# Patient Record
Sex: FEMALE | Race: WHITE | NOT HISPANIC OR LATINO
[De-identification: names, ages, dates, MRNs, and addresses within clinical notes are randomized per-mention and may not be internally consistent; named-entity substitution may affect disease eponyms.]

---

## 2017-01-10 ENCOUNTER — RX RENEWAL (OUTPATIENT)
Age: 82
End: 2017-01-10

## 2017-01-17 ENCOUNTER — APPOINTMENT (OUTPATIENT)
Dept: INTERNAL MEDICINE | Facility: CLINIC | Age: 82
End: 2017-01-17

## 2017-01-17 ENCOUNTER — LABORATORY RESULT (OUTPATIENT)
Age: 82
End: 2017-01-17

## 2017-01-17 VITALS
HEIGHT: 62 IN | HEART RATE: 78 BPM | BODY MASS INDEX: 22.08 KG/M2 | SYSTOLIC BLOOD PRESSURE: 138 MMHG | DIASTOLIC BLOOD PRESSURE: 80 MMHG | RESPIRATION RATE: 14 BRPM | WEIGHT: 120 LBS

## 2017-01-17 DIAGNOSIS — R07.9 CHEST PAIN, UNSPECIFIED: ICD-10-CM

## 2017-01-17 RX ORDER — DILTIAZEM HYDROCHLORIDE 60 MG/1
60 TABLET ORAL 3 TIMES DAILY
Refills: 0 | Status: DISCONTINUED | COMMUNITY
End: 2017-01-17

## 2017-01-26 ENCOUNTER — APPOINTMENT (OUTPATIENT)
Dept: PULMONOLOGY | Facility: CLINIC | Age: 82
End: 2017-01-26

## 2017-01-26 VITALS
HEART RATE: 69 BPM | HEIGHT: 61 IN | DIASTOLIC BLOOD PRESSURE: 80 MMHG | SYSTOLIC BLOOD PRESSURE: 122 MMHG | OXYGEN SATURATION: 98 % | BODY MASS INDEX: 22.66 KG/M2 | WEIGHT: 120 LBS

## 2017-02-07 ENCOUNTER — APPOINTMENT (OUTPATIENT)
Dept: INTERNAL MEDICINE | Facility: CLINIC | Age: 82
End: 2017-02-07

## 2017-02-07 VITALS
WEIGHT: 122 LBS | HEIGHT: 61 IN | SYSTOLIC BLOOD PRESSURE: 124 MMHG | DIASTOLIC BLOOD PRESSURE: 78 MMHG | TEMPERATURE: 98 F | BODY MASS INDEX: 23.03 KG/M2 | HEART RATE: 72 BPM | RESPIRATION RATE: 14 BRPM

## 2017-02-23 ENCOUNTER — APPOINTMENT (OUTPATIENT)
Dept: PULMONOLOGY | Facility: CLINIC | Age: 82
End: 2017-02-23

## 2017-02-23 ENCOUNTER — LABORATORY RESULT (OUTPATIENT)
Age: 82
End: 2017-02-23

## 2017-02-23 VITALS
BODY MASS INDEX: 23.03 KG/M2 | HEART RATE: 70 BPM | HEIGHT: 61 IN | SYSTOLIC BLOOD PRESSURE: 128 MMHG | OXYGEN SATURATION: 98 % | DIASTOLIC BLOOD PRESSURE: 62 MMHG | WEIGHT: 122 LBS

## 2017-02-27 ENCOUNTER — APPOINTMENT (OUTPATIENT)
Dept: CARDIOLOGY | Facility: CLINIC | Age: 82
End: 2017-02-27

## 2017-02-27 ENCOUNTER — NON-APPOINTMENT (OUTPATIENT)
Age: 82
End: 2017-02-27

## 2017-02-27 VITALS
WEIGHT: 116 LBS | DIASTOLIC BLOOD PRESSURE: 62 MMHG | SYSTOLIC BLOOD PRESSURE: 144 MMHG | RESPIRATION RATE: 16 BRPM | HEART RATE: 75 BPM | OXYGEN SATURATION: 98 % | BODY MASS INDEX: 21.92 KG/M2

## 2017-03-13 ENCOUNTER — APPOINTMENT (OUTPATIENT)
Dept: CARDIOLOGY | Facility: CLINIC | Age: 82
End: 2017-03-13

## 2017-03-29 ENCOUNTER — LABORATORY RESULT (OUTPATIENT)
Age: 82
End: 2017-03-29

## 2017-04-05 ENCOUNTER — RX RENEWAL (OUTPATIENT)
Age: 82
End: 2017-04-05

## 2017-04-10 ENCOUNTER — APPOINTMENT (OUTPATIENT)
Dept: INTERNAL MEDICINE | Facility: CLINIC | Age: 82
End: 2017-04-10

## 2017-04-10 VITALS
HEART RATE: 76 BPM | RESPIRATION RATE: 14 BRPM | DIASTOLIC BLOOD PRESSURE: 78 MMHG | SYSTOLIC BLOOD PRESSURE: 132 MMHG | HEIGHT: 61 IN | BODY MASS INDEX: 21.9 KG/M2 | WEIGHT: 116 LBS

## 2017-04-10 RX ORDER — FUROSEMIDE 20 MG/1
20 TABLET ORAL TWICE DAILY
Refills: 0 | Status: DISCONTINUED | COMMUNITY
End: 2017-04-10

## 2017-05-02 ENCOUNTER — LABORATORY RESULT (OUTPATIENT)
Age: 82
End: 2017-05-02

## 2017-05-22 ENCOUNTER — APPOINTMENT (OUTPATIENT)
Dept: PULMONOLOGY | Facility: CLINIC | Age: 82
End: 2017-05-22

## 2017-05-22 VITALS
SYSTOLIC BLOOD PRESSURE: 118 MMHG | WEIGHT: 116 LBS | BODY MASS INDEX: 21.9 KG/M2 | DIASTOLIC BLOOD PRESSURE: 72 MMHG | HEIGHT: 61 IN

## 2017-06-13 ENCOUNTER — LABORATORY RESULT (OUTPATIENT)
Age: 82
End: 2017-06-13

## 2017-06-14 ENCOUNTER — RX RENEWAL (OUTPATIENT)
Age: 82
End: 2017-06-14

## 2017-06-19 ENCOUNTER — APPOINTMENT (OUTPATIENT)
Dept: CARDIOLOGY | Facility: CLINIC | Age: 82
End: 2017-06-19

## 2017-06-25 ENCOUNTER — EMERGENCY (EMERGENCY)
Facility: HOSPITAL | Age: 82
LOS: 1 days | Discharge: HOME | End: 2017-06-25
Admitting: INTERNAL MEDICINE
Payer: MEDICARE

## 2017-06-25 DIAGNOSIS — Z79.01 LONG TERM (CURRENT) USE OF ANTICOAGULANTS: ICD-10-CM

## 2017-06-25 DIAGNOSIS — Z23 ENCOUNTER FOR IMMUNIZATION: ICD-10-CM

## 2017-06-25 DIAGNOSIS — K21.9 GASTRO-ESOPHAGEAL REFLUX DISEASE WITHOUT ESOPHAGITIS: ICD-10-CM

## 2017-06-25 DIAGNOSIS — S51.811A LACERATION WITHOUT FOREIGN BODY OF RIGHT FOREARM, INITIAL ENCOUNTER: ICD-10-CM

## 2017-06-25 DIAGNOSIS — Z79.82 LONG TERM (CURRENT) USE OF ASPIRIN: ICD-10-CM

## 2017-06-25 DIAGNOSIS — S09.90XA UNSPECIFIED INJURY OF HEAD, INITIAL ENCOUNTER: ICD-10-CM
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
## 2017-06-25 DIAGNOSIS — Z98.49 CATARACT EXTRACTION STATUS, UNSPECIFIED EYE: Chronic | ICD-10-CM
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
## 2017-06-25 DIAGNOSIS — I10 ESSENTIAL (PRIMARY) HYPERTENSION: ICD-10-CM

## 2017-06-25 DIAGNOSIS — S61.212A LACERATION WITHOUT FOREIGN BODY OF RIGHT MIDDLE FINGER WITHOUT DAMAGE TO NAIL, INITIAL ENCOUNTER: ICD-10-CM

## 2017-06-25 DIAGNOSIS — S01.81XA LACERATION WITHOUT FOREIGN BODY OF OTHER PART OF HEAD, INITIAL ENCOUNTER: ICD-10-CM
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
## 2017-06-25 PROCEDURE — 73130 X-RAY EXAM OF HAND: CPT

## 2017-06-25 PROCEDURE — 90471 IMMUNIZATION ADMIN: CPT

## 2017-06-25 PROCEDURE — 99284 EMERGENCY DEPT VISIT MOD MDM: CPT | Mod: 25

## 2017-06-25 PROCEDURE — 70450 CT HEAD/BRAIN W/O DYE: CPT | Mod: 26

## 2017-06-25 PROCEDURE — 80048 BASIC METABOLIC PNL TOTAL CA: CPT

## 2017-06-25 PROCEDURE — 85027 COMPLETE CBC AUTOMATED: CPT

## 2017-06-25 PROCEDURE — 70450 CT HEAD/BRAIN W/O DYE: CPT

## 2017-06-25 PROCEDURE — 12014 RPR F/E/E/N/L/M 5.1-7.5 CM: CPT | Mod: XU

## 2017-06-25 PROCEDURE — 12002 RPR S/N/AX/GEN/TRNK2.6-7.5CM: CPT

## 2017-06-25 PROCEDURE — 12014 RPR F/E/E/N/L/M 5.1-7.5 CM: CPT | Mod: 59,RT

## 2017-06-25 PROCEDURE — 85610 PROTHROMBIN TIME: CPT

## 2017-06-25 PROCEDURE — 73130 X-RAY EXAM OF HAND: CPT | Mod: 26,RT

## 2017-06-25 PROCEDURE — 36415 COLL VENOUS BLD VENIPUNCTURE: CPT

## 2017-06-25 PROCEDURE — 90700 DTAP VACCINE < 7 YRS IM: CPT

## 2017-06-25 PROCEDURE — 85730 THROMBOPLASTIN TIME PARTIAL: CPT

## 2017-07-03 ENCOUNTER — RX RENEWAL (OUTPATIENT)
Age: 82
End: 2017-07-03

## 2017-07-05 ENCOUNTER — RX RENEWAL (OUTPATIENT)
Age: 82
End: 2017-07-05

## 2017-07-10 ENCOUNTER — APPOINTMENT (OUTPATIENT)
Dept: INTERNAL MEDICINE | Facility: CLINIC | Age: 82
End: 2017-07-10

## 2017-07-10 VITALS
HEIGHT: 61 IN | RESPIRATION RATE: 14 BRPM | BODY MASS INDEX: 21.71 KG/M2 | HEART RATE: 72 BPM | SYSTOLIC BLOOD PRESSURE: 116 MMHG | DIASTOLIC BLOOD PRESSURE: 72 MMHG | WEIGHT: 115 LBS

## 2017-07-18 ENCOUNTER — LABORATORY RESULT (OUTPATIENT)
Age: 82
End: 2017-07-18

## 2017-07-21 ENCOUNTER — LABORATORY RESULT (OUTPATIENT)
Age: 82
End: 2017-07-21

## 2017-08-03 ENCOUNTER — LABORATORY RESULT (OUTPATIENT)
Age: 82
End: 2017-08-03

## 2017-08-12 ENCOUNTER — RX RENEWAL (OUTPATIENT)
Age: 82
End: 2017-08-12

## 2017-08-17 ENCOUNTER — LABORATORY RESULT (OUTPATIENT)
Age: 82
End: 2017-08-17

## 2017-08-28 ENCOUNTER — APPOINTMENT (OUTPATIENT)
Dept: CARDIOLOGY | Facility: CLINIC | Age: 82
End: 2017-08-28
Payer: MEDICARE

## 2017-08-28 ENCOUNTER — NON-APPOINTMENT (OUTPATIENT)
Age: 82
End: 2017-08-28

## 2017-08-28 VITALS
OXYGEN SATURATION: 99 % | BODY MASS INDEX: 22.58 KG/M2 | WEIGHT: 115 LBS | HEIGHT: 60 IN | SYSTOLIC BLOOD PRESSURE: 148 MMHG | HEART RATE: 60 BPM | DIASTOLIC BLOOD PRESSURE: 68 MMHG | RESPIRATION RATE: 17 BRPM

## 2017-08-28 PROCEDURE — 93000 ELECTROCARDIOGRAM COMPLETE: CPT

## 2017-08-28 PROCEDURE — 99214 OFFICE O/P EST MOD 30 MIN: CPT

## 2017-08-28 PROCEDURE — 93306 TTE W/DOPPLER COMPLETE: CPT

## 2017-09-05 ENCOUNTER — RX RENEWAL (OUTPATIENT)
Age: 82
End: 2017-09-05

## 2017-09-08 ENCOUNTER — RX RENEWAL (OUTPATIENT)
Age: 82
End: 2017-09-08

## 2017-09-20 ENCOUNTER — APPOINTMENT (OUTPATIENT)
Dept: CARDIOLOGY | Facility: CLINIC | Age: 82
End: 2017-09-20
Payer: MEDICARE

## 2017-09-20 PROCEDURE — 93288 INTERROG EVL PM/LDLS PM IP: CPT

## 2017-10-02 ENCOUNTER — RX RENEWAL (OUTPATIENT)
Age: 82
End: 2017-10-02

## 2017-10-04 ENCOUNTER — RX RENEWAL (OUTPATIENT)
Age: 82
End: 2017-10-04

## 2017-10-09 ENCOUNTER — RX RENEWAL (OUTPATIENT)
Age: 82
End: 2017-10-09

## 2017-10-12 ENCOUNTER — LABORATORY RESULT (OUTPATIENT)
Age: 82
End: 2017-10-12

## 2017-10-13 ENCOUNTER — LABORATORY RESULT (OUTPATIENT)
Age: 82
End: 2017-10-13

## 2017-10-16 ENCOUNTER — APPOINTMENT (OUTPATIENT)
Dept: INTERNAL MEDICINE | Facility: CLINIC | Age: 82
End: 2017-10-16
Payer: MEDICARE

## 2017-10-16 VITALS
RESPIRATION RATE: 14 BRPM | TEMPERATURE: 98.4 F | SYSTOLIC BLOOD PRESSURE: 138 MMHG | DIASTOLIC BLOOD PRESSURE: 78 MMHG | HEART RATE: 68 BPM

## 2017-10-16 VITALS
SYSTOLIC BLOOD PRESSURE: 142 MMHG | DIASTOLIC BLOOD PRESSURE: 74 MMHG | RESPIRATION RATE: 15 BRPM | HEART RATE: 64 BPM | HEIGHT: 60 IN | BODY MASS INDEX: 22.78 KG/M2 | WEIGHT: 116 LBS

## 2017-10-16 DIAGNOSIS — B02.9 ZOSTER W/OUT COMPLICATIONS: ICD-10-CM

## 2017-10-16 PROCEDURE — 99215 OFFICE O/P EST HI 40 MIN: CPT | Mod: 25

## 2017-10-16 PROCEDURE — 90662 IIV NO PRSV INCREASED AG IM: CPT

## 2017-10-16 PROCEDURE — G0008: CPT

## 2017-10-30 ENCOUNTER — APPOINTMENT (OUTPATIENT)
Dept: INTERNAL MEDICINE | Facility: CLINIC | Age: 82
End: 2017-10-30
Payer: MEDICARE

## 2017-10-30 ENCOUNTER — LABORATORY RESULT (OUTPATIENT)
Age: 82
End: 2017-10-30

## 2017-10-30 DIAGNOSIS — Z23 ENCOUNTER FOR IMMUNIZATION: ICD-10-CM

## 2017-10-30 PROCEDURE — G0009: CPT

## 2017-10-30 PROCEDURE — 90670 PCV13 VACCINE IM: CPT

## 2017-11-06 ENCOUNTER — RX RENEWAL (OUTPATIENT)
Age: 82
End: 2017-11-06

## 2017-11-13 ENCOUNTER — RX RENEWAL (OUTPATIENT)
Age: 82
End: 2017-11-13

## 2017-11-15 ENCOUNTER — NON-APPOINTMENT (OUTPATIENT)
Age: 82
End: 2017-11-15

## 2017-11-15 ENCOUNTER — APPOINTMENT (OUTPATIENT)
Dept: CARDIOLOGY | Facility: CLINIC | Age: 82
End: 2017-11-15
Payer: MEDICARE

## 2017-11-15 VITALS
BODY MASS INDEX: 23.16 KG/M2 | DIASTOLIC BLOOD PRESSURE: 61 MMHG | WEIGHT: 118 LBS | OXYGEN SATURATION: 98 % | HEIGHT: 60 IN | RESPIRATION RATE: 17 BRPM | SYSTOLIC BLOOD PRESSURE: 144 MMHG | HEART RATE: 60 BPM

## 2017-11-15 PROCEDURE — 99214 OFFICE O/P EST MOD 30 MIN: CPT

## 2017-11-15 PROCEDURE — 93000 ELECTROCARDIOGRAM COMPLETE: CPT

## 2017-11-22 ENCOUNTER — APPOINTMENT (OUTPATIENT)
Dept: PULMONOLOGY | Facility: CLINIC | Age: 82
End: 2017-11-22
Payer: MEDICARE

## 2017-11-22 ENCOUNTER — LABORATORY RESULT (OUTPATIENT)
Age: 82
End: 2017-11-22

## 2017-11-22 VITALS
DIASTOLIC BLOOD PRESSURE: 70 MMHG | HEIGHT: 60 IN | OXYGEN SATURATION: 98 % | SYSTOLIC BLOOD PRESSURE: 122 MMHG | WEIGHT: 118 LBS | BODY MASS INDEX: 23.16 KG/M2

## 2017-11-22 PROCEDURE — 99213 OFFICE O/P EST LOW 20 MIN: CPT

## 2017-11-30 ENCOUNTER — RX RENEWAL (OUTPATIENT)
Age: 82
End: 2017-11-30

## 2017-11-30 ENCOUNTER — MEDICATION RENEWAL (OUTPATIENT)
Age: 82
End: 2017-11-30

## 2017-12-10 ENCOUNTER — FORM ENCOUNTER (OUTPATIENT)
Age: 82
End: 2017-12-10

## 2017-12-11 ENCOUNTER — APPOINTMENT (OUTPATIENT)
Dept: CT IMAGING | Facility: HOSPITAL | Age: 82
End: 2017-12-11
Payer: MEDICARE

## 2017-12-11 ENCOUNTER — OUTPATIENT (OUTPATIENT)
Dept: OUTPATIENT SERVICES | Facility: HOSPITAL | Age: 82
LOS: 1 days | End: 2017-12-11
Payer: MEDICARE

## 2017-12-11 ENCOUNTER — RX RENEWAL (OUTPATIENT)
Age: 82
End: 2017-12-11

## 2017-12-11 DIAGNOSIS — J47.9 BRONCHIECTASIS, UNCOMPLICATED: ICD-10-CM

## 2017-12-11 DIAGNOSIS — Z98.49 CATARACT EXTRACTION STATUS, UNSPECIFIED EYE: Chronic | ICD-10-CM

## 2017-12-11 DIAGNOSIS — J98.4 OTHER DISORDERS OF LUNG: ICD-10-CM

## 2017-12-11 PROCEDURE — 71250 CT THORAX DX C-: CPT | Mod: 26

## 2017-12-11 PROCEDURE — 71250 CT THORAX DX C-: CPT

## 2017-12-13 ENCOUNTER — RX RENEWAL (OUTPATIENT)
Age: 82
End: 2017-12-13

## 2017-12-27 ENCOUNTER — RX RENEWAL (OUTPATIENT)
Age: 82
End: 2017-12-27

## 2017-12-28 ENCOUNTER — MEDICATION RENEWAL (OUTPATIENT)
Age: 82
End: 2017-12-28

## 2017-12-28 ENCOUNTER — RX RENEWAL (OUTPATIENT)
Age: 82
End: 2017-12-28

## 2018-01-08 ENCOUNTER — NON-APPOINTMENT (OUTPATIENT)
Age: 83
End: 2018-01-08

## 2018-01-08 ENCOUNTER — APPOINTMENT (OUTPATIENT)
Dept: CARDIOLOGY | Facility: CLINIC | Age: 83
End: 2018-01-08
Payer: MEDICARE

## 2018-01-08 VITALS
DIASTOLIC BLOOD PRESSURE: 63 MMHG | HEART RATE: 64 BPM | WEIGHT: 120 LBS | OXYGEN SATURATION: 98 % | HEIGHT: 60 IN | RESPIRATION RATE: 17 BRPM | SYSTOLIC BLOOD PRESSURE: 113 MMHG | BODY MASS INDEX: 23.56 KG/M2

## 2018-01-08 VITALS — DIASTOLIC BLOOD PRESSURE: 70 MMHG | SYSTOLIC BLOOD PRESSURE: 138 MMHG

## 2018-01-08 PROCEDURE — 93000 ELECTROCARDIOGRAM COMPLETE: CPT

## 2018-01-08 PROCEDURE — 99214 OFFICE O/P EST MOD 30 MIN: CPT

## 2018-01-09 ENCOUNTER — APPOINTMENT (OUTPATIENT)
Dept: INTERNAL MEDICINE | Facility: CLINIC | Age: 83
End: 2018-01-09
Payer: MEDICARE

## 2018-01-09 ENCOUNTER — LABORATORY RESULT (OUTPATIENT)
Age: 83
End: 2018-01-09

## 2018-01-09 VITALS
SYSTOLIC BLOOD PRESSURE: 128 MMHG | HEART RATE: 68 BPM | BODY MASS INDEX: 23.95 KG/M2 | DIASTOLIC BLOOD PRESSURE: 80 MMHG | WEIGHT: 122 LBS | RESPIRATION RATE: 14 BRPM | HEIGHT: 60 IN

## 2018-01-09 DIAGNOSIS — Z78.9 OTHER SPECIFIED HEALTH STATUS: ICD-10-CM

## 2018-01-09 PROCEDURE — 99215 OFFICE O/P EST HI 40 MIN: CPT

## 2018-01-15 ENCOUNTER — APPOINTMENT (OUTPATIENT)
Dept: CARDIOLOGY | Facility: CLINIC | Age: 83
End: 2018-01-15
Payer: MEDICARE

## 2018-01-15 PROCEDURE — 93293 PM PHONE R-STRIP DEVICE EVAL: CPT

## 2018-01-27 ENCOUNTER — RX RENEWAL (OUTPATIENT)
Age: 83
End: 2018-01-27

## 2018-02-06 ENCOUNTER — RX RENEWAL (OUTPATIENT)
Age: 83
End: 2018-02-06

## 2018-02-08 ENCOUNTER — MEDICATION RENEWAL (OUTPATIENT)
Age: 83
End: 2018-02-08

## 2018-02-25 ENCOUNTER — RX RENEWAL (OUTPATIENT)
Age: 83
End: 2018-02-25

## 2018-02-26 ENCOUNTER — LABORATORY RESULT (OUTPATIENT)
Age: 83
End: 2018-02-26

## 2018-03-21 ENCOUNTER — LABORATORY RESULT (OUTPATIENT)
Age: 83
End: 2018-03-21

## 2018-03-21 LAB
APPEARANCE: CLEAR
BASOPHILS # BLD AUTO: 0.03 K/UL
BASOPHILS NFR BLD AUTO: 0.4 %
BILIRUBIN URINE: NEGATIVE
BLOOD URINE: NEGATIVE
COLOR: YELLOW
EOSINOPHIL # BLD AUTO: 0.17 K/UL
EOSINOPHIL NFR BLD AUTO: 2.5 %
GLUCOSE QUALITATIVE U: NEGATIVE MG/DL
HCT VFR BLD CALC: 39 %
HGB BLD-MCNC: 12.9 G/DL
IMM GRANULOCYTES NFR BLD AUTO: 0 %
KETONES URINE: NEGATIVE
LEUKOCYTE ESTERASE URINE: ABNORMAL
LYMPHOCYTES # BLD AUTO: 1.23 K/UL
LYMPHOCYTES NFR BLD AUTO: 18.3 %
MAN DIFF?: NORMAL
MCHC RBC-ENTMCNC: 28.7 PG
MCHC RBC-ENTMCNC: 33.1 GM/DL
MCV RBC AUTO: 86.9 FL
MONOCYTES # BLD AUTO: 0.56 K/UL
MONOCYTES NFR BLD AUTO: 8.3 %
NEUTROPHILS # BLD AUTO: 4.73 K/UL
NEUTROPHILS NFR BLD AUTO: 70.5 %
NITRITE URINE: NEGATIVE
PH URINE: 5.5
PLATELET # BLD AUTO: 174 K/UL
PROTEIN URINE: 30 MG/DL
RBC # BLD: 4.49 M/UL
RBC # FLD: 16.3 %
SPECIFIC GRAVITY URINE: 1.02
UROBILINOGEN URINE: NEGATIVE MG/DL
WBC # FLD AUTO: 6.72 K/UL

## 2018-03-22 LAB
25(OH)D3 SERPL-MCNC: 11 NG/ML
ALBUMIN SERPL ELPH-MCNC: 4.4 G/DL
ALP BLD-CCNC: 155 U/L
ALT SERPL-CCNC: 19 U/L
ANION GAP SERPL CALC-SCNC: 17 MMOL/L
AST SERPL-CCNC: 24 U/L
BILIRUB SERPL-MCNC: 0.6 MG/DL
BUN SERPL-MCNC: 22 MG/DL
CALCIUM SERPL-MCNC: 11 MG/DL
CHLORIDE SERPL-SCNC: 102 MMOL/L
CHOLEST SERPL-MCNC: 190 MG/DL
CHOLEST/HDLC SERPL: 3.2 RATIO
CO2 SERPL-SCNC: 23 MMOL/L
CREAT SERPL-MCNC: 1.33 MG/DL
CRP SERPL HS-MCNC: 7.3 MG/L
GLUCOSE BS SERPL-MCNC: 105 MG/DL
GLUCOSE SERPL-MCNC: 107 MG/DL
HBA1C MFR BLD HPLC: 6.1 %
HDLC SERPL-MCNC: 60 MG/DL
LDLC SERPL CALC-MCNC: 109 MG/DL
POTASSIUM SERPL-SCNC: 3.9 MMOL/L
PROT SERPL-MCNC: 6.8 G/DL
SODIUM SERPL-SCNC: 142 MMOL/L
T4 FREE SERPL-MCNC: 1.5 NG/DL
TRIGL SERPL-MCNC: 103 MG/DL
TSH SERPL-ACNC: 2.65 UIU/ML
VIT B12 SERPL-MCNC: 1309 PG/ML

## 2018-03-26 ENCOUNTER — RX RENEWAL (OUTPATIENT)
Age: 83
End: 2018-03-26

## 2018-03-30 ENCOUNTER — RX RENEWAL (OUTPATIENT)
Age: 83
End: 2018-03-30

## 2018-04-02 ENCOUNTER — RX RENEWAL (OUTPATIENT)
Age: 83
End: 2018-04-02

## 2018-04-09 ENCOUNTER — LABORATORY RESULT (OUTPATIENT)
Age: 83
End: 2018-04-09

## 2018-04-10 ENCOUNTER — APPOINTMENT (OUTPATIENT)
Dept: INTERNAL MEDICINE | Facility: CLINIC | Age: 83
End: 2018-04-10
Payer: MEDICARE

## 2018-04-10 VITALS
HEIGHT: 60 IN | DIASTOLIC BLOOD PRESSURE: 75 MMHG | BODY MASS INDEX: 23.16 KG/M2 | RESPIRATION RATE: 15 BRPM | HEART RATE: 72 BPM | SYSTOLIC BLOOD PRESSURE: 124 MMHG | WEIGHT: 118 LBS

## 2018-04-10 PROCEDURE — 99215 OFFICE O/P EST HI 40 MIN: CPT

## 2018-04-16 ENCOUNTER — APPOINTMENT (OUTPATIENT)
Dept: CARDIOLOGY | Facility: CLINIC | Age: 83
End: 2018-04-16
Payer: MEDICARE

## 2018-04-16 PROCEDURE — 93293 PM PHONE R-STRIP DEVICE EVAL: CPT

## 2018-04-23 ENCOUNTER — LABORATORY RESULT (OUTPATIENT)
Age: 83
End: 2018-04-23

## 2018-04-29 ENCOUNTER — RX RENEWAL (OUTPATIENT)
Age: 83
End: 2018-04-29

## 2018-04-30 ENCOUNTER — RX RENEWAL (OUTPATIENT)
Age: 83
End: 2018-04-30

## 2018-04-30 ENCOUNTER — LABORATORY RESULT (OUTPATIENT)
Age: 83
End: 2018-04-30

## 2018-05-09 ENCOUNTER — RX RENEWAL (OUTPATIENT)
Age: 83
End: 2018-05-09

## 2018-05-10 ENCOUNTER — APPOINTMENT (OUTPATIENT)
Dept: CARDIOLOGY | Facility: CLINIC | Age: 83
End: 2018-05-10
Payer: MEDICARE

## 2018-05-10 VITALS
WEIGHT: 118 LBS | HEIGHT: 60 IN | DIASTOLIC BLOOD PRESSURE: 66 MMHG | HEART RATE: 60 BPM | RESPIRATION RATE: 17 BRPM | SYSTOLIC BLOOD PRESSURE: 138 MMHG | BODY MASS INDEX: 23.16 KG/M2 | OXYGEN SATURATION: 95 %

## 2018-05-10 VITALS — DIASTOLIC BLOOD PRESSURE: 60 MMHG | SYSTOLIC BLOOD PRESSURE: 120 MMHG

## 2018-05-10 PROCEDURE — 99214 OFFICE O/P EST MOD 30 MIN: CPT

## 2018-05-10 PROCEDURE — 93000 ELECTROCARDIOGRAM COMPLETE: CPT

## 2018-05-29 ENCOUNTER — RX RENEWAL (OUTPATIENT)
Age: 83
End: 2018-05-29

## 2018-06-04 ENCOUNTER — LABORATORY RESULT (OUTPATIENT)
Age: 83
End: 2018-06-04

## 2018-06-21 ENCOUNTER — RX RENEWAL (OUTPATIENT)
Age: 83
End: 2018-06-21

## 2018-06-28 ENCOUNTER — RX RENEWAL (OUTPATIENT)
Age: 83
End: 2018-06-28

## 2018-07-10 ENCOUNTER — APPOINTMENT (OUTPATIENT)
Dept: INTERNAL MEDICINE | Facility: CLINIC | Age: 83
End: 2018-07-10
Payer: MEDICARE

## 2018-07-10 ENCOUNTER — RX RENEWAL (OUTPATIENT)
Age: 83
End: 2018-07-10

## 2018-07-10 ENCOUNTER — LABORATORY RESULT (OUTPATIENT)
Age: 83
End: 2018-07-10

## 2018-07-10 VITALS — BODY MASS INDEX: 20.43 KG/M2 | HEIGHT: 62 IN | WEIGHT: 111 LBS

## 2018-07-10 VITALS — DIASTOLIC BLOOD PRESSURE: 64 MMHG | SYSTOLIC BLOOD PRESSURE: 122 MMHG | HEART RATE: 62 BPM | RESPIRATION RATE: 15 BRPM

## 2018-07-10 PROCEDURE — 99215 OFFICE O/P EST HI 40 MIN: CPT

## 2018-07-10 NOTE — REVIEW OF SYSTEMS
[Nasal Discharge] : nasal discharge [Nocturia] : nocturia [Fever] : no fever [Chills] : no chills [Fatigue] : no fatigue [Hot Flashes] : no hot flashes [Night Sweats] : no night sweats [Recent Change In Weight] : ~T no recent weight change [Discharge] : no discharge [Pain] : no pain [Redness] : no redness [Dryness] : no dryness  [Vision Problems] : no vision problems [Itching] : no itching [Earache] : no earache [Hearing Loss] : no hearing loss [Nosebleed] : no nosebleeds [Hoarseness] : no hoarseness [Sore Throat] : no sore throat [Postnasal Drip] : no postnasal drip [Chest Pain] : no chest pain [Palpitations] : no palpitations [Leg Claudication] : no leg claudication [Lower Ext Edema] : no lower extremity edema [Orthopnea] : no orthopnea [Paroysmal Nocturnal Dyspnea] : no paroysmal nocturnal dyspnea [Shortness Of Breath] : no shortness of breath [Wheezing] : no wheezing [Cough] : no cough [Dyspnea on Exertion] : no dyspnea on exertion [Abdominal Pain] : no abdominal pain [Nausea] : no nausea [Constipation] : no constipation [Diarrhea] : diarrhea [Vomiting] : no vomiting [Heartburn] : no heartburn [Melena] : no melena [Dysuria] : no dysuria [Incontinence] : no incontinence [Poor Libido] : libido not poor [Hematuria] : no hematuria [Frequency] : no frequency [Vaginal Discharge] : no vaginal discharge [Dysmenorrhea] : no dysmenorrhea [Joint Pain] : no joint pain [Joint Stiffness] : no joint stiffness [Joint Swelling] : no joint swelling [Muscle Weakness] : no muscle weakness [Muscle Pain] : no muscle pain [Back Pain] : no back pain [Itching] : no itching [Mole Changes] : no mole changes [Nail Changes] : no nail changes [Hair Changes] : no hair changes [Skin Rash] : no skin rash [Headache] : no headache [Dizziness] : no dizziness [Fainting] : no fainting [Confusion] : no confusion [Memory Loss] : no memory loss [Unsteady Walking] : no ataxia [Suicidal] : not suicidal [Insomnia] : no insomnia [Anxiety] : no anxiety [Depression] : no depression [Easy Bleeding] : no easy bleeding [Easy Bruising] : no easy bruising [Swollen Glands] : no swollen glands

## 2018-07-10 NOTE — HISTORY OF PRESENT ILLNESS
[FreeTextEntry1] : Comes to the office for followup to review her medications discuss her overall health [de-identified] : 89-year-old woman comes for followup of her history of bronchiectasis A. fib ulcerative colitis hypertension macular degeneration chronic venous insufficiency and leg edema and sick sinus syndrome with pacemaker placement. She denies chest pain shortness of breath exertional dyspnea palpitations lightheadedness dizziness vertigo or syncope she has chronic discomfort in her lower back which is mild she has seen no blood in her stool and denied abdominal pain nausea vomiting or constipation her appetite has been good her weight is stable denies temperature chills sweats or myalgias she still has some irritation in the area of her shingles which occurred in January of 2018

## 2018-07-10 NOTE — ASSESSMENT
[FreeTextEntry1] : Physical exam shows a well-developed female in no acute distress blood pressure 122/64 pulse is 60-70 respirations of 15 5 foot 2 inches 111 pounds BMI 20.3 HEENT was unremarkable chest clear cardiovascular was irregular with a 1/6 systolic murmur abdomen was soft and nontender extremities showed trace bilateral edema unchanged from prior exam neurologic exam was nonfocal patient recently saw her cardiologist and gets monthly pacemaker checks she is heading down to get her INR which he does faithfully every month. She is up-to-date with her ophthalmologist and dermatologist she will receive the new shingles vaccine in the appropriate interval time since her last attack of shingles

## 2018-07-10 NOTE — HEALTH RISK ASSESSMENT
[No falls in past year] : Patient reported no falls in the past year [0] : 2) Feeling down, depressed, or hopeless: Not at all (0) [[id number]] : 0

## 2018-07-23 ENCOUNTER — LABORATORY RESULT (OUTPATIENT)
Age: 83
End: 2018-07-23

## 2018-08-02 ENCOUNTER — RX RENEWAL (OUTPATIENT)
Age: 83
End: 2018-08-02

## 2018-08-06 ENCOUNTER — RX RENEWAL (OUTPATIENT)
Age: 83
End: 2018-08-06

## 2018-08-06 ENCOUNTER — LABORATORY RESULT (OUTPATIENT)
Age: 83
End: 2018-08-06

## 2018-08-20 ENCOUNTER — LABORATORY RESULT (OUTPATIENT)
Age: 83
End: 2018-08-20

## 2018-09-10 ENCOUNTER — LABORATORY RESULT (OUTPATIENT)
Age: 83
End: 2018-09-10

## 2018-09-13 ENCOUNTER — APPOINTMENT (OUTPATIENT)
Dept: CARDIOLOGY | Facility: CLINIC | Age: 83
End: 2018-09-13
Payer: MEDICARE

## 2018-09-13 ENCOUNTER — NON-APPOINTMENT (OUTPATIENT)
Age: 83
End: 2018-09-13

## 2018-09-13 VITALS
HEART RATE: 60 BPM | BODY MASS INDEX: 20.61 KG/M2 | HEIGHT: 62 IN | WEIGHT: 112 LBS | DIASTOLIC BLOOD PRESSURE: 58 MMHG | OXYGEN SATURATION: 98 % | RESPIRATION RATE: 17 BRPM | SYSTOLIC BLOOD PRESSURE: 107 MMHG

## 2018-09-13 VITALS — DIASTOLIC BLOOD PRESSURE: 60 MMHG | SYSTOLIC BLOOD PRESSURE: 120 MMHG

## 2018-09-13 PROCEDURE — 93000 ELECTROCARDIOGRAM COMPLETE: CPT

## 2018-09-13 PROCEDURE — 99214 OFFICE O/P EST MOD 30 MIN: CPT

## 2018-09-13 PROCEDURE — 93306 TTE W/DOPPLER COMPLETE: CPT

## 2018-09-17 ENCOUNTER — RX RENEWAL (OUTPATIENT)
Age: 83
End: 2018-09-17

## 2018-09-25 ENCOUNTER — APPOINTMENT (OUTPATIENT)
Dept: CARDIOLOGY | Facility: CLINIC | Age: 83
End: 2018-09-25
Payer: MEDICARE

## 2018-09-25 PROCEDURE — 93288 INTERROG EVL PM/LDLS PM IP: CPT

## 2018-10-04 ENCOUNTER — OUTPATIENT (OUTPATIENT)
Dept: OUTPATIENT SERVICES | Facility: HOSPITAL | Age: 83
LOS: 1 days | End: 2018-10-04
Payer: MEDICARE

## 2018-10-04 ENCOUNTER — APPOINTMENT (OUTPATIENT)
Dept: RADIOLOGY | Facility: HOSPITAL | Age: 83
End: 2018-10-04

## 2018-10-04 DIAGNOSIS — Z00.8 ENCOUNTER FOR OTHER GENERAL EXAMINATION: ICD-10-CM
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
## 2018-10-04 DIAGNOSIS — R26.2 DIFFICULTY IN WALKING, NOT ELSEWHERE CLASSIFIED: ICD-10-CM

## 2018-10-04 DIAGNOSIS — M25.551 PAIN IN RIGHT HIP: ICD-10-CM

## 2018-10-04 DIAGNOSIS — M79.651 PAIN IN RIGHT THIGH: ICD-10-CM
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
## 2018-10-04 DIAGNOSIS — Z98.49 CATARACT EXTRACTION STATUS, UNSPECIFIED EYE: Chronic | ICD-10-CM

## 2018-10-04 PROCEDURE — 73552 X-RAY EXAM OF FEMUR 2/>: CPT | Mod: 26,RT

## 2018-10-04 PROCEDURE — 73502 X-RAY EXAM HIP UNI 2-3 VIEWS: CPT

## 2018-10-04 PROCEDURE — 73502 X-RAY EXAM HIP UNI 2-3 VIEWS: CPT | Mod: 26,RT

## 2018-10-04 PROCEDURE — 73552 X-RAY EXAM OF FEMUR 2/>: CPT

## 2018-10-08 ENCOUNTER — LABORATORY RESULT (OUTPATIENT)
Age: 83
End: 2018-10-08

## 2018-10-08 ENCOUNTER — APPOINTMENT (OUTPATIENT)
Dept: ORTHOPEDIC SURGERY | Facility: CLINIC | Age: 83
End: 2018-10-08
Payer: MEDICARE

## 2018-10-08 VITALS
SYSTOLIC BLOOD PRESSURE: 152 MMHG | HEART RATE: 61 BPM | BODY MASS INDEX: 20.2 KG/M2 | WEIGHT: 107 LBS | DIASTOLIC BLOOD PRESSURE: 77 MMHG | HEIGHT: 61 IN

## 2018-10-08 PROCEDURE — 99214 OFFICE O/P EST MOD 30 MIN: CPT

## 2018-10-09 ENCOUNTER — APPOINTMENT (OUTPATIENT)
Dept: INTERNAL MEDICINE | Facility: CLINIC | Age: 83
End: 2018-10-09

## 2018-10-23 ENCOUNTER — LABORATORY RESULT (OUTPATIENT)
Age: 83
End: 2018-10-23

## 2018-10-23 ENCOUNTER — APPOINTMENT (OUTPATIENT)
Dept: ORTHOPEDIC SURGERY | Facility: CLINIC | Age: 83
End: 2018-10-23
Payer: MEDICARE

## 2018-10-23 VITALS — HEIGHT: 61 IN | WEIGHT: 107 LBS | BODY MASS INDEX: 20.2 KG/M2

## 2018-10-23 DIAGNOSIS — S72.001A FRACTURE OF UNSPECIFIED PART OF NECK OF RIGHT FEMUR, INITIAL ENCOUNTER FOR CLOSED FRACTURE: ICD-10-CM

## 2018-10-23 PROCEDURE — 99213 OFFICE O/P EST LOW 20 MIN: CPT

## 2018-10-23 PROCEDURE — 73502 X-RAY EXAM HIP UNI 2-3 VIEWS: CPT | Mod: RT

## 2018-11-01 ENCOUNTER — RX RENEWAL (OUTPATIENT)
Age: 83
End: 2018-11-01

## 2018-11-13 ENCOUNTER — APPOINTMENT (OUTPATIENT)
Dept: ORTHOPEDIC SURGERY | Facility: CLINIC | Age: 83
End: 2018-11-13
Payer: MEDICARE

## 2018-11-13 ENCOUNTER — LABORATORY RESULT (OUTPATIENT)
Age: 83
End: 2018-11-13

## 2018-11-13 VITALS — HEIGHT: 61 IN | WEIGHT: 107 LBS | BODY MASS INDEX: 20.2 KG/M2

## 2018-11-13 DIAGNOSIS — S72.001G FRACTURE OF UNSPECIFIED PART OF NECK OF RIGHT FEMUR, SUBSEQUENT ENCOUNTER FOR CLOSED FRACTURE WITH DELAYED HEALING: ICD-10-CM

## 2018-11-13 PROCEDURE — 99213 OFFICE O/P EST LOW 20 MIN: CPT

## 2018-11-13 PROCEDURE — 73502 X-RAY EXAM HIP UNI 2-3 VIEWS: CPT

## 2018-11-23 ENCOUNTER — TRANSCRIPTION ENCOUNTER (OUTPATIENT)
Age: 83
End: 2018-11-23

## 2018-11-23 ENCOUNTER — INPATIENT (INPATIENT)
Facility: HOSPITAL | Age: 83
LOS: 3 days | Discharge: NURSING, REHAB OR LONG-TERM CARE | DRG: 470 | End: 2018-11-27
Attending: INTERNAL MEDICINE | Admitting: INTERNAL MEDICINE
Payer: MEDICARE

## 2018-11-23 VITALS
OXYGEN SATURATION: 96 % | RESPIRATION RATE: 17 BRPM | TEMPERATURE: 98 F | HEART RATE: 72 BPM | DIASTOLIC BLOOD PRESSURE: 65 MMHG | SYSTOLIC BLOOD PRESSURE: 158 MMHG | WEIGHT: 111.99 LBS

## 2018-11-23 DIAGNOSIS — I10 ESSENTIAL (PRIMARY) HYPERTENSION: ICD-10-CM

## 2018-11-23 DIAGNOSIS — E87.6 HYPOKALEMIA: ICD-10-CM

## 2018-11-23 DIAGNOSIS — S72.001A FRACTURE OF UNSPECIFIED PART OF NECK OF RIGHT FEMUR, INITIAL ENCOUNTER FOR CLOSED FRACTURE: ICD-10-CM

## 2018-11-23 DIAGNOSIS — Z29.9 ENCOUNTER FOR PROPHYLACTIC MEASURES, UNSPECIFIED: ICD-10-CM

## 2018-11-23 DIAGNOSIS — Z98.49 CATARACT EXTRACTION STATUS, UNSPECIFIED EYE: Chronic | ICD-10-CM

## 2018-11-23 DIAGNOSIS — I48.2 CHRONIC ATRIAL FIBRILLATION: ICD-10-CM

## 2018-11-23 DIAGNOSIS — S72.90XA UNSPECIFIED FRACTURE OF UNSPECIFIED FEMUR, INITIAL ENCOUNTER FOR CLOSED FRACTURE: ICD-10-CM

## 2018-11-23 DIAGNOSIS — Z95.0 PRESENCE OF CARDIAC PACEMAKER: ICD-10-CM

## 2018-11-23 LAB
ALBUMIN SERPL ELPH-MCNC: 3.1 G/DL — LOW (ref 3.3–5)
ALP SERPL-CCNC: 101 U/L — SIGNIFICANT CHANGE UP (ref 40–120)
ALT FLD-CCNC: 21 U/L DA — SIGNIFICANT CHANGE UP (ref 10–45)
ANION GAP SERPL CALC-SCNC: 7 MMOL/L — SIGNIFICANT CHANGE UP (ref 5–17)
APTT BLD: 29.3 SEC — SIGNIFICANT CHANGE UP (ref 27.5–36.3)
AST SERPL-CCNC: 16 U/L — SIGNIFICANT CHANGE UP (ref 10–40)
BASOPHILS # BLD AUTO: 0 K/UL — SIGNIFICANT CHANGE UP (ref 0–0.2)
BASOPHILS NFR BLD AUTO: 0.6 % — SIGNIFICANT CHANGE UP (ref 0–2)
BILIRUB SERPL-MCNC: 0.6 MG/DL — SIGNIFICANT CHANGE UP (ref 0.2–1.2)
BUN SERPL-MCNC: 24 MG/DL — HIGH (ref 7–23)
CALCIUM SERPL-MCNC: 10.5 MG/DL — SIGNIFICANT CHANGE UP (ref 8.4–10.5)
CHLORIDE SERPL-SCNC: 100 MMOL/L — SIGNIFICANT CHANGE UP (ref 96–108)
CO2 SERPL-SCNC: 29 MMOL/L — SIGNIFICANT CHANGE UP (ref 22–31)
CREAT SERPL-MCNC: 1.24 MG/DL — SIGNIFICANT CHANGE UP (ref 0.5–1.3)
EOSINOPHIL # BLD AUTO: 0.1 K/UL — SIGNIFICANT CHANGE UP (ref 0–0.5)
EOSINOPHIL NFR BLD AUTO: 1 % — SIGNIFICANT CHANGE UP (ref 0–6)
GLUCOSE SERPL-MCNC: 106 MG/DL — HIGH (ref 70–99)
HCT VFR BLD CALC: 40.8 % — SIGNIFICANT CHANGE UP (ref 34.5–45)
HGB BLD-MCNC: 13.1 G/DL — SIGNIFICANT CHANGE UP (ref 11.5–15.5)
INR BLD: 1.46 RATIO — HIGH (ref 0.88–1.16)
LYMPHOCYTES # BLD AUTO: 0.8 K/UL — LOW (ref 1–3.3)
LYMPHOCYTES # BLD AUTO: 9.3 % — LOW (ref 13–44)
MCHC RBC-ENTMCNC: 29.6 PG — SIGNIFICANT CHANGE UP (ref 27–34)
MCHC RBC-ENTMCNC: 32.2 GM/DL — SIGNIFICANT CHANGE UP (ref 32–36)
MCV RBC AUTO: 91.8 FL — SIGNIFICANT CHANGE UP (ref 80–100)
MONOCYTES # BLD AUTO: 1 K/UL — HIGH (ref 0–0.9)
MONOCYTES NFR BLD AUTO: 12.2 % — SIGNIFICANT CHANGE UP (ref 2–14)
NEUTROPHILS # BLD AUTO: 6.5 K/UL — SIGNIFICANT CHANGE UP (ref 1.8–7.4)
NEUTROPHILS NFR BLD AUTO: 76.9 % — SIGNIFICANT CHANGE UP (ref 43–77)
PLATELET # BLD AUTO: 178 K/UL — SIGNIFICANT CHANGE UP (ref 150–400)
POTASSIUM SERPL-MCNC: 2.8 MMOL/L — CRITICAL LOW (ref 3.5–5.3)
POTASSIUM SERPL-SCNC: 2.8 MMOL/L — CRITICAL LOW (ref 3.5–5.3)
PROT SERPL-MCNC: 6.5 G/DL — SIGNIFICANT CHANGE UP (ref 6–8.3)
PROTHROM AB SERPL-ACNC: 16.5 SEC — HIGH (ref 10–12.9)
RBC # BLD: 4.44 M/UL — SIGNIFICANT CHANGE UP (ref 3.8–5.2)
RBC # FLD: 14.6 % — HIGH (ref 10.3–14.5)
SODIUM SERPL-SCNC: 136 MMOL/L — SIGNIFICANT CHANGE UP (ref 135–145)
WBC # BLD: 8.4 K/UL — SIGNIFICANT CHANGE UP (ref 3.8–10.5)
WBC # FLD AUTO: 8.4 K/UL — SIGNIFICANT CHANGE UP (ref 3.8–10.5)

## 2018-11-23 PROCEDURE — 99285 EMERGENCY DEPT VISIT HI MDM: CPT

## 2018-11-23 PROCEDURE — 73552 X-RAY EXAM OF FEMUR 2/>: CPT | Mod: 26,RT

## 2018-11-23 PROCEDURE — 99223 1ST HOSP IP/OBS HIGH 75: CPT

## 2018-11-23 PROCEDURE — 71045 X-RAY EXAM CHEST 1 VIEW: CPT | Mod: 26

## 2018-11-23 PROCEDURE — 93010 ELECTROCARDIOGRAM REPORT: CPT

## 2018-11-23 PROCEDURE — 73502 X-RAY EXAM HIP UNI 2-3 VIEWS: CPT | Mod: 26,RT

## 2018-11-23 PROCEDURE — 72192 CT PELVIS W/O DYE: CPT | Mod: 26

## 2018-11-23 RX ORDER — SODIUM CHLORIDE 9 MG/ML
3 INJECTION INTRAMUSCULAR; INTRAVENOUS; SUBCUTANEOUS ONCE
Qty: 0 | Refills: 0 | Status: COMPLETED | OUTPATIENT
Start: 2018-11-23 | End: 2018-11-23

## 2018-11-23 RX ORDER — CHLORHEXIDINE GLUCONATE 213 G/1000ML
1 SOLUTION TOPICAL ONCE
Qty: 0 | Refills: 0 | Status: COMPLETED | OUTPATIENT
Start: 2018-11-23 | End: 2018-11-24

## 2018-11-23 RX ORDER — MESALAMINE 400 MG
2400 TABLET, DELAYED RELEASE (ENTERIC COATED) ORAL DAILY
Qty: 0 | Refills: 0 | Status: DISCONTINUED | OUTPATIENT
Start: 2018-11-23 | End: 2018-11-23

## 2018-11-23 RX ORDER — FUROSEMIDE 40 MG
20 TABLET ORAL DAILY
Qty: 0 | Refills: 0 | Status: DISCONTINUED | OUTPATIENT
Start: 2018-11-23 | End: 2018-11-24

## 2018-11-23 RX ORDER — POTASSIUM CHLORIDE 20 MEQ
10 PACKET (EA) ORAL DAILY
Qty: 0 | Refills: 0 | Status: DISCONTINUED | OUTPATIENT
Start: 2018-11-23 | End: 2018-11-24

## 2018-11-23 RX ORDER — ACETAMINOPHEN 500 MG
650 TABLET ORAL ONCE
Qty: 0 | Refills: 0 | Status: COMPLETED | OUTPATIENT
Start: 2018-11-23 | End: 2018-11-23

## 2018-11-23 RX ORDER — LABETALOL HCL 100 MG
200 TABLET ORAL THREE TIMES A DAY
Qty: 0 | Refills: 0 | Status: DISCONTINUED | OUTPATIENT
Start: 2018-11-23 | End: 2018-11-24

## 2018-11-23 RX ORDER — OXYCODONE AND ACETAMINOPHEN 5; 325 MG/1; MG/1
1 TABLET ORAL EVERY 6 HOURS
Qty: 0 | Refills: 0 | Status: DISCONTINUED | OUTPATIENT
Start: 2018-11-23 | End: 2018-11-24

## 2018-11-23 RX ORDER — MONTELUKAST 4 MG/1
10 TABLET, CHEWABLE ORAL AT BEDTIME
Qty: 0 | Refills: 0 | Status: DISCONTINUED | OUTPATIENT
Start: 2018-11-23 | End: 2018-11-24

## 2018-11-23 RX ORDER — MESALAMINE 400 MG
2.4 TABLET, DELAYED RELEASE (ENTERIC COATED) ORAL DAILY
Qty: 0 | Refills: 0 | Status: DISCONTINUED | OUTPATIENT
Start: 2018-11-23 | End: 2018-11-24

## 2018-11-23 RX ORDER — FUROSEMIDE 40 MG
40 TABLET ORAL
Qty: 0 | Refills: 0 | Status: DISCONTINUED | OUTPATIENT
Start: 2018-11-23 | End: 2018-11-24

## 2018-11-23 RX ORDER — POTASSIUM CHLORIDE 20 MEQ
40 PACKET (EA) ORAL ONCE
Qty: 0 | Refills: 0 | Status: COMPLETED | OUTPATIENT
Start: 2018-11-23 | End: 2018-11-23

## 2018-11-23 RX ORDER — POTASSIUM CHLORIDE 20 MEQ
10 PACKET (EA) ORAL
Qty: 0 | Refills: 0 | Status: COMPLETED | OUTPATIENT
Start: 2018-11-23 | End: 2018-11-23

## 2018-11-23 RX ADMIN — Medication 10 MILLIEQUIVALENT(S): at 14:41

## 2018-11-23 RX ADMIN — OXYCODONE AND ACETAMINOPHEN 1 TABLET(S): 5; 325 TABLET ORAL at 15:50

## 2018-11-23 RX ADMIN — MONTELUKAST 10 MILLIGRAM(S): 4 TABLET, CHEWABLE ORAL at 22:26

## 2018-11-23 RX ADMIN — Medication 100 MILLIEQUIVALENT(S): at 13:32

## 2018-11-23 RX ADMIN — Medication 40 MILLIEQUIVALENT(S): at 13:31

## 2018-11-23 RX ADMIN — OXYCODONE AND ACETAMINOPHEN 1 TABLET(S): 5; 325 TABLET ORAL at 16:30

## 2018-11-23 RX ADMIN — SODIUM CHLORIDE 3 MILLILITER(S): 9 INJECTION INTRAMUSCULAR; INTRAVENOUS; SUBCUTANEOUS at 12:52

## 2018-11-23 RX ADMIN — Medication 100 MILLIEQUIVALENT(S): at 14:41

## 2018-11-23 RX ADMIN — Medication 200 MILLIGRAM(S): at 22:26

## 2018-11-23 NOTE — H&P ADULT - HISTORY OF PRESENT ILLNESS
89 y/o F with PMH HTN, Afib on coumadin , +PPM presents after falling after her physical therapy session, and having right hip pain. Patient states a few weeks ago she feel and fractured her right hip but was able to walk around and do PT, declined surgery at that time. Now patient understands that her second fall has made her fracture "worse" and is agreeable to surgery now . Pt denies any chest pain, SOB, dizziness, palpitations etc

## 2018-11-23 NOTE — ED PROVIDER NOTE - MEDICAL DECISION MAKING DETAILS
fall fx femur old unable to ambulate fall fx femur old unable to ambulate xray hip ortho consult admit

## 2018-11-23 NOTE — ED ADULT TRIAGE NOTE - CHIEF COMPLAINT QUOTE
she fell the other day, she recently fractured her hip and is in PT,but now is complaining of leg pain

## 2018-11-23 NOTE — ED PROVIDER NOTE - PMH
Acid reflux disease    Atrial fibrillation    HTN (hypertension), benign    Pacemaker    Transient cerebral ischemia, unspecified transient cerebral ischemia type

## 2018-11-23 NOTE — ED PROVIDER NOTE - PHYSICAL EXAMINATION
General:     NAD, well-nourished, well-appearing  Head:     NC/AT, EOMI, oral mucosa moist  Neck:     trachea midline  Lungs:     CTA b/l, no w/r/r  CVS:     S1S2, RRR,   Abd:     +BS, s/nt/nd, no organomegaly  Ext:    2+ radial and pedal pulses, no c/c/e,   MSK R hip tenderness painful movement, unable to ambulate  Neuro: AAOx3, no sensory/motor deficits

## 2018-11-23 NOTE — ED ADULT NURSE NOTE - OBJECTIVE STATEMENT
Patient presents today with right hip pain. She suffered a right hip fx approx. 8 weeks ago, no surgery, in PT. On Monday of this week, she suffered another fall. Patient comes in today due to constant pain at right hip, worsened with movement. Patient took Tylenol just PTA.

## 2018-11-23 NOTE — H&P ADULT - NSHPREVIEWOFSYSTEMS_GEN_ALL_CORE
REVIEW OF SYSTEMS:  CONSTITUTIONAL: No fever, weight loss, or fatigue  RESPIRATORY: No cough, wheezing, chills or hemoptysis; No shortness of breath  CARDIOVASCULAR: No chest pain, palpitations, dizziness, or leg swelling  GASTROINTESTINAL: No abdominal pain, No nausea, vomiting, or hematemesis; No diarrhea or constipation  GENITOURINARY: No dysuria, frequency, hematuria, or incontinence  NEUROLOGICAL: No headaches, memory loss, loss of strength, numbness, or tremors  SKIN: No itching, burning, rashes, or lesions   MUSCULOSKELETAL: +right hip pain           All other ROS reviewed and negative except as otherwise stated

## 2018-11-23 NOTE — H&P ADULT - NSHPLABSRESULTS_GEN_ALL_CORE
13.1   8.4   )-----------( 178      ( 23 Nov 2018 12:48 )             40.8       11-23    136  |  100  |  24<H>  ----------------------------<  106<H>  2.8<LL>   |  29  |  1.24    Ca    10.5      23 Nov 2018 12:48    TPro  6.5  /  Alb  3.1<L>  /  TBili  0.6  /  DBili  x   /  AST  16  /  ALT  21  /  AlkPhos  101  11-23      PT/INR - ( 23 Nov 2018 12:48 )   PT: 16.5 sec;   INR: 1.46 ratio         PTT - ( 23 Nov 2018 12:48 )  PTT:29.3 sec          Vital Signs Last 24 Hrs  T(C): 36.4 (23 Nov 2018 10:45), Max: 36.4 (23 Nov 2018 10:45)  T(F): 97.6 (23 Nov 2018 10:45), Max: 97.6 (23 Nov 2018 10:45)  HR: 62 (23 Nov 2018 14:31) (60 - 72)  BP: 118/74 (23 Nov 2018 14:31) (118/74 - 158/65)  BP(mean): --  RR: 16 (23 Nov 2018 14:31) (16 - 17)  SpO2: 98% (23 Nov 2018 14:31) (96% - 99%)        < from: CT Pelvis Bony Only No Cont (11.23.18 @ 12:50) >      IMPRESSION:    Redemonstration of comminuted impacted right femoral neck fracture   without evidence of bridging. Now with mild superior displacement of the   distal femur with respect to the proximal femoral head/neck. No new   fracture    < end of copied text >

## 2018-11-23 NOTE — H&P ADULT - PROBLEM SELECTOR PLAN 1
ortho consult , hold asa/coumadin, likely surgery in am  npo after midnight  pain control ortho consult , hold asa/coumadin, likely surgery in am  npo after midnight  pain control  check 2D echo for medical clearance

## 2018-11-23 NOTE — H&P ADULT - PROBLEM SELECTOR PLAN 6
IMPROVE VTE Individual Risk Assessment          RISK                                                          Points  [  ] Previous VTE                                                3  [  ] Thrombophilia                                             2  [  ] Lower limb paralysis                                   2        (unable to hold up >15 seconds)    [  ] Current Cancer                                             2         (within 6 months)  [  ] Immobilization > 24 hrs                              1  [  ] ICU/CCU stay > 24 hours                             1  [ x ] Age > 60                                                         1    IMPROVE VTE Score: 1

## 2018-11-23 NOTE — ED PROVIDER NOTE - CARE PLAN
Principal Discharge DX:	Femur fracture Principal Discharge DX:	Closed fracture of neck of right femur, initial encounter

## 2018-11-23 NOTE — ED ADULT NURSE NOTE - PMH
Acid reflux disease    Atrial fibrillation    HTN (hypertension), benign    Transient cerebral ischemia, unspecified transient cerebral ischemia type Acid reflux disease    Atrial fibrillation    HTN (hypertension), benign    Pacemaker    Transient cerebral ischemia, unspecified transient cerebral ischemia type

## 2018-11-23 NOTE — ED ADULT NURSE REASSESSMENT NOTE - REASSESS COMMUNICATION
Patient c/o pain with KCL infusion. Infusion rate slowed down for patient comfort. Will continue to monitor.

## 2018-11-23 NOTE — ED ADULT NURSE NOTE - NSIMPLEMENTINTERV_GEN_ALL_ED
Implemented All Fall with Harm Risk Interventions:  [place] to call system. Call bell, personal items and telephone within reach. Instruct patient to call for assistance. Room bathroom lighting operational. Non-slip footwear when patient is off stretcher. Physically safe environment: no spills, clutter or unnecessary equipment. Stretcher in lowest position, wheels locked, appropriate side rails in place. Provide visual cue, wrist band, yellow gown, etc. Monitor gait and stability. Monitor for mental status changes and reorient to person, place, and time. Review medications for side effects contributing to fall risk. Reinforce activity limits and safety measures with patient and family. Provide visual clues: red socks.

## 2018-11-24 ENCOUNTER — RESULT REVIEW (OUTPATIENT)
Age: 83
End: 2018-11-24

## 2018-11-24 DIAGNOSIS — S72.90XA UNSPECIFIED FRACTURE OF UNSPECIFIED FEMUR, INITIAL ENCOUNTER FOR CLOSED FRACTURE: ICD-10-CM

## 2018-11-24 LAB
ABO RH CONFIRMATION: SIGNIFICANT CHANGE UP
ANION GAP SERPL CALC-SCNC: 8 MMOL/L — SIGNIFICANT CHANGE UP (ref 5–17)
BLD GP AB SCN SERPL QL: SIGNIFICANT CHANGE UP
BUN SERPL-MCNC: 20 MG/DL — SIGNIFICANT CHANGE UP (ref 7–23)
CALCIUM SERPL-MCNC: 10.6 MG/DL — HIGH (ref 8.4–10.5)
CHLORIDE SERPL-SCNC: 102 MMOL/L — SIGNIFICANT CHANGE UP (ref 96–108)
CO2 SERPL-SCNC: 27 MMOL/L — SIGNIFICANT CHANGE UP (ref 22–31)
CREAT SERPL-MCNC: 0.98 MG/DL — SIGNIFICANT CHANGE UP (ref 0.5–1.3)
GLUCOSE SERPL-MCNC: 106 MG/DL — HIGH (ref 70–99)
HCT VFR BLD CALC: 34.8 % — SIGNIFICANT CHANGE UP (ref 34.5–45)
HCT VFR BLD CALC: 35.7 % — SIGNIFICANT CHANGE UP (ref 34.5–45)
HCT VFR BLD CALC: 39 % — SIGNIFICANT CHANGE UP (ref 34.5–45)
HGB BLD-MCNC: 11.1 G/DL — LOW (ref 11.5–15.5)
HGB BLD-MCNC: 11.5 G/DL — SIGNIFICANT CHANGE UP (ref 11.5–15.5)
HGB BLD-MCNC: 12.2 G/DL — SIGNIFICANT CHANGE UP (ref 11.5–15.5)
INR BLD: 1.4 RATIO — HIGH (ref 0.88–1.16)
MCHC RBC-ENTMCNC: 29 PG — SIGNIFICANT CHANGE UP (ref 27–34)
MCHC RBC-ENTMCNC: 29.4 PG — SIGNIFICANT CHANGE UP (ref 27–34)
MCHC RBC-ENTMCNC: 29.5 PG — SIGNIFICANT CHANGE UP (ref 27–34)
MCHC RBC-ENTMCNC: 31.4 GM/DL — LOW (ref 32–36)
MCHC RBC-ENTMCNC: 32 GM/DL — SIGNIFICANT CHANGE UP (ref 32–36)
MCHC RBC-ENTMCNC: 32.2 GM/DL — SIGNIFICANT CHANGE UP (ref 32–36)
MCV RBC AUTO: 91.6 FL — SIGNIFICANT CHANGE UP (ref 80–100)
MCV RBC AUTO: 91.9 FL — SIGNIFICANT CHANGE UP (ref 80–100)
MCV RBC AUTO: 92.3 FL — SIGNIFICANT CHANGE UP (ref 80–100)
PLATELET # BLD AUTO: 165 K/UL — SIGNIFICANT CHANGE UP (ref 150–400)
PLATELET # BLD AUTO: 167 K/UL — SIGNIFICANT CHANGE UP (ref 150–400)
PLATELET # BLD AUTO: 170 K/UL — SIGNIFICANT CHANGE UP (ref 150–400)
POTASSIUM SERPL-MCNC: 3.5 MMOL/L — SIGNIFICANT CHANGE UP (ref 3.5–5.3)
POTASSIUM SERPL-SCNC: 3.5 MMOL/L — SIGNIFICANT CHANGE UP (ref 3.5–5.3)
PROTHROM AB SERPL-ACNC: 15.8 SEC — HIGH (ref 10–12.9)
RBC # BLD: 3.79 M/UL — LOW (ref 3.8–5.2)
RBC # BLD: 3.9 M/UL — SIGNIFICANT CHANGE UP (ref 3.8–5.2)
RBC # BLD: 4.22 M/UL — SIGNIFICANT CHANGE UP (ref 3.8–5.2)
RBC # FLD: 14.4 % — SIGNIFICANT CHANGE UP (ref 10.3–14.5)
RBC # FLD: 14.6 % — HIGH (ref 10.3–14.5)
RBC # FLD: 14.6 % — HIGH (ref 10.3–14.5)
SODIUM SERPL-SCNC: 137 MMOL/L — SIGNIFICANT CHANGE UP (ref 135–145)
WBC # BLD: 10 K/UL — SIGNIFICANT CHANGE UP (ref 3.8–10.5)
WBC # BLD: 7 K/UL — SIGNIFICANT CHANGE UP (ref 3.8–10.5)
WBC # BLD: 9.2 K/UL — SIGNIFICANT CHANGE UP (ref 3.8–10.5)
WBC # FLD AUTO: 10 K/UL — SIGNIFICANT CHANGE UP (ref 3.8–10.5)
WBC # FLD AUTO: 7 K/UL — SIGNIFICANT CHANGE UP (ref 3.8–10.5)
WBC # FLD AUTO: 9.2 K/UL — SIGNIFICANT CHANGE UP (ref 3.8–10.5)

## 2018-11-24 PROCEDURE — 27236 TREAT THIGH FRACTURE: CPT | Mod: AS,RT

## 2018-11-24 PROCEDURE — 27236 TREAT THIGH FRACTURE: CPT | Mod: RT

## 2018-11-24 PROCEDURE — 99222 1ST HOSP IP/OBS MODERATE 55: CPT

## 2018-11-24 PROCEDURE — 93306 TTE W/DOPPLER COMPLETE: CPT | Mod: 26

## 2018-11-24 PROCEDURE — 99235 HOSP IP/OBS SAME DATE MOD 70: CPT

## 2018-11-24 PROCEDURE — 73501 X-RAY EXAM HIP UNI 1 VIEW: CPT | Mod: 26,RT

## 2018-11-24 PROCEDURE — 99223 1ST HOSP IP/OBS HIGH 75: CPT | Mod: 57

## 2018-11-24 RX ORDER — POLYETHYLENE GLYCOL 3350 17 G/17G
17 POWDER, FOR SOLUTION ORAL DAILY
Qty: 0 | Refills: 0 | Status: DISCONTINUED | OUTPATIENT
Start: 2018-11-25 | End: 2018-11-27

## 2018-11-24 RX ORDER — ENOXAPARIN SODIUM 100 MG/ML
30 INJECTION SUBCUTANEOUS DAILY
Qty: 0 | Refills: 0 | Status: DISCONTINUED | OUTPATIENT
Start: 2018-11-25 | End: 2018-11-27

## 2018-11-24 RX ORDER — LABETALOL HCL 100 MG
200 TABLET ORAL THREE TIMES A DAY
Qty: 0 | Refills: 0 | Status: DISCONTINUED | OUTPATIENT
Start: 2018-11-24 | End: 2018-11-27

## 2018-11-24 RX ORDER — ACETAMINOPHEN 500 MG
1000 TABLET ORAL ONCE
Qty: 0 | Refills: 0 | Status: COMPLETED | OUTPATIENT
Start: 2018-11-24 | End: 2018-11-24

## 2018-11-24 RX ORDER — HYDROMORPHONE HYDROCHLORIDE 2 MG/ML
0.25 INJECTION INTRAMUSCULAR; INTRAVENOUS; SUBCUTANEOUS
Qty: 0 | Refills: 0 | Status: DISCONTINUED | OUTPATIENT
Start: 2018-11-24 | End: 2018-11-27

## 2018-11-24 RX ORDER — SODIUM CHLORIDE 9 MG/ML
1000 INJECTION, SOLUTION INTRAVENOUS
Qty: 0 | Refills: 0 | Status: DISCONTINUED | OUTPATIENT
Start: 2018-11-24 | End: 2018-11-24

## 2018-11-24 RX ORDER — SENNA PLUS 8.6 MG/1
1 TABLET ORAL AT BEDTIME
Qty: 0 | Refills: 0 | Status: DISCONTINUED | OUTPATIENT
Start: 2018-11-25 | End: 2018-11-27

## 2018-11-24 RX ORDER — FUROSEMIDE 40 MG
40 TABLET ORAL
Qty: 0 | Refills: 0 | Status: DISCONTINUED | OUTPATIENT
Start: 2018-11-24 | End: 2018-11-27

## 2018-11-24 RX ORDER — MONTELUKAST 4 MG/1
10 TABLET, CHEWABLE ORAL AT BEDTIME
Qty: 0 | Refills: 0 | Status: DISCONTINUED | OUTPATIENT
Start: 2018-11-24 | End: 2018-11-27

## 2018-11-24 RX ORDER — ONDANSETRON 8 MG/1
4 TABLET, FILM COATED ORAL ONCE
Qty: 0 | Refills: 0 | Status: DISCONTINUED | OUTPATIENT
Start: 2018-11-24 | End: 2018-11-27

## 2018-11-24 RX ORDER — ONDANSETRON 8 MG/1
4 TABLET, FILM COATED ORAL EVERY 6 HOURS
Qty: 0 | Refills: 0 | Status: DISCONTINUED | OUTPATIENT
Start: 2018-11-24 | End: 2018-11-27

## 2018-11-24 RX ORDER — SODIUM CHLORIDE 9 MG/ML
1000 INJECTION, SOLUTION INTRAVENOUS
Qty: 0 | Refills: 0 | Status: DISCONTINUED | OUTPATIENT
Start: 2018-11-24 | End: 2018-11-26

## 2018-11-24 RX ORDER — HYDROMORPHONE HYDROCHLORIDE 2 MG/ML
0.5 INJECTION INTRAMUSCULAR; INTRAVENOUS; SUBCUTANEOUS EVERY 4 HOURS
Qty: 0 | Refills: 0 | Status: DISCONTINUED | OUTPATIENT
Start: 2018-11-24 | End: 2018-11-27

## 2018-11-24 RX ORDER — POTASSIUM CHLORIDE 20 MEQ
10 PACKET (EA) ORAL DAILY
Qty: 0 | Refills: 0 | Status: DISCONTINUED | OUTPATIENT
Start: 2018-11-24 | End: 2018-11-27

## 2018-11-24 RX ORDER — DOCUSATE SODIUM 100 MG
100 CAPSULE ORAL THREE TIMES A DAY
Qty: 0 | Refills: 0 | Status: DISCONTINUED | OUTPATIENT
Start: 2018-11-24 | End: 2018-11-27

## 2018-11-24 RX ORDER — OXYCODONE HYDROCHLORIDE 5 MG/1
5 TABLET ORAL
Qty: 0 | Refills: 0 | Status: DISCONTINUED | OUTPATIENT
Start: 2018-11-24 | End: 2018-11-25

## 2018-11-24 RX ORDER — FUROSEMIDE 40 MG
20 TABLET ORAL DAILY
Qty: 0 | Refills: 0 | Status: DISCONTINUED | OUTPATIENT
Start: 2018-11-24 | End: 2018-11-27

## 2018-11-24 RX ADMIN — Medication 400 MILLIGRAM(S): at 21:45

## 2018-11-24 RX ADMIN — Medication 100 MILLIGRAM(S): at 21:45

## 2018-11-24 RX ADMIN — Medication 200 MILLIGRAM(S): at 06:14

## 2018-11-24 RX ADMIN — SODIUM CHLORIDE 50 MILLILITER(S): 9 INJECTION, SOLUTION INTRAVENOUS at 10:07

## 2018-11-24 RX ADMIN — Medication 20 MILLIGRAM(S): at 06:13

## 2018-11-24 RX ADMIN — Medication 100 MILLIGRAM(S): at 21:40

## 2018-11-24 RX ADMIN — MONTELUKAST 10 MILLIGRAM(S): 4 TABLET, CHEWABLE ORAL at 21:40

## 2018-11-24 RX ADMIN — Medication 200 MILLIGRAM(S): at 21:40

## 2018-11-24 RX ADMIN — CHLORHEXIDINE GLUCONATE 1 APPLICATION(S): 213 SOLUTION TOPICAL at 13:36

## 2018-11-24 NOTE — CONSULT NOTE ADULT - ASSESSMENT
In summary, the patient is an elderly woman with chronic af s/p vvi pm lasted interrogated 9/25/2018 with normal function and 90 months of battery life remaining  Echo today reveals normal lvef with mod tr mild ai mild mr    There is no cardiac contraindication due the proposed surgery and the patient is thereby "cardiac cleared"    Would restart a/c as soon as deemed appropriate by the surgical team

## 2018-11-24 NOTE — BRIEF OPERATIVE NOTE - PROCEDURE
<<-----Click on this checkbox to enter Procedure Hemiarthroplasty of hip  11/24/2018  right hip  Active  [email or web address]

## 2018-11-24 NOTE — CONSULT NOTE ADULT - SUBJECTIVE AND OBJECTIVE BOX
Chief Complaint:  BROKEN HIP    HPI: this is a 89 yr old woman with a hx of PAF AND SICK AV NODE MANY YRS S/P PPM VVI ADDMITTED AFTER TRIPPING,FALLING AND BREAKING RIGHT HIP.  Patient has had no recent cardiac events. no cp sob palps dizziness or syncope    PMH:   Pacemaker  Transient cerebral ischemia, unspecified transient cerebral ischemia type  HTN (hypertension), benign  Atrial fibrillation  Acid reflux disease    PSH:   History of cataract surgery    Family History:  FAMILY HISTORY:  No pertinent family history in first degree relatives      Social History:  Smoking:NO  Alcohol:NO  Drugs:NO    Allergies:  Keflex (Diarrhea)  Norvasc (Unknown)      Medications:  chlorhexidine 4% Liquid 1 Application(s) Topical once  diltiazem    Tablet 60 milliGRAM(s) Oral every 8 hours  furosemide    Tablet 20 milliGRAM(s) Oral daily  furosemide    Tablet 40 milliGRAM(s) Oral <User Schedule>  labetalol 200 milliGRAM(s) Oral three times a day  lactated ringers. 1000 milliLiter(s) IV Continuous <Continuous>  mesalamine DR (24-Hour) Tablet 2.4 Gram(s) Oral daily  montelukast 10 milliGRAM(s) Oral at bedtime  oxyCODONE    5 mG/acetaminophen 325 mG 1 Tablet(s) Oral every 6 hours PRN  potassium chloride    Tablet ER 10 milliEquivalent(s) Oral daily      REVIEW OF SYSTEMS:  CONSTITUTIONAL: No fever, weight loss, or fatigue  EYES: No eye pain, visual disturbances, or discharge  ENMT:  No difficulty hearing, tinnitus, vertigo; No sinus or throat pain  NECK: No pain or stiffness  BREASTS: No pain, masses, or nipple discharge  RESPIRATORY: No cough, wheezing, chills or hemoptysis; No shortness of breath  CARDIOVASCULAR: No chest pain, palpitations, dizziness, or leg swelling  GASTROINTESTINAL: No abdominal or epigastric pain. No nausea, vomiting, or hematemesis; No diarrhea or constipation. No melena or hematochezia.  GENITOURINARY: No dysuria, frequency, hematuria, or incontinence  NEUROLOGICAL: No headaches, memory loss, loss of strength, numbness, or tremors  SKIN: No itching, burning, rashes, or lesions   LYMPH NODES: No enlarged glands  ENDOCRINE: No heat or cold intolerance; No hair loss  MUSCULOSKELETAL: No joint pain or swelling; No muscle, back, or extremity pain  PSYCHIATRIC: No depression, anxiety, mood swings, or difficulty sleeping  HEME/LYMPH: No easy bruising, or bleeding gums  ALLERY AND IMMUNOLOGIC: No hives or eczema    Physical Exam:  T(C): 36.6 (11-24-18 @ 05:16), Max: 37.3 (11-23-18 @ 21:44)  HR: 80 (11-24-18 @ 05:16) (55 - 80)  BP: 150/53 (11-24-18 @ 05:16) (118/74 - 170/60)  RR: 15 (11-24-18 @ 05:16) (15 - 16)  SpO2: 94% (11-24-18 @ 05:16) (94% - 99%)  Wt(kg): --    GENERAL: NAD, well-groomed, well-developed  HEAD:  Atraumatic, Normocephalic  EYES: EOMI, conjunctiva and sclera clear  ENT: Moist mucous membranes,  NECK: Supple, No JVD, no bruits  CHEST/LUNG: Clear to percussion bilaterally; No rales, rhonchi, wheezing, or rubs  HEART: Regular rate and rhythm; No murmurs, rubs, or gallops PMI non displaced.  ABDOMEN: Soft, Nontender, Nondistended; Bowel sounds present  EXTREMITIES:  2+ Peripheral Pulses, No clubbing, cyanosis, or edema  SKIN: No rashes or lesions  NERVOUS SYSTEM:  unable to cooperate    Cardiovascular Diagnostic Testing:  ECG:VVI PACING    Labs:                        11.5   7.0   )-----------( 170      ( 24 Nov 2018 06:35 )             35.7     11-24    137  |  102  |  20  ----------------------------<  106<H>  3.5   |  27  |  0.98    Ca    10.6<H>      24 Nov 2018 06:35    TPro  6.5  /  Alb  3.1<L>  /  TBili  0.6  /  DBili  x   /  AST  16  /  ALT  21  /  AlkPhos  101  11-23    PT/INR - ( 24 Nov 2018 06:35 )   PT: 15.8 sec;   INR: 1.40 ratio         PTT - ( 23 Nov 2018 12:48 )  PTT:29.3 sec                Imaging:

## 2018-11-24 NOTE — CONSULT NOTE ADULT - SUBJECTIVE AND OBJECTIVE BOX
History and Physical:   Source of Information	Patient, Child	  Outpatient Providers	Dr. Turner	     Language:  · Patient/Family of Limited English Proficiency	No	       History of Present Illness:  Reason for Admission: fall, hip fx	  History of Present Illness: 	  89 y/o F with PMH HTN, Afib on coumadin , +PPM presents after falling after leaving her physical therapy session yesterday , and having right hip pain. Patient states a few weeks ago she feel and fractured her right hip but was able to walk around and do PT, declined surgery at that time. Now patient understands that her second fall has made her fracture "worse" and is agreeable to surgery now . Pt denies any chest pain, SOB, dizziness, palpitations etc. Hx of having sustained a nondisplaced right hip femoral neck fracture on/around October 4, 2018. Saw Dr Davidson. Patient offered surgical stabilization via hip pinning procedure but she declined opting for nonop tx instead. Once she fell yesterday, acute worsening of right hip pain, inability to ambulate or bear weight on the right hip. Presented to [facility] yesterday for further evaluation and treatment of same. Prior to original injury, was able to ambulate without assistive devices.      Review of Systems:  Review of Systems: REVIEW OF SYSTEMS:  CONSTITUTIONAL: No fever, weight loss, or fatigue  RESPIRATORY: No cough, wheezing, chills or hemoptysis; No shortness of breath  CARDIOVASCULAR: No chest pain, palpitations, dizziness, or leg swelling  GASTROINTESTINAL: No abdominal pain, No nausea, vomiting, or hematemesis; No diarrhea or constipation  GENITOURINARY: No dysuria, frequency, hematuria, or incontinence  NEUROLOGICAL: No headaches, memory loss, loss of strength, numbness, or tremors  SKIN: No itching, burning, rashes, or lesions   MUSCULOSKELETAL: +right hip pain       All other ROS reviewed and negative except as otherwise stated	      Allergies and Intolerances:        Allergies:  	Keflex: Drug, Diarrhea, bloody  	Norvasc: Drug, Unknown    Home Medications:   * Patient Currently Takes Medications as of 23-Nov-2018 11:17 documented in Structured Notes  · 	furosemide 20 mg oral tablet: 1 tab(s) orally once a day in the am, Last Dose Taken:    · 	furosemide 40 mg oral tablet: 1 tab(s) orally once a day in the PM, Last Dose Taken:    · 	mesalamine 1.2 g oral delayed release tablet: 2 tab(s) orally once a day at dinner, Last Dose Taken:    · 	montelukast 10 mg oral tablet: orally once a day (at bedtime), Last Dose Taken:    · 	fluticasone propionate 55 mcg/inh inhalation powder: 1 puff(s) inhaled every 12 hours, Last Dose Taken:    · 	Azopt 1% ophthalmic suspension: 1 drop left eye 2 x a day, Last Dose Taken:    · 	vit D: to each affected eye once a day, Last Dose Taken:    · 	Breo Ellipta 100 mcg-25 mcg/inh inhalation powder: 1 puff(s) inhaled once a day, Last Dose Taken:    · 	potassium chloride 20 mEq oral granule, extended release: orally once a day, Last Dose Taken:    · 	labetalol 200 mg oral tablet: orally 3 times a day, Last Dose Taken:    · 	diltiazem 60 mg oral tablet: 1 tab(s) orally every 8 hours, Last Dose Taken:    · 	warfarin 2 mg oral tablet: 1 tab(s) orally once a day, Last Dose Taken:    · 	Aspirin Enteric Coated 81 mg oral delayed release tablet: 1 tab(s) orally once a day, Last Dose Taken:      .    Patient History:    Past Medical History:  Acid reflux disease    Atrial fibrillation    HTN (hypertension), benign    Pacemaker    Transient cerebral ischemia, unspecified transient cerebral ischemia type.     Past Surgical History:  History of cataract surgery.     Family History:  No pertinent family history in first degree relatives.     Social History:  Social History (marital status, living situation, occupation, tobacco use, alcohol and drug use, and sexual history): denies tobacco, etoh use, drug use	     Tobacco Screening:  · Core Measure Site	No	  · Has the patient used tobacco in the past 30 days?	No	    Risk Assessment:    Present on Admission:  Deep Venous Thrombosis	no	  Pulmonary Embolus	no	  Urinary Catheter	no	  Central Venous Catheter/PICC Line	no	  Surgical Site Incision	no	  Pressure Ulcer(s)	no	     Heart Failure:  Does this patient have a history of or has been diagnosed with heart failure? no.       Physical Exam:  Physical Exam: PHYSICAL EXAM:  GENERAL: NAD, well-groomed, well-developed  HEAD:  Atraumatic, Normocephalic  EYES: EOMI, PERRLA, conjunctiva and sclera clear  ENMT: No tonsillar erythema, exudates, or enlargement; Moist mucous membranes, Good dentition  NECK: Supple, No JVD  CHEST/LUNG: Clear to auscultation bilaterally; No rales, rhonchi, wheezing, or rubs  HEART: Regular rate and rhythm; S1/S2, No murmurs, rubs, or gallops  ABDOMEN: Soft, Nontender, Nondistended; Bowel sounds present  VASCULAR: Normal pulses, Normal capillary refill  EXTREMITIES:  2+ Peripheral Pulses, No clubbing, cyanosis, or edema  SKIN: Warm, Intact  PSYCH: Normal mood and affect NERVOUS SYSTEM:  A/O x3, Good concentration; CN 2-12 intact, No focal deficits  Right hip exam +groin ttp mild edema neg significant ecchymosis or erythema +deformity (shortened, rotated) ROM and stability testing deferred due to known displaced right hip femoral neck fx nvid BLE unable to straight leg raise RLE	       Labs and Results:  Labs, Radiology, Cardiology, and Other Results: 13.1   8.4   )-----------( 178      ( 23 Nov 2018 12:48 )             40.8     11-23   136  |  100  |  24<H>  ----------------------------<  106<H>  2.8<LL>   |  29  |  1.24   Ca    10.5      23 Nov 2018 12:48   TPro  6.5  /  Alb  3.1<L>  /  TBili  0.6  /  DBili  x   /  AST  16  /  ALT  21  /  AlkPhos  101  11-23    PT/INR - ( 23 Nov 2018 12:48 )   PT: 16.5 sec;   INR: 1.46 ratio        PTT - ( 23 Nov 2018 12:48 )  PTT:29.3 sec      Vital Signs Last 24 Hrs  T(C): 36.4 (23 Nov 2018 10:45), Max: 36.4 (23 Nov 2018 10:45)  T(F): 97.6 (23 Nov 2018 10:45), Max: 97.6 (23 Nov 2018 10:45)  HR: 62 (23 Nov 2018 14:31) (60 - 72)  BP: 118/74 (23 Nov 2018 14:31) (118/74 - 158/65)  BP(mean): --  RR: 16 (23 Nov 2018 14:31) (16 - 17)  SpO2: 98% (23 Nov 2018 14:31) (96% - 99%)     < from: CT Pelvis Bony Only No Cont (11.23.18 @ 12:50) >    IMPRESSION:   Redemonstration of comminuted impacted right femoral neck fracture   without evidence of bridging. Now with mild superior displacement of the   distal femur with respect to the proximal femoral head/neck. No new   fracture   < end of copied text >	    Assessment and Plan:    Assessment:  · Assessment		  89 y/o F with Afib on coumadin, HTN, +PPM present s/p fall with right hip pain      Problem/Plan - 1:  ·  Problem: Closed fracture of right hip, initial encounter.   Patient is medically and cardiac cleared for right hip hemiarthroplasty to be done today  Continue NPO  Continue NWB RLE  Continue pain management/pain control in the meantime  risks and benefits of op vs nonop tx discussed with patient in detail including and up to risk of mortality, iatrogenic fracture, postop hip instability and/or leg length discrepancy  patient aware of risks and wishes to proceed accordingly        Problem/Plan - 2:  Problem: Prophylactic measure. Plan: IMPROVE VTE Individual Risk Assessment          RISK                                                          Points  [  ] Previous VTE                                                3  [  ] Thrombophilia                                             2  [  ] Lower limb paralysis                                   2        (unable to hold up >15 seconds)    [  ] Current Cancer                                             2         (within 6 months)  [  ] Immobilization > 24 hrs                              1  [  ] ICU/CCU stay > 24 hours                             1  [ x ] Age > 60                                                         1    IMPROVE VTE Score: 1.

## 2018-11-24 NOTE — BRIEF OPERATIVE NOTE - POST-OP DX
Closed hip fracture requiring operative repair  11/24/2018  femoral neck fracture  Active  Cecilia Rivers

## 2018-11-25 DIAGNOSIS — I48.91 UNSPECIFIED ATRIAL FIBRILLATION: ICD-10-CM

## 2018-11-25 LAB
ANION GAP SERPL CALC-SCNC: 6 MMOL/L — SIGNIFICANT CHANGE UP (ref 5–17)
BUN SERPL-MCNC: 34 MG/DL — HIGH (ref 7–23)
CALCIUM SERPL-MCNC: 9.9 MG/DL — SIGNIFICANT CHANGE UP (ref 8.4–10.5)
CHLORIDE SERPL-SCNC: 101 MMOL/L — SIGNIFICANT CHANGE UP (ref 96–108)
CO2 SERPL-SCNC: 27 MMOL/L — SIGNIFICANT CHANGE UP (ref 22–31)
CREAT SERPL-MCNC: 1.49 MG/DL — HIGH (ref 0.5–1.3)
GLUCOSE SERPL-MCNC: 219 MG/DL — HIGH (ref 70–99)
HCT VFR BLD CALC: 30.8 % — LOW (ref 34.5–45)
HGB BLD-MCNC: 9.8 G/DL — LOW (ref 11.5–15.5)
INR BLD: 1.45 RATIO — HIGH (ref 0.88–1.16)
MCHC RBC-ENTMCNC: 29.4 PG — SIGNIFICANT CHANGE UP (ref 27–34)
MCHC RBC-ENTMCNC: 31.7 GM/DL — LOW (ref 32–36)
MCV RBC AUTO: 92.7 FL — SIGNIFICANT CHANGE UP (ref 80–100)
PLATELET # BLD AUTO: 164 K/UL — SIGNIFICANT CHANGE UP (ref 150–400)
POTASSIUM SERPL-MCNC: 4.3 MMOL/L — SIGNIFICANT CHANGE UP (ref 3.5–5.3)
POTASSIUM SERPL-SCNC: 4.3 MMOL/L — SIGNIFICANT CHANGE UP (ref 3.5–5.3)
PROTHROM AB SERPL-ACNC: 16.4 SEC — HIGH (ref 10–12.9)
RBC # BLD: 3.33 M/UL — LOW (ref 3.8–5.2)
RBC # FLD: 14.3 % — SIGNIFICANT CHANGE UP (ref 10.3–14.5)
SODIUM SERPL-SCNC: 134 MMOL/L — LOW (ref 135–145)
WBC # BLD: 11 K/UL — HIGH (ref 3.8–10.5)
WBC # FLD AUTO: 11 K/UL — HIGH (ref 3.8–10.5)

## 2018-11-25 PROCEDURE — 99232 SBSQ HOSP IP/OBS MODERATE 35: CPT

## 2018-11-25 PROCEDURE — 99233 SBSQ HOSP IP/OBS HIGH 50: CPT

## 2018-11-25 RX ORDER — WARFARIN SODIUM 2.5 MG/1
2.5 TABLET ORAL ONCE
Qty: 0 | Refills: 0 | Status: COMPLETED | OUTPATIENT
Start: 2018-11-25 | End: 2018-11-25

## 2018-11-25 RX ORDER — ACETAMINOPHEN 500 MG
1000 TABLET ORAL ONCE
Qty: 0 | Refills: 0 | Status: DISCONTINUED | OUTPATIENT
Start: 2018-11-25 | End: 2018-11-26

## 2018-11-25 RX ORDER — TRAMADOL HYDROCHLORIDE 50 MG/1
50 TABLET ORAL EVERY 6 HOURS
Qty: 0 | Refills: 0 | Status: DISCONTINUED | OUTPATIENT
Start: 2018-11-25 | End: 2018-11-27

## 2018-11-25 RX ADMIN — MONTELUKAST 10 MILLIGRAM(S): 4 TABLET, CHEWABLE ORAL at 22:40

## 2018-11-25 RX ADMIN — WARFARIN SODIUM 2.5 MILLIGRAM(S): 2.5 TABLET ORAL at 22:42

## 2018-11-25 RX ADMIN — Medication 200 MILLIGRAM(S): at 22:40

## 2018-11-25 RX ADMIN — Medication 10 MILLIEQUIVALENT(S): at 14:11

## 2018-11-25 RX ADMIN — Medication 200 MILLIGRAM(S): at 14:32

## 2018-11-25 RX ADMIN — Medication 100 MILLIGRAM(S): at 06:56

## 2018-11-25 RX ADMIN — ENOXAPARIN SODIUM 30 MILLIGRAM(S): 100 INJECTION SUBCUTANEOUS at 14:11

## 2018-11-25 RX ADMIN — SODIUM CHLORIDE 75 MILLILITER(S): 9 INJECTION, SOLUTION INTRAVENOUS at 09:45

## 2018-11-25 RX ADMIN — Medication 40 MILLIGRAM(S): at 18:03

## 2018-11-25 RX ADMIN — Medication 20 MILLIGRAM(S): at 06:56

## 2018-11-25 RX ADMIN — Medication 1000 MILLIGRAM(S): at 00:37

## 2018-11-25 RX ADMIN — Medication 200 MILLIGRAM(S): at 06:56

## 2018-11-25 RX ADMIN — Medication 100 MILLIGRAM(S): at 22:40

## 2018-11-25 NOTE — PHYSICAL THERAPY INITIAL EVALUATION ADULT - ADDITIONAL COMMENTS
pt lives in apartment, dtr on 2nd floor, 5 LUIS w/handrail. pt has been using a rw since 1 fall approx 2 wks ago but ambulated independently prior to that

## 2018-11-26 LAB
ANION GAP SERPL CALC-SCNC: 8 MMOL/L — SIGNIFICANT CHANGE UP (ref 5–17)
BUN SERPL-MCNC: 41 MG/DL — HIGH (ref 7–23)
CALCIUM SERPL-MCNC: 10.1 MG/DL — SIGNIFICANT CHANGE UP (ref 8.4–10.5)
CHLORIDE SERPL-SCNC: 102 MMOL/L — SIGNIFICANT CHANGE UP (ref 96–108)
CO2 SERPL-SCNC: 26 MMOL/L — SIGNIFICANT CHANGE UP (ref 22–31)
CREAT SERPL-MCNC: 1.35 MG/DL — HIGH (ref 0.5–1.3)
GLUCOSE SERPL-MCNC: 136 MG/DL — HIGH (ref 70–99)
HCT VFR BLD CALC: 28.5 % — LOW (ref 34.5–45)
HGB BLD-MCNC: 9.3 G/DL — LOW (ref 11.5–15.5)
INR BLD: 1.53 RATIO — HIGH (ref 0.88–1.16)
MCHC RBC-ENTMCNC: 29.7 PG — SIGNIFICANT CHANGE UP (ref 27–34)
MCHC RBC-ENTMCNC: 32.5 GM/DL — SIGNIFICANT CHANGE UP (ref 32–36)
MCV RBC AUTO: 91.3 FL — SIGNIFICANT CHANGE UP (ref 80–100)
PLATELET # BLD AUTO: 150 K/UL — SIGNIFICANT CHANGE UP (ref 150–400)
POTASSIUM SERPL-MCNC: 4.2 MMOL/L — SIGNIFICANT CHANGE UP (ref 3.5–5.3)
POTASSIUM SERPL-SCNC: 4.2 MMOL/L — SIGNIFICANT CHANGE UP (ref 3.5–5.3)
PROTHROM AB SERPL-ACNC: 17.4 SEC — HIGH (ref 10–12.9)
RBC # BLD: 3.12 M/UL — LOW (ref 3.8–5.2)
RBC # FLD: 14.7 % — HIGH (ref 10.3–14.5)
SODIUM SERPL-SCNC: 136 MMOL/L — SIGNIFICANT CHANGE UP (ref 135–145)
WBC # BLD: 11.6 K/UL — HIGH (ref 3.8–10.5)
WBC # FLD AUTO: 11.6 K/UL — HIGH (ref 3.8–10.5)

## 2018-11-26 PROCEDURE — 99222 1ST HOSP IP/OBS MODERATE 55: CPT | Mod: GC

## 2018-11-26 PROCEDURE — 99232 SBSQ HOSP IP/OBS MODERATE 35: CPT

## 2018-11-26 PROCEDURE — 99231 SBSQ HOSP IP/OBS SF/LOW 25: CPT

## 2018-11-26 RX ORDER — ACETAMINOPHEN 500 MG
325 TABLET ORAL EVERY 4 HOURS
Qty: 0 | Refills: 0 | Status: DISCONTINUED | OUTPATIENT
Start: 2018-11-26 | End: 2018-11-27

## 2018-11-26 RX ORDER — DORZOLAMIDE HYDROCHLORIDE 20 MG/ML
1 SOLUTION/ DROPS OPHTHALMIC THREE TIMES A DAY
Qty: 0 | Refills: 0 | Status: DISCONTINUED | OUTPATIENT
Start: 2018-11-26 | End: 2018-11-27

## 2018-11-26 RX ORDER — MESALAMINE 400 MG
2.4 TABLET, DELAYED RELEASE (ENTERIC COATED) ORAL
Qty: 0 | Refills: 0 | Status: DISCONTINUED | OUTPATIENT
Start: 2018-11-26 | End: 2018-11-27

## 2018-11-26 RX ORDER — BUDESONIDE AND FORMOTEROL FUMARATE DIHYDRATE 160; 4.5 UG/1; UG/1
2 AEROSOL RESPIRATORY (INHALATION)
Qty: 0 | Refills: 0 | Status: DISCONTINUED | OUTPATIENT
Start: 2018-11-26 | End: 2018-11-27

## 2018-11-26 RX ORDER — WARFARIN SODIUM 2.5 MG/1
4 TABLET ORAL ONCE
Qty: 0 | Refills: 0 | Status: COMPLETED | OUTPATIENT
Start: 2018-11-26 | End: 2018-11-26

## 2018-11-26 RX ADMIN — Medication 325 MILLIGRAM(S): at 14:58

## 2018-11-26 RX ADMIN — Medication 100 MILLIGRAM(S): at 05:47

## 2018-11-26 RX ADMIN — Medication 200 MILLIGRAM(S): at 13:43

## 2018-11-26 RX ADMIN — WARFARIN SODIUM 4 MILLIGRAM(S): 2.5 TABLET ORAL at 21:54

## 2018-11-26 RX ADMIN — DORZOLAMIDE HYDROCHLORIDE 1 DROP(S): 20 SOLUTION/ DROPS OPHTHALMIC at 21:54

## 2018-11-26 RX ADMIN — BUDESONIDE AND FORMOTEROL FUMARATE DIHYDRATE 2 PUFF(S): 160; 4.5 AEROSOL RESPIRATORY (INHALATION) at 20:28

## 2018-11-26 RX ADMIN — Medication 10 MILLIEQUIVALENT(S): at 12:11

## 2018-11-26 RX ADMIN — Medication 100 MILLIGRAM(S): at 21:54

## 2018-11-26 RX ADMIN — Medication 200 MILLIGRAM(S): at 05:47

## 2018-11-26 RX ADMIN — SODIUM CHLORIDE 75 MILLILITER(S): 9 INJECTION, SOLUTION INTRAVENOUS at 05:48

## 2018-11-26 RX ADMIN — MONTELUKAST 10 MILLIGRAM(S): 4 TABLET, CHEWABLE ORAL at 21:54

## 2018-11-26 RX ADMIN — Medication 325 MILLIGRAM(S): at 14:28

## 2018-11-26 RX ADMIN — Medication 20 MILLIGRAM(S): at 05:47

## 2018-11-26 RX ADMIN — Medication 2.4 GRAM(S): at 17:35

## 2018-11-26 RX ADMIN — Medication 200 MILLIGRAM(S): at 21:54

## 2018-11-26 RX ADMIN — Medication 100 MILLIGRAM(S): at 13:43

## 2018-11-26 RX ADMIN — ENOXAPARIN SODIUM 30 MILLIGRAM(S): 100 INJECTION SUBCUTANEOUS at 12:11

## 2018-11-26 RX ADMIN — Medication 40 MILLIGRAM(S): at 17:34

## 2018-11-26 RX ADMIN — TRAMADOL HYDROCHLORIDE 50 MILLIGRAM(S): 50 TABLET ORAL at 10:18

## 2018-11-26 NOTE — CONSULT NOTE ADULT - ATTENDING COMMENTS
Chart reviewed. Patient seen at bedside.   89 year old female, with history as stated above, admitted after two falls and work up that showed right femoral neck impacted superiorly displaced fracture, needing hemiarthroplasty. Tolerated procedure. WBAT. On coumadin due to a fib.   Exam also shows limited AROM left shoulder ? after recent fall.  Consider ortho follow up and further work up as this will impact overall function.   Patient awaiting OT eval  will follow up in am     Recommend acute inpatient rehab : PT/OT 3 hrs /day 5 days /week, as patient needs daily physician oversight for management of medical comorbidities.   Thank you for this consult.

## 2018-11-26 NOTE — CONSULT NOTE ADULT - ASSESSMENT
This is a 89 y/o female s/p mechanical fall resulting in right femur fx (neck fx) s/p hemiarthroplasty now with functional deficits including gait and ADL dysfunction with decreased strength and endurance.     Recommend: Continue bedside PT, awaiting OT evaluation. Continue medical management per primary team. Leukocytosis noted, likely reactive, continue to monitor for S&S of infection.   Continue tramadol PRN for pain management. Rec d/c the IV Dilaudid and switch IV Tylenol to PO Tylenol Add lidocaine patch daily and ice packs to right hip prn.   Left shoulder weakness likely secondary to rotator cuff strain from improper RW use. Continue ROM exercises with PT/OT.  Patient will need rehab services including PT and OT to address gait and ADL dysfunction and to improve endurance and strength. Patient will be able to tolerate 3 hours a day, 5 days a week of therapies. ELOS 14-18 days with disposition to home with home services.   Will discuss with rehab attending.

## 2018-11-26 NOTE — CONSULT NOTE ADULT - SUBJECTIVE AND OBJECTIVE BOX
This is a 89 y/o female who reports that she had fallen at home after trying to get up from her dinning room table and tripped over a rug. She landed on her right hip. Her daughter took her for an xray that revealed a right hip fracture. Patient states that she was given the name of an orthopedic MD for evaluation and she did not go to that appointment. Pt states that she did not want to undergo surgery at that time, so she started outpatient PT and began using a RW. On the 23rd, the patient reports she was leaving the PT outpatient facilyt and while trying to make room on the sidewalk, her RW got stuck in some dirt and she lost her balance, again falling onto her right hip. She was brought to [facility]'s ER and found to have a high neck fracture of the right femur. Orthopedics were consulted and patient cleared for surgery. Patient underwent a right hemiarthroplasty by Dr Bhatia on 11/24.   Post operatively patient has acute blood loss anemia. WBC are slightly elevated. No signs or symptoms of infection.   Patient was seen by PT yesterday and able to stand from seated position with mod A x2. Patient was able to ambulate 5 feet with RW and min Ax 2.   Pending OT evaluation.   PM&R consulted to weigh in on disposition planning.     PAST MEDICAL & SURGICAL HISTORY:  Pacemaker  Transient cerebral ischemia, unspecified transient cerebral ischemia type  HTN (hypertension), benign  Atrial fibrillation on coumadin   Acid reflux disease  History of cataract surgery  Ulcerative colitis     Allergies    Keflex (Diarrhea)  Norvasc (Unknown)      Social History: Patient lives in a PH on the 1st floor with 5 LUIS, and a HR. Her daughter lives on the second floor.   Prior to fall 2 weeks ago, patient did not need an AD for ambulation. Patient was independent with all ADLs. She did the cooking for her daughter and herself.     TODAY'S SUBJECTIVE & REVIEW OF SYMPTOMS:    [  x ] Constitutional WNL  [  x ] Cardio WNL  [  x ] Resp WNL  [   ] GI WNL  [   ] Heme WNL  [ x  ] Endo WNL  [   ] Skin WNL  [   ] MSK WNL  [ x  ] Neuro WNL  [ x  ] Cognitive WNL  [ x  ] Psych WNL  Patient denies HA, dizziness, vision changes, CP, SOB, abdominal pain, dysuria. Last BM prior to admission but declining stool softeners at this time due to h/o ulcerative colitis and trying to avoid loose stools/diarrhea from occurring   Patient notes that she has had some difficulty with weakness in the left shoulder since using the RW after her first fall.   Having difficulty raising the arm above her head. Denies pain in the shoulder.   Reports that there is no pain to right hip while at rest and reports that pain is present with activity.     Vital Signs Last 24 Hrs  T(C): 36.7 (25 Nov 2018 20:50), Max: 36.8 (25 Nov 2018 15:33)  T(F): 98.1 (25 Nov 2018 20:50), Max: 98.2 (25 Nov 2018 15:33)  HR: 107 (25 Nov 2018 20:50) (91 - 112)  BP: 134/58 (25 Nov 2018 20:50) (106/53 - 134/58)  BP(mean): 75 (25 Nov 2018 20:50) (75 - 75)  RR: 18 (25 Nov 2018 20:50) (16 - 18)  SpO2: 98% (25 Nov 2018 20:50) (98% - 100%)    11-26    136  |  102  |  41<H>  ----------------------------<  136<H>  4.2   |  26  |  1.35<H>    Ca    10.1      26 Nov 2018 06:10    11-26    136  |  102  |  41<H>  ----------------------------<  136<H>  4.2   |  26  |  1.35<H>    Ca    10.1      26 Nov 2018 06:10      < from: Xray Femur 2 Views, Right (11.23.18 @ 12:07) >    EXAM:  XR FEMUR 2 VIEWS RT    EXAM:  XR HIP 2-3V RT      PROCEDURE DATE:  11/23/2018        INTERPRETATION:  Right hip and right femur. Patient fell with local   trauma.    Right hip with full pelvic view. 3 images obtained.    There is a mild to moderate left lower lumbar curve. Arterial   calcifications are noted.    There is a high neck fracture of the right hip with superior displacement   of the distal fracture fragment.    Hips are relatively free of degeneration.    Right femur.    4 extra views obtained.    There is mild knee degeneration.    Lower femur is intact.    IMPRESSION: High neck fracture of right femur as above. Other findings as   above.    < end of copied text >          Physical Exam:  Mental Status - Patient is alert, awake, oriented X3. Speech is fluent. Normal attention and concentration.  Follows commands well and able to answer questions appropriately. Mood and affect  normal.  Motor Exam -   Right upper full AROM, 5/5 strengths throughout   Left upper limited AROM, unable to raise the arm above 75-80 degrees (shoulder flexion), full PROM, pain noted with internal rotation and abduction with passive movement, 4-/5 elbow flexion and extension, 4+/5 hand strengths   Right lower 3/5 hip flexion, 4+/5 knee extension and 4/+ 5DF and PF  Left lower 4+/5 hip flexion, 5/5 knee extension and 5/5 DF and PF   Normal muscle bulk/tone  Sensory    Intact to light touch bilaterally.   DTS Reflexes:   2+ bilat biceps and patellar reflexes                      GENERAL Exam:     Nontoxic , No Acute Distress 	  HEENT:  normocephalic, atraumatic		  LUNGS Clear bilaterally    HEART:	 Irreg, S1S2      GI/ ABDOMEN:  Soft  Non tender +BS  EXTREMITIES:  =1 bilat lower extremity edema, edema noted to the left upper extremity   MUSCULOSKELETAL: as noted above   SKIN: aquacel dressing to right hip clean, dry and intact. ecchymosis to the left medial elbow (edema present)

## 2018-11-27 ENCOUNTER — INPATIENT (INPATIENT)
Facility: HOSPITAL | Age: 83
LOS: 14 days | Discharge: NURSING, REHAB OR LONG-TERM CARE | DRG: 949 | End: 2018-12-12
Attending: PHYSICAL MEDICINE & REHABILITATION | Admitting: PHYSICAL MEDICINE & REHABILITATION
Payer: MEDICARE

## 2018-11-27 VITALS
HEART RATE: 91 BPM | TEMPERATURE: 99 F | WEIGHT: 123.02 LBS | HEIGHT: 61 IN | DIASTOLIC BLOOD PRESSURE: 73 MMHG | RESPIRATION RATE: 14 BRPM | SYSTOLIC BLOOD PRESSURE: 135 MMHG | OXYGEN SATURATION: 92 %

## 2018-11-27 VITALS
OXYGEN SATURATION: 98 % | RESPIRATION RATE: 16 BRPM | HEART RATE: 78 BPM | SYSTOLIC BLOOD PRESSURE: 122 MMHG | DIASTOLIC BLOOD PRESSURE: 34 MMHG | TEMPERATURE: 98 F

## 2018-11-27 DIAGNOSIS — S72.146A NONDISPLACED INTERTROCHANTERIC FRACTURE OF UNSPECIFIED FEMUR, INITIAL ENCOUNTER FOR CLOSED FRACTURE: ICD-10-CM

## 2018-11-27 DIAGNOSIS — R29.898 OTHER SYMPTOMS AND SIGNS INVOLVING THE MUSCULOSKELETAL SYSTEM: ICD-10-CM

## 2018-11-27 DIAGNOSIS — S72.91XA UNSPECIFIED FRACTURE OF RIGHT FEMUR, INITIAL ENCOUNTER FOR CLOSED FRACTURE: ICD-10-CM

## 2018-11-27 DIAGNOSIS — I10 ESSENTIAL (PRIMARY) HYPERTENSION: ICD-10-CM

## 2018-11-27 DIAGNOSIS — K21.9 GASTRO-ESOPHAGEAL REFLUX DISEASE WITHOUT ESOPHAGITIS: ICD-10-CM

## 2018-11-27 DIAGNOSIS — I48.91 UNSPECIFIED ATRIAL FIBRILLATION: ICD-10-CM

## 2018-11-27 DIAGNOSIS — K51.90 ULCERATIVE COLITIS, UNSPECIFIED, WITHOUT COMPLICATIONS: ICD-10-CM

## 2018-11-27 DIAGNOSIS — Z98.49 CATARACT EXTRACTION STATUS, UNSPECIFIED EYE: Chronic | ICD-10-CM

## 2018-11-27 PROBLEM — Z95.0 PRESENCE OF CARDIAC PACEMAKER: Chronic | Status: ACTIVE | Noted: 2018-11-23

## 2018-11-27 LAB
INR BLD: 1.53 RATIO — HIGH (ref 0.88–1.16)
PROTHROM AB SERPL-ACNC: 17.4 SEC — HIGH (ref 10–12.9)

## 2018-11-27 PROCEDURE — 93306 TTE W/DOPPLER COMPLETE: CPT

## 2018-11-27 PROCEDURE — 73501 X-RAY EXAM HIP UNI 1 VIEW: CPT

## 2018-11-27 PROCEDURE — 85730 THROMBOPLASTIN TIME PARTIAL: CPT

## 2018-11-27 PROCEDURE — 36000 PLACE NEEDLE IN VEIN: CPT

## 2018-11-27 PROCEDURE — 85027 COMPLETE CBC AUTOMATED: CPT

## 2018-11-27 PROCEDURE — 86850 RBC ANTIBODY SCREEN: CPT

## 2018-11-27 PROCEDURE — 97116 GAIT TRAINING THERAPY: CPT

## 2018-11-27 PROCEDURE — 99285 EMERGENCY DEPT VISIT HI MDM: CPT | Mod: 25

## 2018-11-27 PROCEDURE — C1713: CPT

## 2018-11-27 PROCEDURE — 73552 X-RAY EXAM OF FEMUR 2/>: CPT

## 2018-11-27 PROCEDURE — 93005 ELECTROCARDIOGRAM TRACING: CPT

## 2018-11-27 PROCEDURE — 85610 PROTHROMBIN TIME: CPT

## 2018-11-27 PROCEDURE — 94760 N-INVAS EAR/PLS OXIMETRY 1: CPT

## 2018-11-27 PROCEDURE — C1776: CPT

## 2018-11-27 PROCEDURE — 73502 X-RAY EXAM HIP UNI 2-3 VIEWS: CPT

## 2018-11-27 PROCEDURE — 72192 CT PELVIS W/O DYE: CPT

## 2018-11-27 PROCEDURE — 71045 X-RAY EXAM CHEST 1 VIEW: CPT

## 2018-11-27 PROCEDURE — 86901 BLOOD TYPING SEROLOGIC RH(D): CPT

## 2018-11-27 PROCEDURE — C1889: CPT

## 2018-11-27 PROCEDURE — 99239 HOSP IP/OBS DSCHRG MGMT >30: CPT

## 2018-11-27 PROCEDURE — 86900 BLOOD TYPING SEROLOGIC ABO: CPT

## 2018-11-27 PROCEDURE — 94640 AIRWAY INHALATION TREATMENT: CPT

## 2018-11-27 PROCEDURE — 80053 COMPREHEN METABOLIC PANEL: CPT

## 2018-11-27 PROCEDURE — 80048 BASIC METABOLIC PNL TOTAL CA: CPT

## 2018-11-27 RX ORDER — POLYETHYLENE GLYCOL 3350 17 G/17G
17 POWDER, FOR SOLUTION ORAL
Qty: 0 | Refills: 0 | COMMUNITY
Start: 2018-11-27

## 2018-11-27 RX ORDER — SENNA PLUS 8.6 MG/1
1 TABLET ORAL
Qty: 0 | Refills: 0 | DISCHARGE
Start: 2018-11-27

## 2018-11-27 RX ORDER — LABETALOL HCL 100 MG
0 TABLET ORAL
Qty: 0 | Refills: 0 | COMMUNITY

## 2018-11-27 RX ORDER — ZINC SULFATE TAB 220 MG (50 MG ZINC EQUIVALENT) 220 (50 ZN) MG
220 TAB ORAL DAILY
Qty: 0 | Refills: 0 | Status: COMPLETED | OUTPATIENT
Start: 2018-11-28 | End: 2018-12-07

## 2018-11-27 RX ORDER — ENOXAPARIN SODIUM 100 MG/ML
30 INJECTION SUBCUTANEOUS
Qty: 0 | Refills: 0 | COMMUNITY
Start: 2018-11-27

## 2018-11-27 RX ORDER — TRAMADOL HYDROCHLORIDE 50 MG/1
50 TABLET ORAL EVERY 6 HOURS
Qty: 0 | Refills: 0 | Status: DISCONTINUED | OUTPATIENT
Start: 2018-11-27 | End: 2018-12-03

## 2018-11-27 RX ORDER — ONDANSETRON 8 MG/1
4 TABLET, FILM COATED ORAL
Qty: 0 | Refills: 0 | DISCHARGE
Start: 2018-11-27

## 2018-11-27 RX ORDER — MESALAMINE 400 MG
2 TABLET, DELAYED RELEASE (ENTERIC COATED) ORAL
Qty: 0 | Refills: 0 | COMMUNITY

## 2018-11-27 RX ORDER — FUROSEMIDE 40 MG
1 TABLET ORAL
Qty: 0 | Refills: 0 | COMMUNITY

## 2018-11-27 RX ORDER — LABETALOL HCL 100 MG
1 TABLET ORAL
Qty: 0 | Refills: 0 | COMMUNITY
Start: 2018-11-27

## 2018-11-27 RX ORDER — SENNA PLUS 8.6 MG/1
1 TABLET ORAL AT BEDTIME
Qty: 0 | Refills: 0 | Status: DISCONTINUED | OUTPATIENT
Start: 2018-11-27 | End: 2018-12-08

## 2018-11-27 RX ORDER — TRAMADOL HYDROCHLORIDE 50 MG/1
1 TABLET ORAL
Qty: 0 | Refills: 0 | COMMUNITY
Start: 2018-11-27

## 2018-11-27 RX ORDER — FLUTICASONE PROPIONATE 220 MCG
1 AEROSOL WITH ADAPTER (GRAM) INHALATION
Qty: 0 | Refills: 0 | COMMUNITY

## 2018-11-27 RX ORDER — ACETAMINOPHEN 500 MG
650 TABLET ORAL EVERY 6 HOURS
Qty: 0 | Refills: 0 | Status: DISCONTINUED | OUTPATIENT
Start: 2018-11-27 | End: 2018-12-12

## 2018-11-27 RX ORDER — DORZOLAMIDE HYDROCHLORIDE 20 MG/ML
1 SOLUTION/ DROPS OPHTHALMIC THREE TIMES A DAY
Qty: 0 | Refills: 0 | Status: DISCONTINUED | OUTPATIENT
Start: 2018-11-27 | End: 2018-12-12

## 2018-11-27 RX ORDER — POLYETHYLENE GLYCOL 3350 17 G/17G
17 POWDER, FOR SOLUTION ORAL DAILY
Qty: 0 | Refills: 0 | Status: DISCONTINUED | OUTPATIENT
Start: 2018-11-27 | End: 2018-12-12

## 2018-11-27 RX ORDER — WARFARIN SODIUM 2.5 MG/1
1 TABLET ORAL
Qty: 0 | Refills: 0 | COMMUNITY
Start: 2018-11-27

## 2018-11-27 RX ORDER — MONTELUKAST 4 MG/1
0 TABLET, CHEWABLE ORAL
Qty: 0 | Refills: 0 | COMMUNITY

## 2018-11-27 RX ORDER — BRINZOLAMIDE 10 MG/ML
0 SUSPENSION/ DROPS OPHTHALMIC
Qty: 0 | Refills: 0 | COMMUNITY

## 2018-11-27 RX ORDER — FUROSEMIDE 40 MG
40 TABLET ORAL
Qty: 0 | Refills: 0 | Status: DISCONTINUED | OUTPATIENT
Start: 2018-11-27 | End: 2018-11-30

## 2018-11-27 RX ORDER — MONTELUKAST 4 MG/1
10 TABLET, CHEWABLE ORAL AT BEDTIME
Qty: 0 | Refills: 0 | Status: DISCONTINUED | OUTPATIENT
Start: 2018-11-27 | End: 2018-12-12

## 2018-11-27 RX ORDER — FUROSEMIDE 40 MG
20 TABLET ORAL DAILY
Qty: 0 | Refills: 0 | Status: DISCONTINUED | OUTPATIENT
Start: 2018-11-27 | End: 2018-11-30

## 2018-11-27 RX ORDER — PANTOPRAZOLE SODIUM 20 MG/1
40 TABLET, DELAYED RELEASE ORAL
Qty: 0 | Refills: 0 | Status: DISCONTINUED | OUTPATIENT
Start: 2018-11-27 | End: 2018-12-12

## 2018-11-27 RX ORDER — POTASSIUM CHLORIDE 20 MEQ
0 PACKET (EA) ORAL
Qty: 0 | Refills: 0 | COMMUNITY

## 2018-11-27 RX ORDER — DORZOLAMIDE HYDROCHLORIDE 20 MG/ML
1 SOLUTION/ DROPS OPHTHALMIC
Qty: 0 | Refills: 0 | COMMUNITY
Start: 2018-11-27

## 2018-11-27 RX ORDER — FUROSEMIDE 40 MG
1 TABLET ORAL
Qty: 0 | Refills: 0 | COMMUNITY
Start: 2018-11-27

## 2018-11-27 RX ORDER — LIDOCAINE 4 G/100G
1 CREAM TOPICAL
Qty: 0 | Refills: 0 | Status: DISCONTINUED | OUTPATIENT
Start: 2018-11-28 | End: 2018-12-12

## 2018-11-27 RX ORDER — POTASSIUM CHLORIDE 20 MEQ
10 PACKET (EA) ORAL DAILY
Qty: 0 | Refills: 0 | Status: DISCONTINUED | OUTPATIENT
Start: 2018-11-28 | End: 2018-12-12

## 2018-11-27 RX ORDER — LABETALOL HCL 100 MG
200 TABLET ORAL THREE TIMES A DAY
Qty: 0 | Refills: 0 | Status: DISCONTINUED | OUTPATIENT
Start: 2018-11-27 | End: 2018-12-04

## 2018-11-27 RX ORDER — ASCORBIC ACID 60 MG
500 TABLET,CHEWABLE ORAL
Qty: 0 | Refills: 0 | Status: DISCONTINUED | OUTPATIENT
Start: 2018-11-27 | End: 2018-12-12

## 2018-11-27 RX ORDER — ACETAMINOPHEN 500 MG
1 TABLET ORAL
Qty: 0 | Refills: 0 | COMMUNITY
Start: 2018-11-27

## 2018-11-27 RX ORDER — MESALAMINE 400 MG
2 TABLET, DELAYED RELEASE (ENTERIC COATED) ORAL
Qty: 0 | Refills: 0 | COMMUNITY
Start: 2018-11-27

## 2018-11-27 RX ORDER — ENOXAPARIN SODIUM 100 MG/ML
30 INJECTION SUBCUTANEOUS DAILY
Qty: 0 | Refills: 0 | Status: DISCONTINUED | OUTPATIENT
Start: 2018-11-27 | End: 2018-11-30

## 2018-11-27 RX ORDER — WARFARIN SODIUM 2.5 MG/1
4 TABLET ORAL AT BEDTIME
Qty: 0 | Refills: 0 | Status: DISCONTINUED | OUTPATIENT
Start: 2018-11-27 | End: 2018-11-28

## 2018-11-27 RX ORDER — DOCUSATE SODIUM 100 MG
1 CAPSULE ORAL
Qty: 0 | Refills: 0 | DISCHARGE
Start: 2018-11-27

## 2018-11-27 RX ORDER — MONTELUKAST 4 MG/1
1 TABLET, CHEWABLE ORAL
Qty: 0 | Refills: 0 | COMMUNITY
Start: 2018-11-27

## 2018-11-27 RX ORDER — DOCUSATE SODIUM 100 MG
100 CAPSULE ORAL THREE TIMES A DAY
Qty: 0 | Refills: 0 | Status: DISCONTINUED | OUTPATIENT
Start: 2018-11-27 | End: 2018-11-28

## 2018-11-27 RX ORDER — DILTIAZEM HCL 120 MG
1 CAPSULE, EXT RELEASE 24 HR ORAL
Qty: 0 | Refills: 0 | COMMUNITY
Start: 2018-11-27

## 2018-11-27 RX ORDER — BUDESONIDE AND FORMOTEROL FUMARATE DIHYDRATE 160; 4.5 UG/1; UG/1
2 AEROSOL RESPIRATORY (INHALATION)
Qty: 0 | Refills: 0 | Status: DISCONTINUED | OUTPATIENT
Start: 2018-11-27 | End: 2018-12-12

## 2018-11-27 RX ORDER — LIDOCAINE 4 G/100G
1 CREAM TOPICAL
Qty: 0 | Refills: 0 | Status: DISCONTINUED | OUTPATIENT
Start: 2018-11-27 | End: 2018-12-12

## 2018-11-27 RX ORDER — POTASSIUM CHLORIDE 20 MEQ
1 PACKET (EA) ORAL
Qty: 0 | Refills: 0 | COMMUNITY
Start: 2018-11-27

## 2018-11-27 RX ORDER — MESALAMINE 400 MG
800 TABLET, DELAYED RELEASE (ENTERIC COATED) ORAL
Qty: 0 | Refills: 0 | Status: DISCONTINUED | OUTPATIENT
Start: 2018-11-27 | End: 2018-12-12

## 2018-11-27 RX ADMIN — Medication 200 MILLIGRAM(S): at 13:49

## 2018-11-27 RX ADMIN — Medication 100 MILLIGRAM(S): at 06:06

## 2018-11-27 RX ADMIN — Medication 10 MILLIEQUIVALENT(S): at 12:49

## 2018-11-27 RX ADMIN — Medication 2.4 GRAM(S): at 16:27

## 2018-11-27 RX ADMIN — Medication 500 MILLIGRAM(S): at 17:50

## 2018-11-27 RX ADMIN — DORZOLAMIDE HYDROCHLORIDE 1 DROP(S): 20 SOLUTION/ DROPS OPHTHALMIC at 06:07

## 2018-11-27 RX ADMIN — Medication 200 MILLIGRAM(S): at 06:06

## 2018-11-27 RX ADMIN — Medication 40 MILLIGRAM(S): at 17:50

## 2018-11-27 RX ADMIN — Medication 200 MILLIGRAM(S): at 22:37

## 2018-11-27 RX ADMIN — Medication 20 MILLIGRAM(S): at 06:07

## 2018-11-27 RX ADMIN — DORZOLAMIDE HYDROCHLORIDE 1 DROP(S): 20 SOLUTION/ DROPS OPHTHALMIC at 22:38

## 2018-11-27 RX ADMIN — Medication 325 MILLIGRAM(S): at 10:54

## 2018-11-27 RX ADMIN — Medication 100 MILLIGRAM(S): at 13:49

## 2018-11-27 RX ADMIN — ENOXAPARIN SODIUM 30 MILLIGRAM(S): 100 INJECTION SUBCUTANEOUS at 22:38

## 2018-11-27 RX ADMIN — BUDESONIDE AND FORMOTEROL FUMARATE DIHYDRATE 2 PUFF(S): 160; 4.5 AEROSOL RESPIRATORY (INHALATION) at 08:50

## 2018-11-27 RX ADMIN — BUDESONIDE AND FORMOTEROL FUMARATE DIHYDRATE 2 PUFF(S): 160; 4.5 AEROSOL RESPIRATORY (INHALATION) at 21:05

## 2018-11-27 RX ADMIN — Medication 325 MILLIGRAM(S): at 11:00

## 2018-11-27 RX ADMIN — MONTELUKAST 10 MILLIGRAM(S): 4 TABLET, CHEWABLE ORAL at 22:37

## 2018-11-27 RX ADMIN — WARFARIN SODIUM 4 MILLIGRAM(S): 2.5 TABLET ORAL at 22:38

## 2018-11-27 RX ADMIN — ENOXAPARIN SODIUM 30 MILLIGRAM(S): 100 INJECTION SUBCUTANEOUS at 12:50

## 2018-11-27 RX ADMIN — DORZOLAMIDE HYDROCHLORIDE 1 DROP(S): 20 SOLUTION/ DROPS OPHTHALMIC at 13:49

## 2018-11-27 NOTE — DISCHARGE NOTE ADULT - MEDICATION SUMMARY - MEDICATIONS TO TAKE
I will START or STAY ON the medications listed below when I get home from the hospital:    mesalamine 1.2 g oral delayed release tablet  -- 2 tab(s) by mouth   -- Indication: For Colitis    acetaminophen 325 mg oral tablet  -- 1 tab(s) by mouth every 4 hours, As needed, Temp greater or equal to 38C (100.4F), Mild Pain (1 - 3)  -- Indication: For Pain    traMADol 50 mg oral tablet  -- 1 tab(s) by mouth every 6 hours, As needed, Moderate Pain (4 - 6)  -- Indication: For Pain    dilTIAZem 60 mg oral tablet  -- 1 tab(s) by mouth every 8 hours  -- Indication: For Chronic atrial fibrillation    warfarin 2 mg oral tablet  -- 1 tab(s) by mouth once a day  -- Indication: For Chronic atrial fibrillation    ondansetron 2 mg/mL injectable solution  -- 4 milligram(s) injectable every 6 hours, As Needed for nausea  -- Indication: For nausea    labetalol 200 mg oral tablet  -- 1 tab(s) by mouth 3 times a day  -- Indication: For Chronic atrial fibrillation    Breo Ellipta 100 mcg-25 mcg/inh inhalation powder  -- 1 puff(s) inhaled once a day  -- Indication: For breathing treatment    furosemide 40 mg oral tablet  -- 1 tab(s) by mouth   -- Indication: For HTN (hypertension), benign    furosemide 20 mg oral tablet  -- 1 tab(s) by mouth once a day  -- Indication: For Diuretic    senna oral tablet  -- 1 tab(s) by mouth once a day (at bedtime), As needed, Constipation  -- Indication: For Constipation    docusate sodium 100 mg oral capsule  -- 1 cap(s) by mouth 3 times a day  -- Indication: For Constipation    polyethylene glycol 3350 oral powder for reconstitution  -- 17 gram(s) by mouth once a day, As needed, Constipation  -- Indication: For Constipation    montelukast 10 mg oral tablet  -- 1 tab(s) by mouth once a day (at bedtime)  -- Indication: For breathing    potassium chloride 10 mEq oral tablet, extended release  -- 1 tab(s) by mouth once a day  -- Indication: For supplement    dorzolamide 2% ophthalmic solution  -- 1 drop(s) to each affected eye 3 times a day  -- Indication: For eyes

## 2018-11-27 NOTE — H&P ADULT - PROBLEM SELECTOR PLAN 1
WBAT.  Start comprehensive PT and OT program.   Maintain posterior hip precautions. WBAT.  Start comprehensive PT and OT program.   Maintain posterior hip precautions.  For pain, continue prn tylenol and tramadol. Add ice packs prn and daily lidocaine patch.   Vit c, zinc x 10day, MVI for healing.

## 2018-11-27 NOTE — DISCHARGE NOTE ADULT - SECONDARY DIAGNOSIS.
Gastroesophageal reflux disease without esophagitis Chronic atrial fibrillation HTN (hypertension), benign Pacemaker

## 2018-11-27 NOTE — PROGRESS NOTE ADULT - PROVIDER SPECIALTY LIST ADULT
Cardiology
Cardiology
Hospitalist
Internal Medicine
Orthopedics
Surgery
Surgery
Internal Medicine

## 2018-11-27 NOTE — H&P ADULT - NSHPLABSRESULTS_GEN_ALL_CORE
11-26    136  |  102  |  41<H>  ----------------------------<  136<H>  4.2   |  26  |  1.35<H>    Ca    10.1      26 Nov 2018 06:10                          9.3    11.6  )-----------( 150      ( 26 Nov 2018 06:10 )             28.5   PT/INR - ( 27 Nov 2018 07:20 )   PT: 17.4 sec;   INR: 1.53 ratio         < from: CT Pelvis Bony Only No Cont (11.23.18 @ 12:50) >    EXAM:  CT PELVIS BONY ONLY      PROCEDURE DATE:  11/23/2018        INTERPRETATION:  CT of the pelvis     CLINICAL INFORMATION: Status post fall 5 days ago. History of femoral   neck fracture.  TECHNIQUE: Axial CT images were obtained of the pelvis with coronal and   sagittal reconstructions. No contrast was administered.     COMPARISON: Hip radiographs dated 10/4/2018.    FINDINGS:    Redemonstration of right comminuted impacted femoral neck fracture   without evidence of bony bridging. There is now superior displacement of   the distal femur with respect to the femoral head neck junction.  No new   fractures visualized. There is severe bilateral hip osteoarthrosis. There   is a right hip joint effusion. There is enthesopathy and mineralization   of the proximal hamstring tendons bilaterally. There is right gluteus   minimus and medius and adductor atrophy. There is mild diffuse atrophy of   the right proximal thigh musculature compared to the left. There are   vascular calcifications. Partially visualized fluid-filled structure in   the right hemiabdomen containing peripherally mineralized focus, which   may represent the gallbladder. Subcutaneous tissues are intact.    IMPRESSION:    Redemonstration of comminuted impacted right femoral neck fracture   without evidence of bridging. Now with mild superior displacement of the   distal femur with respect to the proximal femoral head/neck. No new   fracture                  ALFREDA WAKEFIELD M.D., ATTENDING RADIOLOGIST  This document has been electronically signed. Nov 23 2018  1:30PM                < end of copied text > 9.2    5.8   )-----------( 184      ( 28 Nov 2018 06:30 )             29.9   11-28    137  |  103  |  24<H>  ----------------------------<  98  3.8   |  29  |  0.91    Ca    10.2      28 Nov 2018 06:30    TPro  5.6<L>  /  Alb  2.2<L>  /  TBili  0.5  /  DBili  x   /  AST  28  /  ALT  30  /  AlkPhos  87  11-28  PT/INR - ( 28 Nov 2018 06:30 )   PT: 18.1 sec;   INR: 1.59 ratio         PTT - ( 28 Nov 2018 06:30 )  PTT:31.9 sec  PT/INR - ( 27 Nov 2018 07:20 )   PT: 17.4 sec;   INR: 1.53 ratio         < from: CT Pelvis Bony Only No Cont (11.23.18 @ 12:50) >    EXAM:  CT PELVIS BONY ONLY      PROCEDURE DATE:  11/23/2018        INTERPRETATION:  CT of the pelvis     CLINICAL INFORMATION: Status post fall 5 days ago. History of femoral   neck fracture.  TECHNIQUE: Axial CT images were obtained of the pelvis with coronal and   sagittal reconstructions. No contrast was administered.     COMPARISON: Hip radiographs dated 10/4/2018.    FINDINGS:    Redemonstration of right comminuted impacted femoral neck fracture   without evidence of bony bridging. There is now superior displacement of   the distal femur with respect to the femoral head neck junction.  No new   fractures visualized. There is severe bilateral hip osteoarthrosis. There   is a right hip joint effusion. There is enthesopathy and mineralization   of the proximal hamstring tendons bilaterally. There is right gluteus   minimus and medius and adductor atrophy. There is mild diffuse atrophy of   the right proximal thigh musculature compared to the left. There are   vascular calcifications. Partially visualized fluid-filled structure in   the right hemiabdomen containing peripherally mineralized focus, which   may represent the gallbladder. Subcutaneous tissues are intact.    IMPRESSION:    Redemonstration of comminuted impacted right femoral neck fracture   without evidence of bridging. Now with mild superior displacement of the   distal femur with respect to the proximal femoral head/neck. No new   fracture                  ALFREDA WAKEFIELD M.D., ATTENDING RADIOLOGIST  This document < from: Xray Shoulder 2 Views, Left (11.28.18 @ 10:41) >      IMPRESSION: No evidence for acute fracture or dislocation. No significant   degenerative or inflammatory arthropathy identified.    < end of copied text >    been electronically signed. Nov 23 2018  1:30PM                < end of copied text >

## 2018-11-27 NOTE — H&P ADULT - HISTORY OF PRESENT ILLNESS
This is a 89 y/o female who reports that she had fallen at home after trying to get up from her dinning room table and tripped over a rug. She landed on her right hip. Her daughter took her for an xray that revealed a right hip fracture. Patient states that she was given the name of an orthopedic MD for evaluation and she did not go to that appointment. Pt states that she did not want to undergo surgery at that time, so she started outpatient PT and began using a RW. On the 23rd, the patient reports she was leaving the PT outpatient facilyt and while trying to make room on the sidewalk, her RW got stuck in some dirt and she lost her balance, again falling onto her right hip. She was brought to [facility]'s ER and found to have a high neck fracture of the right femur. Orthopedics were consulted and patient cleared for surgery. Patient underwent a right hemiarthroplasty by Dr Bhatia on 11/24.   Post operatively patient has acute blood loss anemia. WBC are slightly elevated. No signs or symptoms of infection.   Patient was seen by PT yesterday and able to stand from seated position with mod A x2. Patient was able to ambulate 5 feet with RW and min Ax 2.   Pending OT evaluation.   PM&R consulted to weigh in on disposition planning and recommended patient be transferred for an acute rehab stay after cleared by primary team.   Patient deemed medically stable today, 11/27/18, for admission to acute rehab here at [facility].

## 2018-11-27 NOTE — H&P ADULT - PROBLEM SELECTOR PLAN 5
Continue mesalamine.   If patient prefers, may bring in home dose of 2.4gm taken every day at 1700 (with dinner).   Currently ordered for Delzicol (mesalamine) 800mg BID (the equivalent to her home dose per pharmacy).

## 2018-11-27 NOTE — H&P ADULT - ASSESSMENT
This is a 89 y/o female s/p mechanical fall resulting in right femur fx (neck fx) s/p hemiarthroplasty now with functional deficits including gait and ADL dysfunction with decreased strength and endurance.     Precautions:    falls, posterior hip precautions                                 Diet: DASH    DVT Prophylaxis:    lovenox and coumadin, will d/c lovenox once INR >2                                                                      Medical Prognosis: good    Prescreen Comparison: I have reviewed the prescreen information and I found no relevant changes between the preadmission screening and my post admission evaluation.     Expected Therapy:   P.T.    2    hrs/day           O. T.  1    hrs/day           S.L.P.      n/a  hrs/day                    P&O    n/a                                               Excpected Frequency: 5 days/7 day period    Rehab Potential:      good       Estimated Disposition: home with home services         ELOS:  14-18            days      Rationale For Inpatient Rehab Admission- Patient demonstrates the following:     [X] Medically appropriate for rehabilitation admission   [X] Has attainable rehab goals with an appropriate discharge plan  [X] Has rehabilitation potential (expected to make significant improvement within a reasonable period of time)  [X] Requires close medical management by a rehab physician, rehab nursing care and comprehensive interdisciplinary team (including         PT, OT, SLP and/or prosthetics and orthotics)            Re  commend: Continue bedside PT, awaiting OT evaluation. Continue medical management per primary team. Leukocytosis noted, likely reactive, continue to monitor for S&S of infection.   Continue tramadol PRN for pain management. Rec d/c the IV Dilaudid and switch IV Tylenol to PO Tylenol Add lidocaine patch daily and ice packs to right hip prn.   Left shoulder weakness likely secondary to rotator cuff strain from improper RW use. Continue ROM exercises with PT/OT.  Patient will need rehab services including PT and OT to address gait and ADL dysfunction and to improve endurance and strength. Patient will be able to tolerate 3 hours a day, 5 days a week of therapies. ELOS 14-18 days with disposition to home with home services.   Will discuss with rehab attending. This is a 89 y/o female s/p mechanical fall resulting in right femur fx (neck fx) s/p hemiarthroplasty now with functional deficits including gait and ADL dysfunction with decreased strength and endurance.       Comprehensive rehab WBAT RLE: PT/OT    Pain management: Tramadol or tylenol prn.  Lidoderm patch, ice packs prn  Left shoulder pain: Xrays 11/28 neg for fx or dislocation.  Likely RTC tear.  Consider cortisone injection    Chronic Afib: Coumadin  INR goal 2-3  INR 1.59  Continue coumadin 4mg qhs with Lovenox bridge    CAD/HTN/PPM: Cardizem, Lasix, Labetalol.  D/W Dr Turner-Resumwilver ASA 81mg daily  H/O TIA: ASA    Colitis: Mesalamine.  Caution with bowel meds    Asthma: Symbicort, Singulair    Acute blood loss anemia: H/H 9.2/29.9.  Add Fe daily    Nutrition/wound care: MVI, Vit C, Zn        Precautions:    falls, posterior hip precautions                                 Diet: DASH    DVT Prophylaxis:    lovenox and coumadin, will d/c lovenox once INR >2                                                                      Medical Prognosis: good    Prescreen Comparison: I have reviewed the prescreen information and I found no relevant changes between the preadmission screening and my post admission evaluation.     Expected Therapy:   P.T.    1.5   hrs/day           O. T.  1.5    hrs/day           S.L.P.      n/a  hrs/day                    P&O    n/a                                               Excpected Frequency: 5 days/7 day period    Rehab Potential:      good       Estimated Disposition: home with home services         ELOS:  14-18            days      Rationale For Inpatient Rehab Admission- Patient demonstrates the following:     [X] Medically appropriate for rehabilitation admission   [X] Has attainable rehab goals with an appropriate discharge plan  [X] Has rehabilitation potential (expected to make significant improvement within a reasonable period of time)  [X] Requires close medical management by a rehab physician, rehab nursing care and comprehensive interdisciplinary team (including         PT, OT, SLP and/or prosthetics and orthotics) This is a 89 y/o female s/p mechanical fall resulting in right femur fx (neck fx) s/p hemiarthroplasty now with functional deficits including gait and ADL dysfunction with decreased strength and endurance.       Comprehensive rehab WBAT RLE: PT/OT    Pain management: Tramadol or tylenol prn.  Lidoderm patch, ice packs prn  Left shoulder pain: Xrays 11/28 neg for fx or dislocation.  Likely RTC tear.  Consider cortisone injection    Chronic Afib: Coumadin  INR goal 2-3  INR 1.59  Continue coumadin 4mg qhs with Lovenox bridge    CAD/HTN/PPM: Cardizem, Lasix, Labetalol.  D/W Dr Turner-Resumwilver ASA 81mg daily  H/O TIA: ASA    Colitis: Mesalamine.  Caution with bowel meds    GERD: Protonix    Asthma: Symbicort, Singulair    Acute blood loss anemia: H/H 9.2/29.9.  Add Fe daily    Moderate protein calorie malutrition/Nutrition/wound care: Ensure bid, MVI, Vit C, Zn        Precautions:    falls, posterior hip precautions                                 Diet: DASH    DVT Prophylaxis:    lovenox and coumadin, will d/c lovenox once INR >2                                                                      Medical Prognosis: good    Prescreen Comparison: I have reviewed the prescreen information and I found no relevant changes between the preadmission screening and my post admission evaluation.     Expected Therapy:   P.T.    1.5   hrs/day           O. T.  1.5    hrs/day           S.L.P.      n/a  hrs/day                    P&O    n/a                                               Excpected Frequency: 5 days/7 day period    Rehab Potential:      good       Estimated Disposition: home with home services         ELOS:  14-18            days      Rationale For Inpatient Rehab Admission- Patient demonstrates the following:     [X] Medically appropriate for rehabilitation admission   [X] Has attainable rehab goals with an appropriate discharge plan  [X] Has rehabilitation potential (expected to make significant improvement within a reasonable period of time)  [X] Requires close medical management by a rehab physician, rehab nursing care and comprehensive interdisciplinary team (including         PT, OT, SLP and/or prosthetics and orthotics) This is a 89 y/o female s/p mechanical fall resulting in right femur fx (neck fx) s/p hemiarthroplasty now with functional deficits including gait and ADL dysfunction with decreased strength and endurance.       Comprehensive rehab WBAT RLE: PT/OT    Pain management: Tramadol or tylenol prn.  Lidoderm patch, ice packs prn  Left shoulder pain: Xrays 11/28 neg for fx or dislocation.  Likely RTC tear.  Consider cortisone injection    Chronic Afib: Coumadin  INR goal 2-3  INR 1.59  Continue coumadin 4mg qhs with Lovenox bridge    CAD/HTN/PPM: Cardizem, Lasix, Labetalol.  D/W Dr Turner-Resumwilver ASA 81mg daily  H/O TIA: ASA    Colitis: Mesalamine.  Caution with bowel meds    GERD: Protonix    Asthma: Symbicort, Singulair    Acute blood loss anemia: H/H 9.2/29.9.  Add Fe daily    Moderate protein calorie malutrition/Nutrition/wound care: Ensure bid, MVI, Vit C, Zn    Vitamin D deficiency: Vit D 25.  Add 1,000U daily        Precautions:    falls, posterior hip precautions                                 Diet: DASH    DVT Prophylaxis:    lovenox and coumadin, will d/c lovenox once INR >2                                                                      Medical Prognosis: good    Prescreen Comparison: I have reviewed the prescreen information and I found no relevant changes between the preadmission screening and my post admission evaluation.     Expected Therapy:   P.T.    1.5   hrs/day           O. T.  1.5    hrs/day           S.L.P.      n/a  hrs/day                    P&O    n/a                                               Excpected Frequency: 5 days/7 day period    Rehab Potential:      good       Estimated Disposition: home with home services         ELOS:  14-18            days      Rationale For Inpatient Rehab Admission- Patient demonstrates the following:     [X] Medically appropriate for rehabilitation admission   [X] Has attainable rehab goals with an appropriate discharge plan  [X] Has rehabilitation potential (expected to make significant improvement within a reasonable period of time)  [X] Requires close medical management by a rehab physician, rehab nursing care and comprehensive interdisciplinary team (including         PT, OT, SLP and/or prosthetics and orthotics) This is a 89 y/o female s/p mechanical fall resulting in right femur fx (neck fx) s/p hemiarthroplasty now with functional deficits including gait and ADL dysfunction with decreased strength and endurance.       Comprehensive rehab WBAT RLE: PT/OT with post hip precautions    Pain management: Tramadol or tylenol prn.  Lidoderm patch, ice packs prn  Left shoulder pain: Xrays 11/28 neg for fx or dislocation.  Likely RTC tear.  Consider cortisone injection    Chronic Afib: Coumadin  INR goal 2-3  INR 1.59  Continue coumadin 4mg qhs with Lovenox bridge    CAD/HTN/PPM: Cardizem, Lasix, Labetalol.  D/W Dr Turner-Resume ASA 81mg daily  H/O TIA: ASA    Colitis: Mesalamine.  Caution with bowel meds    GERD: Protonix    Asthma: Symbicort, Singulair    Acute blood loss anemia: H/H 9.2/29.9.  Add Fe daily    Moderate protein calorie malutrition/Nutrition/wound care: Ensure bid, MVI, Vit C, Zn    Vitamin D deficiency: Vit D 25.  Add 1,000U daily        Precautions:    falls, posterior hip precautions                                 Diet: DASH    DVT Prophylaxis:    lovenox and coumadin, will d/c lovenox once INR >2                                                                      Medical Prognosis: good    Prescreen Comparison: I have reviewed the prescreen information and I found no relevant changes between the preadmission screening and my post admission evaluation.     Expected Therapy:   P.T.    1.5   hrs/day           O. T.  1.5    hrs/day           S.L.P.      n/a  hrs/day                    P&O    n/a                                               Excpected Frequency: 5 days/7 day period    Rehab Potential:      good       Estimated Disposition: home with home services         ELOS:  14-18            days      Rationale For Inpatient Rehab Admission- Patient demonstrates the following:     [X] Medically appropriate for rehabilitation admission   [X] Has attainable rehab goals with an appropriate discharge plan  [X] Has rehabilitation potential (expected to make significant improvement within a reasonable period of time)  [X] Requires close medical management by a rehab physician, rehab nursing care and comprehensive interdisciplinary team (including         PT, OT, SLP and/or prosthetics and orthotics)

## 2018-11-27 NOTE — DISCHARGE NOTE ADULT - CARE PROVIDER_API CALL
Kiko Turner), Gastroenterology; Internal Medicine  [address]  [address]  [place], [place] [id number]  Phone: [phone]  Fax: [phone]

## 2018-11-27 NOTE — DISCHARGE NOTE ADULT - CARE PLAN
Principal Discharge DX:	Closed fracture of neck of femur, unspecified laterality, initial encounter  Goal:	pain control, regain use of right leg  Assessment and plan of treatment:	rehab program  Secondary Diagnosis:	Gastroesophageal reflux disease without esophagitis  Secondary Diagnosis:	Chronic atrial fibrillation  Secondary Diagnosis:	HTN (hypertension), benign  Secondary Diagnosis:	Pacemaker

## 2018-11-27 NOTE — H&P ADULT - ATTENDING COMMENTS
Agree with above.  Pt is a 89 y/o female s/p mechanical fall resulting in right femur fx (neck fx) s/p hemiarthroplasty now with functional deficits including gait and ADL dysfunction with decreased strength and endurance.  Pt requires comprehensive rehab with physician management of comorbidities.

## 2018-11-27 NOTE — PROGRESS NOTE ADULT - PROBLEM SELECTOR PROBLEM 1
Closed fracture of right hip, initial encounter
Femur fracture

## 2018-11-27 NOTE — PROGRESS NOTE ADULT - PROBLEM SELECTOR PROBLEM 2
Atrial fibrillation
Chronic atrial fibrillation
Atrial fibrillation
Chronic atrial fibrillation

## 2018-11-27 NOTE — PROGRESS NOTE ADULT - ASSESSMENT
Medically cleared for transfer to acute rehabilitation
89 y/o F with Afib on coumadin, HTN, +PPM present s/p fall with right hip pain, found to have right femoral neck fracture, displaced.
IMP  Patient s/p hip surgery doing well    May d/c monitor
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
Resting comfortably after surgery on her hip will reinstate warfarin if okay with orthopedics check daily INR acute. rehabilitation consult
Stable post repair of hip fracture believe she would be an excellent candidate for acute rehabilitation awaiting final decision
Surgically stable   HTn, Afib , Pacemaker   Anemia of acute blood loss     Plan:   PT FWBAT            Lovenox for DVT then Coumadin            D./C Lovenox when INR therapeutic             Total Hip Precautions            check CBC /INR  in am              rehab planning
admitted hip fracture   PMH afib and HTN.. actively followed by Dr Tyson
admitted with hip fracture....h/o htn and afib ..TTE and cardiolgy consult

## 2018-11-27 NOTE — H&P ADULT - NSHPREVIEWOFSYSTEMS_GEN_ALL_CORE
TODAY'S SUBJECTIVE & REVIEW OF SYMPTOMS:    [  x ] Constitutional WNL  [  x ] Cardio WNL  [  x ] Resp WNL  [   ] GI WNL  [   ] Heme WNL  [ x  ] Endo WNL  [   ] Skin WNL  [   ] MSK WNL  [ x  ] Neuro WNL  [ x  ] Cognitive WNL  [ x  ] Psych WNL  Patient denies HA, dizziness, vision changes, CP, SOB, abdominal pain, dysuria. Last BM   Patient notes that she has had some difficulty with weakness in the left shoulder since using the RW after her first fall.   Having difficulty raising the arm above her head. Denies pain in the shoulder.   Reports that there is no pain to right hip while at rest and reports that pain is present with activity. TODAY'S SUBJECTIVE & REVIEW OF SYMPTOMS:    [  x ] Constitutional WNL  [  x ] Cardio WNL  [  x ] Resp WNL  [   ] GI WNL  [   ] Heme WNL  [ x  ] Endo WNL  [   ] Skin WNL  [   ] MSK WNL  [ x  ] Neuro WNL  [ x  ] Cognitive WNL  [ x  ] Psych WNL  Patient denies HA, dizziness, vision changes, CP, SOB, abdominal pain, dysuria. Last BM today, formed.   Patient notes that she has had some difficulty with weakness in the left shoulder since using the RW after her first fall.   Having difficulty raising the arm off of the bed. Denies pain in the shoulder at rest. Reports pain with passive ROM to the anterior shoulder. Denies injury during her two falls.   Reports that there is no pain to right hip while at rest and reports that pain is present with activity but tolerable.      Any major surgery within past 100 days?    YES  Two or more falls within past one year?      YES  One fall with injury within past one year?   YES TODAY'S SUBJECTIVE & REVIEW OF SYMPTOMS:    [  x ] Constitutional WNL  [  x ] Cardio WNL  [  x ] Resp WNL  [   ] GI WNL  [   ] Heme WNL  [ x  ] Endo WNL  [   ] Skin WNL  [   ] MSK WNL  [ x  ] Neuro WNL  [ x  ] Cognitive WNL  [ x  ] Psych WNL  Patient denies HA, dizziness, vision changes, CP, SOB, abdominal pain, dysuria. Last BM 11/27, formed.   Patient notes that she has had some difficulty with weakness in the left shoulder since using the RW after her first fall.   Having difficulty raising the arm off of the bed. Denies pain in the shoulder at rest. Reports pain with passive ROM to the anterior shoulder. Denies injury during her two falls.   Reports that there is no pain to right hip while at rest and reports that pain is present with activity but tolerable.      Any major surgery within past 100 days?    YES  Two or more falls within past one year?      YES  One fall with injury within past one year?   YES

## 2018-11-27 NOTE — H&P ADULT - PROBLEM SELECTOR PLAN 6
Left shoulder weakness, likely secondary to rotator cuff injury. Will obtain xray to rule out fracture.   Will start PT and OT to work on AROM, PROM and improving strength.   Will order lidocaine patch daily during therapy. Left shoulder weakness, likely secondary to rotator cuff injury. Xray to rule out fracture; neg  Will start PT and OT to work on AROM, PROM and improving strength.   Will order lidocaine patch daily during therapy.

## 2018-11-27 NOTE — PROGRESS NOTE ADULT - SUBJECTIVE AND OBJECTIVE BOX
Patient is a 89y old  Female who presents with a chief complaint of fall, hip fx (27 Nov 2018 07:42)      INTERVAL HPI/OVERNIGHT EVENTS:in minimal discomfort      REVIEW OF SYSTEMS:  CONSTITUTIONAL: No fever, weight loss, or fatigue  EYES: No eye pain, visual disturbances, or discharge  ENMT:  No difficulty hearing, tinnitus, vertigo; No sinus or throat pain  NECK: No pain or stiffness  BREASTS: No pain, masses, or nipple discharge  RESPIRATORY: No cough, wheezing, chills or hemoptysis; No shortness of breath  CARDIOVASCULAR: No chest pain, palpitations, dizziness, or leg swelling  GASTROINTESTINAL: No abdominal or epigastric pain. No nausea, vomiting, or hematemesis; No diarrhea or constipation. No melena or hematochezia.  GENITOURINARY: No dysuria, frequency, hematuria, or incontinence  NEUROLOGICAL: No headaches, memory loss, loss of strength, numbness, or tremors  SKIN: No itching, burning, rashes, or lesions   LYMPH NODES: No enlarged glands  ENDOCRINE: No heat or cold intolerance; No hair loss  MUSCULOSKELETAL: No joint pain or swelling; No muscle, back, or extremity pain  PSYCHIATRIC: No depression, anxiety, mood swings, or difficulty sleeping  HEME/LYMPH: No easy bruising, or bleeding gums  ALLERY AND IMMUNOLOGIC: No hives or eczema  FAMILY HISTORY:  No pertinent family history in first degree relatives    T(C): 36.5 (11-27-18 @ 07:53), Max: 36.9 (11-26-18 @ 11:10)  HR: 72 (11-27-18 @ 08:52) (60 - 86)  BP: 115/46 (11-27-18 @ 07:53) (112/46 - 130/58)  RR: 14 (11-27-18 @ 07:53) (14 - 17)  SpO2: 96% (11-27-18 @ 08:52) (96% - 100%)  Wt(kg): --Vital Signs Last 24 Hrs  T(C): 36.5 (27 Nov 2018 07:53), Max: 36.9 (26 Nov 2018 11:10)  T(F): 97.7 (27 Nov 2018 07:53), Max: 98.5 (26 Nov 2018 11:10)  HR: 72 (27 Nov 2018 08:52) (60 - 86)  BP: 115/46 (27 Nov 2018 07:53) (112/46 - 130/58)  BP(mean): --  RR: 14 (27 Nov 2018 07:53) (14 - 17)  SpO2: 96% (27 Nov 2018 08:52) (96% - 100%)    T(F): 97.7 (11-27-18 @ 07:53), Max: 98.5 (11-26-18 @ 11:10)  HR: 72 (11-27-18 @ 08:52) (47 - 112)  BP: 115/46 (11-27-18 @ 07:53) (93/40 - 147/48)  RR: 14 (11-27-18 @ 07:53) (14 - 18)  SpO2: 96% (11-27-18 @ 08:52) (96% - 100%)    PHYSICAL EXAM:  GENERAL: NAD, well-groomed, well-developed  HEAD:  Atraumatic, Normocephalic  EYES: EOMI, PERRLA, conjunctiva and sclera clear  ENMT: No tonsillar erythema, exudates, or enlargement; Moist mucous membranes, Good dentition, No lesions  NECK: Supple, No JVD, Normal thyroid  NERVOUS SYSTEM:  Alert & Oriented X3, Good concentration; Motor Strength 5/5 B/L upper and lower extremities; DTRs 2+ intact and symmetric  CHEST/LUNG: Clear to percussion bilaterally; No rales, rhonchi, wheezing, or rubs  HEART: Regular rate and rhythm; No murmurs, rubs, or gallops  ABDOMEN: Soft, Nontender, Nondistended; Bowel sounds present  EXTREMITIES:  2+ Peripheral Pulses, No clubbing, cyanosis, or edema  LYMPH: No lymphadenopathy noted  SKIN: No rashes or lesions    Consultant(s) Notes Reviewed:  [x ] YES  [ ] NO  Care Discussed with Consultants/Other Providers [ x] YES  [ ] NO    LABS:  11-26    136  |  102  |  41<H>  ----------------------------<  136<H>  4.2   |  26  |  1.35<H>    Ca    10.1      26 Nov 2018 06:10                            9.3    11.6  )-----------( 150      ( 26 Nov 2018 06:10 )             28.5         PT/INR - ( 27 Nov 2018 07:20 )   PT: 17.4 sec;   INR: 1.53 ratio                              9.3    11.6  )-----------( 150      ( 11-26 @ 06:10 )             28.5                9.8    11.0  )-----------( 164      ( 11-25 @ 10:37 )             30.8                11.1   10.0  )-----------( 165      ( 11-24 @ 21:25 )             34.8                12.2   9.2   )-----------( 167      ( 11-24 @ 17:22 )             39.0                11.5   7.0   )-----------( 170      ( 11-24 @ 06:35 )             35.7                13.1   8.4   )-----------( 178      ( 11-23 @ 12:48 )             40.8               RADIOLOGY & ADDITIONAL TESTS:    Imaging Personally Reviewed:  [ ] YES  [ ] NO  acetaminophen   Tablet .. 325 milliGRAM(s) Oral every 4 hours PRN  buDESOnide  80 MICROgram(s)/formoterol 4.5 MICROgram(s) Inhaler 2 Puff(s) Inhalation two times a day  diltiazem    Tablet 60 milliGRAM(s) Oral every 8 hours  docusate sodium 100 milliGRAM(s) Oral three times a day  dorzolamide 2% Ophthalmic Solution 1 Drop(s) Left EYE three times a day  enoxaparin Injectable 30 milliGRAM(s) SubCutaneous daily  furosemide    Tablet 20 milliGRAM(s) Oral daily  furosemide    Tablet 40 milliGRAM(s) Oral <User Schedule>  HYDROmorphone  Injectable 0.5 milliGRAM(s) IV Push every 4 hours PRN  HYDROmorphone  Injectable 0.25 milliGRAM(s) IV Push every 10 minutes PRN  labetalol 200 milliGRAM(s) Oral three times a day  mesalamine DR (24-Hour) Tablet 2.4 Gram(s) Oral <User Schedule>  montelukast 10 milliGRAM(s) Oral at bedtime  ondansetron Injectable 4 milliGRAM(s) IV Push every 6 hours PRN  ondansetron Injectable 4 milliGRAM(s) IV Push once PRN  polyethylene glycol 3350 17 Gram(s) Oral daily PRN  potassium chloride    Tablet ER 10 milliEquivalent(s) Oral daily  senna 1 Tablet(s) Oral at bedtime PRN  traMADol 50 milliGRAM(s) Oral every 6 hours PRN      HEALTH ISSUES - PROBLEM Dx:  Atrial fibrillation: Atrial fibrillation  Femur fracture: Femur fracture  Prophylactic measure: Prophylactic measure  Hypokalemia: Hypokalemia  Pacemaker: Pacemaker  HTN (hypertension), benign: HTN (hypertension), benign  Chronic atrial fibrillation: Chronic atrial fibrillation  Closed fracture of right hip, initial encounter: Closed fracture of right hip, initial encounter
Follow up for af  SUBJ: pATIENT DOING WELL POST OP. NO CP SOB PALPS  PMH  Pacemaker  Transient cerebral ischemia, unspecified transient cerebral ischemia type  HTN (hypertension), benign  Atrial fibrillation  Acid reflux disease      MEDICATIONS  (STANDING):  diltiazem    Tablet 60 milliGRAM(s) Oral every 8 hours  docusate sodium 100 milliGRAM(s) Oral three times a day  enoxaparin Injectable 30 milliGRAM(s) SubCutaneous daily  furosemide    Tablet 20 milliGRAM(s) Oral daily  furosemide    Tablet 40 milliGRAM(s) Oral <User Schedule>  labetalol 200 milliGRAM(s) Oral three times a day  lactated ringers. 1000 milliLiter(s) (75 mL/Hr) IV Continuous <Continuous>  lactated ringers. 1000 milliLiter(s) (75 mL/Hr) IV Continuous <Continuous>  montelukast 10 milliGRAM(s) Oral at bedtime  potassium chloride    Tablet ER 10 milliEquivalent(s) Oral daily    MEDICATIONS  (PRN):  acetaminophen   Tablet .. 325 milliGRAM(s) Oral every 4 hours PRN Temp greater or equal to 38C (100.4F), Mild Pain (1 - 3)  HYDROmorphone  Injectable 0.5 milliGRAM(s) IV Push every 4 hours PRN Severe Pain (7 - 10)  HYDROmorphone  Injectable 0.25 milliGRAM(s) IV Push every 10 minutes PRN Moderate Pain (4 - 6)  ondansetron Injectable 4 milliGRAM(s) IV Push every 6 hours PRN Nausea and/or Vomiting  ondansetron Injectable 4 milliGRAM(s) IV Push once PRN Nausea and/or Vomiting  polyethylene glycol 3350 17 Gram(s) Oral daily PRN Constipation  senna 1 Tablet(s) Oral at bedtime PRN Constipation  traMADol 50 milliGRAM(s) Oral every 6 hours PRN Moderate Pain (4 - 6)        PHYSICAL EXAM:  Vital Signs Last 24 Hrs  T(C): 36.9 (26 Nov 2018 11:10), Max: 36.9 (26 Nov 2018 11:10)  T(F): 98.5 (26 Nov 2018 11:10), Max: 98.5 (26 Nov 2018 11:10)  HR: 71 (26 Nov 2018 11:10) (71 - 112)  BP: 123/81 (26 Nov 2018 11:10) (106/53 - 134/58)  BP(mean): 75 (25 Nov 2018 20:50) (75 - 75)  RR: 17 (26 Nov 2018 11:10) (16 - 18)  SpO2: 96% (26 Nov 2018 11:10) (96% - 100%)    GENERAL: NAD, well-groomed, well-developed  HEAD:  Atraumatic, Normocephalic  EYES: EOMI, PERRLA, conjunctiva and sclera clear  ENT: Moist mucous membranes,  NECK: Supple, No JVD, no bruits  CHEST/LUNG: Clear to percussion bilaterally; No rales, rhonchi, wheezing, or rubs  HEART: Regular rate and rhythm; No murmurs, rubs, or gallops PMI non displaced.  ABDOMEN: Soft, Nontender, Nondistended; Bowel sounds present  EXTREMITIES:  2+ Peripheral Pulses, No clubbing, cyanosis, or edema  SKIN: No rashes or lesions  NERVOUS SYSTEM:  Alert & Oriented X3, Good concentration; Motor Strength 5/5 B/L upper and lower extremities; DTRs 2+ intact and symmetric      TELEMETRY: AFLUTTER WITH MODERATE RESPONSE    ECG:    LABS:                        9.3    11.6  )-----------( 150      ( 26 Nov 2018 06:10 )             28.5     11-26    136  |  102  |  41<H>  ----------------------------<  136<H>  4.2   |  26  |  1.35<H>    Ca    10.1      26 Nov 2018 06:10          PT/INR - ( 26 Nov 2018 06:10 )   PT: 17.4 sec;   INR: 1.53 ratio             I&O's Summary    25 Nov 2018 07:01  -  26 Nov 2018 07:00  --------------------------------------------------------  IN: 2505 mL / OUT: 0 mL / NET: 2505 mL      BNP    RADIOLOGY & ADDITIONAL STUDIES:    ECHO:
Follow up for cardiac assessment  SUBJ:Patient now pos op doing well. no cp no sob no palps  PMH  Pacemaker  Transient cerebral ischemia, unspecified transient cerebral ischemia type  HTN (hypertension), benign  Atrial fibrillation  Acid reflux disease      MEDICATIONS  (STANDING):  diltiazem    Tablet 60 milliGRAM(s) Oral every 8 hours  docusate sodium 100 milliGRAM(s) Oral three times a day  enoxaparin Injectable 30 milliGRAM(s) SubCutaneous daily  furosemide    Tablet 20 milliGRAM(s) Oral daily  furosemide    Tablet 40 milliGRAM(s) Oral <User Schedule>  labetalol 200 milliGRAM(s) Oral three times a day  lactated ringers. 1000 milliLiter(s) (75 mL/Hr) IV Continuous <Continuous>  lactated ringers. 1000 milliLiter(s) (75 mL/Hr) IV Continuous <Continuous>  montelukast 10 milliGRAM(s) Oral at bedtime  potassium chloride    Tablet ER 10 milliEquivalent(s) Oral daily  warfarin 2.5 milliGRAM(s) Oral once    MEDICATIONS  (PRN):  acetaminophen  IVPB .. 1000 milliGRAM(s) IV Intermittent once PRN Severe Pain (7 - 10)  HYDROmorphone  Injectable 0.5 milliGRAM(s) IV Push every 4 hours PRN Severe Pain (7 - 10)  HYDROmorphone  Injectable 0.25 milliGRAM(s) IV Push every 10 minutes PRN Moderate Pain (4 - 6)  ondansetron Injectable 4 milliGRAM(s) IV Push every 6 hours PRN Nausea and/or Vomiting  ondansetron Injectable 4 milliGRAM(s) IV Push once PRN Nausea and/or Vomiting  polyethylene glycol 3350 17 Gram(s) Oral daily PRN Constipation  senna 1 Tablet(s) Oral at bedtime PRN Constipation  traMADol 50 milliGRAM(s) Oral every 6 hours PRN Moderate Pain (4 - 6)        PHYSICAL EXAM:  Vital Signs Last 24 Hrs  T(C): 36.5 (25 Nov 2018 09:31), Max: 36.8 (24 Nov 2018 17:12)  T(F): 97.7 (25 Nov 2018 09:31), Max: 98.2 (24 Nov 2018 17:12)  HR: 68 (25 Nov 2018 09:31) (47 - 103)  BP: 112/35 (25 Nov 2018 09:31) (93/40 - 147/48)  BP(mean): --  RR: 16 (25 Nov 2018 09:31) (15 - 18)  SpO2: 100% (25 Nov 2018 09:31) (99% - 100%)    GENERAL: NAD, well-groomed, well-developed  HEAD:  Atraumatic, Normocephalic  EYES: EOMI, PERRLA, conjunctiva and sclera clear  ENT: Moist mucous membranes,  NECK: Supple, No JVD, no bruits  CHEST/LUNG: Clear  HEART: irregularly irregular  ABDOMEN: Soft, Nontender, Nondistended; Bowel sounds present  EXTREMITIES:  2+ Peripheral Pulses, No clubbing, cyanosis, or edema  SKIN: No rashes or lesions  NERVOUS SYSTEM:  unable to cooperate      TELEMETRY: aflutter moderate response occ pacing    ECG:    LABS:                        9.8    11.0  )-----------( 164      ( 25 Nov 2018 10:37 )             30.8     11-25    134<L>  |  101  |  34<H>  ----------------------------<  219<H>  4.3   |  27  |  1.49<H>    Ca    9.9      25 Nov 2018 10:37          PT/INR - ( 25 Nov 2018 05:40 )   PT: 16.4 sec;   INR: 1.45 ratio             I&O's Summary    24 Nov 2018 07:01  -  25 Nov 2018 07:00  --------------------------------------------------------  IN: 1100 mL / OUT: 0 mL / NET: 1100 mL    25 Nov 2018 07:01  -  25 Nov 2018 12:49  --------------------------------------------------------  IN: 420 mL / OUT: 0 mL / NET: 420 mL      BNP    RADIOLOGY & ADDITIONAL STUDIES:    ECHO:
POD #2 right hip hemiarthroplasty    Pt resting comfortably. no new complaints no sob no cp    Vital Signs Last 24 Hrs  T(C): 36.7 (25 Nov 2018 20:50), Max: 36.8 (25 Nov 2018 15:33)  T(F): 98.1 (25 Nov 2018 20:50), Max: 98.2 (25 Nov 2018 15:33)  HR: 107 (25 Nov 2018 20:50) (66 - 112)  BP: 134/58 (25 Nov 2018 20:50) (93/40 - 134/58)  BP(mean): 75 (25 Nov 2018 20:50) (75 - 75)  RR: 18 (25 Nov 2018 20:50) (16 - 18)  SpO2: 98% (25 Nov 2018 20:50) (98% - 100%)    chest CTA bilat  cardio S1S2 IRR afib  abd SNT nd  right hip wound C/D/I staples intact thigh supple  RLE; NVI sensory intact +DP +EHL abduction pillow in place.                          9.3    11.6  )-----------( 150      ( 26 Nov 2018 06:10 )             28.5     11-26    136  |  102  |  41<H>  ----------------------------<  136<H>  4.2   |  26  |  1.35<H>    Ca    10.1      26 Nov 2018 06:10    PT/INR - ( 26 Nov 2018 06:10 )   PT: 17.4 sec;   INR: 1.53 ratio
POD #3 r hip hemiarthroplasty    Pt resting comfortably. no new complaints. denies SOB or CP awaiting rehab     Vital Signs Last 24 Hrs  T(C): 36.7 (27 Nov 2018 05:13), Max: 36.9 (26 Nov 2018 11:10)  T(F): 98 (27 Nov 2018 05:13), Max: 98.5 (26 Nov 2018 11:10)  HR: 75 (27 Nov 2018 05:13) (70 - 86)  BP: 130/58 (27 Nov 2018 05:13) (112/46 - 130/58)  BP(mean): --  RR: 14 (27 Nov 2018 05:13) (14 - 17)  SpO2: 100% (27 Nov 2018 05:13) (96% - 100%)    Chest CTA bilat  Cardio s1s2 IRR  Abd SNT ND +BS  r hip incision: staples intact aquacell removed moderate drainage on dressing. thigh soft  RLE: NVI sensory intact +DP +EHL abduction pillow in place.  Ext: no c/c/e    PT INR pending
Patient is a 89y old  Female who presents with a chief complaint of fall, hip fx (23 Nov 2018 15:20)    Patient seen at bedside. pain on movement of right hip. Denies chest pain, sob, nausea, vomiting.      Patient seen and examined at bedside.    ALLERGIES:  Keflex (Diarrhea)  Norvasc (Unknown)    MEDICATIONS  (STANDING):  chlorhexidine 4% Liquid 1 Application(s) Topical once  diltiazem    Tablet 60 milliGRAM(s) Oral every 8 hours  furosemide    Tablet 20 milliGRAM(s) Oral daily  furosemide    Tablet 40 milliGRAM(s) Oral <User Schedule>  labetalol 200 milliGRAM(s) Oral three times a day  mesalamine DR (24-Hour) Tablet 2.4 Gram(s) Oral daily  montelukast 10 milliGRAM(s) Oral at bedtime  potassium chloride    Tablet ER 10 milliEquivalent(s) Oral daily    MEDICATIONS  (PRN):  oxyCODONE    5 mG/acetaminophen 325 mG 1 Tablet(s) Oral every 6 hours PRN Moderate Pain (4 - 6)    Vital Signs Last 24 Hrs  T(F): 97.8 (24 Nov 2018 05:16), Max: 99.1 (23 Nov 2018 21:44)  HR: 80 (24 Nov 2018 05:16) (55 - 80)  BP: 150/53 (24 Nov 2018 05:16) (118/74 - 170/60)  RR: 15 (24 Nov 2018 05:16) (15 - 17)  SpO2: 94% (24 Nov 2018 05:16) (94% - 99%)  I&O's Summary    23 Nov 2018 07:01  -  24 Nov 2018 07:00  --------------------------------------------------------  IN: 240 mL / OUT: 350 mL / NET: -110 mL      BMI (kg/m2): 19.3 (11-24-18 @ 05:16)  PHYSICAL EXAM:  General: NAD, A/O x 3  ENT: MMM  Neck: Supple, No JVD  Lungs: Clear to auscultation bilaterally  Cardio: RRR, S1/S2, No murmurs  Abdomen: Soft, Nontender, Nondistended; Bowel sounds present  Extremities: No calf tenderness, No pitting edema, right hip TTP     LABS:                        11.5   7.0   )-----------( 170      ( 24 Nov 2018 06:35 )             35.7       11-24    137  |  102  |  20  ----------------------------<  106  3.5   |  27  |  0.98    Ca    10.6      24 Nov 2018 06:35    TPro  6.5  /  Alb  3.1  /  TBili  0.6  /  DBili  x   /  AST  16  /  ALT  21  /  AlkPhos  101  11-23     eGFR if Non African American: 50 mL/min/1.73M2 (11-24-18 @ 06:35)  eGFR if African American: 58 mL/min/1.73M2 (11-24-18 @ 06:35)    PT/INR - ( 24 Nov 2018 06:35 )   PT: 15.8 sec;   INR: 1.40 ratio      PTT - ( 23 Nov 2018 12:48 )  PTT:29.3 sec     CAPILLARY BLOOD GLUCOSE    RADIOLOGY & ADDITIONAL TESTS:    Care Discussed with Consultants/Other Providers:
Patient is a 89y old  Female who presents with a chief complaint of fall, hip fx (24 Nov 2018 07:56)      INTERVAL HPI/OVERNIGHT EVENTS  :mild pain      REVIEW OF SYSTEMS:  CONSTITUTIONAL: No fever, weight loss, or fatigue  EYES: No eye pain, visual disturbances, or discharge  ENMT:  No difficulty hearing, tinnitus, vertigo; No sinus or throat pain  NECK: No pain or stiffness  BREASTS: No pain, masses, or nipple discharge  RESPIRATORY: No cough, wheezing, chills or hemoptysis; No shortness of breath  CARDIOVASCULAR: No chest pain, palpitations, dizziness, or leg swelling  GASTROINTESTINAL: No abdominal or epigastric pain. No nausea, vomiting, or hematemesis; No diarrhea or constipation. No melena or hematochezia.  GENITOURINARY: No dysuria, frequency, hematuria, or incontinence  NEUROLOGICAL: No headaches, memory loss, loss of strength, numbness, or tremors  SKIN: No itching, burning, rashes, or lesions   LYMPH NODES: No enlarged glands  ENDOCRINE: No heat or cold intolerance; No hair loss  MUSCULOSKELETAL: No joint pain or swelling; No muscle, back, or extremity pain  PSYCHIATRIC: No depression, anxiety, mood swings, or difficulty sleeping  HEME/LYMPH: No easy bruising, or bleeding gums  ALLERY AND IMMUNOLOGIC: No hives or eczema  FAMILY HISTORY:  No pertinent family history in first degree relatives    T(C): 36.6 (11-24-18 @ 05:16), Max: 37.3 (11-23-18 @ 21:44)  HR: 80 (11-24-18 @ 05:16) (55 - 80)  BP: 150/53 (11-24-18 @ 05:16) (118/74 - 170/60)  RR: 15 (11-24-18 @ 05:16) (15 - 17)  SpO2: 94% (11-24-18 @ 05:16) (94% - 99%)  Wt(kg): --Vital Signs Last 24 Hrs  T(C): 36.6 (24 Nov 2018 05:16), Max: 37.3 (23 Nov 2018 21:44)  T(F): 97.8 (24 Nov 2018 05:16), Max: 99.1 (23 Nov 2018 21:44)  HR: 80 (24 Nov 2018 05:16) (55 - 80)  BP: 150/53 (24 Nov 2018 05:16) (118/74 - 170/60)  BP(mean): --  RR: 15 (24 Nov 2018 05:16) (15 - 17)  SpO2: 94% (24 Nov 2018 05:16) (94% - 99%)    T(F): 97.8 (11-24-18 @ 05:16), Max: 99.1 (11-23-18 @ 21:44)  HR: 80 (11-24-18 @ 05:16) (55 - 80)  BP: 150/53 (11-24-18 @ 05:16) (118/74 - 170/60)  RR: 15 (11-24-18 @ 05:16) (15 - 17)  SpO2: 94% (11-24-18 @ 05:16) (94% - 99%)    PHYSICAL EXAM:  GENERAL: NAD, well-groomed, well-developed  HEAD:  Atraumatic, Normocephalic  EYES: EOMI, PERRLA, conjunctiva and sclera clear  ENMT: No tonsillar erythema, exudates, or enlargement; Moist mucous membranes, Good dentition, No lesions  NECK: Supple, No JVD, Normal thyroid  NERVOUS SYSTEM:  Alert & Oriented X3, Good concentration; Motor Strength 5/5 B/L upper and lower extremities; DTRs 2+ intact and symmetric  CHEST/LUNG: Clear to percussion bilaterally; No rales, rhonchi, wheezing, or rubs  HEART: Regular rate and rhythm; No murmurs, rubs, or gallops  ABDOMEN: Soft, Nontender, Nondistended; Bowel sounds present  EXTREMITIES:  2+ Peripheral Pulses, No clubbing, cyanosis, or edema  LYMPH: No lymphadenopathy noted  SKIN: No rashes or lesions    Consultant(s) Notes Reviewed:  [x ] YES  [ ] NO  Care Discussed with Consultants/Other Providers [ x] YES  [ ] NO    LABS:  11-24    137  |  102  |  20  ----------------------------<  106<H>  3.5   |  27  |  0.98    Ca    10.6<H>      24 Nov 2018 06:35    TPro  6.5  /  Alb  3.1<L>  /  TBili  0.6  /  DBili  x   /  AST  16  /  ALT  21  /  AlkPhos  101  11-23                          11.5   7.0   )-----------( 170      ( 24 Nov 2018 06:35 )             35.7         PT/INR - ( 24 Nov 2018 06:35 )   PT: 15.8 sec;   INR: 1.40 ratio         PTT - ( 23 Nov 2018 12:48 )  PTT:29.3 sec  LIVER FUNCTIONS - ( 23 Nov 2018 12:48 )  Alb: 3.1 g/dL / Pro: 6.5 g/dL / ALK PHOS: 101 U/L / ALT: 21 U/L DA / AST: 16 U/L / GGT: x                            11.5   7.0   )-----------( 170      ( 11-24 @ 06:35 )             35.7                13.1   8.4   )-----------( 178      ( 11-23 @ 12:48 )             40.8               RADIOLOGY & ADDITIONAL TESTS:    Imaging Personally Reviewed:  [ ] YES  [ ] NO  chlorhexidine 4% Liquid 1 Application(s) Topical once  diltiazem    Tablet 60 milliGRAM(s) Oral every 8 hours  furosemide    Tablet 20 milliGRAM(s) Oral daily  furosemide    Tablet 40 milliGRAM(s) Oral <User Schedule>  labetalol 200 milliGRAM(s) Oral three times a day  mesalamine DR (24-Hour) Tablet 2.4 Gram(s) Oral daily  montelukast 10 milliGRAM(s) Oral at bedtime  oxyCODONE    5 mG/acetaminophen 325 mG 1 Tablet(s) Oral every 6 hours PRN  potassium chloride    Tablet ER 10 milliEquivalent(s) Oral daily      HEALTH ISSUES - PROBLEM Dx:  Prophylactic measure: Prophylactic measure  Hypokalemia: Hypokalemia  Pacemaker: Pacemaker  HTN (hypertension), benign: HTN (hypertension), benign  Chronic atrial fibrillation: Chronic atrial fibrillation  Closed fracture of right hip, initial encounter: Closed fracture of right hip, initial encounter
Patient is a 89y old  Female who presents with a chief complaint of fall, hip fx (24 Nov 2018 09:14)      INTERVAL HPI/OVERNIGHT EVENTS:  minimal pain at rest       REVIEW OF SYSTEMS:  CONSTITUTIONAL: No fever, weight loss, or fatigue  EYES: No eye pain, visual disturbances, or discharge  ENMT:  No difficulty hearing, tinnitus, vertigo; No sinus or throat pain  NECK: No pain or stiffness  BREASTS: No pain, masses, or nipple discharge  RESPIRATORY: No cough, wheezing, chills or hemoptysis; No shortness of breath  CARDIOVASCULAR: No chest pain, palpitations, dizziness, or leg swelling  GASTROINTESTINAL: No abdominal or epigastric pain. No nausea, vomiting, or hematemesis; No diarrhea or constipation. No melena or hematochezia.  GENITOURINARY: No dysuria, frequency, hematuria, or incontinence  NEUROLOGICAL: No headaches, memory loss, loss of strength, numbness, or tremors  SKIN: No itching, burning, rashes, or lesions   LYMPH NODES: No enlarged glands  ENDOCRINE: No heat or cold intolerance; No hair loss  MUSCULOSKELETAL: No joint pain or swelling; No muscle, back, or extremity pain  PSYCHIATRIC: No depression, anxiety, mood swings, or difficulty sleeping  HEME/LYMPH: No easy bruising, or bleeding gums  ALLERY AND IMMUNOLOGIC: No hives or eczema  FAMILY HISTORY:  No pertinent family history in first degree relatives    T(C): 36.6 (11-24-18 @ 05:16), Max: 37.3 (11-23-18 @ 21:44)  HR: 80 (11-24-18 @ 05:16) (55 - 80)  BP: 150/53 (11-24-18 @ 05:16) (118/74 - 170/60)  RR: 15 (11-24-18 @ 05:16) (15 - 17)  SpO2: 94% (11-24-18 @ 05:16) (94% - 99%)  Wt(kg): --Vital Signs Last 24 Hrs  T(C): 36.6 (24 Nov 2018 05:16), Max: 37.3 (23 Nov 2018 21:44)  T(F): 97.8 (24 Nov 2018 05:16), Max: 99.1 (23 Nov 2018 21:44)  HR: 80 (24 Nov 2018 05:16) (55 - 80)  BP: 150/53 (24 Nov 2018 05:16) (118/74 - 170/60)  BP(mean): --  RR: 15 (24 Nov 2018 05:16) (15 - 17)  SpO2: 94% (24 Nov 2018 05:16) (94% - 99%)    T(F): 97.8 (11-24-18 @ 05:16), Max: 99.1 (11-23-18 @ 21:44)  HR: 80 (11-24-18 @ 05:16) (55 - 80)  BP: 150/53 (11-24-18 @ 05:16) (118/74 - 170/60)  RR: 15 (11-24-18 @ 05:16) (15 - 17)  SpO2: 94% (11-24-18 @ 05:16) (94% - 99%)    PHYSICAL EXAM:  GENERAL: NAD, well-groomed, well-developed  HEAD:  Atraumatic, Normocephalic  EYES: EOMI, PERRLA, conjunctiva and sclera clear  ENMT: No tonsillar erythema, exudates, or enlargement; Moist mucous membranes, Good dentition, No lesions  NECK: Supple, No JVD, Normal thyroid  NERVOUS SYSTEM:  Alert & Oriented X3, Good concentration; Motor Strength 5/5 B/L upper and lower extremities; DTRs 2+ intact and symmetric  CHEST/LUNG: Clear to percussion bilaterally; No rales, rhonchi, wheezing, or rubs  HEART: Regular rate and rhythm; No murmurs, rubs, or gallops  ABDOMEN: Soft, Nontender, Nondistended; Bowel sounds present  EXTREMITIES:  2+ Peripheral Pulses, No clubbing, cyanosis, or edema  LYMPH: No lymphadenopathy noted  SKIN: No rashes or lesions    Consultant(s) Notes Reviewed:  [x ] YES  [ ] NO  Care Discussed with Consultants/Other Providers [ x] YES  [ ] NO    LABS:  11-24    137  |  102  |  20  ----------------------------<  106<H>  3.5   |  27  |  0.98    Ca    10.6<H>      24 Nov 2018 06:35    TPro  6.5  /  Alb  3.1<L>  /  TBili  0.6  /  DBili  x   /  AST  16  /  ALT  21  /  AlkPhos  101  11-23                          11.5   7.0   )-----------( 170      ( 24 Nov 2018 06:35 )             35.7         PT/INR - ( 24 Nov 2018 06:35 )   PT: 15.8 sec;   INR: 1.40 ratio         PTT - ( 23 Nov 2018 12:48 )  PTT:29.3 sec  LIVER FUNCTIONS - ( 23 Nov 2018 12:48 )  Alb: 3.1 g/dL / Pro: 6.5 g/dL / ALK PHOS: 101 U/L / ALT: 21 U/L DA / AST: 16 U/L / GGT: x                            11.5   7.0   )-----------( 170      ( 11-24 @ 06:35 )             35.7                13.1   8.4   )-----------( 178      ( 11-23 @ 12:48 )             40.8               RADIOLOGY & ADDITIONAL TESTS:    Imaging Personally Reviewed:  [ ] YES  [ ] NO  chlorhexidine 4% Liquid 1 Application(s) Topical once  diltiazem    Tablet 60 milliGRAM(s) Oral every 8 hours  furosemide    Tablet 20 milliGRAM(s) Oral daily  furosemide    Tablet 40 milliGRAM(s) Oral <User Schedule>  labetalol 200 milliGRAM(s) Oral three times a day  lactated ringers. 1000 milliLiter(s) IV Continuous <Continuous>  mesalamine DR (24-Hour) Tablet 2.4 Gram(s) Oral daily  montelukast 10 milliGRAM(s) Oral at bedtime  oxyCODONE    5 mG/acetaminophen 325 mG 1 Tablet(s) Oral every 6 hours PRN  potassium chloride    Tablet ER 10 milliEquivalent(s) Oral daily      HEALTH ISSUES - PROBLEM Dx:  Prophylactic measure: Prophylactic measure  Hypokalemia: Hypokalemia  Pacemaker: Pacemaker  HTN (hypertension), benign: HTN (hypertension), benign  Chronic atrial fibrillation: Chronic atrial fibrillation  Closed fracture of right hip, initial encounter: Closed fracture of right hip, initial encounter
Patient is a 89y old  Female who presents with a chief complaint of fall, hip fx (24 Nov 2018 11:49)      INTERVAL HPI/OVERNIGHT EVENTS: Eating breakfast in minimal discomfort      REVIEW OF SYSTEMS:  CONSTITUTIONAL: No fever, weight loss, or fatigue  EYES: No eye pain, visual disturbances, or discharge  ENMT:  No difficulty hearing, tinnitus, vertigo; No sinus or throat pain  NECK: No pain or stiffness  BREASTS: No pain, masses, or nipple discharge  RESPIRATORY: No cough, wheezing, chills or hemoptysis; No shortness of breath  CARDIOVASCULAR: No chest pain, palpitations, dizziness, or leg swelling  GASTROINTESTINAL: No abdominal or epigastric pain. No nausea, vomiting, or hematemesis; No diarrhea or constipation. No melena or hematochezia.  GENITOURINARY: No dysuria, frequency, hematuria, or incontinence  NEUROLOGICAL: No headaches, memory loss, loss of strength, numbness, or tremors  SKIN: No itching, burning, rashes, or lesions   LYMPH NODES: No enlarged glands  ENDOCRINE: No heat or cold intolerance; No hair loss  MUSCULOSKELETAL: No joint pain or swelling; No muscle, back, or extremity pain  PSYCHIATRIC: No depression, anxiety, mood swings, or difficulty sleeping  HEME/LYMPH: No easy bruising, or bleeding gums  ALLERY AND IMMUNOLOGIC: No hives or eczema  FAMILY HISTORY:  No pertinent family history in first degree relatives    T(C): 36.5 (11-25-18 @ 09:31), Max: 36.8 (11-24-18 @ 17:12)  HR: 68 (11-25-18 @ 09:31) (47 - 103)  BP: 112/35 (11-25-18 @ 09:31) (93/40 - 147/48)  RR: 16 (11-25-18 @ 09:31) (15 - 18)  SpO2: 100% (11-25-18 @ 09:31) (99% - 100%)  Wt(kg): --Vital Signs Last 24 Hrs  T(C): 36.5 (25 Nov 2018 09:31), Max: 36.8 (24 Nov 2018 17:12)  T(F): 97.7 (25 Nov 2018 09:31), Max: 98.2 (24 Nov 2018 17:12)  HR: 68 (25 Nov 2018 09:31) (47 - 103)  BP: 112/35 (25 Nov 2018 09:31) (93/40 - 147/48)  BP(mean): --  RR: 16 (25 Nov 2018 09:31) (15 - 18)  SpO2: 100% (25 Nov 2018 09:31) (99% - 100%)    T(F): 97.7 (11-25-18 @ 09:31), Max: 99.1 (11-23-18 @ 21:44)  HR: 68 (11-25-18 @ 09:31) (47 - 103)  BP: 112/35 (11-25-18 @ 09:31) (93/40 - 170/60)  RR: 16 (11-25-18 @ 09:31) (15 - 18)  SpO2: 100% (11-25-18 @ 09:31) (94% - 100%)    PHYSICAL EXAM:  GENERAL: NAD, well-groomed, well-developed  HEAD:  Atraumatic, Normocephalic  EYES: EOMI, PERRLA, conjunctiva and sclera clear  ENMT: No tonsillar erythema, exudates, or enlargement; Moist mucous membranes, Good dentition, No lesions  NECK: Supple, No JVD, Normal thyroid  NERVOUS SYSTEM:  Alert & Oriented X3, Good concentration; Motor Strength 5/5 B/L upper and lower extremities; DTRs 2+ intact and symmetric  CHEST/LUNG: Clear to percussion bilaterally; No rales, rhonchi, wheezing, or rubs  HEART: Regular rate and rhythm; No murmurs, rubs, or gallops  ABDOMEN: Soft, Nontender, Nondistended; Bowel sounds present  EXTREMITIES:  2+ Peripheral Pulses, No clubbing, cyanosis, or edema  LYMPH: No lymphadenopathy noted  SKIN: No rashes or lesions    Consultant(s) Notes Reviewed:  [x ] YES  [ ] NO  Care Discussed with Consultants/Other Providers [ x] YES  [ ] NO    LABS:  11-24    137  |  102  |  20  ----------------------------<  106<H>  3.5   |  27  |  0.98    Ca    10.6<H>      24 Nov 2018 06:35    TPro  6.5  /  Alb  3.1<L>  /  TBili  0.6  /  DBili  x   /  AST  16  /  ALT  21  /  AlkPhos  101  11-23                          11.1   10.0  )-----------( 165      ( 24 Nov 2018 21:25 )             34.8         PT/INR - ( 25 Nov 2018 05:40 )   PT: 16.4 sec;   INR: 1.45 ratio         PTT - ( 23 Nov 2018 12:48 )  PTT:29.3 sec  LIVER FUNCTIONS - ( 23 Nov 2018 12:48 )  Alb: 3.1 g/dL / Pro: 6.5 g/dL / ALK PHOS: 101 U/L / ALT: 21 U/L DA / AST: 16 U/L / GGT: x                            11.1   10.0  )-----------( 165      ( 11-24 @ 21:25 )             34.8                12.2   9.2   )-----------( 167      ( 11-24 @ 17:22 )             39.0                11.5   7.0   )-----------( 170      ( 11-24 @ 06:35 )             35.7                13.1   8.4   )-----------( 178      ( 11-23 @ 12:48 )             40.8               RADIOLOGY & ADDITIONAL TESTS:    Imaging Personally Reviewed:  [ ] YES  [ ] NO  diltiazem    Tablet 60 milliGRAM(s) Oral every 8 hours  docusate sodium 100 milliGRAM(s) Oral three times a day  enoxaparin Injectable 30 milliGRAM(s) SubCutaneous daily  furosemide    Tablet 20 milliGRAM(s) Oral daily  furosemide    Tablet 40 milliGRAM(s) Oral <User Schedule>  HYDROmorphone  Injectable 0.5 milliGRAM(s) IV Push every 4 hours PRN  HYDROmorphone  Injectable 0.25 milliGRAM(s) IV Push every 10 minutes PRN  labetalol 200 milliGRAM(s) Oral three times a day  lactated ringers. 1000 milliLiter(s) IV Continuous <Continuous>  lactated ringers. 1000 milliLiter(s) IV Continuous <Continuous>  montelukast 10 milliGRAM(s) Oral at bedtime  ondansetron Injectable 4 milliGRAM(s) IV Push every 6 hours PRN  ondansetron Injectable 4 milliGRAM(s) IV Push once PRN  oxyCODONE    IR 5 milliGRAM(s) Oral every 3 hours PRN  polyethylene glycol 3350 17 Gram(s) Oral daily PRN  potassium chloride    Tablet ER 10 milliEquivalent(s) Oral daily  senna 1 Tablet(s) Oral at bedtime PRN  warfarin 2.5 milliGRAM(s) Oral once      HEALTH ISSUES - PROBLEM Dx:  Femur fracture: Femur fracture  Prophylactic measure: Prophylactic measure  Hypokalemia: Hypokalemia  Pacemaker: Pacemaker  HTN (hypertension), benign: HTN (hypertension), benign  Chronic atrial fibrillation: Chronic atrial fibrillation  Closed fracture of right hip, initial encounter: Closed fracture of right hip, initial encounter
Patient is a 89y old  Female who presents with a chief complaint of fall, hip fx (26 Nov 2018 14:03)      INTERVAL HPI/OVERNIGHT EVENTS:  Found sitting in chair with minimal complaints  REVIEW OF SYSTEMS:  CONSTITUTIONAL: No fever, weight loss, or fatigue  EYES: No eye pain, visual disturbances, or discharge  ENMT:  No difficulty hearing, tinnitus, vertigo; No sinus or throat pain  NECK: No pain or stiffness  BREASTS: No pain, masses, or nipple discharge  RESPIRATORY: No cough, wheezing, chills or hemoptysis; No shortness of breath  CARDIOVASCULAR: No chest pain, palpitations, dizziness, or leg swelling  GASTROINTESTINAL: No abdominal or epigastric pain. No nausea, vomiting, or hematemesis; No diarrhea or constipation. No melena or hematochezia.  GENITOURINARY: No dysuria, frequency, hematuria, or incontinence  NEUROLOGICAL: No headaches, memory loss, loss of strength, numbness, or tremors  SKIN: No itching, burning, rashes, or lesions   LYMPH NODES: No enlarged glands  ENDOCRINE: No heat or cold intolerance; No hair loss  MUSCULOSKELETAL: No joint pain or swelling; No muscle, back, or extremity pain  PSYCHIATRIC: No depression, anxiety, mood swings, or difficulty sleeping  HEME/LYMPH: No easy bruising, or bleeding gums  ALLERY AND IMMUNOLOGIC: No hives or eczema  FAMILY HISTORY:  No pertinent family history in first degree relatives    T(C): 36.6 (11-26-18 @ 15:59), Max: 36.9 (11-26-18 @ 11:10)  HR: 70 (11-26-18 @ 15:59) (70 - 107)  BP: 112/46 (11-26-18 @ 15:59) (112/46 - 134/58)  RR: 15 (11-26-18 @ 15:59) (15 - 18)  SpO2: 96% (11-26-18 @ 15:59) (96% - 98%)  Wt(kg): --Vital Signs Last 24 Hrs  T(C): 36.6 (26 Nov 2018 15:59), Max: 36.9 (26 Nov 2018 11:10)  T(F): 97.9 (26 Nov 2018 15:59), Max: 98.5 (26 Nov 2018 11:10)  HR: 70 (26 Nov 2018 15:59) (70 - 107)  BP: 112/46 (26 Nov 2018 15:59) (112/46 - 134/58)  BP(mean): 75 (25 Nov 2018 20:50) (75 - 75)  RR: 15 (26 Nov 2018 15:59) (15 - 18)  SpO2: 96% (26 Nov 2018 15:59) (96% - 98%)    T(F): 97.9 (11-26-18 @ 15:59), Max: 99.1 (11-23-18 @ 21:44)  HR: 70 (11-26-18 @ 15:59) (47 - 112)  BP: 112/46 (11-26-18 @ 15:59) (93/40 - 170/60)  RR: 15 (11-26-18 @ 15:59) (15 - 18)  SpO2: 96% (11-26-18 @ 15:59) (94% - 100%)    PHYSICAL EXAM:  GENERAL: NAD, well-groomed, well-developed  HEAD:  Atraumatic, Normocephalic  EYES: EOMI, PERRLA, conjunctiva and sclera clear  ENMT: No tonsillar erythema, exudates, or enlargement; Moist mucous membranes, Good dentition, No lesions  NECK: Supple, No JVD, Normal thyroid  NERVOUS SYSTEM:  Alert & Oriented X3, Good concentration; Motor Strength 5/5 B/L upper and lower extremities; DTRs 2+ intact and symmetric  CHEST/LUNG: Clear to percussion bilaterally; No rales, rhonchi, wheezing, or rubs  HEART: Regular rate and rhythm; No murmurs, rubs, or gallops  ABDOMEN: Soft, Nontender, Nondistended; Bowel sounds present  EXTREMITIES:  2+ Peripheral Pulses, No clubbing, cyanosis, or edema  LYMPH: No lymphadenopathy noted  SKIN: No rashes or lesions    Consultant(s) Notes Reviewed:  [x ] YES  [ ] NO  Care Discussed with Consultants/Other Providers [ x] YES  [ ] NO    LABS:  11-26    136  |  102  |  41<H>  ----------------------------<  136<H>  4.2   |  26  |  1.35<H>    Ca    10.1      26 Nov 2018 06:10                            9.3    11.6  )-----------( 150      ( 26 Nov 2018 06:10 )             28.5         PT/INR - ( 26 Nov 2018 06:10 )   PT: 17.4 sec;   INR: 1.53 ratio                              9.3    11.6  )-----------( 150      ( 11-26 @ 06:10 )             28.5                9.8    11.0  )-----------( 164      ( 11-25 @ 10:37 )             30.8                11.1   10.0  )-----------( 165      ( 11-24 @ 21:25 )             34.8                12.2   9.2   )-----------( 167      ( 11-24 @ 17:22 )             39.0                11.5   7.0   )-----------( 170      ( 11-24 @ 06:35 )             35.7                13.1   8.4   )-----------( 178      ( 11-23 @ 12:48 )             40.8               RADIOLOGY & ADDITIONAL TESTS:    Imaging Personally Reviewed:  [ ] YES  [ ] NO  acetaminophen   Tablet .. 325 milliGRAM(s) Oral every 4 hours PRN  buDESOnide  80 MICROgram(s)/formoterol 4.5 MICROgram(s) Inhaler 2 Puff(s) Inhalation two times a day  diltiazem    Tablet 60 milliGRAM(s) Oral every 8 hours  docusate sodium 100 milliGRAM(s) Oral three times a day  dorzolamide 2% Ophthalmic Solution 1 Drop(s) Left EYE three times a day  enoxaparin Injectable 30 milliGRAM(s) SubCutaneous daily  furosemide    Tablet 20 milliGRAM(s) Oral daily  furosemide    Tablet 40 milliGRAM(s) Oral <User Schedule>  HYDROmorphone  Injectable 0.5 milliGRAM(s) IV Push every 4 hours PRN  HYDROmorphone  Injectable 0.25 milliGRAM(s) IV Push every 10 minutes PRN  labetalol 200 milliGRAM(s) Oral three times a day  mesalamine DR (24-Hour) Tablet 2.4 Gram(s) Oral <User Schedule>  montelukast 10 milliGRAM(s) Oral at bedtime  ondansetron Injectable 4 milliGRAM(s) IV Push every 6 hours PRN  ondansetron Injectable 4 milliGRAM(s) IV Push once PRN  polyethylene glycol 3350 17 Gram(s) Oral daily PRN  potassium chloride    Tablet ER 10 milliEquivalent(s) Oral daily  senna 1 Tablet(s) Oral at bedtime PRN  traMADol 50 milliGRAM(s) Oral every 6 hours PRN  warfarin 4 milliGRAM(s) Oral once      HEALTH ISSUES - PROBLEM Dx:  Atrial fibrillation: Atrial fibrillation  Femur fracture: Femur fracture  Prophylactic measure: Prophylactic measure  Hypokalemia: Hypokalemia  Pacemaker: Pacemaker  HTN (hypertension), benign: HTN (hypertension), benign  Chronic atrial fibrillation: Chronic atrial fibrillation  Closed fracture of right hip, initial encounter: Closed fracture of right hip, initial encounter
Surgery PA Note:  POD#1    S/P Right hip hemiarthroplasty     Patient sitting up in chair.  She states that she is feeling much better and her pain is manageable.  Patient does not want oxycodone.  She states  that it makes her confused.      Vital Signs Last 24 Hrs  T(C): 36.5 (25 Nov 2018 09:31), Max: 36.8 (24 Nov 2018 17:12)  T(F): 97.7 (25 Nov 2018 09:31), Max: 98.2 (24 Nov 2018 17:12)  HR: 68 (25 Nov 2018 09:31) (47 - 103)  BP: 112/35 (25 Nov 2018 09:31) (93/40 - 147/48)  BP(mean): --  RR: 16 (25 Nov 2018 09:31) (15 - 18)  SpO2: 100% (25 Nov 2018 09:31) (99% - 100%)    PE:    Patient is alert and Oriented x3     Lungs:  CTA   Cor:  S1 S2   Abd:  Soft, +BS - BM            non tender tolerating po s   Ext:  Right hip dressing C/D/I thigh supple + neurovascular intact          + pulses   SCD's bilateral w/o calf discomfort     Labs:  11-25    134<L>  |  101  |  34<H>  ----------------------------<  219<H>  4.3   |  27  |  1.49<H>    Ca    9.9      25 Nov 2018 10:37    TPro  6.5  /  Alb  3.1<L>  /  TBili  0.6  /  DBili  x   /  AST  16  /  ALT  21  /  AlkPhos  101  11-23                        9.8    11.0  )-----------( 164      ( 25 Nov 2018 10:37 )             30.8

## 2018-11-27 NOTE — DISCHARGE NOTE ADULT - HOSPITAL COURSE
89 y/o F with PMH HTN, Afib on coumadin , +PPM presents after falling after leaving her physical therapy session yesterday , and having right hip pain. Patient states a few weeks ago she feel and fractured her right hip but was able to walk around and do PT, declined surgery at that time. Now patient understands that her second fall has made her fracture "worse" and is agreeable to surgery now . Pt denies any chest pain, SOB, dizziness, palpitations etc. Hx of having sustained a nondisplaced right hip femoral neck fracture on/around October 4, 2018. Saw Dr Davidson. Patient offered surgical stabilization via hip pinning procedure but she declined opting for nonop tx instead. Once she fell yesterday, acute worsening of right hip pain, inability to ambulate or bear weight on the right hip. Presented to [facility] yesterday for further evaluation and treatment of same. Prior to original injury, was able to ambulate without assistive devices. Pt. had surgery with Dr. Hui, Orthopod, did well postop, discharged to Acute Rehab at .

## 2018-11-27 NOTE — PROGRESS NOTE ADULT - PROBLEM SELECTOR PLAN 1
acute rehabilitation
continue current management  ortho stable for transfer to rehab when bed available  PT WBAT RLE
pain controlled   dvt prophylaxis lovenox bridge to coumadin for Afib  continue PT WBAT  d/c plan to rehab awaiting authorization  continue current management  H/H stabilizing
Stable for acute rehab
for OR  ortho consulted  hold coumadin - INR 1.4 today  follow up TTE for medical clearance today
oprthopedic consult
ortho consult
surgically repaired

## 2018-11-27 NOTE — DISCHARGE NOTE ADULT - MEDICATION SUMMARY - MEDICATIONS TO STOP TAKING
I will STOP taking the medications listed below when I get home from the hospital:    Aspirin Enteric Coated 81 mg oral delayed release tablet  -- 1 tab(s) by mouth once a day    vit D  -- to each affected eye once a day

## 2018-11-27 NOTE — H&P ADULT - PROBLEM SELECTOR PLAN 2
Continue cardizem, monitor HR.  Continue 4mg coumadin per Dr Carroll. Home dosing 4mg every other day.   Currently INR subtheraputic. Order daily INRs.   Stop lovenox once INR >2 per Dr Carroll.

## 2018-11-27 NOTE — H&P ADULT - NSHPPHYSICALEXAM_GEN_ALL_CORE
Mental Status - Patient is alert, awake, oriented X3. Speech is fluent. Normal attention and concentration.  Follows commands well and able to answer questions appropriately. Mood and affect  normal.  Motor Exam -   Right upper full AROM, 5/5 strengths throughout   Left upper limited AROM, unable to raise the arm off of the bed today, full PROM, pain noted with internal rotation and abduction with passive movement, 4/5 elbow flexion and extension, 4+/5 hand strengths   Right lower 3/5 hip flexion, 4+/5 knee extension and 4/+ 5DF and PF  Left lower 4+/5 hip flexion, 5/5 knee extension and 5/5 DF and PF   Normal muscle bulk/tone  Sensory    Intact to light touch bilaterally.   DTS Reflexes:   2+ bilat biceps and patellar reflexes                      GENERAL Exam:     Nontoxic , No Acute Distress 	  HEENT:  normocephalic, atraumatic		  LUNGS Clear bilaterally    HEART:	 Irreg, S1S2      GI/ ABDOMEN:  Soft  Non tender +BS  EXTREMITIES:  +1 bilat lower extremity edema, edema noted to the left upper extremity (improved today)  MUSCULOSKELETAL: as noted above   SKIN: staples to right hip c/d/i with some serous drainage noted on sheets, periwound pink and intact, ecchymosis to the left medial elbow (edema present). No signs of skin breakdown noted to heels, elbows, buttocks, sacral or coccygeal areas. No rashes noted. Mental Status - Patient is alert, awake, oriented X3. Speech is fluent. Normal attention and concentration.  Follows commands well and able to answer questions appropriately. Mood and affect  normal.  Motor Exam -   Right upper full AROM, 5/5 strengths throughout   Left upper limited AROM, unable to raise the arm off of the bed today, PROM to 145 degrees, pain noted with internal rotation and abduction with passive movement, 4/5 elbow flexion and extension, 4+/5 hand strengths   Right lower 3/5 hip flexion, 4+/5 knee extension and 4/+ 5DF and PF  Left lower 4+/5 hip flexion, 5/5 knee extension and 5/5 DF and PF   Normal muscle bulk/tone  Sensory    Intact to light touch bilaterally.   DTS Reflexes:   2+ bilat biceps and patellar reflexes                      GENERAL Exam:     Nontoxic , No Acute Distress 	  HEENT:  normocephalic, atraumatic		  LUNGS Clear bilaterally    HEART:	 Irreg, S1S2      GI/ ABDOMEN:  Soft  Non tender +BS  EXTREMITIES:  +1 bilat lower extremity edema, edema noted to the left upper extremity (improved today)  MUSCULOSKELETAL: as noted above   SKIN: staples to right hip c/d/i, periwound pink and intact, ecchymosis to the left medial elbow (edema present). No signs of skin breakdown noted to heels, elbows, buttocks, sacral or coccygeal areas. No rashes noted.

## 2018-11-27 NOTE — PROGRESS NOTE ADULT - PROBLEM SELECTOR PLAN 2
Continue warfarin keep an INR between 2 and 3
coumadin/ INR managed by dr bob
INR between 2 and 3
discussed cardiolgy will do clearance
hold warfarin as surgery likely today
rate controlled, hold coumadin for surgery
resume warfarin

## 2018-11-27 NOTE — H&P ADULT - NSHPSOCIALHISTORY_GEN_ALL_CORE
Patient lives in a PH on the 1st floor with 5 LUIS, and a HR. Her daughter lives on the second floor.   Prior to fall 2 weeks ago, patient did not need an AD for ambulation. Patient was independent with all ADLs. She did the cooking for her daughter and herself.     Denies ETOH, tobacco and illicit drug use.

## 2018-11-27 NOTE — PROGRESS NOTE ADULT - REASON FOR ADMISSION
fall, hip fx

## 2018-11-28 DIAGNOSIS — Z95.0 PRESENCE OF CARDIAC PACEMAKER: ICD-10-CM

## 2018-11-28 DIAGNOSIS — S72.001A FRACTURE OF UNSPECIFIED PART OF NECK OF RIGHT FEMUR, INITIAL ENCOUNTER FOR CLOSED FRACTURE: ICD-10-CM

## 2018-11-28 DIAGNOSIS — M25.512 PAIN IN LEFT SHOULDER: ICD-10-CM

## 2018-11-28 LAB
24R-OH-CALCIDIOL SERPL-MCNC: 25 NG/ML — LOW (ref 30–80)
ALBUMIN SERPL ELPH-MCNC: 2.2 G/DL — LOW (ref 3.3–5)
ALP SERPL-CCNC: 87 U/L — SIGNIFICANT CHANGE UP (ref 40–120)
ALT FLD-CCNC: 30 U/L DA — SIGNIFICANT CHANGE UP (ref 10–45)
ANION GAP SERPL CALC-SCNC: 5 MMOL/L — SIGNIFICANT CHANGE UP (ref 5–17)
APTT BLD: 31.9 SEC — SIGNIFICANT CHANGE UP (ref 27.5–36.3)
AST SERPL-CCNC: 28 U/L — SIGNIFICANT CHANGE UP (ref 10–40)
BASOPHILS # BLD AUTO: 0 K/UL — SIGNIFICANT CHANGE UP (ref 0–0.2)
BASOPHILS NFR BLD AUTO: 0.6 % — SIGNIFICANT CHANGE UP (ref 0–2)
BILIRUB SERPL-MCNC: 0.5 MG/DL — SIGNIFICANT CHANGE UP (ref 0.2–1.2)
BUN SERPL-MCNC: 24 MG/DL — HIGH (ref 7–23)
CALCIUM SERPL-MCNC: 10.2 MG/DL — SIGNIFICANT CHANGE UP (ref 8.4–10.5)
CHLORIDE SERPL-SCNC: 103 MMOL/L — SIGNIFICANT CHANGE UP (ref 96–108)
CO2 SERPL-SCNC: 29 MMOL/L — SIGNIFICANT CHANGE UP (ref 22–31)
CREAT SERPL-MCNC: 0.91 MG/DL — SIGNIFICANT CHANGE UP (ref 0.5–1.3)
EOSINOPHIL # BLD AUTO: 0.1 K/UL — SIGNIFICANT CHANGE UP (ref 0–0.5)
EOSINOPHIL NFR BLD AUTO: 2 % — SIGNIFICANT CHANGE UP (ref 0–6)
GLUCOSE SERPL-MCNC: 98 MG/DL — SIGNIFICANT CHANGE UP (ref 70–99)
HCT VFR BLD CALC: 29.9 % — LOW (ref 34.5–45)
HGB BLD-MCNC: 9.2 G/DL — LOW (ref 11.5–15.5)
INR BLD: 1.59 RATIO — HIGH (ref 0.88–1.16)
LYMPHOCYTES # BLD AUTO: 0.9 K/UL — LOW (ref 1–3.3)
LYMPHOCYTES # BLD AUTO: 15.5 % — SIGNIFICANT CHANGE UP (ref 13–44)
MCHC RBC-ENTMCNC: 28.3 PG — SIGNIFICANT CHANGE UP (ref 27–34)
MCHC RBC-ENTMCNC: 30.8 GM/DL — LOW (ref 32–36)
MCV RBC AUTO: 92 FL — SIGNIFICANT CHANGE UP (ref 80–100)
MONOCYTES # BLD AUTO: 0.6 K/UL — SIGNIFICANT CHANGE UP (ref 0–0.9)
MONOCYTES NFR BLD AUTO: 10.2 % — HIGH (ref 1–9)
NEUTROPHILS # BLD AUTO: 4.2 K/UL — SIGNIFICANT CHANGE UP (ref 1.8–7.4)
NEUTROPHILS NFR BLD AUTO: 71.6 % — SIGNIFICANT CHANGE UP (ref 43–77)
PLATELET # BLD AUTO: 184 K/UL — SIGNIFICANT CHANGE UP (ref 150–400)
POTASSIUM SERPL-MCNC: 3.8 MMOL/L — SIGNIFICANT CHANGE UP (ref 3.5–5.3)
POTASSIUM SERPL-SCNC: 3.8 MMOL/L — SIGNIFICANT CHANGE UP (ref 3.5–5.3)
PROT SERPL-MCNC: 5.6 G/DL — LOW (ref 6–8.3)
PROTHROM AB SERPL-ACNC: 18.1 SEC — HIGH (ref 10–12.9)
RBC # BLD: 3.25 M/UL — LOW (ref 3.8–5.2)
RBC # FLD: 14.3 % — SIGNIFICANT CHANGE UP (ref 10.3–14.5)
SODIUM SERPL-SCNC: 137 MMOL/L — SIGNIFICANT CHANGE UP (ref 135–145)
WBC # BLD: 5.8 K/UL — SIGNIFICANT CHANGE UP (ref 3.8–10.5)
WBC # FLD AUTO: 5.8 K/UL — SIGNIFICANT CHANGE UP (ref 3.8–10.5)

## 2018-11-28 PROCEDURE — 99222 1ST HOSP IP/OBS MODERATE 55: CPT | Mod: 24

## 2018-11-28 PROCEDURE — 73030 X-RAY EXAM OF SHOULDER: CPT | Mod: 26,LT

## 2018-11-28 PROCEDURE — 99222 1ST HOSP IP/OBS MODERATE 55: CPT | Mod: AI

## 2018-11-28 PROCEDURE — 99235 HOSP IP/OBS SAME DATE MOD 70: CPT

## 2018-11-28 RX ORDER — FERROUS SULFATE 325(65) MG
325 TABLET ORAL DAILY
Qty: 0 | Refills: 0 | Status: DISCONTINUED | OUTPATIENT
Start: 2018-11-28 | End: 2018-12-12

## 2018-11-28 RX ORDER — WARFARIN SODIUM 2.5 MG/1
4 TABLET ORAL AT BEDTIME
Qty: 0 | Refills: 0 | Status: DISCONTINUED | OUTPATIENT
Start: 2018-11-28 | End: 2018-11-29

## 2018-11-28 RX ORDER — CHOLECALCIFEROL (VITAMIN D3) 125 MCG
1000 CAPSULE ORAL DAILY
Qty: 0 | Refills: 0 | Status: DISCONTINUED | OUTPATIENT
Start: 2018-11-28 | End: 2018-12-10

## 2018-11-28 RX ORDER — ASPIRIN/CALCIUM CARB/MAGNESIUM 324 MG
81 TABLET ORAL DAILY
Qty: 0 | Refills: 0 | Status: DISCONTINUED | OUTPATIENT
Start: 2018-11-28 | End: 2018-12-12

## 2018-11-28 RX ORDER — DOCUSATE SODIUM 100 MG
100 CAPSULE ORAL THREE TIMES A DAY
Qty: 0 | Refills: 0 | Status: DISCONTINUED | OUTPATIENT
Start: 2018-11-28 | End: 2018-12-08

## 2018-11-28 RX ADMIN — Medication 10 MILLIEQUIVALENT(S): at 12:12

## 2018-11-28 RX ADMIN — TRAMADOL HYDROCHLORIDE 50 MILLIGRAM(S): 50 TABLET ORAL at 00:22

## 2018-11-28 RX ADMIN — Medication 40 MILLIGRAM(S): at 19:04

## 2018-11-28 RX ADMIN — BUDESONIDE AND FORMOTEROL FUMARATE DIHYDRATE 2 PUFF(S): 160; 4.5 AEROSOL RESPIRATORY (INHALATION) at 08:32

## 2018-11-28 RX ADMIN — Medication 20 MILLIGRAM(S): at 06:35

## 2018-11-28 RX ADMIN — BUDESONIDE AND FORMOTEROL FUMARATE DIHYDRATE 2 PUFF(S): 160; 4.5 AEROSOL RESPIRATORY (INHALATION) at 21:10

## 2018-11-28 RX ADMIN — Medication 800 MILLIGRAM(S): at 06:35

## 2018-11-28 RX ADMIN — LIDOCAINE 1 PATCH: 4 CREAM TOPICAL at 19:06

## 2018-11-28 RX ADMIN — Medication 500 MILLIGRAM(S): at 19:03

## 2018-11-28 RX ADMIN — ZINC SULFATE TAB 220 MG (50 MG ZINC EQUIVALENT) 220 MILLIGRAM(S): 220 (50 ZN) TAB at 12:12

## 2018-11-28 RX ADMIN — DORZOLAMIDE HYDROCHLORIDE 1 DROP(S): 20 SOLUTION/ DROPS OPHTHALMIC at 14:20

## 2018-11-28 RX ADMIN — PANTOPRAZOLE SODIUM 40 MILLIGRAM(S): 20 TABLET, DELAYED RELEASE ORAL at 06:36

## 2018-11-28 RX ADMIN — DORZOLAMIDE HYDROCHLORIDE 1 DROP(S): 20 SOLUTION/ DROPS OPHTHALMIC at 06:36

## 2018-11-28 RX ADMIN — TRAMADOL HYDROCHLORIDE 50 MILLIGRAM(S): 50 TABLET ORAL at 22:00

## 2018-11-28 RX ADMIN — Medication 1 TABLET(S): at 12:12

## 2018-11-28 RX ADMIN — WARFARIN SODIUM 4 MILLIGRAM(S): 2.5 TABLET ORAL at 21:22

## 2018-11-28 RX ADMIN — LIDOCAINE 1 PATCH: 4 CREAM TOPICAL at 23:00

## 2018-11-28 RX ADMIN — Medication 200 MILLIGRAM(S): at 14:19

## 2018-11-28 RX ADMIN — TRAMADOL HYDROCHLORIDE 50 MILLIGRAM(S): 50 TABLET ORAL at 01:30

## 2018-11-28 RX ADMIN — Medication 200 MILLIGRAM(S): at 21:22

## 2018-11-28 RX ADMIN — DORZOLAMIDE HYDROCHLORIDE 1 DROP(S): 20 SOLUTION/ DROPS OPHTHALMIC at 21:23

## 2018-11-28 RX ADMIN — LIDOCAINE 1 PATCH: 4 CREAM TOPICAL at 11:04

## 2018-11-28 RX ADMIN — MONTELUKAST 10 MILLIGRAM(S): 4 TABLET, CHEWABLE ORAL at 21:22

## 2018-11-28 RX ADMIN — TRAMADOL HYDROCHLORIDE 50 MILLIGRAM(S): 50 TABLET ORAL at 21:22

## 2018-11-28 RX ADMIN — Medication 200 MILLIGRAM(S): at 06:35

## 2018-11-28 RX ADMIN — Medication 800 MILLIGRAM(S): at 19:03

## 2018-11-28 RX ADMIN — ENOXAPARIN SODIUM 30 MILLIGRAM(S): 100 INJECTION SUBCUTANEOUS at 12:12

## 2018-11-28 RX ADMIN — LIDOCAINE 1 PATCH: 4 CREAM TOPICAL at 11:03

## 2018-11-28 RX ADMIN — Medication 500 MILLIGRAM(S): at 06:35

## 2018-11-28 NOTE — DIETITIAN INITIAL EVALUATION ADULT. - PROBLEM SELECTOR PLAN 6
Left shoulder weakness, likely secondary to rotator cuff injury. Will obtain xray to rule out fracture.   Will start PT and OT to work on AROM, PROM and improving strength.   Will order lidocaine patch daily during therapy.

## 2018-11-28 NOTE — PROGRESS NOTE ADULT - SUBJECTIVE AND OBJECTIVE BOX
Patient is a 89y old  Female who presents with a chief complaint of right femur fx s/p hemiarthroplasty (27 Nov 2018 13:55)      INTERVAL HPI/OVERNIGHT EVENTS: left shoulder pain      REVIEW OF SYSTEMS:  CONSTITUTIONAL: No fever, weight loss, or fatigue  EYES: No eye pain, visual disturbances, or discharge  ENMT:  No difficulty hearing, tinnitus, vertigo; No sinus or throat pain  NECK: No pain or stiffness  BREASTS: No pain, masses, or nipple discharge  RESPIRATORY: No cough, wheezing, chills or hemoptysis; No shortness of breath  CARDIOVASCULAR: No chest pain, palpitations, dizziness, or leg swelling  GASTROINTESTINAL: No abdominal or epigastric pain. No nausea, vomiting, or hematemesis; No diarrhea or constipation. No melena or hematochezia.  GENITOURINARY: No dysuria, frequency, hematuria, or incontinence  NEUROLOGICAL: No headaches, memory loss, loss of strength, numbness, or tremors  SKIN: No itching, burning, rashes, or lesions   LYMPH NODES: No enlarged glands  ENDOCRINE: No heat or cold intolerance; No hair loss  MUSCULOSKELETAL: No joint pain or swelling; No muscle, back, or extremity pain  PSYCHIATRIC: No depression, anxiety, mood swings, or difficulty sleeping  HEME/LYMPH: No easy bruising, or bleeding gums  ALLERY AND IMMUNOLOGIC: No hives or eczema  FAMILY HISTORY:  No pertinent family history in first degree relatives    T(C): 36.5 (11-28-18 @ 08:50), Max: 37.1 (11-27-18 @ 17:42)  HR: 74 (11-28-18 @ 08:50) (72 - 95)  BP: 132/74 (11-28-18 @ 08:50) (122/34 - 166/75)  RR: 14 (11-28-18 @ 08:50) (14 - 16)  SpO2: 97% (11-28-18 @ 08:50) (92% - 98%)  Wt(kg): --Vital Signs Last 24 Hrs  T(C): 36.5 (28 Nov 2018 08:50), Max: 37.1 (27 Nov 2018 17:42)  T(F): 97.7 (28 Nov 2018 08:50), Max: 98.8 (27 Nov 2018 17:42)  HR: 74 (28 Nov 2018 08:50) (72 - 95)  BP: 132/74 (28 Nov 2018 08:50) (122/34 - 166/75)  BP(mean): --  RR: 14 (28 Nov 2018 08:50) (14 - 16)  SpO2: 97% (28 Nov 2018 08:50) (92% - 98%)    T(F): 97.7 (11-28-18 @ 08:50), Max: 98.8 (11-27-18 @ 17:42)  HR: 74 (11-28-18 @ 08:50) (60 - 112)  BP: 132/74 (11-28-18 @ 08:50) (106/53 - 166/75)  RR: 14 (11-28-18 @ 08:50) (14 - 18)  SpO2: 97% (11-28-18 @ 08:50) (92% - 100%)    PHYSICAL EXAM:  GENERAL: NAD, well-groomed, well-developed  HEAD:  Atraumatic, Normocephalic  EYES: EOMI, PERRLA, conjunctiva and sclera clear  ENMT: No tonsillar erythema, exudates, or enlargement; Moist mucous membranes, Good dentition, No lesions  NECK: Supple, No JVD, Normal thyroid  NERVOUS SYSTEM:  Alert & Oriented X3, Good concentration; Motor Strength 5/5 B/L upper and lower extremities; DTRs 2+ intact and symmetric  CHEST/LUNG: Clear to percussion bilaterally; No rales, rhonchi, wheezing, or rubs  HEART: Regular rate and rhythm; No murmurs, rubs, or gallops  ABDOMEN: Soft, Nontender, Nondistended; Bowel sounds present  EXTREMITIES:  2+ Peripheral Pulses, No clubbing, cyanosis, or edema  LYMPH: No lymphadenopathy noted  SKIN: No rashes or lesions    Consultant(s) Notes Reviewed:  [x ] YES  [ ] NO  Care Discussed with Consultants/Other Providers [ x] YES  [ ] NO    LABS:  11-28    137  |  103  |  24<H>  ----------------------------<  98  3.8   |  29  |  0.91    Ca    10.2      28 Nov 2018 06:30    TPro  5.6<L>  /  Alb  2.2<L>  /  TBili  0.5  /  DBili  x   /  AST  28  /  ALT  30  /  AlkPhos  87  11-28                          9.2    5.8   )-----------( 184      ( 28 Nov 2018 06:30 )             29.9         PT/INR - ( 28 Nov 2018 06:30 )   PT: 18.1 sec;   INR: 1.59 ratio         PTT - ( 28 Nov 2018 06:30 )  PTT:31.9 sec  LIVER FUNCTIONS - ( 28 Nov 2018 06:30 )  Alb: 2.2 g/dL / Pro: 5.6 g/dL / ALK PHOS: 87 U/L / ALT: 30 U/L DA / AST: 28 U/L / GGT: x                            9.2    5.8   )-----------( 184      ( 11-28 @ 06:30 )             29.9                9.3    11.6  )-----------( 150      ( 11-26 @ 06:10 )             28.5                9.8    11.0  )-----------( 164      ( 11-25 @ 10:37 )             30.8                11.1   10.0  )-----------( 165      ( 11-24 @ 21:25 )             34.8                12.2   9.2   )-----------( 167      ( 11-24 @ 17:22 )             39.0                11.5   7.0   )-----------( 170      ( 11-24 @ 06:35 )             35.7                13.1   8.4   )-----------( 178      ( 11-23 @ 12:48 )             40.8               RADIOLOGY & ADDITIONAL TESTS:    Imaging Personally Reviewed:  [ ] YES  [ ] NO  acetaminophen   Tablet .. 650 milliGRAM(s) Oral every 6 hours PRN  ascorbic acid 500 milliGRAM(s) Oral two times a day  buDESOnide  80 MICROgram(s)/formoterol 4.5 MICROgram(s) Inhaler 2 Puff(s) Inhalation two times a day  diltiazem    Tablet 60 milliGRAM(s) Oral every 8 hours  docusate sodium 100 milliGRAM(s) Oral three times a day  dorzolamide 2% Ophthalmic Solution 1 Drop(s) Left EYE three times a day  enoxaparin Injectable 30 milliGRAM(s) SubCutaneous daily  furosemide    Tablet 20 milliGRAM(s) Oral daily  furosemide    Tablet 40 milliGRAM(s) Oral <User Schedule>  labetalol 200 milliGRAM(s) Oral three times a day  lidocaine   Patch 1 Patch Transdermal <User Schedule>  lidocaine   Patch 1 Patch Transdermal <User Schedule>  mesalamine DR Capsule 800 milliGRAM(s) Oral two times a day  montelukast 10 milliGRAM(s) Oral at bedtime  multivitamin 1 Tablet(s) Oral daily  pantoprazole    Tablet 40 milliGRAM(s) Oral before breakfast  polyethylene glycol 3350 17 Gram(s) Oral daily PRN  potassium chloride    Tablet ER 10 milliEquivalent(s) Oral daily  senna 1 Tablet(s) Oral at bedtime PRN  traMADol 50 milliGRAM(s) Oral every 6 hours PRN  warfarin 4 milliGRAM(s) Oral at bedtime  zinc sulfate 220 milliGRAM(s) Oral daily      HEALTH ISSUES - PROBLEM Dx:  Shoulder weakness: Shoulder weakness  Ulcerative colitis: Ulcerative colitis  Acid reflux disease: Acid reflux disease  HTN (hypertension), benign: HTN (hypertension), benign  Atrial fibrillation: Atrial fibrillation  Femur fracture, right: Femur fracture, right

## 2018-11-28 NOTE — CHART NOTE - NSCHARTNOTEFT_GEN_A_CORE
[facility] Interdisciplinary Plan of Care    REHABILITATION DIAGNOSIS:  Closed fracture of neck of right femur        COMORBIDITIES/COMPLICATING CONDITIONS IMPACTING REHABILITATION:  HEALTH ISSUES - PROBLEM Dx:  Pacemaker: Pacemaker  Shoulder pain, left: Shoulder pain, left  Shoulder weakness: Shoulder weakness  Ulcerative colitis: Ulcerative colitis  Acid reflux disease: Acid reflux disease  HTN (hypertension), benign: HTN (hypertension), benign  Atrial fibrillation: Atrial fibrillation  Femur fracture, right: Femur fracture, right  Acute blood loss anemia        PAST MEDICAL & SURGICAL HISTORY:  Pacemaker  Transient cerebral ischemia, unspecified transient cerebral ischemia type  HTN (hypertension), benign  Atrial fibrillation  Acid reflux disease  History of cataract surgery      Based upon consideration of the patient's impairments, functional status, complicating conditions and any other contributing factors and after information garnered from the assessments of all therapy disciplines involved in treating the patient and other pertinent clinicians:    INTERDISCIPLINARY REHABILITATION INTERVENTIONS:    [  x ] Transfer Training  [  x ] Bed Mobility  [  x ] Therapeutic Exercise  [  x ] Balance/Coordination Exercises  [ x  ] Locomotion retraining  [  x ] Stairs  [ x  ] Functional Transfer Training  [ x  ] Bowel/Bladder program  [ x  ] Pain Management  [ x  ] Skin/Wound Care  [   ] Visual/Perceptual Training  [   ] Therapeutic Recreation Activities  [   ] Neuromuscular Re-education  [ x  ] Activities of Daily Living  [   ] Speech Exercise  [   ] Swallowing Exercises  [   ] Vital Stim  [ x  ] Dietary Supplements  [   ] Calorie Count  [   ] Cognitive Exercises  [   ] Cognitive/Linguistic Treatment  [   ] Behavior Program  [   ] Neuropsych Therapy  [ x ] Patient/Family Counseling  [ x  ] Family Training  [ x  ] Community Re-entry  [   ] Orthotic Evaluation  [   ] Prosthetic Eval/Training    MEDICAL PROGNOSIS:  Good    REHAB POTENTIAL:  Good  EXPECTED DAILY THERAPY:         PT:1.5hrs       OT:1.5hrs       ST:0       P&O:0    EXPECTED INTENSITY OF PROGRAM:  3 hrs / Day    EXPECTED FREQUENCY OF PROGRAM: 5 Days/ Week    ESTIMATED LOS:  [  ] 5-7 Days  [  ] 7-10 Days  [  ] 10- 14 Days  [ x ] 14- 18 Days  [  ] 18- 21 Days    ESTIMATED DISPOSITION:  [  ] Home   [  ] Home with Outpatient Therapies  [x  ] Home with Home Therapies  [  ] Assisted Living  [  ] Nursing Home  [  ] Long Term Acute Care    INTERDISCIPLINARY FUNCTIONAL OUTCOMES/GOALS:         Gait/Mobility:6       Transfers:6       ADLs:4       Functional Transfers:6       Medication Management:5       Communication:na       Cognitive:na       Dysphagia:na       Bladder:7       Bowel:7     Functional Independent Measures:   7 = Independent  6 = Modified Independent  5 = Supervision  4 = Minimal Assist/ Contact Guard  3 = Moderate Assistance  2 = Maximum Assistance  1 = Total Assistance  0 = Unable to assess

## 2018-11-28 NOTE — CONSULT NOTE ADULT - SUBJECTIVE AND OBJECTIVE BOX
· Subjective and Objective: 	  This is a 89 y/o female who reports that she had fallen at home after trying to get up from her dining room table and tripped over a rug. She landed on her right hip. Her daughter took her for an xray that revealed a right hip fracture. Patient states that she was given the name of an orthopedic MD for evaluation and she did not go to that appointment. Pt states that she did not want to undergo surgery at that time, so she started outpatient PT and began using a RW. On the 23rd, the patient reports she was leaving the PT outpatient facility and while trying to make room on the sidewalk, her RW got stuck in some dirt and she lost her balance, again falling onto her right hip. She was brought to [facility]'s ER and found to have a displaced neck fracture of the right femur. Patient was cleared for surgery. Patient underwent a right hemiarthroplasty by me on 11/24. Patient subsequently admitted to [facility] for further postop evaluation and treatment of same. Patient now c/o left shoulder pain, stiffness, weakness with difficulty raising left arm overhead. As a result, new orthopedic consult request made to further evaluate and/or treat patient's left shoulder. Patient noted some left shoulder pain symptoms prior to her fall injuries as described above. However, patient's pain symptoms aggravated after her falls. Patient attributes increased pain in part due to increased use/overuse of the left arm (i.e. requiring use of a walker to ambulate where previously ambulated without assistive devices). Patient not sure but believes was able to raise left arm overhead prior to falls/her current symptoms. Remains symptomatic despite nonop tx (observation/time, nsaids/tylenol prn). Clinically stable otherwise.     PAST MEDICAL & SURGICAL HISTORY:  Pacemaker  Transient cerebral ischemia, unspecified transient cerebral ischemia type  HTN (hypertension), benign  Atrial fibrillation on coumadin   Acid reflux disease  History of cataract surgery  Ulcerative colitis     Allergies    Keflex (Diarrhea)  Norvasc (Unknown)      Social History: Patient lives in a PH on the 1st floor with 5 LUIS, and a HR. Her daughter lives on the second floor.   Prior to fall 2 weeks ago, patient did not need an AD for ambulation. Patient was independent with all ADLs. She did the cooking for her daughter and herself.     TODAY'S SUBJECTIVE & REVIEW OF SYMPTOMS:    [  x ] Constitutional WNL  [  x ] Cardio WNL  [  x ] Resp WNL  [   ] GI WNL  [   ] Heme WNL  [ x  ] Endo WNL  [   ] Skin WNL  [   ] MSK WNL  [ x  ] Neuro WNL  [ x  ] Cognitive WNL  [ x  ] Psych WNL  Patient denies HA, dizziness, vision changes, CP, SOB, abdominal pain, dysuria. Last BM prior to admission but declining stool softeners at this time due to h/o ulcerative colitis and trying to avoid loose stools/diarrhea from occurring   Patient notes that she has had some difficulty with weakness in the left shoulder since using the RW after her first fall.   Having difficulty raising the arm above her head. Denies pain in the shoulder.   Reports that there is no pain to right hip while at rest and reports that pain is present with activity.     Vital Signs Last 24 Hrs  T(C): 36.7 (25 Nov 2018 20:50), Max: 36.8 (25 Nov 2018 15:33)  T(F): 98.1 (25 Nov 2018 20:50), Max: 98.2 (25 Nov 2018 15:33)  HR: 107 (25 Nov 2018 20:50) (91 - 112)  BP: 134/58 (25 Nov 2018 20:50) (106/53 - 134/58)  BP(mean): 75 (25 Nov 2018 20:50) (75 - 75)  RR: 18 (25 Nov 2018 20:50) (16 - 18)  SpO2: 98% (25 Nov 2018 20:50) (98% - 100%)    11-26    136  |  102  |  41<H>  ----------------------------<  136<H>  4.2   |  26  |  1.35<H>    Ca    10.1      26 Nov 2018 06:10    11-26    136  |  102  |  41<H>  ----------------------------<  136<H>  4.2   |  26  |  1.35<H>    Ca    10.1      26 Nov 2018 06:10      XRAYS:  left shoulder xrays: neg fx/dislocation/advanced arthropathy       Physical Exam:  Mental Status - Patient is alert, awake, oriented X3. Speech is fluent. Normal attention and concentration.  Follows commands well and able to answer questions appropriately. Mood and affect  normal.  Motor Exam -   Right upper full AROM, 5/5 strengths throughout   Left upper limited AROM, unable to raise the arm above 75-80 degrees (shoulder flexion), full PROM, pain noted with internal rotation and abduction with passive movement, 4-/5 elbow flexion and extension, 4+/5 hand strengths   Right lower 3/5 hip flexion, 4+/5 knee extension and 4/+ 5DF and PF  Left lower 4+/5 hip flexion, 5/5 knee extension and 5/5 DF and PF   Normal muscle bulk/tone  Sensory    Intact to light touch bilaterally.   DTS Reflexes:   2+ bilat biceps and patellar reflexes                      GENERAL Exam:     Nontoxic , No Acute Distress 	  HEENT:  normocephalic, atraumatic		  LUNGS Clear bilaterally    HEART:	 Irreg, S1S2      GI/ ABDOMEN:  Soft  Non tender +BS  EXTREMITIES:  =1 bilat lower extremity edema, edema noted to the left upper extremity   MUSCULOSKELETAL: as noted above.   Left shoulder: diffuse nonspecific mild ttp (greatest subacromion space)  mild associated edema  neg ecchymosis, erythema, gross clinical deformity  +muscular atrophy   +trace intermittent crepitus with passive ROM  ROM, strength assessment as noted above  stable to stress  grossly nvid BUE  +Neer, +Hodges, +Zarco, +Speed  +pain, weakness with resisted rotator cuff function testing  +drop arm  SKIN: aquacel dressing to right hip clean, dry and intact. ecchymosis to the left medial elbow (edema present)      Assessment and Recommendation:   · Assessment		  This is a 89 y/o female s/p mechanical fall resulting in right femur fx (neck fx) s/p hemiarthroplasty now with functional deficits including gait and ADL dysfunction with decreased strength and endurance. Now also c/o left shoulder pain and weakness. Clinical dx: full thickness rotator cuff tear (r/o acute vs chronic). mild underlying shoulder OA    Recommend: Continue PT/Rehab right hip as already in place. Continue tramadol PRN for pain management. IV Tylenol to PO Tylenol. Continue  lidocaine patch daily and ice packs to right hip and left shoulder prn.   Patient will need rehab services including PT and OT to address gait and ADL dysfunction and to improve endurance and strength. Patient will be able to tolerate 3 hours a day, 5 days a week of therapies. ELOS 14-18 days with disposition to home with home services.     Regarding left shoulder, check CT arthrogram left shoulder (r/o full thickness cuff tear). Patient unable to have MRI left shoulder done due to pacemaker placement. WBAT LUE in the meantime. Patient has hx of cortisone injection left shoulder in the distant past. Does not desire repeat cortisone injection left shoulder at this time. Based on CT arthrogram results, patient may need to consider future rotator cuff repair surgery (if tear present and amenable to surgical repair) vs. continued nonop tx of same (i.e. PT/rehab +/- reconsideration of cortisone injection at that time).

## 2018-11-28 NOTE — DIETITIAN INITIAL EVALUATION ADULT. - PROBLEM SELECTOR PLAN 1
WBAT.  Start comprehensive PT and OT program.   Maintain posterior hip precautions.  For pain, continue prn tylenol and tramadol. Add ice packs prn and daily lidocaine patch.   Vit c, zinc x 10day, MVI for healing.

## 2018-11-28 NOTE — CHART NOTE - NSCHARTNOTEFT_GEN_A_CORE
Upon Nutritional Assessment by the Registered Dietitian your patient was determined to meet criteria / has evidence of the following diagnosis/diagnoses:          [ ]  Mild Protein Calorie Malnutrition        [X]  Moderate Protein Calorie Malnutrition        [ ] Severe Protein Calorie Malnutrition        [ ] Unspecified Protein Calorie Malnutrition        [ ] Underweight / BMI <19        [ ] Morbid Obesity / BMI > 40    Findings as based on:  [X] Comprehensive Nutrition Assessment   [X] Nutrition Focused Physical Exam - Temporal, Orbital, & Hand Wasting Observed  [X] Other: States Weight Decrease Over last Month    Nutrition Plan/Recommendations:    1) Continue Ensure Enlive 8oz PO BID    PROVIDER Section:   By signing this assessment you are acknowledging and agree with the diagnosis/diagnoses assigned by the Registered Dietitian    Zev Wiggins RD

## 2018-11-28 NOTE — DIETITIAN INITIAL EVALUATION ADULT. - OTHER INFO
89yr Old Female - Dx: Femur Fx - Initial Assessment - DASH-TLC Diet w/ Thin Liquids, Ensure Enlive 8oz PO BID, Denies Food Allergy, Tolerates Diet, No Chewing/Swallowing Complications (Per Pt), No Pressure Ulcers, No Edema, No Recent N/V/C

## 2018-11-29 LAB
APTT BLD: 30.1 SEC — SIGNIFICANT CHANGE UP (ref 27.5–36.3)
INR BLD: 1.78 RATIO — HIGH (ref 0.88–1.16)
PROTHROM AB SERPL-ACNC: 20.3 SEC — HIGH (ref 10–12.9)

## 2018-11-29 PROCEDURE — 99232 SBSQ HOSP IP/OBS MODERATE 35: CPT

## 2018-11-29 RX ORDER — BENZOCAINE AND MENTHOL 5; 1 G/100ML; G/100ML
1 LIQUID ORAL EVERY 4 HOURS
Qty: 0 | Refills: 0 | Status: DISCONTINUED | OUTPATIENT
Start: 2018-11-29 | End: 2018-12-10

## 2018-11-29 RX ORDER — WARFARIN SODIUM 2.5 MG/1
4 TABLET ORAL AT BEDTIME
Qty: 0 | Refills: 0 | Status: DISCONTINUED | OUTPATIENT
Start: 2018-11-29 | End: 2018-11-29

## 2018-11-29 RX ORDER — WARFARIN SODIUM 2.5 MG/1
4 TABLET ORAL ONCE
Qty: 0 | Refills: 0 | Status: COMPLETED | OUTPATIENT
Start: 2018-11-29 | End: 2018-11-29

## 2018-11-29 RX ORDER — SALIVA SUBSTITUTE COMB NO.11 351 MG
5 POWDER IN PACKET (EA) MUCOUS MEMBRANE
Qty: 0 | Refills: 0 | Status: DISCONTINUED | OUTPATIENT
Start: 2018-11-29 | End: 2018-12-08

## 2018-11-29 RX ADMIN — Medication 20 MILLIGRAM(S): at 06:41

## 2018-11-29 RX ADMIN — Medication 800 MILLIGRAM(S): at 06:49

## 2018-11-29 RX ADMIN — WARFARIN SODIUM 4 MILLIGRAM(S): 2.5 TABLET ORAL at 21:34

## 2018-11-29 RX ADMIN — LIDOCAINE 1 PATCH: 4 CREAM TOPICAL at 08:33

## 2018-11-29 RX ADMIN — Medication 800 MILLIGRAM(S): at 17:40

## 2018-11-29 RX ADMIN — BUDESONIDE AND FORMOTEROL FUMARATE DIHYDRATE 2 PUFF(S): 160; 4.5 AEROSOL RESPIRATORY (INHALATION) at 20:37

## 2018-11-29 RX ADMIN — DORZOLAMIDE HYDROCHLORIDE 1 DROP(S): 20 SOLUTION/ DROPS OPHTHALMIC at 06:43

## 2018-11-29 RX ADMIN — Medication 500 MILLIGRAM(S): at 17:44

## 2018-11-29 RX ADMIN — Medication 200 MILLIGRAM(S): at 06:41

## 2018-11-29 RX ADMIN — LIDOCAINE 1 PATCH: 4 CREAM TOPICAL at 21:48

## 2018-11-29 RX ADMIN — DORZOLAMIDE HYDROCHLORIDE 1 DROP(S): 20 SOLUTION/ DROPS OPHTHALMIC at 13:03

## 2018-11-29 RX ADMIN — TRAMADOL HYDROCHLORIDE 50 MILLIGRAM(S): 50 TABLET ORAL at 21:36

## 2018-11-29 RX ADMIN — Medication 650 MILLIGRAM(S): at 12:55

## 2018-11-29 RX ADMIN — Medication 650 MILLIGRAM(S): at 13:30

## 2018-11-29 RX ADMIN — MONTELUKAST 10 MILLIGRAM(S): 4 TABLET, CHEWABLE ORAL at 21:34

## 2018-11-29 RX ADMIN — Medication 325 MILLIGRAM(S): at 12:51

## 2018-11-29 RX ADMIN — Medication 200 MILLIGRAM(S): at 13:03

## 2018-11-29 RX ADMIN — TRAMADOL HYDROCHLORIDE 50 MILLIGRAM(S): 50 TABLET ORAL at 22:30

## 2018-11-29 RX ADMIN — Medication 81 MILLIGRAM(S): at 17:39

## 2018-11-29 RX ADMIN — Medication 10 MILLIEQUIVALENT(S): at 12:51

## 2018-11-29 RX ADMIN — Medication 40 MILLIGRAM(S): at 17:45

## 2018-11-29 RX ADMIN — Medication 1 TABLET(S): at 12:51

## 2018-11-29 RX ADMIN — BUDESONIDE AND FORMOTEROL FUMARATE DIHYDRATE 2 PUFF(S): 160; 4.5 AEROSOL RESPIRATORY (INHALATION) at 09:46

## 2018-11-29 RX ADMIN — TRAMADOL HYDROCHLORIDE 50 MILLIGRAM(S): 50 TABLET ORAL at 06:42

## 2018-11-29 RX ADMIN — ENOXAPARIN SODIUM 30 MILLIGRAM(S): 100 INJECTION SUBCUTANEOUS at 12:50

## 2018-11-29 RX ADMIN — TRAMADOL HYDROCHLORIDE 50 MILLIGRAM(S): 50 TABLET ORAL at 07:35

## 2018-11-29 RX ADMIN — ZINC SULFATE TAB 220 MG (50 MG ZINC EQUIVALENT) 220 MILLIGRAM(S): 220 (50 ZN) TAB at 12:51

## 2018-11-29 RX ADMIN — Medication 500 MILLIGRAM(S): at 06:41

## 2018-11-29 RX ADMIN — DORZOLAMIDE HYDROCHLORIDE 1 DROP(S): 20 SOLUTION/ DROPS OPHTHALMIC at 21:34

## 2018-11-29 RX ADMIN — Medication 1000 UNIT(S): at 12:51

## 2018-11-29 RX ADMIN — PANTOPRAZOLE SODIUM 40 MILLIGRAM(S): 20 TABLET, DELAYED RELEASE ORAL at 06:41

## 2018-11-29 RX ADMIN — TRAMADOL HYDROCHLORIDE 50 MILLIGRAM(S): 50 TABLET ORAL at 13:29

## 2018-11-29 RX ADMIN — TRAMADOL HYDROCHLORIDE 50 MILLIGRAM(S): 50 TABLET ORAL at 12:55

## 2018-11-29 NOTE — PROGRESS NOTE ADULT - SUBJECTIVE AND OBJECTIVE BOX
HISTORY OF PRESENT ILLNESS: This is a 89 y/o female who reports that she had fallen at home after trying to get up from her dinning room table and tripped over a rug. She landed on her right hip. Her daughter took her for an xray that revealed a right hip fracture. Patient states that she was given the name of an orthopedic MD for evaluation and she did not go to that appointment. Pt states that she did not want to undergo surgery at that time, so she started outpatient PT and began using a RW. On the 23rd, the patient reports she was leaving the PT outpatient facilyt and while trying to make room on the sidewalk, her RW got stuck in some dirt and she lost her balance, again falling onto her right hip. She was brought to [facility]'s ER and found to have a high neck fracture of the right femur. Orthopedics were consulted and patient cleared for surgery. Patient underwent a right hemiarthroplasty by Dr Bhatia on 11/24.   Post operatively patient has acute blood loss anemia. WBC are slightly elevated. No signs or symptoms of infection.   Patient was deemed medically stable and discharged to acute rehab 11/27/18    TODAY'S SUBJECTIVE & REVIEW OF SYMPTOMS:  Pt seen and examined.  Slept well.  Mild pain right hip; mod left shoulder pain with movement.  Pt c/o dry mouth.  Good appetite.  No bowel or bladder complaints. +BM 11/27    [ x  ] Constitutional WNL  [   ] Cardio WNL  [ x  ] Resp WNL  [ x  ] GI WNL  [   ] Heme WNL  [   ] Endo WNL  [   ] Skin WNL  [   ] MSK WNL  [  x ] Neuro WNL  [   ] Cognitive WNL  [ x  ] Psych WNL        PHYSICAL EXAM  Vital Signs Last 24 Hrs  T(C): 36.9 (29 Nov 2018 08:29), Max: 36.9 (29 Nov 2018 08:29)  T(F): 98.4 (29 Nov 2018 08:29), Max: 98.4 (29 Nov 2018 08:29)  HR: 68 (29 Nov 2018 08:29) (68 - 81)  BP: 95/55 (29 Nov 2018 08:29) (95/55 - 148/62)  BP(mean): --  RR: 14 (29 Nov 2018 08:29) (14 - 14)  SpO2: 98% (29 Nov 2018 08:29) (97% - 98%)      Physical Exam: Mental Status - WDWN, NAD  Patient is alert, awake, oriented X3. Speech is fluent. Normal attention and concentration.  Follows commands well and able to answer questions appropriately. Mood and affect  normal.  	Motor Exam -   	Right upper full AROM, 5/5 strengths throughout   	Left upper limited AROM to 20 degrees, PROM to 145 degrees, pain noted with internal rotation and abduction with passive movement, 4/5 elbow flexion and extension, 4+/5 hand strengths   	Right lower 3/5 hip flexion, 4+/5 knee extension and 4/+ 5DF and PF  	Left lower 4+/5 hip flexion, 5/5 knee extension and 5/5 DF and PF   	Normal muscle bulk/tone  	Sensory    Intact to light touch bilaterally.		  	LUNGS Clear bilaterally    	HEART:	 Irreg, S1S2      	GI/ ABDOMEN:  Soft  Non tender +BS  	EXTREMITIES:  +1 bilat lower extremity edema  	SKIN:  Right hip incision with staples c/d/i.  No erythema noted,  ecchymosis to the left medial elbow. No signs of skin breakdown noted to heels, elbows, buttocks, sacral or coccygeal areas. No rashes noted.        FUNCTIONAL PROGRESS:  Bed mobility: Mod A  Gait: 20' RW Min A  ADLs: UE dress mod A, LE dress total A  Transfers: Mod A      RECENT LABS:                          9.2    5.8   )-----------( 184      ( 28 Nov 2018 06:30 )             29.9     11-28    137  |  103  |  24<H>  ----------------------------<  98  3.8   |  29  |  0.91    Ca    10.2      28 Nov 2018 06:30    TPro  5.6<L>  /  Alb  2.2<L>  /  TBili  0.5  /  DBili  x   /  AST  28  /  ALT  30  /  AlkPhos  87  11-28    PT/INR - ( 29 Nov 2018 06:50 )   PT: 20.3 sec;   INR: 1.78 ratio         PTT - ( 29 Nov 2018 06:50 )  PTT:30.1 sec          Assessment and Plan:     This is a 89 y/o female s/p mechanical fall resulting in right femur fx (neck fx) s/p hemiarthroplasty now with functional deficits including gait and ADL dysfunction with decreased strength and endurance.       Comprehensive rehab WBAT RLE: PT/OT with post hip precautions    Pain management: Tramadol or tylenol prn.  Lidoderm patch, ice packs prn  Left shoulder pain: Xrays 11/28 neg for fx or dislocation.  Likely RTC tear.  Ortho consulted; recommended WBAT LUE, CT arthrogram.  Consider cortisone injection although pt declines at this time.     Chronic Afib: Coumadin  INR goal 2-3  INR 1.78  Continue coumadin 4mg qhs with Lovenox bridge    CAD/HTN/PPM: Cardizem, Lasix, Labetalol.  D/W Dr Turner-Resumed ASA 81mg daily  H/O TIA: ASA    Colitis: Mesalamine.  Caution with bowel meds    GERD: Protonix    Asthma: Symbicort, Singulair    Acute blood loss anemia: H/H 9.2/29.9.  Added Fe daily    Moderate protein calorie malutrition/Nutrition/wound care: Ensure bid, MVI, Vit C, Zn  Diet: DASH    Vitamin D deficiency: Vit D 25.  Added 1,000U daily    Dry mouth:  Add Biotene and Cepacol lozenges prn    Precautions:    falls, posterior hip precautions                                     DVT Prophylaxis:    lovenox and coumadin, will d/c lovenox once INR >2

## 2018-11-29 NOTE — PROGRESS NOTE ADULT - SUBJECTIVE AND OBJECTIVE BOX
Patient is a 89y old  Female who presents with a chief complaint of right femur fx s/p hemiarthroplasty (28 Nov 2018 21:25)      INTERVAL HPI/OVERNIGHT EVENTS:  Resting comfortably    REVIEW OF SYSTEMS:  CONSTITUTIONAL: No fever, weight loss, or fatigue  EYES: No eye pain, visual disturbances, or discharge  ENMT:  No difficulty hearing, tinnitus, vertigo; No sinus or throat pain  NECK: No pain or stiffness  BREASTS: No pain, masses, or nipple discharge  RESPIRATORY: No cough, wheezing, chills or hemoptysis; No shortness of breath  CARDIOVASCULAR: No chest pain, palpitations, dizziness, or leg swelling  GASTROINTESTINAL: No abdominal or epigastric pain. No nausea, vomiting, or hematemesis; No diarrhea or constipation. No melena or hematochezia.  GENITOURINARY: No dysuria, frequency, hematuria, or incontinence  NEUROLOGICAL: No headaches, memory loss, loss of strength, numbness, or tremors  SKIN: No itching, burning, rashes, or lesions   LYMPH NODES: No enlarged glands  ENDOCRINE: No heat or cold intolerance; No hair loss  MUSCULOSKELETAL: No joint pain or swelling; No muscle, back, or extremity pain  PSYCHIATRIC: No depression, anxiety, mood swings, or difficulty sleeping  HEME/LYMPH: No easy bruising, or bleeding gums  ALLERY AND IMMUNOLOGIC: No hives or eczema  FAMILY HISTORY:  No pertinent family history in first degree relatives    T(C): 36.6 (11-28-18 @ 20:08), Max: 36.6 (11-28-18 @ 20:08)  HR: 72 (11-29-18 @ 06:00) (72 - 81)  BP: 143/50 (11-29-18 @ 06:00) (132/74 - 148/62)  RR: 14 (11-28-18 @ 20:08) (14 - 14)  SpO2: 97% (11-28-18 @ 21:11) (97% - 97%)  Wt(kg): --Vital Signs Last 24 Hrs  T(C): 36.6 (28 Nov 2018 20:08), Max: 36.6 (28 Nov 2018 20:08)  T(F): 97.8 (28 Nov 2018 20:08), Max: 97.8 (28 Nov 2018 20:08)  HR: 72 (29 Nov 2018 06:00) (72 - 81)  BP: 143/50 (29 Nov 2018 06:00) (132/74 - 148/62)  BP(mean): --  RR: 14 (28 Nov 2018 20:08) (14 - 14)  SpO2: 97% (28 Nov 2018 21:11) (97% - 97%)    T(F): 97.8 (11-28-18 @ 20:08), Max: 98.8 (11-27-18 @ 17:42)  HR: 72 (11-29-18 @ 06:00) (60 - 95)  BP: 143/50 (11-29-18 @ 06:00) (112/46 - 166/75)  RR: 14 (11-28-18 @ 20:08) (14 - 17)  SpO2: 97% (11-28-18 @ 21:11) (92% - 100%)    PHYSICAL EXAM:  GENERAL: NAD, well-groomed, well-developed  HEAD:  Atraumatic, Normocephalic  EYES: EOMI, PERRLA, conjunctiva and sclera clear  ENMT: No tonsillar erythema, exudates, or enlargement; Moist mucous membranes, Good dentition, No lesions  NECK: Supple, No JVD, Normal thyroid  NERVOUS SYSTEM:  Alert & Oriented X3, Good concentration; Motor Strength 5/5 B/L upper and lower extremities; DTRs 2+ intact and symmetric  CHEST/LUNG: Clear to percussion bilaterally; No rales, rhonchi, wheezing, or rubs  HEART: Regular rate and rhythm; No murmurs, rubs, or gallops  ABDOMEN: Soft, Nontender, Nondistended; Bowel sounds present  EXTREMITIES:  2+ Peripheral Pulses, No clubbing, cyanosis, or edema  LYMPH: No lymphadenopathy noted  SKIN: No rashes or lesions    Consultant(s) Notes Reviewed:  [x ] YES  [ ] NO  Care Discussed with Consultants/Other Providers [ x] YES  [ ] NO    LABS:  11-28    137  |  103  |  24<H>  ----------------------------<  98  3.8   |  29  |  0.91    Ca    10.2      28 Nov 2018 06:30    TPro  5.6<L>  /  Alb  2.2<L>  /  TBili  0.5  /  DBili  x   /  AST  28  /  ALT  30  /  AlkPhos  87  11-28                          9.2    5.8   )-----------( 184      ( 28 Nov 2018 06:30 )             29.9         PT/INR - ( 29 Nov 2018 06:50 )   PT: 20.3 sec;   INR: 1.78 ratio         PTT - ( 29 Nov 2018 06:50 )  PTT:30.1 sec  LIVER FUNCTIONS - ( 28 Nov 2018 06:30 )  Alb: 2.2 g/dL / Pro: 5.6 g/dL / ALK PHOS: 87 U/L / ALT: 30 U/L DA / AST: 28 U/L / GGT: x                            9.2    5.8   )-----------( 184      ( 11-28 @ 06:30 )             29.9                9.3    11.6  )-----------( 150      ( 11-26 @ 06:10 )             28.5                9.8    11.0  )-----------( 164      ( 11-25 @ 10:37 )             30.8                11.1   10.0  )-----------( 165      ( 11-24 @ 21:25 )             34.8                12.2   9.2   )-----------( 167      ( 11-24 @ 17:22 )             39.0                11.5   7.0   )-----------( 170      ( 11-24 @ 06:35 )             35.7                13.1   8.4   )-----------( 178      ( 11-23 @ 12:48 )             40.8               RADIOLOGY & ADDITIONAL TESTS:    Imaging Personally Reviewed:  [ ] YES  [ ] NO  acetaminophen   Tablet .. 650 milliGRAM(s) Oral every 6 hours PRN  ascorbic acid 500 milliGRAM(s) Oral two times a day  aspirin enteric coated 81 milliGRAM(s) Oral daily  buDESOnide  80 MICROgram(s)/formoterol 4.5 MICROgram(s) Inhaler 2 Puff(s) Inhalation two times a day  cholecalciferol 1000 Unit(s) Oral daily  diltiazem    Tablet 60 milliGRAM(s) Oral every 8 hours  docusate sodium 100 milliGRAM(s) Oral three times a day PRN  dorzolamide 2% Ophthalmic Solution 1 Drop(s) Left EYE three times a day  enoxaparin Injectable 30 milliGRAM(s) SubCutaneous daily  ferrous    sulfate 325 milliGRAM(s) Oral daily  furosemide    Tablet 20 milliGRAM(s) Oral daily  furosemide    Tablet 40 milliGRAM(s) Oral <User Schedule>  labetalol 200 milliGRAM(s) Oral three times a day  lidocaine   Patch 1 Patch Transdermal <User Schedule>  lidocaine   Patch 1 Patch Transdermal <User Schedule>  mesalamine DR Capsule 800 milliGRAM(s) Oral two times a day  montelukast 10 milliGRAM(s) Oral at bedtime  multivitamin 1 Tablet(s) Oral daily  pantoprazole    Tablet 40 milliGRAM(s) Oral before breakfast  polyethylene glycol 3350 17 Gram(s) Oral daily PRN  potassium chloride    Tablet ER 10 milliEquivalent(s) Oral daily  senna 1 Tablet(s) Oral at bedtime PRN  traMADol 50 milliGRAM(s) Oral every 6 hours PRN  warfarin 4 milliGRAM(s) Oral at bedtime  zinc sulfate 220 milliGRAM(s) Oral daily      HEALTH ISSUES - PROBLEM Dx:  Pacemaker: Pacemaker  Shoulder pain, left: Shoulder pain, left  Shoulder weakness: Shoulder weakness  Ulcerative colitis: Ulcerative colitis  Acid reflux disease: Acid reflux disease  HTN (hypertension), benign: HTN (hypertension), benign  Atrial fibrillation: Atrial fibrillation  Femur fracture, right: Femur fracture, right

## 2018-11-30 LAB
APTT BLD: 30.7 SEC — SIGNIFICANT CHANGE UP (ref 27.5–36.3)
HCT VFR BLD CALC: 31.1 % — LOW (ref 34.5–45)
HGB BLD-MCNC: 9.8 G/DL — LOW (ref 11.5–15.5)
INR BLD: 2.2 RATIO — HIGH (ref 0.88–1.16)
MCHC RBC-ENTMCNC: 28.9 PG — SIGNIFICANT CHANGE UP (ref 27–34)
MCHC RBC-ENTMCNC: 31.4 GM/DL — LOW (ref 32–36)
MCV RBC AUTO: 91.9 FL — SIGNIFICANT CHANGE UP (ref 80–100)
PLATELET # BLD AUTO: 245 K/UL — SIGNIFICANT CHANGE UP (ref 150–400)
PROTHROM AB SERPL-ACNC: 25.2 SEC — HIGH (ref 10–12.9)
RBC # BLD: 3.38 M/UL — LOW (ref 3.8–5.2)
RBC # FLD: 14.8 % — HIGH (ref 10.3–14.5)
WBC # BLD: 8.7 K/UL — SIGNIFICANT CHANGE UP (ref 3.8–10.5)
WBC # FLD AUTO: 8.7 K/UL — SIGNIFICANT CHANGE UP (ref 3.8–10.5)

## 2018-11-30 PROCEDURE — 99232 SBSQ HOSP IP/OBS MODERATE 35: CPT

## 2018-11-30 RX ORDER — LIDOCAINE 4 G/100G
1 CREAM TOPICAL DAILY
Qty: 0 | Refills: 0 | Status: DISCONTINUED | OUTPATIENT
Start: 2018-11-30 | End: 2018-12-02

## 2018-11-30 RX ORDER — FUROSEMIDE 40 MG
40 TABLET ORAL DAILY
Qty: 0 | Refills: 0 | Status: DISCONTINUED | OUTPATIENT
Start: 2018-11-30 | End: 2018-12-10

## 2018-11-30 RX ORDER — WARFARIN SODIUM 2.5 MG/1
4 TABLET ORAL AT BEDTIME
Qty: 0 | Refills: 0 | Status: DISCONTINUED | OUTPATIENT
Start: 2018-11-30 | End: 2018-12-01

## 2018-11-30 RX ADMIN — Medication 10 MILLIEQUIVALENT(S): at 12:04

## 2018-11-30 RX ADMIN — Medication 500 MILLIGRAM(S): at 17:59

## 2018-11-30 RX ADMIN — MONTELUKAST 10 MILLIGRAM(S): 4 TABLET, CHEWABLE ORAL at 21:31

## 2018-11-30 RX ADMIN — Medication 650 MILLIGRAM(S): at 10:00

## 2018-11-30 RX ADMIN — LIDOCAINE 1 PATCH: 4 CREAM TOPICAL at 06:17

## 2018-11-30 RX ADMIN — Medication 650 MILLIGRAM(S): at 22:08

## 2018-11-30 RX ADMIN — TRAMADOL HYDROCHLORIDE 50 MILLIGRAM(S): 50 TABLET ORAL at 13:45

## 2018-11-30 RX ADMIN — TRAMADOL HYDROCHLORIDE 50 MILLIGRAM(S): 50 TABLET ORAL at 07:06

## 2018-11-30 RX ADMIN — LIDOCAINE 1 PATCH: 4 CREAM TOPICAL at 12:03

## 2018-11-30 RX ADMIN — TRAMADOL HYDROCHLORIDE 50 MILLIGRAM(S): 50 TABLET ORAL at 05:37

## 2018-11-30 RX ADMIN — LIDOCAINE 1 PATCH: 4 CREAM TOPICAL at 18:07

## 2018-11-30 RX ADMIN — LIDOCAINE 1 PATCH: 4 CREAM TOPICAL at 15:24

## 2018-11-30 RX ADMIN — LIDOCAINE 1 PATCH: 4 CREAM TOPICAL at 15:23

## 2018-11-30 RX ADMIN — LIDOCAINE 1 PATCH: 4 CREAM TOPICAL at 18:06

## 2018-11-30 RX ADMIN — PANTOPRAZOLE SODIUM 40 MILLIGRAM(S): 20 TABLET, DELAYED RELEASE ORAL at 05:35

## 2018-11-30 RX ADMIN — Medication 200 MILLIGRAM(S): at 21:31

## 2018-11-30 RX ADMIN — Medication 650 MILLIGRAM(S): at 09:11

## 2018-11-30 RX ADMIN — LIDOCAINE 1 PATCH: 4 CREAM TOPICAL at 21:39

## 2018-11-30 RX ADMIN — BENZOCAINE AND MENTHOL 1 LOZENGE: 5; 1 LIQUID ORAL at 12:03

## 2018-11-30 RX ADMIN — Medication 1 TABLET(S): at 12:04

## 2018-11-30 RX ADMIN — Medication 800 MILLIGRAM(S): at 17:59

## 2018-11-30 RX ADMIN — DORZOLAMIDE HYDROCHLORIDE 1 DROP(S): 20 SOLUTION/ DROPS OPHTHALMIC at 13:46

## 2018-11-30 RX ADMIN — Medication 800 MILLIGRAM(S): at 05:36

## 2018-11-30 RX ADMIN — Medication 20 MILLIGRAM(S): at 06:18

## 2018-11-30 RX ADMIN — Medication 81 MILLIGRAM(S): at 12:04

## 2018-11-30 RX ADMIN — Medication 200 MILLIGRAM(S): at 05:35

## 2018-11-30 RX ADMIN — BUDESONIDE AND FORMOTEROL FUMARATE DIHYDRATE 2 PUFF(S): 160; 4.5 AEROSOL RESPIRATORY (INHALATION) at 09:22

## 2018-11-30 RX ADMIN — TRAMADOL HYDROCHLORIDE 50 MILLIGRAM(S): 50 TABLET ORAL at 14:00

## 2018-11-30 RX ADMIN — DORZOLAMIDE HYDROCHLORIDE 1 DROP(S): 20 SOLUTION/ DROPS OPHTHALMIC at 21:31

## 2018-11-30 RX ADMIN — WARFARIN SODIUM 4 MILLIGRAM(S): 2.5 TABLET ORAL at 21:31

## 2018-11-30 RX ADMIN — Medication 325 MILLIGRAM(S): at 12:04

## 2018-11-30 RX ADMIN — Medication 500 MILLIGRAM(S): at 05:35

## 2018-11-30 RX ADMIN — ZINC SULFATE TAB 220 MG (50 MG ZINC EQUIVALENT) 220 MILLIGRAM(S): 220 (50 ZN) TAB at 12:04

## 2018-11-30 RX ADMIN — Medication 40 MILLIGRAM(S): at 18:01

## 2018-11-30 RX ADMIN — Medication 1000 UNIT(S): at 13:45

## 2018-11-30 RX ADMIN — DORZOLAMIDE HYDROCHLORIDE 1 DROP(S): 20 SOLUTION/ DROPS OPHTHALMIC at 05:36

## 2018-11-30 RX ADMIN — Medication 650 MILLIGRAM(S): at 21:34

## 2018-11-30 RX ADMIN — BUDESONIDE AND FORMOTEROL FUMARATE DIHYDRATE 2 PUFF(S): 160; 4.5 AEROSOL RESPIRATORY (INHALATION) at 21:59

## 2018-11-30 NOTE — PROGRESS NOTE ADULT - SUBJECTIVE AND OBJECTIVE BOX
Patient is a 89y old  Female who presents with a chief complaint of right femur fx s/p hemiarthroplasty (29 Nov 2018 09:15)      INTERVAL HPI/OVERNIGHT EVENTS:no new  complaints      REVIEW OF SYSTEMS:  CONSTITUTIONAL: No fever, weight loss, or fatigue  EYES: No eye pain, visual disturbances, or discharge  ENMT:  No difficulty hearing, tinnitus, vertigo; No sinus or throat pain  NECK: No pain or stiffness  BREASTS: No pain, masses, or nipple discharge  RESPIRATORY: No cough, wheezing, chills or hemoptysis; No shortness of breath  CARDIOVASCULAR: No chest pain, palpitations, dizziness, or leg swelling  GASTROINTESTINAL: No abdominal or epigastric pain. No nausea, vomiting, or hematemesis; No diarrhea or constipation. No melena or hematochezia.  GENITOURINARY: No dysuria, frequency, hematuria, or incontinence  NEUROLOGICAL: No headaches, memory loss, loss of strength, numbness, or tremors  SKIN: No itching, burning, rashes, or lesions   LYMPH NODES: No enlarged glands  ENDOCRINE: No heat or cold intolerance; No hair loss  MUSCULOSKELETAL: No joint pain or swelling; No muscle, back, or extremity pain  PSYCHIATRIC: No depression, anxiety, mood swings, or difficulty sleeping  HEME/LYMPH: No easy bruising, or bleeding gums  ALLERY AND IMMUNOLOGIC: No hives or eczema  FAMILY HISTORY:  No pertinent family history in first degree relatives    T(C): 36.6 (11-29-18 @ 20:57), Max: 36.9 (11-29-18 @ 08:29)  HR: 69 (11-30-18 @ 05:44) (58 - 69)  BP: 156/64 (11-30-18 @ 05:44) (95/55 - 156/64)  RR: 14 (11-29-18 @ 20:57) (14 - 14)  SpO2: 99% (11-29-18 @ 20:57) (97% - 99%)  Wt(kg): --Vital Signs Last 24 Hrs  T(C): 36.6 (29 Nov 2018 20:57), Max: 36.9 (29 Nov 2018 08:29)  T(F): 97.9 (29 Nov 2018 20:57), Max: 98.4 (29 Nov 2018 08:29)  HR: 69 (30 Nov 2018 05:44) (58 - 69)  BP: 156/64 (30 Nov 2018 05:44) (95/55 - 156/64)  BP(mean): --  RR: 14 (29 Nov 2018 20:57) (14 - 14)  SpO2: 99% (29 Nov 2018 20:57) (97% - 99%)    T(F): 97.9 (11-29-18 @ 20:57), Max: 98.8 (11-27-18 @ 17:42)  HR: 69 (11-30-18 @ 05:44) (58 - 95)  BP: 156/64 (11-30-18 @ 05:44) (95/55 - 166/75)  RR: 14 (11-29-18 @ 20:57) (14 - 16)  SpO2: 99% (11-29-18 @ 20:57) (92% - 99%)    PHYSICAL EXAM:  GENERAL: NAD, well-groomed, well-developed  HEAD:  Atraumatic, Normocephalic  EYES: EOMI, PERRLA, conjunctiva and sclera clear  ENMT: No tonsillar erythema, exudates, or enlargement; Moist mucous membranes, Good dentition, No lesions  NECK: Supple, No JVD, Normal thyroid  NERVOUS SYSTEM:  Alert & Oriented X3, Good concentration; Motor Strength 5/5 B/L upper and lower extremities; DTRs 2+ intact and symmetric  CHEST/LUNG: Clear to percussion bilaterally; No rales, rhonchi, wheezing, or rubs  HEART: Regular rate and rhythm; No murmurs, rubs, or gallops  ABDOMEN: Soft, Nontender, Nondistended; Bowel sounds present  EXTREMITIES:  2+ Peripheral Pulses, No clubbing, cyanosis, or edema  LYMPH: No lymphadenopathy noted  SKIN: No rashes or lesions    Consultant(s) Notes Reviewed:  [x ] YES  [ ] NO  Care Discussed with Consultants/Other Providers [ x] YES  [ ] NO    LABS:              PT/INR - ( 30 Nov 2018 05:25 )   PT: 25.2 sec;   INR: 2.20 ratio         PTT - ( 30 Nov 2018 05:25 )  PTT:30.7 sec                     9.2    5.8   )-----------( 184      ( 11-28 @ 06:30 )             29.9                9.3    11.6  )-----------( 150      ( 11-26 @ 06:10 )             28.5                9.8    11.0  )-----------( 164      ( 11-25 @ 10:37 )             30.8                11.1   10.0  )-----------( 165      ( 11-24 @ 21:25 )             34.8                12.2   9.2   )-----------( 167      ( 11-24 @ 17:22 )             39.0                11.5   7.0   )-----------( 170      ( 11-24 @ 06:35 )             35.7                13.1   8.4   )-----------( 178      ( 11-23 @ 12:48 )             40.8               RADIOLOGY & ADDITIONAL TESTS:    Imaging Personally Reviewed:  [ ] YES  [ ] NO  acetaminophen   Tablet .. 650 milliGRAM(s) Oral every 6 hours PRN  ascorbic acid 500 milliGRAM(s) Oral two times a day  aspirin enteric coated 81 milliGRAM(s) Oral daily  benzocaine 15 mG/menthol 3.6 mG Lozenge 1 Lozenge Oral every 4 hours PRN  Biotene Dry Mouth Oral Rinse 5 milliLiter(s) Swish and Spit four times a day PRN  buDESOnide  80 MICROgram(s)/formoterol 4.5 MICROgram(s) Inhaler 2 Puff(s) Inhalation two times a day  cholecalciferol 1000 Unit(s) Oral daily  diltiazem    Tablet 60 milliGRAM(s) Oral every 8 hours  docusate sodium 100 milliGRAM(s) Oral three times a day PRN  dorzolamide 2% Ophthalmic Solution 1 Drop(s) Left EYE three times a day  enoxaparin Injectable 30 milliGRAM(s) SubCutaneous daily  ferrous    sulfate 325 milliGRAM(s) Oral daily  furosemide    Tablet 20 milliGRAM(s) Oral daily  furosemide    Tablet 40 milliGRAM(s) Oral <User Schedule>  labetalol 200 milliGRAM(s) Oral three times a day  lidocaine   Patch 1 Patch Transdermal <User Schedule>  lidocaine   Patch 1 Patch Transdermal <User Schedule>  mesalamine DR Capsule 800 milliGRAM(s) Oral two times a day  montelukast 10 milliGRAM(s) Oral at bedtime  multivitamin 1 Tablet(s) Oral daily  pantoprazole    Tablet 40 milliGRAM(s) Oral before breakfast  polyethylene glycol 3350 17 Gram(s) Oral daily PRN  potassium chloride    Tablet ER 10 milliEquivalent(s) Oral daily  senna 1 Tablet(s) Oral at bedtime PRN  traMADol 50 milliGRAM(s) Oral every 6 hours PRN  zinc sulfate 220 milliGRAM(s) Oral daily      HEALTH ISSUES - PROBLEM Dx:  Pacemaker: Pacemaker  Shoulder pain, left: Shoulder pain, left  Shoulder weakness: Shoulder weakness  Ulcerative colitis: Ulcerative colitis  Acid reflux disease: Acid reflux disease  HTN (hypertension), benign: HTN (hypertension), benign  Atrial fibrillation: Atrial fibrillation  Femur fracture, right: Femur fracture, right

## 2018-11-30 NOTE — PROGRESS NOTE ADULT - SUBJECTIVE AND OBJECTIVE BOX
HISTORY OF PRESENT ILLNESS: This is a 89 y/o female who reports that she had fallen at home after trying to get up from her dinning room table and tripped over a rug. She landed on her right hip. Her daughter took her for an xray that revealed a right hip fracture. Patient states that she was given the name of an orthopedic MD for evaluation and she did not go to that appointment. Pt states that she did not want to undergo surgery at that time, so she started outpatient PT and began using a RW. On the 23rd, the patient reports she was leaving the PT outpatient facilyt and while trying to make room on the sidewalk, her RW got stuck in some dirt and she lost her balance, again falling onto her right hip. She was brought to [facility]'s ER and found to have a high neck fracture of the right femur. Orthopedics were consulted and patient cleared for surgery. Patient underwent a right hemiarthroplasty by Dr Bhatia on 11/24.   Post operatively patient has acute blood loss anemia. WBC are slightly elevated. No signs or symptoms of infection.   Patient was deemed medically stable and discharged to acute rehab 11/27/18    TODAY'S SUBJECTIVE & REVIEW OF SYMPTOMS: Patient seen and examined in wheelchair.  Slept well overnight.  Noted loose stool yesterday which is common with her chronic colitis.  Denies abdominal pain, hematochezia.  Tolerating therapy. Mild pain right hip but controlled overall...     [ x  ] Constitutional WNL  [   ] Cardio WNL  [ x  ] Resp WNL  [ x  ] GI WNL  [   ] Heme WNL  [   ] Endo WNL  [   ] Skin WNL  [   ] MSK WNL  [  x ] Neuro WNL  [   ] Cognitive WNL  [ x  ] Psych WNL        PHYSICAL EXAM  Vital Signs Last 24 Hrs  T(C): 36.6 (30 Nov 2018 07:58), Max: 36.6 (29 Nov 2018 20:57)  T(F): 97.8 (30 Nov 2018 07:58), Max: 97.9 (29 Nov 2018 20:57)  HR: 73 (30 Nov 2018 09:22) (58 - 73)  BP: 102/58 (30 Nov 2018 07:58) (102/58 - 156/64)  BP(mean): --  RR: 14 (30 Nov 2018 07:58) (14 - 14)  SpO2: 100% (30 Nov 2018 09:22) (97% - 100%)      Physical Exam:   Mental Status - Patient is alert, awake, oriented X3. Speech is fluent. Normal attention and concentration.  Follows commands well and able to answer questions appropriately. Mood and affect  normal.  	Motor Exam -   	Right upper full AROM, 5/5 strengths throughout   	Left upper limited AROM to 20 degrees, PROM to 145 degrees, pain noted with internal rotation and abduction with passive movement, 4/5 elbow flexion and extension, 4+/5 hand strengths   	Right lower 3/5 hip flexion, 4+/5 knee extension and 4/+ 5DF and PF  	Left lower 4+/5 hip flexion, 5/5 knee extension and 5/5 DF and PF   	Normal muscle bulk/tone  	Sensory    Intact to light touch bilaterally.		  	LUNGS Clear bilaterally    	HEART:	 Irreg, S1S2      	GI/ ABDOMEN:  Soft  Non tender +BS  	EXTREMITIES:  +1 bilat lower extremity edema  	SKIN:  Right hip incision with staples c/d/i.  No erythema noted,  ecchymosis to the left medial elbow. No signs of skin breakdown noted to heels, elbows, buttocks, sacral or coccygeal areas. No rashes noted.        FUNCTIONAL PROGRESS:  Bed mobility: Mod A  Gait: 20' RW Min A  ADLs: UE dress mod A, LE dress total A  Transfers: Mod A      RECENT LABS:  LABS:                        9.8    8.7   )-----------( 245      ( 30 Nov 2018 09:16 )             31.1           PT/INR - ( 30 Nov 2018 05:25 )   PT: 25.2 sec;   INR: 2.20 ratio        PTT - ( 30 Nov 2018 05:25 )  PTT:30.7 sec          Assessment and Plan:     This is a 89 y/o female s/p mechanical fall resulting in right femur fx (neck fx) s/p hemiarthroplasty now with functional deficits including gait and ADL dysfunction with decreased strength and endurance.       Comprehensive rehab WBAT RLE: PT/OT with post hip precautions    Pain management: Tramadol or tylenol prn.  Lidoderm patch, ice packs prn  Left shoulder pain: Xrays 11/28 neg for fx or dislocation.  Likely RTC tear.  Ortho consulted; recommended WBAT LUE, CT arthrogram.  Consider cortisone injection although pt declines at this time.     Chronic Afib: Coumadin  INR goal 2-3  INR 2.2  Continue coumadin 4mg qhs s/p lovenox bridge     CAD/HTN/PPM: Cardizem, Lasix, Labetalol.  D/W Dr Turner-Resumed ASA 81mg daily  H/O TIA: ASA    Colitis: Mesalamine.  Caution with bowel meds    GERD: Protonix    Asthma: Symbicort, Singulair    Acute blood loss anemia: H/H 9.2/29.9.  Added Fe daily    Moderate protein calorie malutrition/Nutrition/wound care: Ensure bid, MVI, Vit C, Zn  Diet: DASH    Vitamin D deficiency: Vit D 25.  Added 1,000U daily    Dry mouth:  Add Biotene and Cepacol lozenges prn    Precautions:    falls, posterior hip precautions                                     DVT Prophylaxis:    lovenox and coumadin, will d/c lovenox once INR >2

## 2018-12-01 LAB
APTT BLD: 32.7 SEC — SIGNIFICANT CHANGE UP (ref 27.5–36.3)
INR BLD: 2.79 RATIO — HIGH (ref 0.88–1.16)
PROTHROM AB SERPL-ACNC: 32.3 SEC — HIGH (ref 10–12.9)

## 2018-12-01 PROCEDURE — 99232 SBSQ HOSP IP/OBS MODERATE 35: CPT

## 2018-12-01 RX ORDER — WARFARIN SODIUM 2.5 MG/1
1 TABLET ORAL AT BEDTIME
Qty: 0 | Refills: 0 | Status: DISCONTINUED | OUTPATIENT
Start: 2018-12-01 | End: 2018-12-03

## 2018-12-01 RX ADMIN — Medication 800 MILLIGRAM(S): at 17:53

## 2018-12-01 RX ADMIN — LIDOCAINE 1 PATCH: 4 CREAM TOPICAL at 23:14

## 2018-12-01 RX ADMIN — Medication 200 MILLIGRAM(S): at 22:26

## 2018-12-01 RX ADMIN — Medication 500 MILLIGRAM(S): at 05:36

## 2018-12-01 RX ADMIN — Medication 81 MILLIGRAM(S): at 11:46

## 2018-12-01 RX ADMIN — LIDOCAINE 1 PATCH: 4 CREAM TOPICAL at 09:11

## 2018-12-01 RX ADMIN — ZINC SULFATE TAB 220 MG (50 MG ZINC EQUIVALENT) 220 MILLIGRAM(S): 220 (50 ZN) TAB at 11:46

## 2018-12-01 RX ADMIN — Medication 1 TABLET(S): at 11:45

## 2018-12-01 RX ADMIN — Medication 40 MILLIGRAM(S): at 05:37

## 2018-12-01 RX ADMIN — Medication 500 MILLIGRAM(S): at 17:53

## 2018-12-01 RX ADMIN — LIDOCAINE 1 PATCH: 4 CREAM TOPICAL at 17:54

## 2018-12-01 RX ADMIN — TRAMADOL HYDROCHLORIDE 50 MILLIGRAM(S): 50 TABLET ORAL at 14:00

## 2018-12-01 RX ADMIN — BUDESONIDE AND FORMOTEROL FUMARATE DIHYDRATE 2 PUFF(S): 160; 4.5 AEROSOL RESPIRATORY (INHALATION) at 20:20

## 2018-12-01 RX ADMIN — TRAMADOL HYDROCHLORIDE 50 MILLIGRAM(S): 50 TABLET ORAL at 13:21

## 2018-12-01 RX ADMIN — Medication 800 MILLIGRAM(S): at 05:42

## 2018-12-01 RX ADMIN — DORZOLAMIDE HYDROCHLORIDE 1 DROP(S): 20 SOLUTION/ DROPS OPHTHALMIC at 05:37

## 2018-12-01 RX ADMIN — PANTOPRAZOLE SODIUM 40 MILLIGRAM(S): 20 TABLET, DELAYED RELEASE ORAL at 05:42

## 2018-12-01 RX ADMIN — Medication 10 MILLIEQUIVALENT(S): at 11:46

## 2018-12-01 RX ADMIN — Medication 325 MILLIGRAM(S): at 11:46

## 2018-12-01 RX ADMIN — Medication 1000 UNIT(S): at 11:45

## 2018-12-01 RX ADMIN — Medication 650 MILLIGRAM(S): at 09:45

## 2018-12-01 RX ADMIN — DORZOLAMIDE HYDROCHLORIDE 1 DROP(S): 20 SOLUTION/ DROPS OPHTHALMIC at 22:26

## 2018-12-01 RX ADMIN — LIDOCAINE 1 PATCH: 4 CREAM TOPICAL at 11:46

## 2018-12-01 RX ADMIN — WARFARIN SODIUM 1 MILLIGRAM(S): 2.5 TABLET ORAL at 22:27

## 2018-12-01 RX ADMIN — DORZOLAMIDE HYDROCHLORIDE 1 DROP(S): 20 SOLUTION/ DROPS OPHTHALMIC at 14:18

## 2018-12-01 RX ADMIN — Medication 650 MILLIGRAM(S): at 09:09

## 2018-12-01 RX ADMIN — Medication 650 MILLIGRAM(S): at 23:05

## 2018-12-01 RX ADMIN — Medication 200 MILLIGRAM(S): at 14:18

## 2018-12-01 RX ADMIN — MONTELUKAST 10 MILLIGRAM(S): 4 TABLET, CHEWABLE ORAL at 22:26

## 2018-12-01 RX ADMIN — LIDOCAINE 1 PATCH: 4 CREAM TOPICAL at 09:12

## 2018-12-01 RX ADMIN — BUDESONIDE AND FORMOTEROL FUMARATE DIHYDRATE 2 PUFF(S): 160; 4.5 AEROSOL RESPIRATORY (INHALATION) at 08:13

## 2018-12-01 RX ADMIN — LIDOCAINE 1 PATCH: 4 CREAM TOPICAL at 22:14

## 2018-12-01 RX ADMIN — Medication 200 MILLIGRAM(S): at 05:38

## 2018-12-01 RX ADMIN — LIDOCAINE 1 PATCH: 4 CREAM TOPICAL at 17:55

## 2018-12-01 NOTE — PROGRESS NOTE ADULT - SUBJECTIVE AND OBJECTIVE BOX
Patient is a 89y old  Female who presents with a chief complaint of right femur fx s/p hemiarthroplasty (01 Dec 2018 08:59)      HPI:  This is a 89 y/o female who reports that she had fallen at home after trying to get up from her dinning room table and tripped over a rug. She landed on her right hip. Her daughter took her for an xray that revealed a right hip fracture. Patient states that she was given the name of an orthopedic MD for evaluation and she did not go to that appointment. Pt states that she did not want to undergo surgery at that time, so she started outpatient PT and began using a RW. On the 23rd, the patient reports she was leaving the PT outpatient facilyt and while trying to make room on the sidewalk, her RW got stuck in some dirt and she lost her balance, again falling onto her right hip. She was brought to [facility]'s ER and found to have a high neck fracture of the right femur. Orthopedics were consulted and patient cleared for surgery. Patient underwent a right hemiarthroplasty by Dr Bhatia on 11/24.   Post operatively patient has acute blood loss anemia. WBC are slightly elevated. No signs or symptoms of infection.   Patient was seen by PT yesterday and able to stand from seated position with mod A x2. Patient was able to ambulate 5 feet with RW and min Ax 2.   Pending OT evaluation.   PM&R consulted to weigh in on disposition planning and recommended patient be transferred for an acute rehab stay after cleared by primary team.   Patient deemed medically stable today, 11/27/18, for admission to acute rehab here at [facility]. (27 Nov 2018 13:55)      PAST MEDICAL & SURGICAL HISTORY:  Pacemaker  Transient cerebral ischemia, unspecified transient cerebral ischemia type  HTN (hypertension), benign  Atrial fibrillation  Acid reflux disease  History of cataract surgery      MEDICATIONS  (STANDING):  ascorbic acid 500 milliGRAM(s) Oral two times a day  aspirin enteric coated 81 milliGRAM(s) Oral daily  buDESOnide  80 MICROgram(s)/formoterol 4.5 MICROgram(s) Inhaler 2 Puff(s) Inhalation two times a day  cholecalciferol 1000 Unit(s) Oral daily  diltiazem    Tablet 60 milliGRAM(s) Oral every 8 hours  dorzolamide 2% Ophthalmic Solution 1 Drop(s) Left EYE three times a day  ferrous    sulfate 325 milliGRAM(s) Oral daily  furosemide    Tablet 40 milliGRAM(s) Oral daily  labetalol 200 milliGRAM(s) Oral three times a day  lidocaine   Patch 1 Patch Transdermal daily  lidocaine   Patch 1 Patch Transdermal <User Schedule>  lidocaine   Patch 1 Patch Transdermal <User Schedule>  mesalamine DR Capsule 800 milliGRAM(s) Oral two times a day  montelukast 10 milliGRAM(s) Oral at bedtime  multivitamin 1 Tablet(s) Oral daily  pantoprazole    Tablet 40 milliGRAM(s) Oral before breakfast  potassium chloride    Tablet ER 10 milliEquivalent(s) Oral daily  warfarin 4 milliGRAM(s) Oral at bedtime  zinc sulfate 220 milliGRAM(s) Oral daily    MEDICATIONS  (PRN):  acetaminophen   Tablet .. 650 milliGRAM(s) Oral every 6 hours PRN Mild Pain (1 - 3)  benzocaine 15 mG/menthol 3.6 mG Lozenge 1 Lozenge Oral every 4 hours PRN Sore Throat  Biotene Dry Mouth Oral Rinse 5 milliLiter(s) Swish and Spit four times a day PRN Mouth Care  docusate sodium 100 milliGRAM(s) Oral three times a day PRN Constipation  polyethylene glycol 3350 17 Gram(s) Oral daily PRN Constipation  senna 1 Tablet(s) Oral at bedtime PRN Constipation  traMADol 50 milliGRAM(s) Oral every 6 hours PRN Moderate Pain (4 - 6)      Allergies    Keflex (Diarrhea)  Norvasc (Unknown)    Intolerances        TODAY'S SUBJECTIVE & REVIEW OF SYMPTOMS:    Patient stable, no acute issues overnight. Denies H/A. chronic lft shoulder discomfort from old fracture, stable. No respiratory distress, CP, cough or SOB    PHYSICAL EXAM  89y  Vital Signs Last 24 Hrs  T(C): 36.5 (01 Dec 2018 09:04), Max: 36.5 (01 Dec 2018 09:04)  T(F): 97.7 (01 Dec 2018 09:04), Max: 97.7 (01 Dec 2018 09:04)  HR: 60 (01 Dec 2018 09:04) (50 - 71)  BP: 107/53 (01 Dec 2018 09:04) (104/57 - 130/61)  BP(mean): --  RR: 15 (01 Dec 2018 09:04) (15 - 18)  SpO2: 96% (01 Dec 2018 09:04) (95% - 98%)  Daily Height in cm: 154.94 (01 Dec 2018 08:59)    Daily     General: alert, thin, NAD    Resp: clear bilaterally no R/R/W  Cardio: RRR HR 60 BP stable 107/53  Abdomen: +BS soft, NT no R/G  Extremities: reduced left shoulder PROM, mild  discomfort end range FF, ER  calves soft, NT        RECENT LABS:                          9.8    8.7   )-----------( 245      ( 30 Nov 2018 09:16 )             31.1             PT/INR - ( 01 Dec 2018 06:25 )   PT: 32.3 sec;   INR: 2.79 ratio         PTT - ( 01 Dec 2018 06:25 )  PTT:32.7 sec        CAPILLARY BLOOD GLUCOSE        IMPRESSION AND PLAN:

## 2018-12-01 NOTE — PROGRESS NOTE ADULT - SUBJECTIVE AND OBJECTIVE BOX
Patient is a 89y old  Female who presents with a chief complaint of right femur fx s/p hemiarthroplasty (30 Nov 2018 12:50)      INTERVAL HPI/OVERNIGHT EVENTS:  in minimal discomfort      REVIEW OF SYSTEMS:  CONSTITUTIONAL: No fever, weight loss, or fatigue  EYES: No eye pain, visual disturbances, or discharge  ENMT:  No difficulty hearing, tinnitus, vertigo; No sinus or throat pain  NECK: No pain or stiffness  BREASTS: No pain, masses, or nipple discharge  RESPIRATORY: No cough, wheezing, chills or hemoptysis; No shortness of breath  CARDIOVASCULAR: No chest pain, palpitations, dizziness, or leg swelling  GASTROINTESTINAL: No abdominal or epigastric pain. No nausea, vomiting, or hematemesis; No diarrhea or constipation. No melena or hematochezia.  GENITOURINARY: No dysuria, frequency, hematuria, or incontinence  NEUROLOGICAL: No headaches, memory loss, loss of strength, numbness, or tremors  SKIN: No itching, burning, rashes, or lesions   LYMPH NODES: No enlarged glands  ENDOCRINE: No heat or cold intolerance; No hair loss  MUSCULOSKELETAL: No joint pain or swelling; No muscle, back, or extremity pain  PSYCHIATRIC: No depression, anxiety, mood swings, or difficulty sleeping  HEME/LYMPH: No easy bruising, or bleeding gums  ALLERY AND IMMUNOLOGIC: No hives or eczema  FAMILY HISTORY:  No pertinent family history in first degree relatives    T(C): 36.3 (11-30-18 @ 23:10), Max: 36.4 (11-30-18 @ 21:41)  HR: 50 (12-01-18 @ 08:15) (50 - 73)  BP: 117/60 (12-01-18 @ 05:48) (104/57 - 130/61)  RR: 16 (11-30-18 @ 23:10) (16 - 18)  SpO2: 95% (12-01-18 @ 08:15) (95% - 100%)  Wt(kg): --Vital Signs Last 24 Hrs  T(C): 36.3 (30 Nov 2018 23:10), Max: 36.4 (30 Nov 2018 21:41)  T(F): 97.4 (30 Nov 2018 23:10), Max: 97.5 (30 Nov 2018 21:41)  HR: 50 (01 Dec 2018 08:15) (50 - 73)  BP: 117/60 (01 Dec 2018 05:48) (104/57 - 130/61)  BP(mean): --  RR: 16 (30 Nov 2018 23:10) (16 - 18)  SpO2: 95% (01 Dec 2018 08:15) (95% - 100%)    T(F): 97.4 (11-30-18 @ 23:10), Max: 98.4 (11-29-18 @ 08:29)  HR: 50 (12-01-18 @ 08:15) (50 - 81)  BP: 117/60 (12-01-18 @ 05:48) (95/55 - 156/64)  RR: 16 (11-30-18 @ 23:10) (14 - 18)  SpO2: 95% (12-01-18 @ 08:15) (95% - 100%)    PHYSICAL EXAM:  GENERAL: NAD, well-groomed, well-developed  HEAD:  Atraumatic, Normocephalic  EYES: EOMI, PERRLA, conjunctiva and sclera clear  ENMT: No tonsillar erythema, exudates, or enlargement; Moist mucous membranes, Good dentition, No lesions  NECK: Supple, No JVD, Normal thyroid  NERVOUS SYSTEM:  Alert & Oriented X3, Good concentration; Motor Strength 5/5 B/L upper and lower extremities; DTRs 2+ intact and symmetric  CHEST/LUNG: Clear to percussion bilaterally; No rales, rhonchi, wheezing, or rubs  HEART: Regular rate and rhythm; No murmurs, rubs, or gallops  ABDOMEN: Soft, Nontender, Nondistended; Bowel sounds present  EXTREMITIES:  2+ Peripheral Pulses, No clubbing, cyanosis, or edema  LYMPH: No lymphadenopathy noted  SKIN: No rashes or lesions    Consultant(s) Notes Reviewed:  [x ] YES  [ ] NO  Care Discussed with Consultants/Other Providers [ x] YES  [ ] NO    LABS:                              9.8    8.7   )-----------( 245      ( 30 Nov 2018 09:16 )             31.1         PT/INR - ( 01 Dec 2018 06:25 )   PT: 32.3 sec;   INR: 2.79 ratio         PTT - ( 01 Dec 2018 06:25 )  PTT:32.7 sec                     9.8    8.7   )-----------( 245      ( 11-30 @ 09:16 )             31.1                9.2    5.8   )-----------( 184      ( 11-28 @ 06:30 )             29.9                9.3    11.6  )-----------( 150      ( 11-26 @ 06:10 )             28.5                9.8    11.0  )-----------( 164      ( 11-25 @ 10:37 )             30.8                11.1   10.0  )-----------( 165      ( 11-24 @ 21:25 )             34.8                12.2   9.2   )-----------( 167      ( 11-24 @ 17:22 )             39.0                11.5   7.0   )-----------( 170      ( 11-24 @ 06:35 )             35.7                13.1   8.4   )-----------( 178      ( 11-23 @ 12:48 )             40.8               RADIOLOGY & ADDITIONAL TESTS:    Imaging Personally Reviewed:  [ ] YES  [ ] NO  acetaminophen   Tablet .. 650 milliGRAM(s) Oral every 6 hours PRN  ascorbic acid 500 milliGRAM(s) Oral two times a day  aspirin enteric coated 81 milliGRAM(s) Oral daily  benzocaine 15 mG/menthol 3.6 mG Lozenge 1 Lozenge Oral every 4 hours PRN  Biotene Dry Mouth Oral Rinse 5 milliLiter(s) Swish and Spit four times a day PRN  buDESOnide  80 MICROgram(s)/formoterol 4.5 MICROgram(s) Inhaler 2 Puff(s) Inhalation two times a day  cholecalciferol 1000 Unit(s) Oral daily  diltiazem    Tablet 60 milliGRAM(s) Oral every 8 hours  docusate sodium 100 milliGRAM(s) Oral three times a day PRN  dorzolamide 2% Ophthalmic Solution 1 Drop(s) Left EYE three times a day  ferrous    sulfate 325 milliGRAM(s) Oral daily  furosemide    Tablet 40 milliGRAM(s) Oral daily  labetalol 200 milliGRAM(s) Oral three times a day  lidocaine   Patch 1 Patch Transdermal daily  lidocaine   Patch 1 Patch Transdermal <User Schedule>  lidocaine   Patch 1 Patch Transdermal <User Schedule>  mesalamine DR Capsule 800 milliGRAM(s) Oral two times a day  montelukast 10 milliGRAM(s) Oral at bedtime  multivitamin 1 Tablet(s) Oral daily  pantoprazole    Tablet 40 milliGRAM(s) Oral before breakfast  polyethylene glycol 3350 17 Gram(s) Oral daily PRN  potassium chloride    Tablet ER 10 milliEquivalent(s) Oral daily  senna 1 Tablet(s) Oral at bedtime PRN  traMADol 50 milliGRAM(s) Oral every 6 hours PRN  warfarin 4 milliGRAM(s) Oral at bedtime  zinc sulfate 220 milliGRAM(s) Oral daily      HEALTH ISSUES - PROBLEM Dx:  Pacemaker: Pacemaker  Shoulder pain, left: Shoulder pain, left  Shoulder weakness: Shoulder weakness  Ulcerative colitis: Ulcerative colitis  Acid reflux disease: Acid reflux disease  HTN (hypertension), benign: HTN (hypertension), benign  Atrial fibrillation: Atrial fibrillation  Femur fracture, right: Femur fracture, right

## 2018-12-02 LAB
APTT BLD: 35.7 SEC — SIGNIFICANT CHANGE UP (ref 27.5–36.3)
INR BLD: 2.73 RATIO — HIGH (ref 0.88–1.16)
PROTHROM AB SERPL-ACNC: 31.5 SEC — HIGH (ref 10–12.9)

## 2018-12-02 PROCEDURE — 99232 SBSQ HOSP IP/OBS MODERATE 35: CPT

## 2018-12-02 RX ADMIN — Medication 800 MILLIGRAM(S): at 17:33

## 2018-12-02 RX ADMIN — Medication 10 MILLIEQUIVALENT(S): at 11:18

## 2018-12-02 RX ADMIN — LIDOCAINE 1 PATCH: 4 CREAM TOPICAL at 17:35

## 2018-12-02 RX ADMIN — BUDESONIDE AND FORMOTEROL FUMARATE DIHYDRATE 2 PUFF(S): 160; 4.5 AEROSOL RESPIRATORY (INHALATION) at 08:18

## 2018-12-02 RX ADMIN — Medication 650 MILLIGRAM(S): at 21:45

## 2018-12-02 RX ADMIN — LIDOCAINE 1 PATCH: 4 CREAM TOPICAL at 06:32

## 2018-12-02 RX ADMIN — Medication 40 MILLIGRAM(S): at 05:31

## 2018-12-02 RX ADMIN — Medication 500 MILLIGRAM(S): at 05:31

## 2018-12-02 RX ADMIN — Medication 650 MILLIGRAM(S): at 01:00

## 2018-12-02 RX ADMIN — Medication 81 MILLIGRAM(S): at 11:18

## 2018-12-02 RX ADMIN — Medication 1000 UNIT(S): at 11:18

## 2018-12-02 RX ADMIN — Medication 200 MILLIGRAM(S): at 13:34

## 2018-12-02 RX ADMIN — LIDOCAINE 1 PATCH: 4 CREAM TOPICAL at 06:31

## 2018-12-02 RX ADMIN — Medication 500 MILLIGRAM(S): at 17:33

## 2018-12-02 RX ADMIN — Medication 650 MILLIGRAM(S): at 21:26

## 2018-12-02 RX ADMIN — DORZOLAMIDE HYDROCHLORIDE 1 DROP(S): 20 SOLUTION/ DROPS OPHTHALMIC at 13:34

## 2018-12-02 RX ADMIN — WARFARIN SODIUM 1 MILLIGRAM(S): 2.5 TABLET ORAL at 21:24

## 2018-12-02 RX ADMIN — LIDOCAINE 1 PATCH: 4 CREAM TOPICAL at 08:05

## 2018-12-02 RX ADMIN — MONTELUKAST 10 MILLIGRAM(S): 4 TABLET, CHEWABLE ORAL at 21:20

## 2018-12-02 RX ADMIN — BUDESONIDE AND FORMOTEROL FUMARATE DIHYDRATE 2 PUFF(S): 160; 4.5 AEROSOL RESPIRATORY (INHALATION) at 21:51

## 2018-12-02 RX ADMIN — PANTOPRAZOLE SODIUM 40 MILLIGRAM(S): 20 TABLET, DELAYED RELEASE ORAL at 06:33

## 2018-12-02 RX ADMIN — DORZOLAMIDE HYDROCHLORIDE 1 DROP(S): 20 SOLUTION/ DROPS OPHTHALMIC at 21:19

## 2018-12-02 RX ADMIN — DORZOLAMIDE HYDROCHLORIDE 1 DROP(S): 20 SOLUTION/ DROPS OPHTHALMIC at 05:31

## 2018-12-02 RX ADMIN — Medication 200 MILLIGRAM(S): at 21:19

## 2018-12-02 RX ADMIN — Medication 325 MILLIGRAM(S): at 11:18

## 2018-12-02 RX ADMIN — Medication 1 TABLET(S): at 11:18

## 2018-12-02 RX ADMIN — Medication 800 MILLIGRAM(S): at 05:32

## 2018-12-02 RX ADMIN — ZINC SULFATE TAB 220 MG (50 MG ZINC EQUIVALENT) 220 MILLIGRAM(S): 220 (50 ZN) TAB at 11:18

## 2018-12-02 NOTE — PROGRESS NOTE ADULT - SUBJECTIVE AND OBJECTIVE BOX
Patient is a 89y old  Female who presents with a chief complaint of right femur fx s/p hemiarthroplasty (01 Dec 2018 11:52)      HPI:  This is a 89 y/o female who reports that she had fallen at home after trying to get up from her dinning room table and tripped over a rug. She landed on her right hip. Her daughter took her for an xray that revealed a right hip fracture. Patient states that she was given the name of an orthopedic MD for evaluation and she did not go to that appointment. Pt states that she did not want to undergo surgery at that time, so she started outpatient PT and began using a RW. On the 23rd, the patient reports she was leaving the PT outpatient facilyt and while trying to make room on the sidewalk, her RW got stuck in some dirt and she lost her balance, again falling onto her right hip. She was brought to [facility]'s ER and found to have a high neck fracture of the right femur. Orthopedics were consulted and patient cleared for surgery. Patient underwent a right hemiarthroplasty by Dr Bhatia on 11/24.   Post operatively patient has acute blood loss anemia. WBC are slightly elevated. No signs or symptoms of infection.   Patient was seen by PT yesterday and able to stand from seated position with mod A x2. Patient was able to ambulate 5 feet with RW and min Ax 2.   Pending OT evaluation.   PM&R consulted to weigh in on disposition planning and recommended patient be transferred for an acute rehab stay after cleared by primary team.   Patient deemed medically stable today, 11/27/18, for admission to acute rehab here at [facility]. (27 Nov 2018 13:55)      PAST MEDICAL & SURGICAL HISTORY:  Pacemaker  Transient cerebral ischemia, unspecified transient cerebral ischemia type  HTN (hypertension), benign  Atrial fibrillation  Acid reflux disease  History of cataract surgery      MEDICATIONS  (STANDING):  ascorbic acid 500 milliGRAM(s) Oral two times a day  aspirin enteric coated 81 milliGRAM(s) Oral daily  buDESOnide  80 MICROgram(s)/formoterol 4.5 MICROgram(s) Inhaler 2 Puff(s) Inhalation two times a day  cholecalciferol 1000 Unit(s) Oral daily  diltiazem    Tablet 60 milliGRAM(s) Oral every 8 hours  dorzolamide 2% Ophthalmic Solution 1 Drop(s) Left EYE three times a day  ferrous    sulfate 325 milliGRAM(s) Oral daily  furosemide    Tablet 40 milliGRAM(s) Oral daily  labetalol 200 milliGRAM(s) Oral three times a day  lidocaine   Patch 1 Patch Transdermal daily  lidocaine   Patch 1 Patch Transdermal <User Schedule>  lidocaine   Patch 1 Patch Transdermal <User Schedule>  mesalamine DR Capsule 800 milliGRAM(s) Oral two times a day  montelukast 10 milliGRAM(s) Oral at bedtime  multivitamin 1 Tablet(s) Oral daily  pantoprazole    Tablet 40 milliGRAM(s) Oral before breakfast  potassium chloride    Tablet ER 10 milliEquivalent(s) Oral daily  warfarin 1 milliGRAM(s) Oral at bedtime  zinc sulfate 220 milliGRAM(s) Oral daily    MEDICATIONS  (PRN):  acetaminophen   Tablet .. 650 milliGRAM(s) Oral every 6 hours PRN Mild Pain (1 - 3)  benzocaine 15 mG/menthol 3.6 mG Lozenge 1 Lozenge Oral every 4 hours PRN Sore Throat  Biotene Dry Mouth Oral Rinse 5 milliLiter(s) Swish and Spit four times a day PRN Mouth Care  docusate sodium 100 milliGRAM(s) Oral three times a day PRN Constipation  polyethylene glycol 3350 17 Gram(s) Oral daily PRN Constipation  senna 1 Tablet(s) Oral at bedtime PRN Constipation  traMADol 50 milliGRAM(s) Oral every 6 hours PRN Moderate Pain (4 - 6)      Allergies    Keflex (Diarrhea)  Norvasc (Unknown)    Intolerances        TODAY'S SUBJECTIVE & REVIEW OF SYMPTOMS:    Pateint stable overnight. No new complaints, continued left shoudler pain but does not want steroid injection (states had in past, and was extremely painful)    PHYSICAL EXAM  89y  Vital Signs Last 24 Hrs  T(C): 36.3 (02 Dec 2018 08:27), Max: 36.8 (01 Dec 2018 22:16)  T(F): 97.4 (02 Dec 2018 08:27), Max: 98.2 (01 Dec 2018 22:16)  HR: 61 (02 Dec 2018 08:27) (52 - 65)  BP: 127/66 (02 Dec 2018 08:27) (120/64 - 145/55)  BP(mean): --  RR: 14 (02 Dec 2018 08:27) (14 - 14)  SpO2: 97% (02 Dec 2018 08:27) (96% - 100%)  Daily Height in cm: 154.94 (01 Dec 2018 11:52)    Daily     General: thin female NAD no respiratory distress    Resp: clear bilaterally , fair effort, kyphotic posture  Cardio: RRR HR 61  Abdomen: +Bs soft, NT ND  Extremities: no calf swelling or pedal edema  pain with RPOM shoulder FF and abduction, mild guarding  no swelling hand, no warmth elbow or shoulder        RECENT LABS:              PT/INR - ( 02 Dec 2018 06:11 )   PT: 31.5 sec;   INR: 2.73 ratio         PTT - ( 02 Dec 2018 06:11 )  PTT:35.7 sec        CAPILLARY BLOOD GLUCOSE        IMPRESSION AND PLAN:

## 2018-12-03 LAB
APTT BLD: 39.8 SEC — HIGH (ref 27.5–36.3)
INR BLD: 2.36 RATIO — HIGH (ref 0.88–1.16)
PROTHROM AB SERPL-ACNC: 27.1 SEC — HIGH (ref 10–12.9)

## 2018-12-03 PROCEDURE — 99232 SBSQ HOSP IP/OBS MODERATE 35: CPT

## 2018-12-03 RX ORDER — WARFARIN SODIUM 2.5 MG/1
2 TABLET ORAL AT BEDTIME
Qty: 0 | Refills: 0 | Status: DISCONTINUED | OUTPATIENT
Start: 2018-12-03 | End: 2018-12-04

## 2018-12-03 RX ORDER — TRAMADOL HYDROCHLORIDE 50 MG/1
50 TABLET ORAL EVERY 6 HOURS
Qty: 0 | Refills: 0 | Status: DISCONTINUED | OUTPATIENT
Start: 2018-12-03 | End: 2018-12-10

## 2018-12-03 RX ADMIN — Medication 40 MILLIGRAM(S): at 05:40

## 2018-12-03 RX ADMIN — DORZOLAMIDE HYDROCHLORIDE 1 DROP(S): 20 SOLUTION/ DROPS OPHTHALMIC at 13:00

## 2018-12-03 RX ADMIN — Medication 10 MILLIEQUIVALENT(S): at 11:50

## 2018-12-03 RX ADMIN — PANTOPRAZOLE SODIUM 40 MILLIGRAM(S): 20 TABLET, DELAYED RELEASE ORAL at 05:42

## 2018-12-03 RX ADMIN — Medication 81 MILLIGRAM(S): at 11:50

## 2018-12-03 RX ADMIN — ZINC SULFATE TAB 220 MG (50 MG ZINC EQUIVALENT) 220 MILLIGRAM(S): 220 (50 ZN) TAB at 11:50

## 2018-12-03 RX ADMIN — Medication 800 MILLIGRAM(S): at 17:38

## 2018-12-03 RX ADMIN — Medication 500 MILLIGRAM(S): at 05:40

## 2018-12-03 RX ADMIN — TRAMADOL HYDROCHLORIDE 50 MILLIGRAM(S): 50 TABLET ORAL at 21:31

## 2018-12-03 RX ADMIN — BUDESONIDE AND FORMOTEROL FUMARATE DIHYDRATE 2 PUFF(S): 160; 4.5 AEROSOL RESPIRATORY (INHALATION) at 21:59

## 2018-12-03 RX ADMIN — Medication 1 TABLET(S): at 11:50

## 2018-12-03 RX ADMIN — LIDOCAINE 1 PATCH: 4 CREAM TOPICAL at 18:33

## 2018-12-03 RX ADMIN — LIDOCAINE 1 PATCH: 4 CREAM TOPICAL at 18:21

## 2018-12-03 RX ADMIN — Medication 325 MILLIGRAM(S): at 11:50

## 2018-12-03 RX ADMIN — DORZOLAMIDE HYDROCHLORIDE 1 DROP(S): 20 SOLUTION/ DROPS OPHTHALMIC at 21:25

## 2018-12-03 RX ADMIN — Medication 500 MILLIGRAM(S): at 17:38

## 2018-12-03 RX ADMIN — MONTELUKAST 10 MILLIGRAM(S): 4 TABLET, CHEWABLE ORAL at 21:25

## 2018-12-03 RX ADMIN — Medication 1000 UNIT(S): at 11:50

## 2018-12-03 RX ADMIN — Medication 200 MILLIGRAM(S): at 13:00

## 2018-12-03 RX ADMIN — Medication 800 MILLIGRAM(S): at 05:41

## 2018-12-03 RX ADMIN — BUDESONIDE AND FORMOTEROL FUMARATE DIHYDRATE 2 PUFF(S): 160; 4.5 AEROSOL RESPIRATORY (INHALATION) at 08:27

## 2018-12-03 RX ADMIN — DORZOLAMIDE HYDROCHLORIDE 1 DROP(S): 20 SOLUTION/ DROPS OPHTHALMIC at 05:40

## 2018-12-03 RX ADMIN — WARFARIN SODIUM 2 MILLIGRAM(S): 2.5 TABLET ORAL at 21:26

## 2018-12-03 RX ADMIN — Medication 200 MILLIGRAM(S): at 05:39

## 2018-12-03 RX ADMIN — Medication 200 MILLIGRAM(S): at 21:26

## 2018-12-03 RX ADMIN — TRAMADOL HYDROCHLORIDE 50 MILLIGRAM(S): 50 TABLET ORAL at 22:05

## 2018-12-03 RX ADMIN — LIDOCAINE 1 PATCH: 4 CREAM TOPICAL at 07:33

## 2018-12-03 NOTE — PROGRESS NOTE ADULT - SUBJECTIVE AND OBJECTIVE BOX
HISTORY OF PRESENT ILLNESS: This is a 89 y/o female who reports that she had fallen at home after trying to get up from her dinning room table and tripped over a rug. She landed on her right hip. Her daughter took her for an xray that revealed a right hip fracture. Patient states that she was given the name of an orthopedic MD for evaluation and she did not go to that appointment. Pt states that she did not want to undergo surgery at that time, so she started outpatient PT and began using a RW. On the 23rd, the patient reports she was leaving the PT outpatient facilyt and while trying to make room on the sidewalk, her RW got stuck in some dirt and she lost her balance, again falling onto her right hip. She was brought to [facility]'s ER and found to have a high neck fracture of the right femur. Orthopedics were consulted and patient cleared for surgery. Patient underwent a right hemiarthroplasty by Dr Bhatia on 11/24.   Post operatively patient has acute blood loss anemia. WBC are slightly elevated. No signs or symptoms of infection.   Patient was deemed medically stable and discharged to acute rehab 11/27/18    TODAY'S SUBJECTIVE & REVIEW OF SYMPTOMS: Patient seen and examined bedside. Doing well.  She notes slow but steady progress.  Having some dry mouth.    Noted loose stool yesterday which is common with her chronic colitis.  Denies abdominal pain, hematochezia.  Tolerating therapy. Mild pain right hip but controlled overall.  Last BM yesterday.      [ x  ] Constitutional WNL  [   ] Cardio WNL  [ x  ] Resp WNL  [ x  ] GI WNL  [   ] Heme WNL  [   ] Endo WNL  [   ] Skin WNL  [   ] MSK WNL  [  x ] Neuro WNL  [   ] Cognitive WNL  [ x  ] Psych WNL        PHYSICAL EXAM  Vital Signs Last 24 Hrs  T(C): 36.8 (03 Dec 2018 07:31), Max: 36.8 (03 Dec 2018 07:31)  T(F): 98.2 (03 Dec 2018 07:31), Max: 98.2 (03 Dec 2018 07:31)  HR: 54 (03 Dec 2018 08:31) (54 - 80)  BP: 132/53 (03 Dec 2018 07:31) (132/53 - 152/62)  BP(mean): --  RR: 14 (03 Dec 2018 07:31) (14 - 16)  SpO2: 96% (03 Dec 2018 08:31) (96% - 98%)      Physical Exam:   Mental Status - Patient is alert, awake, oriented X3. Speech is fluent. Normal attention and concentration.  Follows commands well and able to answer questions appropriately. Mood and affect  normal.  	Motor Exam -   	Right upper full AROM, 5/5 strengths throughout   	Left upper limited AROM to 20 degrees, PROM to 145 degrees, pain noted with internal rotation and abduction with passive movement, 4/5 elbow flexion and extension, 4+/5 hand strengths   	Right lower 3/5 hip flexion, 4+/5 knee extension and 4/+ 5DF and PF  	Left lower 4+/5 hip flexion, 5/5 knee extension and 5/5 DF and PF   	Normal muscle bulk/tone  	Sensory    Intact to light touch bilaterally.		  	LUNGS Clear bilaterally    	HEART:	 Irreg, S1S2      	GI/ ABDOMEN:  Soft  Non tender +BS  	EXTREMITIES:  +1 bilat lower extremity edema  	SKIN:  Right hip incision with staples c/d/i.  No erythema noted,  ecchymosis to the left medial elbow. No signs of skin breakdown noted to heels, elbows, buttocks, sacral or coccygeal areas. No rashes noted.        FUNCTIONAL PROGRESS:  Bed mobility: Mod A  Gait: 20' RW Min A  ADLs: UE dress mod A, LE dress total A  Transfers: Mod A      RECENT LABS:  LABS:                        9.8    8.7   )-----------( 245      ( 30 Nov 2018 09:16 )             31.1         LABS:    PT/INR - ( 03 Dec 2018 07:00 )   PT: 27.1 sec;   INR: 2.36 ratio    PTT - ( 03 Dec 2018 07:00 )  PTT:39.8 sec          Assessment and Plan:     This is a 89 y/o female s/p mechanical fall resulting in right femur fx (neck fx) s/p hemiarthroplasty now with functional deficits including gait and ADL dysfunction with decreased strength and endurance.       Comprehensive rehab WBAT RLE: PT/OT with post hip precautions    Pain management: Tramadol or tylenol prn.  Lidoderm patch, ice packs prn  Left shoulder pain: Xrays 11/28 neg for fx or dislocation.  Likely RTC tear.  Ortho consulted; recommended WBAT LUE, CT arthrogram.  Consider cortisone injection although pt declines at this time.     Chronic Afib: Coumadin  INR goal 2-3    Continue coumadin 4mg qhs s/p lovenox bridge     CAD/HTN/PPM: Cardizem, Lasix, Labetalol.  D/W Dr Turner-Resumed ASA 81mg daily  H/O TIA: ASA    Colitis: Mesalamine.  Caution with bowel meds    GERD: Protonix    Asthma: Symbicort, Singulair    Acute blood loss anemia: H/H 9.2/29.9.  Added Fe daily    Moderate protein calorie malutrition/Nutrition/wound care: Ensure bid, MVI, Vit C, Zn  Diet: DASH    Vitamin D deficiency: Vit D 25.  Added 1,000U daily    Dry mouth:  Add Biotene and Cepacol lozenges prn, encouraged increased PO fluids    Precautions:    falls, posterior hip precautions                                     DVT Prophylaxis:    lovenox and coumadin, will d/c lovenox once INR >2 HISTORY OF PRESENT ILLNESS: This is a 89 y/o female who reports that she had fallen at home after trying to get up from her dinning room table and tripped over a rug. She landed on her right hip. Her daughter took her for an xray that revealed a right hip fracture. Patient states that she was given the name of an orthopedic MD for evaluation and she did not go to that appointment. Pt states that she did not want to undergo surgery at that time, so she started outpatient PT and began using a RW. On the 23rd, the patient reports she was leaving the PT outpatient facilyt and while trying to make room on the sidewalk, her RW got stuck in some dirt and she lost her balance, again falling onto her right hip. She was brought to [facility]'s ER and found to have a high neck fracture of the right femur. Orthopedics were consulted and patient cleared for surgery. Patient underwent a right hemiarthroplasty by Dr Bhatia on 11/24.   Post operatively patient has acute blood loss anemia. WBC are slightly elevated. No signs or symptoms of infection.   Patient was deemed medically stable and discharged to acute rehab 11/27/18    TODAY'S SUBJECTIVE & REVIEW OF SYMPTOMS: Patient seen and examined bedside. Doing well.  She notes slow but steady progress.  Having some dry mouth.    Noted loose stool yesterday which is common with her chronic colitis.  Denies abdominal pain, hematochezia.  Tolerating therapy. Mild pain right hip but controlled overall.  Last BM yesterday.      [ x  ] Constitutional WNL  [   ] Cardio WNL  [ x  ] Resp WNL  [ x  ] GI WNL  [   ] Heme WNL  [   ] Endo WNL  [   ] Skin WNL  [   ] MSK WNL  [  x ] Neuro WNL  [   ] Cognitive WNL  [ x  ] Psych WNL        PHYSICAL EXAM  Vital Signs Last 24 Hrs  T(C): 36.8 (03 Dec 2018 07:31), Max: 36.8 (03 Dec 2018 07:31)  T(F): 98.2 (03 Dec 2018 07:31), Max: 98.2 (03 Dec 2018 07:31)  HR: 54 (03 Dec 2018 08:31) (54 - 80)  BP: 132/53 (03 Dec 2018 07:31) (132/53 - 152/62)  BP(mean): --  RR: 14 (03 Dec 2018 07:31) (14 - 16)  SpO2: 96% (03 Dec 2018 08:31) (96% - 98%)      Physical Exam:   Mental Status - Patient is alert, awake, oriented X3. Speech is fluent. Normal attention and concentration.  Follows commands well and able to answer questions appropriately. Mood and affect  normal.  	Motor Exam -   	Right upper full AROM, 5/5 strengths throughout   	Left upper limited AROM to 40 degrees, PROM to 145 degrees, pain noted with internal rotation and abduction with passive movement, 5/5 distally  	Right lower 3/5 hip flexion, 4+/5 knee extension and 4/+ 5DF and PF  	Left lower 4+/5 hip flexion, 5/5 knee extension and 5/5 DF and PF   	Normal muscle bulk/tone  	Sensory    Intact to light touch bilaterally.		  	LUNGS Clear bilaterally    	HEART:	 Irreg, S1S2      	GI/ ABDOMEN:  Soft  Non tender +BS  	EXTREMITIES:  +1 bilat lower extremity edema  	SKIN:  Right hip incision with staples c/d/i.  No erythema noted,  ecchymosis to the left medial elbow. No signs of skin breakdown noted to heels, elbows, buttocks, sacral or coccygeal areas. No rashes noted.        FUNCTIONAL PROGRESS:  Bed mobility: Mod A  Transfers: Min A  Gait: 30' RW Min A  ADLs: UE dress sup/set up, LE dress min A        RECENT LABS:  LABS:                        9.8    8.7   )-----------( 245      ( 30 Nov 2018 09:16 )             31.1         LABS:    PT/INR - ( 03 Dec 2018 07:00 )   PT: 27.1 sec;   INR: 2.36 ratio    PTT - ( 03 Dec 2018 07:00 )  PTT:39.8 sec          Assessment and Plan:     This is a 89 y/o female s/p mechanical fall resulting in right femur fx (neck fx) s/p hemiarthroplasty now with functional deficits including gait and ADL dysfunction with decreased strength and endurance.       Comprehensive rehab WBAT RLE: PT/OT with post hip precautions    Pain management: Tramadol or tylenol prn.  Lidoderm patch, ice packs prn  Left shoulder pain: Xrays 11/28 neg for fx or dislocation.  Likely RTC tear.  Ortho consulted; recommended WBAT LUE, CT arthrogram.  Consider cortisone injection although pt declines at this time.     Chronic Afib: Coumadin  INR goal 2-3  INR 2.36  Coumadin 2mg po tonight.  F/U INR in am    CAD/HTN/PPM: Cardizem, Lasix, Labetalol.  D/W Dr Turner-Resumed ASA 81mg daily  H/O TIA: ASA    Colitis: Mesalamine.  Caution with bowel meds    GERD: Protonix    Asthma: Symbicort, Singulair    Acute blood loss anemia: H/H 9.2/29.9.  Added Fe daily    Moderate protein calorie malnutrition/Nutrition/wound care: Ensure bid, MVI, Vit C, Zn  Diet: DASH    Vitamin D deficiency: Vit D 25.  Added 1,000U daily    Dry mouth:  Cont Biotene and Cepacol lozenges prn, encouraged increased PO fluids    Precautions:    falls, posterior hip precautions                                     DVT Prophylaxis:    lovenox and coumadin, discontinued lovenox bridge once INR >2

## 2018-12-03 NOTE — PROGRESS NOTE ADULT - SUBJECTIVE AND OBJECTIVE BOX
Patient is a 89y old  Female who presents with a chief complaint of right femur fx s/p hemiarthroplasty (03 Dec 2018 08:50)      INTERVAL HPI/OVERNIGHT EVENTS: fatigued      REVIEW OF SYSTEMS:  CONSTITUTIONAL: No fever, weight loss, or fatigue  EYES: No eye pain, visual disturbances, or discharge  ENMT:  No difficulty hearing, tinnitus, vertigo; No sinus or throat pain  NECK: No pain or stiffness  BREASTS: No pain, masses, or nipple discharge  RESPIRATORY: No cough, wheezing, chills or hemoptysis; No shortness of breath  CARDIOVASCULAR: No chest pain, palpitations, dizziness, or leg swelling  GASTROINTESTINAL: No abdominal or epigastric pain. No nausea, vomiting, or hematemesis; No diarrhea or constipation. No melena or hematochezia.  GENITOURINARY: No dysuria, frequency, hematuria, or incontinence  NEUROLOGICAL: No headaches, memory loss, loss of strength, numbness, or tremors  SKIN: No itching, burning, rashes, or lesions   LYMPH NODES: No enlarged glands  ENDOCRINE: No heat or cold intolerance; No hair loss  MUSCULOSKELETAL: No joint pain or swelling; No muscle, back, or extremity pain  PSYCHIATRIC: No depression, anxiety, mood swings, or difficulty sleeping  HEME/LYMPH: No easy bruising, or bleeding gums  ALLERY AND IMMUNOLOGIC: No hives or eczema  FAMILY HISTORY:  No pertinent family history in first degree relatives    T(C): 36.8 (12-03-18 @ 07:31), Max: 36.8 (12-03-18 @ 07:31)  HR: 54 (12-03-18 @ 08:31) (54 - 80)  BP: 132/53 (12-03-18 @ 07:31) (132/53 - 152/62)  RR: 14 (12-03-18 @ 07:31) (14 - 16)  SpO2: 96% (12-03-18 @ 08:31) (96% - 98%)  Wt(kg): --Vital Signs Last 24 Hrs  T(C): 36.8 (03 Dec 2018 07:31), Max: 36.8 (03 Dec 2018 07:31)  T(F): 98.2 (03 Dec 2018 07:31), Max: 98.2 (03 Dec 2018 07:31)  HR: 54 (03 Dec 2018 08:31) (54 - 80)  BP: 132/53 (03 Dec 2018 07:31) (132/53 - 152/62)  BP(mean): --  RR: 14 (03 Dec 2018 07:31) (14 - 16)  SpO2: 96% (03 Dec 2018 08:31) (96% - 98%)    T(F): 98.2 (12-03-18 @ 07:31), Max: 98.2 (12-01-18 @ 22:16)  HR: 54 (12-03-18 @ 08:31) (50 - 80)  BP: 132/53 (12-03-18 @ 07:31) (104/57 - 152/62)  RR: 14 (12-03-18 @ 07:31) (14 - 18)  SpO2: 96% (12-03-18 @ 08:31) (95% - 100%)    PHYSICAL EXAM:  GENERAL: NAD, well-groomed, well-developed  HEAD:  Atraumatic, Normocephalic  EYES: EOMI, PERRLA, conjunctiva and sclera clear  ENMT: No tonsillar erythema, exudates, or enlargement; Moist mucous membranes, Good dentition, No lesions  NECK: Supple, No JVD, Normal thyroid  NERVOUS SYSTEM:  Alert & Oriented X3, Good concentration; Motor Strength 5/5 B/L upper and lower extremities; DTRs 2+ intact and symmetric  CHEST/LUNG: Clear to percussion bilaterally; No rales, rhonchi, wheezing, or rubs  HEART: Regular rate and rhythm; No murmurs, rubs, or gallops  ABDOMEN: Soft, Nontender, Nondistended; Bowel sounds present  EXTREMITIES:  2+ Peripheral Pulses, No clubbing, cyanosis, or edema  LYMPH: No lymphadenopathy noted  SKIN: No rashes or lesions    Consultant(s) Notes Reviewed:  [x ] YES  [ ] NO  Care Discussed with Consultants/Other Providers [ x] YES  [ ] NO    LABS:              PT/INR - ( 03 Dec 2018 07:00 )   PT: 27.1 sec;   INR: 2.36 ratio         PTT - ( 03 Dec 2018 07:00 )  PTT:39.8 sec                     9.8    8.7   )-----------( 245      ( 11-30 @ 09:16 )             31.1                9.2    5.8   )-----------( 184      ( 11-28 @ 06:30 )             29.9                9.3    11.6  )-----------( 150      ( 11-26 @ 06:10 )             28.5                9.8    11.0  )-----------( 164      ( 11-25 @ 10:37 )             30.8                11.1   10.0  )-----------( 165      ( 11-24 @ 21:25 )             34.8                12.2   9.2   )-----------( 167      ( 11-24 @ 17:22 )             39.0                11.5   7.0   )-----------( 170      ( 11-24 @ 06:35 )             35.7                13.1   8.4   )-----------( 178      ( 11-23 @ 12:48 )             40.8               RADIOLOGY & ADDITIONAL TESTS:    Imaging Personally Reviewed:  [ ] YES  [ ] NO  acetaminophen   Tablet .. 650 milliGRAM(s) Oral every 6 hours PRN  ascorbic acid 500 milliGRAM(s) Oral two times a day  aspirin enteric coated 81 milliGRAM(s) Oral daily  benzocaine 15 mG/menthol 3.6 mG Lozenge 1 Lozenge Oral every 4 hours PRN  Biotene Dry Mouth Oral Rinse 5 milliLiter(s) Swish and Spit four times a day PRN  buDESOnide  80 MICROgram(s)/formoterol 4.5 MICROgram(s) Inhaler 2 Puff(s) Inhalation two times a day  cholecalciferol 1000 Unit(s) Oral daily  diltiazem    Tablet 60 milliGRAM(s) Oral every 8 hours  docusate sodium 100 milliGRAM(s) Oral three times a day PRN  dorzolamide 2% Ophthalmic Solution 1 Drop(s) Left EYE three times a day  ferrous    sulfate 325 milliGRAM(s) Oral daily  furosemide    Tablet 40 milliGRAM(s) Oral daily  labetalol 200 milliGRAM(s) Oral three times a day  lidocaine   Patch 1 Patch Transdermal <User Schedule>  lidocaine   Patch 1 Patch Transdermal <User Schedule>  mesalamine DR Capsule 800 milliGRAM(s) Oral two times a day  montelukast 10 milliGRAM(s) Oral at bedtime  multivitamin 1 Tablet(s) Oral daily  pantoprazole    Tablet 40 milliGRAM(s) Oral before breakfast  polyethylene glycol 3350 17 Gram(s) Oral daily PRN  potassium chloride    Tablet ER 10 milliEquivalent(s) Oral daily  senna 1 Tablet(s) Oral at bedtime PRN  traMADol 50 milliGRAM(s) Oral every 6 hours PRN  warfarin 2 milliGRAM(s) Oral at bedtime  zinc sulfate 220 milliGRAM(s) Oral daily      HEALTH ISSUES - PROBLEM Dx:  Pacemaker: Pacemaker  Shoulder pain, left: Shoulder pain, left  Shoulder weakness: Shoulder weakness  Ulcerative colitis: Ulcerative colitis  Acid reflux disease: Acid reflux disease  HTN (hypertension), benign: HTN (hypertension), benign  Atrial fibrillation: Atrial fibrillation  Femur fracture, right: Femur fracture, right

## 2018-12-04 LAB
APTT BLD: 33.6 SEC — SIGNIFICANT CHANGE UP (ref 27.5–36.3)
INR BLD: 2.19 RATIO — HIGH (ref 0.88–1.16)
PROTHROM AB SERPL-ACNC: 25.1 SEC — HIGH (ref 10–12.9)

## 2018-12-04 PROCEDURE — 99232 SBSQ HOSP IP/OBS MODERATE 35: CPT | Mod: GC

## 2018-12-04 PROCEDURE — 99232 SBSQ HOSP IP/OBS MODERATE 35: CPT

## 2018-12-04 RX ORDER — WARFARIN SODIUM 2.5 MG/1
2 TABLET ORAL ONCE
Qty: 0 | Refills: 0 | Status: COMPLETED | OUTPATIENT
Start: 2018-12-04 | End: 2018-12-04

## 2018-12-04 RX ORDER — LABETALOL HCL 100 MG
100 TABLET ORAL EVERY 8 HOURS
Qty: 0 | Refills: 0 | Status: DISCONTINUED | OUTPATIENT
Start: 2018-12-04 | End: 2018-12-10

## 2018-12-04 RX ADMIN — Medication 10 MILLIEQUIVALENT(S): at 11:08

## 2018-12-04 RX ADMIN — BUDESONIDE AND FORMOTEROL FUMARATE DIHYDRATE 2 PUFF(S): 160; 4.5 AEROSOL RESPIRATORY (INHALATION) at 08:30

## 2018-12-04 RX ADMIN — DORZOLAMIDE HYDROCHLORIDE 1 DROP(S): 20 SOLUTION/ DROPS OPHTHALMIC at 13:01

## 2018-12-04 RX ADMIN — TRAMADOL HYDROCHLORIDE 50 MILLIGRAM(S): 50 TABLET ORAL at 12:53

## 2018-12-04 RX ADMIN — LIDOCAINE 1 PATCH: 4 CREAM TOPICAL at 22:05

## 2018-12-04 RX ADMIN — Medication 81 MILLIGRAM(S): at 11:08

## 2018-12-04 RX ADMIN — LIDOCAINE 1 PATCH: 4 CREAM TOPICAL at 10:43

## 2018-12-04 RX ADMIN — Medication 325 MILLIGRAM(S): at 11:08

## 2018-12-04 RX ADMIN — Medication 800 MILLIGRAM(S): at 17:53

## 2018-12-04 RX ADMIN — WARFARIN SODIUM 2 MILLIGRAM(S): 2.5 TABLET ORAL at 21:44

## 2018-12-04 RX ADMIN — Medication 800 MILLIGRAM(S): at 05:49

## 2018-12-04 RX ADMIN — ZINC SULFATE TAB 220 MG (50 MG ZINC EQUIVALENT) 220 MILLIGRAM(S): 220 (50 ZN) TAB at 11:07

## 2018-12-04 RX ADMIN — Medication 500 MILLIGRAM(S): at 17:53

## 2018-12-04 RX ADMIN — TRAMADOL HYDROCHLORIDE 50 MILLIGRAM(S): 50 TABLET ORAL at 13:30

## 2018-12-04 RX ADMIN — Medication 40 MILLIGRAM(S): at 05:49

## 2018-12-04 RX ADMIN — DORZOLAMIDE HYDROCHLORIDE 1 DROP(S): 20 SOLUTION/ DROPS OPHTHALMIC at 21:43

## 2018-12-04 RX ADMIN — LIDOCAINE 1 PATCH: 4 CREAM TOPICAL at 10:42

## 2018-12-04 RX ADMIN — Medication 500 MILLIGRAM(S): at 05:48

## 2018-12-04 RX ADMIN — Medication 1000 UNIT(S): at 11:07

## 2018-12-04 RX ADMIN — DORZOLAMIDE HYDROCHLORIDE 1 DROP(S): 20 SOLUTION/ DROPS OPHTHALMIC at 05:48

## 2018-12-04 RX ADMIN — MONTELUKAST 10 MILLIGRAM(S): 4 TABLET, CHEWABLE ORAL at 21:43

## 2018-12-04 RX ADMIN — TRAMADOL HYDROCHLORIDE 50 MILLIGRAM(S): 50 TABLET ORAL at 21:44

## 2018-12-04 RX ADMIN — Medication 1 TABLET(S): at 11:07

## 2018-12-04 RX ADMIN — BUDESONIDE AND FORMOTEROL FUMARATE DIHYDRATE 2 PUFF(S): 160; 4.5 AEROSOL RESPIRATORY (INHALATION) at 21:00

## 2018-12-04 RX ADMIN — Medication 100 MILLIGRAM(S): at 21:43

## 2018-12-04 RX ADMIN — PANTOPRAZOLE SODIUM 40 MILLIGRAM(S): 20 TABLET, DELAYED RELEASE ORAL at 05:51

## 2018-12-04 RX ADMIN — Medication 200 MILLIGRAM(S): at 05:49

## 2018-12-04 NOTE — PROGRESS NOTE ADULT - SUBJECTIVE AND OBJECTIVE BOX
Patient is a 89y old  Female who presents with a chief complaint of right femur fx s/p hemiarthroplasty (04 Dec 2018 08:19)      INTERVAL HPI/OVERNIGHT EVENTS:  fatigued      REVIEW OF SYSTEMS:  CONSTITUTIONAL: No fever, weight loss, or fatigue  EYES: No eye pain, visual disturbances, or discharge  ENMT:  No difficulty hearing, tinnitus, vertigo; No sinus or throat pain  NECK: No pain or stiffness  BREASTS: No pain, masses, or nipple discharge  RESPIRATORY: No cough, wheezing, chills or hemoptysis; No shortness of breath  CARDIOVASCULAR: No chest pain, palpitations, dizziness, or leg swelling  GASTROINTESTINAL: No abdominal or epigastric pain. No nausea, vomiting, or hematemesis; No diarrhea or constipation. No melena or hematochezia.  GENITOURINARY: No dysuria, frequency, hematuria, or incontinence  NEUROLOGICAL: No headaches, memory loss, loss of strength, numbness, or tremors  SKIN: No itching, burning, rashes, or lesions   LYMPH NODES: No enlarged glands  ENDOCRINE: No heat or cold intolerance; No hair loss  MUSCULOSKELETAL: No joint pain or swelling; No muscle, back, or extremity pain  PSYCHIATRIC: No depression, anxiety, mood swings, or difficulty sleeping  HEME/LYMPH: No easy bruising, or bleeding gums  ALLERY AND IMMUNOLOGIC: No hives or eczema  FAMILY HISTORY:  No pertinent family history in first degree relatives    T(C): 36.4 (12-04-18 @ 07:45), Max: 36.4 (12-03-18 @ 21:36)  HR: 63 (12-04-18 @ 07:45) (54 - 78)  BP: 101/59 (12-04-18 @ 07:45) (101/59 - 146/70)  RR: 15 (12-04-18 @ 07:45) (15 - 16)  SpO2: 100% (12-04-18 @ 07:45) (96% - 100%)  Wt(kg): --Vital Signs Last 24 Hrs  T(C): 36.4 (04 Dec 2018 07:45), Max: 36.4 (03 Dec 2018 21:36)  T(F): 97.6 (04 Dec 2018 07:45), Max: 97.6 (03 Dec 2018 21:36)  HR: 63 (04 Dec 2018 07:45) (54 - 78)  BP: 101/59 (04 Dec 2018 07:45) (101/59 - 146/70)  BP(mean): --  RR: 15 (04 Dec 2018 07:45) (15 - 16)  SpO2: 100% (04 Dec 2018 07:45) (96% - 100%)    T(F): 97.6 (12-04-18 @ 07:45), Max: 98.2 (12-01-18 @ 22:16)  HR: 63 (12-04-18 @ 07:45) (52 - 80)  BP: 101/59 (12-04-18 @ 07:45) (101/59 - 152/62)  RR: 15 (12-04-18 @ 07:45) (14 - 16)  SpO2: 100% (12-04-18 @ 07:45) (96% - 100%)    PHYSICAL EXAM:  GENERAL: NAD, well-groomed, well-developed  HEAD:  Atraumatic, Normocephalic  EYES: EOMI, PERRLA, conjunctiva and sclera clear  ENMT: No tonsillar erythema, exudates, or enlargement; Moist mucous membranes, Good dentition, No lesions  NECK: Supple, No JVD, Normal thyroid  NERVOUS SYSTEM:  Alert & Oriented X3, Good concentration; Motor Strength 5/5 B/L upper and lower extremities; DTRs 2+ intact and symmetric  CHEST/LUNG: Clear to percussion bilaterally; No rales, rhonchi, wheezing, or rubs  HEART: Regular rate and rhythm; No murmurs, rubs, or gallops  ABDOMEN: Soft, Nontender, Nondistended; Bowel sounds present  EXTREMITIES:  2+ Peripheral Pulses, No clubbing, cyanosis, or edema  LYMPH: No lymphadenopathy noted  SKIN: No rashes or lesions    Consultant(s) Notes Reviewed:  [x ] YES  [ ] NO  Care Discussed with Consultants/Other Providers [ x] YES  [ ] NO    LABS:              PT/INR - ( 04 Dec 2018 05:45 )   PT: 25.1 sec;   INR: 2.19 ratio         PTT - ( 04 Dec 2018 05:45 )  PTT:33.6 sec                     9.8    8.7   )-----------( 245      ( 11-30 @ 09:16 )             31.1                9.2    5.8   )-----------( 184      ( 11-28 @ 06:30 )             29.9                9.3    11.6  )-----------( 150      ( 11-26 @ 06:10 )             28.5                9.8    11.0  )-----------( 164      ( 11-25 @ 10:37 )             30.8                11.1   10.0  )-----------( 165      ( 11-24 @ 21:25 )             34.8                12.2   9.2   )-----------( 167      ( 11-24 @ 17:22 )             39.0                11.5   7.0   )-----------( 170      ( 11-24 @ 06:35 )             35.7                13.1   8.4   )-----------( 178      ( 11-23 @ 12:48 )             40.8               RADIOLOGY & ADDITIONAL TESTS:    Imaging Personally Reviewed:  [ ] YES  [ ] NO  acetaminophen   Tablet .. 650 milliGRAM(s) Oral every 6 hours PRN  ascorbic acid 500 milliGRAM(s) Oral two times a day  aspirin enteric coated 81 milliGRAM(s) Oral daily  benzocaine 15 mG/menthol 3.6 mG Lozenge 1 Lozenge Oral every 4 hours PRN  Biotene Dry Mouth Oral Rinse 5 milliLiter(s) Swish and Spit four times a day PRN  buDESOnide  80 MICROgram(s)/formoterol 4.5 MICROgram(s) Inhaler 2 Puff(s) Inhalation two times a day  cholecalciferol 1000 Unit(s) Oral daily  diltiazem    Tablet 60 milliGRAM(s) Oral every 8 hours  docusate sodium 100 milliGRAM(s) Oral three times a day PRN  dorzolamide 2% Ophthalmic Solution 1 Drop(s) Left EYE three times a day  ferrous    sulfate 325 milliGRAM(s) Oral daily  furosemide    Tablet 40 milliGRAM(s) Oral daily  labetalol 200 milliGRAM(s) Oral three times a day  lidocaine   Patch 1 Patch Transdermal <User Schedule>  lidocaine   Patch 1 Patch Transdermal <User Schedule>  mesalamine DR Capsule 800 milliGRAM(s) Oral two times a day  montelukast 10 milliGRAM(s) Oral at bedtime  multivitamin 1 Tablet(s) Oral daily  pantoprazole    Tablet 40 milliGRAM(s) Oral before breakfast  polyethylene glycol 3350 17 Gram(s) Oral daily PRN  potassium chloride    Tablet ER 10 milliEquivalent(s) Oral daily  senna 1 Tablet(s) Oral at bedtime PRN  traMADol 50 milliGRAM(s) Oral every 6 hours PRN  warfarin 2 milliGRAM(s) Oral once  zinc sulfate 220 milliGRAM(s) Oral daily      HEALTH ISSUES - PROBLEM Dx:  Pacemaker: Pacemaker  Shoulder pain, left: Shoulder pain, left  Shoulder weakness: Shoulder weakness  Ulcerative colitis: Ulcerative colitis  Acid reflux disease: Acid reflux disease  HTN (hypertension), benign: HTN (hypertension), benign  Atrial fibrillation: Atrial fibrillation  Femur fracture, right: Femur fracture, right

## 2018-12-04 NOTE — PROGRESS NOTE ADULT - SUBJECTIVE AND OBJECTIVE BOX
HISTORY OF PRESENT ILLNESS: This is a 89 y/o female who reports that she had fallen at home after trying to get up from her dinning room table and tripped over a rug. She landed on her right hip. Her daughter took her for an xray that revealed a right hip fracture. Patient states that she was given the name of an orthopedic MD for evaluation and she did not go to that appointment. Pt states that she did not want to undergo surgery at that time, so she started outpatient PT and began using a RW. On the 23rd, the patient reports she was leaving the PT outpatient facilyt and while trying to make room on the sidewalk, her RW got stuck in some dirt and she lost her balance, again falling onto her right hip. She was brought to [facility]'s ER and found to have a high neck fracture of the right femur. Orthopedics were consulted and patient cleared for surgery. Patient underwent a right hemiarthroplasty by Dr Bhatia on 11/24.   Post operatively patient has acute blood loss anemia. WBC are slightly elevated. No signs or symptoms of infection.   Patient was deemed medically stable and discharged to acute rehab 11/27/18    TODAY'S SUBJECTIVE & REVIEW OF SYMPTOMS: Patient seen and examined bedside.  States hearing aids not working.  Last night noted right hip pain which improved after medication.  Slept well overnight.  Endorses poor appetite but drinks ensure and bits of meal as tolerated.  Performing left shoulder stretches and exercises, pain with movement at times.  Last BM yesterday.  Offers no new complaints.      [   ] Cardio WNL  [ x  ] Resp WNL  [ x  ] GI WNL  [   ] Heme WNL  [   ] Endo WNL  [   ] Skin WNL  [   ] MSK WNL  [  x ] Neuro WNL  [   ] Cognitive WNL  [ x  ] Psych WNL        PHYSICAL EXAM  Vital Signs Last 24 Hrs  T(C): 36.4 (04 Dec 2018 07:45), Max: 36.4 (03 Dec 2018 21:36)  T(F): 97.6 (04 Dec 2018 07:45), Max: 97.6 (03 Dec 2018 21:36)  HR: 63 (04 Dec 2018 07:45) (54 - 78)  BP: 101/59 (04 Dec 2018 07:45) (101/59 - 146/70)  BP(mean): --  RR: 15 (04 Dec 2018 07:45) (15 - 16)  SpO2: 100% (04 Dec 2018 07:45) (96% - 100%)      Physical Exam:   Mental Status - Patient is alert, awake, oriented X3. Speech is fluent. Normal attention and concentration.  Follows commands well and able to answer questions appropriately. Mood and affect  normal.  	Motor Exam -   	Right upper full AROM, 5/5 strengths throughout   	Left upper limited AROM to 40 degrees, PROM to 145 degrees, pain noted with internal rotation and abduction with passive movement, 5/5 distally  	Right lower 3/5 hip flexion, 4+/5 knee extension and 4/+ 5DF and PF  	Left lower 4+/5 hip flexion, 5/5 knee extension and 5/5 DF and PF   	Normal muscle bulk/tone  	Sensory    Intact to light touch bilaterally.		  	LUNGS Clear bilaterally    	HEART:	 Irreg, S1S2      	GI/ ABDOMEN:  Soft  Non tender +BS  	EXTREMITIES:  +1 bilat lower extremity edema  	SKIN:  Right hip incision with staples c/d/i.  No erythema noted,  ecchymosis to the left medial elbow. No signs of skin breakdown noted to heels, elbows, buttocks, sacral or coccygeal areas. No rashes noted.        FUNCTIONAL PROGRESS:  Bed mobility: Mod A  Transfers: Min A  Gait: 30' RW Min A  ADLs: UE dress sup/set up, LE dress min A        RECENT LABS:  LABS:                        9.8    8.7   )-----------( 245      ( 30 Nov 2018 09:16 )             31.1         LABS:  Prothrombin Time and INR, Plasma in AM (12.04.18 @ 05:45)    Prothrombin Time, Plasma: 25.1: Effective October 30th, 2018 the reference range for PT has changed. sec    INR: 2.19 ratio                    Assessment and Plan:     This is a 89 y/o female s/p mechanical fall resulting in right femur fx (neck fx) s/p hemiarthroplasty now with functional deficits including gait and ADL dysfunction with decreased strength and endurance.       Comprehensive rehab WBAT RLE: PT/OT with post hip precautions    Pain management: Tramadol or tylenol prn.  Lidoderm patch, ice packs prn  Left shoulder pain: Xrays 11/28 neg for fx or dislocation.  Likely RTC tear.  Ortho consulted; recommended WBAT LUE, CT arthrogram.  Consider cortisone injection although pt declines at this time.     Chronic Afib: Coumadin  INR goal 2-3  INR 2.19  Coumadin 2mg po tonight.  F/U INR in am    CAD/HTN/PPM: Cardizem, Lasix, Labetalol.  D/W Dr Turner-Resumed ASA 81mg daily  H/O TIA: ASA    Colitis: Mesalamine.  Caution with bowel meds    GERD: Protonix    Asthma: Symbicort, Singulair    Acute blood loss anemia: H/H 9.2/29.9.  Added Fe daily    Moderate protein calorie malnutrition/Nutrition/wound care: Ensure bid, MVI, Vit C, Zn  Diet: DASH    Vitamin D deficiency: Vit D 25.  Added 1,000U daily    Dry mouth:  Cont Biotene and Cepacol lozenges prn, encouraged increased PO fluids    Precautions:    falls, posterior hip precautions                                     DVT Prophylaxis:    lovenox and coumadin, discontinued lovenox bridge once INR >2 HISTORY OF PRESENT ILLNESS: This is a 89 y/o female who reports that she had fallen at home after trying to get up from her dinning room table and tripped over a rug. She landed on her right hip. Her daughter took her for an xray that revealed a right hip fracture. Patient states that she was given the name of an orthopedic MD for evaluation and she did not go to that appointment. Pt states that she did not want to undergo surgery at that time, so she started outpatient PT and began using a RW. On the 23rd, the patient reports she was leaving the PT outpatient facilyt and while trying to make room on the sidewalk, her RW got stuck in some dirt and she lost her balance, again falling onto her right hip. She was brought to [facility]'s ER and found to have a high neck fracture of the right femur. Orthopedics were consulted and patient cleared for surgery. Patient underwent a right hemiarthroplasty by Dr Bhatia on 11/24.   Post operatively patient has acute blood loss anemia. WBC are slightly elevated. No signs or symptoms of infection.   Patient was deemed medically stable and discharged to acute rehab 11/27/18    TODAY'S SUBJECTIVE & REVIEW OF SYMPTOMS: Patient seen and examined bedside.  States hearing aids not working.  Last night noted right hip pain which improved after medication.  Slept well overnight.  Endorses poor appetite but drinks ensure and bits of meal as tolerated.  Performing left shoulder stretches and exercises, pain with movement at times.  Last BM yesterday.  Offers no new complaints.      [   ] Cardio WNL  [ x  ] Resp WNL  [ x  ] GI WNL  [   ] Heme WNL  [   ] Endo WNL  [   ] Skin WNL  [   ] MSK WNL  [  x ] Neuro WNL  [   ] Cognitive WNL  [ x  ] Psych WNL        PHYSICAL EXAM  Vital Signs Last 24 Hrs  T(C): 36.4 (04 Dec 2018 07:45), Max: 36.4 (03 Dec 2018 21:36)  T(F): 97.6 (04 Dec 2018 07:45), Max: 97.6 (03 Dec 2018 21:36)  HR: 63 (04 Dec 2018 07:45) (54 - 78)  BP: 101/59 (04 Dec 2018 07:45) (101/59 - 146/70)  BP(mean): --  RR: 15 (04 Dec 2018 07:45) (15 - 16)  SpO2: 100% (04 Dec 2018 07:45) (96% - 100%)      Physical Exam:   Mental Status - Patient is alert, awake, oriented X3. Speech is fluent. Normal attention and concentration.  Follows commands well and able to answer questions appropriately. Mood and affect  normal.  	Motor Exam -   	Right upper full AROM, 5/5 strengths throughout   	Left upper limited AROM to 40 degrees, PROM to 145 degrees, pain noted with internal rotation and abduction with passive movement, 5/5 distally  	Right lower 3/5 hip flexion, 4+/5 knee extension and 4/+ 5DF and PF  	Left lower 4+/5 hip flexion, 5/5 knee extension and 5/5 DF and PF   	Normal muscle bulk/tone  	Sensory    Intact to light touch bilaterally.		  	LUNGS Clear bilaterally    	HEART:	 Irreg, S1S2      	GI/ ABDOMEN:  Soft  Non tender +BS  	EXTREMITIES:  +1 bilat lower extremity edema  	SKIN:  Right hip incision with staples c/d/i.  No erythema noted,  ecchymosis to the left medial elbow. No signs of skin breakdown noted to heels, elbows, buttocks, sacral or coccygeal areas. No rashes noted.        FUNCTIONAL PROGRESS:  Bed mobility: Mod A  Transfers: Min A  Gait: 50' RW Min A  ADLs: UE dress sup/set up, LE dress min A        RECENT LABS:  LABS:                        9.8    8.7   )-----------( 245      ( 30 Nov 2018 09:16 )             31.1         LABS:  Prothrombin Time and INR, Plasma in AM (12.04.18 @ 05:45)    Prothrombin Time, Plasma: 25.1: Effective October 30th, 2018 the reference range for PT has changed. sec    INR: 2.19 ratio                    Assessment and Plan:     This is a 89 y/o female s/p mechanical fall resulting in right femur fx (neck fx) s/p hemiarthroplasty now with functional deficits including gait and ADL dysfunction with decreased strength and endurance.       Comprehensive rehab WBAT RLE: PT/OT with post hip precautions    Pain management: Tramadol or tylenol prn.  Lidoderm patch, ice packs prn  Left shoulder pain: Xrays 11/28 neg for fx or dislocation.  Likely RTC tear.  Ortho consulted; recommended WBAT LUE, CT arthrogram.  Consider cortisone injection although pt declines at this time.     Chronic Afib: Coumadin  INR goal 2-3  INR 2.19  Coumadin 2mg po tonight.  F/U INR in am    CAD/HTN/PPM: Cardizem, Lasix, Labetalol.  D/W Dr Turner-Resumed ASA 81mg daily  H/O TIA: ASA    Colitis: Mesalamine.  Caution with bowel meds    GERD: Protonix    Asthma: Symbicort, Singulair    Acute blood loss anemia: H/H 9.2/29.9.  Added Fe daily    Moderate protein calorie malnutrition/Nutrition/wound care: Ensure bid, MVI, Vit C, Zn  Diet: DASH    Vitamin D deficiency: Vit D 25.  Added 1,000U daily    Dry mouth:  Cont Biotene and Cepacol lozenges prn, encouraged increased PO fluids    Precautions:    falls, posterior hip precautions                                     DVT Prophylaxis:    lovenox and coumadin, discontinued lovenox bridge once INR >2

## 2018-12-05 LAB
APTT BLD: 35.1 SEC — SIGNIFICANT CHANGE UP (ref 27.5–36.3)
INR BLD: 2.26 RATIO — HIGH (ref 0.88–1.16)
PROTHROM AB SERPL-ACNC: 25.9 SEC — HIGH (ref 10–12.9)

## 2018-12-05 PROCEDURE — 99232 SBSQ HOSP IP/OBS MODERATE 35: CPT

## 2018-12-05 RX ORDER — WARFARIN SODIUM 2.5 MG/1
2 TABLET ORAL DAILY
Qty: 0 | Refills: 0 | Status: DISCONTINUED | OUTPATIENT
Start: 2018-12-05 | End: 2018-12-07

## 2018-12-05 RX ADMIN — LIDOCAINE 1 PATCH: 4 CREAM TOPICAL at 08:18

## 2018-12-05 RX ADMIN — DORZOLAMIDE HYDROCHLORIDE 1 DROP(S): 20 SOLUTION/ DROPS OPHTHALMIC at 14:28

## 2018-12-05 RX ADMIN — DORZOLAMIDE HYDROCHLORIDE 1 DROP(S): 20 SOLUTION/ DROPS OPHTHALMIC at 05:59

## 2018-12-05 RX ADMIN — Medication 100 MILLIGRAM(S): at 14:28

## 2018-12-05 RX ADMIN — LIDOCAINE 1 PATCH: 4 CREAM TOPICAL at 17:30

## 2018-12-05 RX ADMIN — TRAMADOL HYDROCHLORIDE 50 MILLIGRAM(S): 50 TABLET ORAL at 13:00

## 2018-12-05 RX ADMIN — TRAMADOL HYDROCHLORIDE 50 MILLIGRAM(S): 50 TABLET ORAL at 22:03

## 2018-12-05 RX ADMIN — WARFARIN SODIUM 2 MILLIGRAM(S): 2.5 TABLET ORAL at 22:03

## 2018-12-05 RX ADMIN — BUDESONIDE AND FORMOTEROL FUMARATE DIHYDRATE 2 PUFF(S): 160; 4.5 AEROSOL RESPIRATORY (INHALATION) at 20:20

## 2018-12-05 RX ADMIN — DORZOLAMIDE HYDROCHLORIDE 1 DROP(S): 20 SOLUTION/ DROPS OPHTHALMIC at 22:03

## 2018-12-05 RX ADMIN — Medication 1 TABLET(S): at 11:38

## 2018-12-05 RX ADMIN — LIDOCAINE 1 PATCH: 4 CREAM TOPICAL at 06:55

## 2018-12-05 RX ADMIN — Medication 81 MILLIGRAM(S): at 11:39

## 2018-12-05 RX ADMIN — Medication 500 MILLIGRAM(S): at 17:13

## 2018-12-05 RX ADMIN — LIDOCAINE 1 PATCH: 4 CREAM TOPICAL at 20:50

## 2018-12-05 RX ADMIN — Medication 800 MILLIGRAM(S): at 20:43

## 2018-12-05 RX ADMIN — Medication 1000 UNIT(S): at 11:39

## 2018-12-05 RX ADMIN — LIDOCAINE 1 PATCH: 4 CREAM TOPICAL at 06:54

## 2018-12-05 RX ADMIN — Medication 40 MILLIGRAM(S): at 05:59

## 2018-12-05 RX ADMIN — Medication 800 MILLIGRAM(S): at 06:00

## 2018-12-05 RX ADMIN — Medication 325 MILLIGRAM(S): at 11:39

## 2018-12-05 RX ADMIN — Medication 100 MILLIGRAM(S): at 06:00

## 2018-12-05 RX ADMIN — MONTELUKAST 10 MILLIGRAM(S): 4 TABLET, CHEWABLE ORAL at 22:03

## 2018-12-05 RX ADMIN — TRAMADOL HYDROCHLORIDE 50 MILLIGRAM(S): 50 TABLET ORAL at 22:43

## 2018-12-05 RX ADMIN — TRAMADOL HYDROCHLORIDE 50 MILLIGRAM(S): 50 TABLET ORAL at 12:19

## 2018-12-05 RX ADMIN — BUDESONIDE AND FORMOTEROL FUMARATE DIHYDRATE 2 PUFF(S): 160; 4.5 AEROSOL RESPIRATORY (INHALATION) at 08:19

## 2018-12-05 RX ADMIN — Medication 100 MILLIGRAM(S): at 22:03

## 2018-12-05 RX ADMIN — Medication 500 MILLIGRAM(S): at 05:59

## 2018-12-05 RX ADMIN — LIDOCAINE 1 PATCH: 4 CREAM TOPICAL at 17:31

## 2018-12-05 RX ADMIN — Medication 10 MILLIEQUIVALENT(S): at 11:39

## 2018-12-05 RX ADMIN — ZINC SULFATE TAB 220 MG (50 MG ZINC EQUIVALENT) 220 MILLIGRAM(S): 220 (50 ZN) TAB at 11:39

## 2018-12-05 RX ADMIN — PANTOPRAZOLE SODIUM 40 MILLIGRAM(S): 20 TABLET, DELAYED RELEASE ORAL at 06:03

## 2018-12-05 NOTE — PROGRESS NOTE ADULT - SUBJECTIVE AND OBJECTIVE BOX
HISTORY OF PRESENT ILLNESS: This is a 89 y/o female who reports that she had fallen at home after trying to get up from her dinning room table and tripped over a rug. She landed on her right hip. Her daughter took her for an xray that revealed a right hip fracture. Patient states that she was given the name of an orthopedic MD for evaluation and she did not go to that appointment. Pt states that she did not want to undergo surgery at that time, so she started outpatient PT and began using a RW. On the 23rd, the patient reports she was leaving the PT outpatient facilyt and while trying to make room on the sidewalk, her RW got stuck in some dirt and she lost her balance, again falling onto her right hip. She was brought to [facility]'s ER and found to have a high neck fracture of the right femur. Orthopedics were consulted and patient cleared for surgery. Patient underwent a right hemiarthroplasty by Dr Bhatia on 11/24.   Post operatively patient has acute blood loss anemia. WBC are slightly elevated. No signs or symptoms of infection.   Patient was deemed medically stable and discharged to acute rehab 11/27/18    TODAY'S SUBJECTIVE & REVIEW OF SYMPTOMS: Patient seen and examined bedside.  Pt reports mild pain right hip relieved with Lidoderm as well as left shoulder pain with movement.  Slept well overnight.  Appetite fair although taking Ensure TID.    Last BM 12/4.      [   ] Cardio WNL  [ x  ] Resp WNL  [ x  ] GI WNL  [   ] Heme WNL  [   ] Endo WNL  [   ] Skin WNL  [   ] MSK WNL  [  x ] Neuro WNL  [   ] Cognitive WNL  [ x  ] Psych WNL        PHYSICAL EXAM  Vital Signs Last 24 Hrs  T(C): 36.6 (05 Dec 2018 07:50), Max: 36.6 (05 Dec 2018 07:50)  T(F): 97.8 (05 Dec 2018 07:50), Max: 97.8 (05 Dec 2018 07:50)  HR: 72 (05 Dec 2018 07:50) (66 - 102)  BP: 119/65 (05 Dec 2018 07:50) (119/65 - 155/74)  BP(mean): --  RR: 14 (05 Dec 2018 07:50) (14 - 18)  SpO2: 96% (05 Dec 2018 08:20) (96% - 100%)    Physical Exam:   Mental Status - Patient is alert, awake, oriented X3. Speech is fluent. Normal attention and concentration.  Follows commands well and able to answer questions appropriately. Mood and affect  normal.  	Motor Exam -   	Right upper full AROM, 5/5 strengths throughout   	Left upper limited AROM to 40 degrees, PROM to 145 degrees, pain noted with internal rotation and abduction with passive movement, 5/5 distally  	Right lower 3/5 hip flexion, 4+/5 knee extension and 4/+ 5DF and PF  	Left lower 4+/5 hip flexion, 5/5 knee extension and 5/5 DF and PF   	Normal muscle bulk/tone  	Sensory    Intact to light touch bilaterally.		  	LUNGS Clear bilaterally    	HEART:	 Irreg, S1S2      	GI/ ABDOMEN:  Soft  Non tender +BS  	EXTREMITIES:  +1 bilat lower extremity edema  	SKIN:  Right hip incision with staples c/d/i.  No erythema noted,  ecchymosis to the left medial elbow. No signs of skin breakdown noted to heels, elbows, buttocks, sacral or coccygeal areas. No rashes noted.        FUNCTIONAL PROGRESS:  Bed mobility: Mod A  Transfers: Min A  Gait: 50' RW Min A  Stairs: Negotiates 4 steps 2HR min Ax1 and CG x1  ADLs: UE dress sup/set up, LE dress min A        RECENT LABS:                          9.8    8.7   )-----------( 245      ( 30 Nov 2018 09:16 )             31.1     Prothrombin Time and INR, Plasma in AM (12.04.18 @ 05:45)    Prothrombin Time, Plasma: 25.1: Effective October 30th, 2018 the reference range for PT has changed. sec    INR: 2.19 ratio  PT/INR - ( 05 Dec 2018 06:30 )   PT: 25.9 sec;   INR: 2.26 ratio        Assessment and Plan:     This is a 89 y/o female s/p mechanical fall resulting in right femur fx (neck fx) s/p hemiarthroplasty now with functional deficits including gait and ADL dysfunction with decreased strength and endurance.       Comprehensive rehab WBAT RLE: PT/OT with post hip precautions    Pain management: Tramadol or tylenol prn.  Lidoderm patch, ice packs prn  Left shoulder pain: Xrays 11/28 neg for fx or dislocation.  Likely RTC tear.  Ortho consulted; recommended WBAT LUE, CT arthrogram.  Consider cortisone injection although pt declines at this time.     Chronic Afib: Coumadin  INR goal 2-3  INR therapeutic at 2.26  Continue oumadin 2mg po qhs.  F/U INR 12/7    CAD/HTN/PPM: Cardizem, Lasix, Labetalol, ASA 81mg daily  H/O TIA: ASA    Colitis: Mesalamine.  Caution with bowel meds    GERD: Protonix    Asthma: Symbicort, Singulair    Acute blood loss anemia: H/H 9.2/29.9.  Added Fe daily    Moderate protein calorie malnutrition/Nutrition/wound care: Ensure bid, MVI, Vit C, Zn  Diet: DASH    Vitamin D deficiency: Vit D 25.  Added 1,000U daily    Dry mouth:  Cont Biotene and Cepacol lozenges prn, encouraged increased PO fluids    Precautions:    falls, posterior hip precautions                                     DVT Prophylaxis:   Coumadin; discontinued lovenox bridge once INR >2

## 2018-12-06 PROCEDURE — 99232 SBSQ HOSP IP/OBS MODERATE 35: CPT

## 2018-12-06 RX ADMIN — Medication 650 MILLIGRAM(S): at 09:30

## 2018-12-06 RX ADMIN — BUDESONIDE AND FORMOTEROL FUMARATE DIHYDRATE 2 PUFF(S): 160; 4.5 AEROSOL RESPIRATORY (INHALATION) at 09:00

## 2018-12-06 RX ADMIN — Medication 40 MILLIGRAM(S): at 06:31

## 2018-12-06 RX ADMIN — DORZOLAMIDE HYDROCHLORIDE 1 DROP(S): 20 SOLUTION/ DROPS OPHTHALMIC at 06:30

## 2018-12-06 RX ADMIN — Medication 650 MILLIGRAM(S): at 08:49

## 2018-12-06 RX ADMIN — Medication 100 MILLIGRAM(S): at 06:30

## 2018-12-06 RX ADMIN — LIDOCAINE 1 PATCH: 4 CREAM TOPICAL at 07:31

## 2018-12-06 RX ADMIN — LIDOCAINE 1 PATCH: 4 CREAM TOPICAL at 22:33

## 2018-12-06 RX ADMIN — Medication 1 TABLET(S): at 13:00

## 2018-12-06 RX ADMIN — Medication 800 MILLIGRAM(S): at 06:29

## 2018-12-06 RX ADMIN — Medication 500 MILLIGRAM(S): at 06:30

## 2018-12-06 RX ADMIN — Medication 325 MILLIGRAM(S): at 13:00

## 2018-12-06 RX ADMIN — Medication 81 MILLIGRAM(S): at 13:01

## 2018-12-06 RX ADMIN — LIDOCAINE 1 PATCH: 4 CREAM TOPICAL at 22:34

## 2018-12-06 RX ADMIN — Medication 100 MILLIGRAM(S): at 21:28

## 2018-12-06 RX ADMIN — Medication 1000 UNIT(S): at 13:00

## 2018-12-06 RX ADMIN — MONTELUKAST 10 MILLIGRAM(S): 4 TABLET, CHEWABLE ORAL at 21:28

## 2018-12-06 RX ADMIN — DORZOLAMIDE HYDROCHLORIDE 1 DROP(S): 20 SOLUTION/ DROPS OPHTHALMIC at 21:27

## 2018-12-06 RX ADMIN — ZINC SULFATE TAB 220 MG (50 MG ZINC EQUIVALENT) 220 MILLIGRAM(S): 220 (50 ZN) TAB at 13:00

## 2018-12-06 RX ADMIN — Medication 10 MILLIEQUIVALENT(S): at 13:00

## 2018-12-06 RX ADMIN — BUDESONIDE AND FORMOTEROL FUMARATE DIHYDRATE 2 PUFF(S): 160; 4.5 AEROSOL RESPIRATORY (INHALATION) at 20:38

## 2018-12-06 RX ADMIN — WARFARIN SODIUM 2 MILLIGRAM(S): 2.5 TABLET ORAL at 21:28

## 2018-12-06 RX ADMIN — Medication 500 MILLIGRAM(S): at 20:11

## 2018-12-06 RX ADMIN — Medication 800 MILLIGRAM(S): at 20:11

## 2018-12-06 RX ADMIN — TRAMADOL HYDROCHLORIDE 50 MILLIGRAM(S): 50 TABLET ORAL at 13:14

## 2018-12-06 RX ADMIN — PANTOPRAZOLE SODIUM 40 MILLIGRAM(S): 20 TABLET, DELAYED RELEASE ORAL at 06:30

## 2018-12-06 RX ADMIN — DORZOLAMIDE HYDROCHLORIDE 1 DROP(S): 20 SOLUTION/ DROPS OPHTHALMIC at 14:45

## 2018-12-06 NOTE — PROGRESS NOTE ADULT - PROBLEM SELECTOR PROBLEM 2
Pacemaker
Atrial fibrillation
Shoulder pain, left

## 2018-12-06 NOTE — PROGRESS NOTE ADULT - SUBJECTIVE AND OBJECTIVE BOX
HISTORY OF PRESENT ILLNESS: This is a 89 y/o female who reports that she had fallen at home after trying to get up from her dinning room table and tripped over a rug. She landed on her right hip. Her daughter took her for an xray that revealed a right hip fracture. Patient states that she was given the name of an orthopedic MD for evaluation and she did not go to that appointment. Pt states that she did not want to undergo surgery at that time, so she started outpatient PT and began using a RW. On the 23rd, the patient reports she was leaving the PT outpatient facilyt and while trying to make room on the sidewalk, her RW got stuck in some dirt and she lost her balance, again falling onto her right hip. She was brought to [facility]'s ER and found to have a high neck fracture of the right femur. Orthopedics were consulted and patient cleared for surgery. Patient underwent a right hemiarthroplasty by Dr Bhatia on 11/24.   Post operatively patient has acute blood loss anemia. WBC are slightly elevated. No signs or symptoms of infection.   Patient was deemed medically stable and discharged to acute rehab 11/27/18    TODAY'S SUBJECTIVE & REVIEW OF SYMPTOMS: Patient seen and examined bedside.  Pt reports mild pain in left shoulder. Denies pain in her right hip.  She continues to have limited ROM and is performing passive ROM exercises and "wall climbing." Slept well overnight. Last BM 2 days ago    [   ] Cardio WNL  [ x  ] Resp WNL  [ x  ] GI WNL  [   ] Heme WNL  [   ] Endo WNL  [   ] Skin WNL  [   ] MSK WNL  [  x ] Neuro WNL  [   ] Cognitive WNL  [ x  ] Psych WNL        PHYSICAL EXAM  Vital Signs Last 24 Hrs  T(C): 36.8 (05 Dec 2018 20:47), Max: 36.8 (05 Dec 2018 20:47)  T(F): 98.2 (05 Dec 2018 20:47), Max: 98.2 (05 Dec 2018 20:47)  HR: 86 (06 Dec 2018 06:33) (72 - 92)  BP: 135/63 (06 Dec 2018 06:33) (114/62 - 152/67)  BP(mean): --  RR: 14 (05 Dec 2018 20:47) (14 - 14)  SpO2: 98% (05 Dec 2018 20:47) (98% - 100%)    Physical Exam:   Mental Status - Patient is alert, awake, oriented X3. Speech is fluent. Normal attention and concentration.  Follows commands well and able to answer questions appropriately. Mood and affect  normal.  	Motor Exam -   	Right upper full AROM, 5/5 strengths throughout   	Left upper limited AROM to 40 degrees, PROM to 145 degrees, pain noted with internal rotation and abduction with passive movement, 5/5 distally  	Right lower 3/5 hip flexion, 4+/5 knee extension and 4/+ 5DF and PF  	Left lower 4+/5 hip flexion, 5/5 knee extension and 5/5 DF and PF   	Normal muscle bulk/tone  	Sensory    Intact to light touch bilaterally.		  	LUNGS Clear bilaterally    	HEART:	 Irreg, S1S2      	GI/ ABDOMEN:  Soft  Non tender +BS  	EXTREMITIES:  +1 bilat lower extremity edema  	SKIN:  Right hip incision with staples c/d/i.  No erythema noted,  ecchymosis to the left medial elbow. No signs of skin breakdown noted to heels, elbows, buttocks, sacral or coccygeal areas. No rashes noted.        FUNCTIONAL PROGRESS:  Bed mobility: Mod A  Transfers: Min A  Gait: 50' RW Min A  Stairs: Negotiates 4 steps 2HR min Ax1 and CG x1  ADLs: UE dress sup/set up, LE dress min A        RECENT LABS:                          9.8    8.7   )-----------( 245      ( 30 Nov 2018 09:16 )             31.1     Prothrombin Time and INR, Plasma in AM (12.04.18 @ 05:45)    Prothrombin Time, Plasma: 25.1: Effective October 30th, 2018 the reference range for PT has changed. sec    INR: 2.19 ratio  PT/INR - ( 05 Dec 2018 06:30 )   PT: 25.9 sec;   INR: 2.26 ratio        Assessment and Plan:     This is a 89 y/o female s/p mechanical fall resulting in right femur fx (neck fx) s/p hemiarthroplasty now with functional deficits including gait and ADL dysfunction with decreased strength and endurance.       Comprehensive rehab WBAT RLE: PT/OT with post hip precautions    Pain management: Tramadol or tylenol prn.  Lidoderm patch, ice packs prn  Left shoulder pain: Xrays 11/28 neg for fx or dislocation.  Likely RTC tear.  Ortho consulted; recommended WBAT LUE, CT arthrogram.  Consider cortisone injection although pt declines at this time.     Chronic Afib: Coumadin  INR goal 2-3  INR therapeutic 12/5 = 2.26  Continue oumadin 2mg po qhs.  F/U INR 12/7    CAD/HTN/PPM: Cardizem, Lasix, Labetalol, ASA 81mg daily  H/O TIA: ASA    Colitis: Mesalamine.  Caution with bowel meds    GERD: Protonix    Asthma: Symbicort, Singulair    Acute blood loss anemia: H/H 9.2/29.9.  Added Fe daily    Moderate protein calorie malnutrition/Nutrition/wound care: Ensure bid, MVI, Vit C, Zn  Diet: DASH    Vitamin D deficiency: Vit D 25.  Added 1,000U daily    Dry mouth:  Cont Biotene and Cepacol lozenges prn, encouraged increased PO fluids    Precautions:    falls, posterior hip precautions                                     DVT Prophylaxis:   Coumadin; discontinued lovenox bridge once INR >2

## 2018-12-06 NOTE — PROGRESS NOTE ADULT - PROBLEM SELECTOR PROBLEM 1
Shoulder pain, left
Femur fracture, right
Shoulder pain, left
Femur fracture, right

## 2018-12-06 NOTE — PROGRESS NOTE ADULT - SUBJECTIVE AND OBJECTIVE BOX
Patient is a 89y old  Female who presents with a chief complaint of right femur fx s/p hemiarthroplasty (06 Dec 2018 08:36)      INTERVAL HPI/OVERNIGHT EVENTS:fatigued      REVIEW OF SYSTEMS:  CONSTITUTIONAL: No fever, weight loss, or fatigue  EYES: No eye pain, visual disturbances, or discharge  ENMT:  No difficulty hearing, tinnitus, vertigo; No sinus or throat pain  NECK: No pain or stiffness  BREASTS: No pain, masses, or nipple discharge  RESPIRATORY: No cough, wheezing, chills or hemoptysis; No shortness of breath  CARDIOVASCULAR: No chest pain, palpitations, dizziness, or leg swelling  GASTROINTESTINAL: No abdominal or epigastric pain. No nausea, vomiting, or hematemesis; No diarrhea or constipation. No melena or hematochezia.  GENITOURINARY: No dysuria, frequency, hematuria, or incontinence  NEUROLOGICAL: No headaches, memory loss, loss of strength, numbness, or tremors  SKIN: No itching, burning, rashes, or lesions   LYMPH NODES: No enlarged glands  ENDOCRINE: No heat or cold intolerance; No hair loss  MUSCULOSKELETAL: No joint pain or swelling; No muscle, back, or extremity pain  PSYCHIATRIC: No depression, anxiety, mood swings, or difficulty sleeping  HEME/LYMPH: No easy bruising, or bleeding gums  ALLERY AND IMMUNOLOGIC: No hives or eczema  FAMILY HISTORY:  No pertinent family history in first degree relatives    T(C): 36.8 (12-05-18 @ 20:47), Max: 36.8 (12-05-18 @ 20:47)  HR: 86 (12-06-18 @ 06:33) (72 - 92)  BP: 135/63 (12-06-18 @ 06:33) (114/62 - 152/67)  RR: 14 (12-05-18 @ 20:47) (14 - 14)  SpO2: 98% (12-05-18 @ 20:47) (98% - 100%)  Wt(kg): --Vital Signs Last 24 Hrs  T(C): 36.8 (05 Dec 2018 20:47), Max: 36.8 (05 Dec 2018 20:47)  T(F): 98.2 (05 Dec 2018 20:47), Max: 98.2 (05 Dec 2018 20:47)  HR: 86 (06 Dec 2018 06:33) (72 - 92)  BP: 135/63 (06 Dec 2018 06:33) (114/62 - 152/67)  BP(mean): --  RR: 14 (05 Dec 2018 20:47) (14 - 14)  SpO2: 98% (05 Dec 2018 20:47) (98% - 100%)    T(F): 98.2 (12-05-18 @ 20:47), Max: 98.2 (12-05-18 @ 20:47)  HR: 86 (12-06-18 @ 06:33) (54 - 102)  BP: 135/63 (12-06-18 @ 06:33) (101/59 - 155/74)  RR: 14 (12-05-18 @ 20:47) (14 - 18)  SpO2: 98% (12-05-18 @ 20:47) (96% - 100%)    PHYSICAL EXAM:  GENERAL: NAD, well-groomed, well-developed  HEAD:  Atraumatic, Normocephalic  EYES: EOMI, PERRLA, conjunctiva and sclera clear  ENMT: No tonsillar erythema, exudates, or enlargement; Moist mucous membranes, Good dentition, No lesions  NECK: Supple, No JVD, Normal thyroid  NERVOUS SYSTEM:  Alert & Oriented X3, Good concentration; Motor Strength 5/5 B/L upper and lower extremities; DTRs 2+ intact and symmetric  CHEST/LUNG: Clear to percussion bilaterally; No rales, rhonchi, wheezing, or rubs  HEART: Regular rate and rhythm; No murmurs, rubs, or gallops  ABDOMEN: Soft, Nontender, Nondistended; Bowel sounds present  EXTREMITIES:  2+ Peripheral Pulses, No clubbing, cyanosis, or edema  LYMPH: No lymphadenopathy noted  SKIN: No rashes or lesions    Consultant(s) Notes Reviewed:  [x ] YES  [ ] NO  Care Discussed with Consultants/Other Providers [ x] YES  [ ] NO    LABS:              PT/INR - ( 05 Dec 2018 06:30 )   PT: 25.9 sec;   INR: 2.26 ratio         PTT - ( 05 Dec 2018 06:30 )  PTT:35.1 sec                     9.8    8.7   )-----------( 245      ( 11-30 @ 09:16 )             31.1                9.2    5.8   )-----------( 184      ( 11-28 @ 06:30 )             29.9                9.3    11.6  )-----------( 150      ( 11-26 @ 06:10 )             28.5                9.8    11.0  )-----------( 164      ( 11-25 @ 10:37 )             30.8                11.1   10.0  )-----------( 165      ( 11-24 @ 21:25 )             34.8                12.2   9.2   )-----------( 167      ( 11-24 @ 17:22 )             39.0                11.5   7.0   )-----------( 170      ( 11-24 @ 06:35 )             35.7                13.1   8.4   )-----------( 178      ( 11-23 @ 12:48 )             40.8               RADIOLOGY & ADDITIONAL TESTS:    Imaging Personally Reviewed:  [ ] YES  [ ] NO  acetaminophen   Tablet .. 650 milliGRAM(s) Oral every 6 hours PRN  ascorbic acid 500 milliGRAM(s) Oral two times a day  aspirin enteric coated 81 milliGRAM(s) Oral daily  benzocaine 15 mG/menthol 3.6 mG Lozenge 1 Lozenge Oral every 4 hours PRN  Biotene Dry Mouth Oral Rinse 5 milliLiter(s) Swish and Spit four times a day PRN  buDESOnide  80 MICROgram(s)/formoterol 4.5 MICROgram(s) Inhaler 2 Puff(s) Inhalation two times a day  cholecalciferol 1000 Unit(s) Oral daily  diltiazem    Tablet 30 milliGRAM(s) Oral every 8 hours  docusate sodium 100 milliGRAM(s) Oral three times a day PRN  dorzolamide 2% Ophthalmic Solution 1 Drop(s) Left EYE three times a day  ferrous    sulfate 325 milliGRAM(s) Oral daily  furosemide    Tablet 40 milliGRAM(s) Oral daily  labetalol 100 milliGRAM(s) Oral every 8 hours  lidocaine   Patch 1 Patch Transdermal <User Schedule>  lidocaine   Patch 1 Patch Transdermal <User Schedule>  mesalamine DR Capsule 800 milliGRAM(s) Oral two times a day  montelukast 10 milliGRAM(s) Oral at bedtime  multivitamin 1 Tablet(s) Oral daily  pantoprazole    Tablet 40 milliGRAM(s) Oral before breakfast  polyethylene glycol 3350 17 Gram(s) Oral daily PRN  potassium chloride    Tablet ER 10 milliEquivalent(s) Oral daily  senna 1 Tablet(s) Oral at bedtime PRN  traMADol 50 milliGRAM(s) Oral every 6 hours PRN  warfarin 2 milliGRAM(s) Oral daily  zinc sulfate 220 milliGRAM(s) Oral daily      HEALTH ISSUES - PROBLEM Dx:  Pacemaker: Pacemaker  Shoulder pain, left: Shoulder pain, left  Shoulder weakness: Shoulder weakness  Ulcerative colitis: Ulcerative colitis  Acid reflux disease: Acid reflux disease  HTN (hypertension), benign: HTN (hypertension), benign  Atrial fibrillation: Atrial fibrillation  Femur fracture, right: Femur fracture, right

## 2018-12-07 LAB
ANION GAP SERPL CALC-SCNC: 7 MMOL/L — SIGNIFICANT CHANGE UP (ref 5–17)
BUN SERPL-MCNC: 33 MG/DL — HIGH (ref 7–23)
CALCIUM SERPL-MCNC: 9.9 MG/DL — SIGNIFICANT CHANGE UP (ref 8.4–10.5)
CHLORIDE SERPL-SCNC: 98 MMOL/L — SIGNIFICANT CHANGE UP (ref 96–108)
CO2 SERPL-SCNC: 27 MMOL/L — SIGNIFICANT CHANGE UP (ref 22–31)
CREAT SERPL-MCNC: 1.15 MG/DL — SIGNIFICANT CHANGE UP (ref 0.5–1.3)
GLUCOSE SERPL-MCNC: 101 MG/DL — HIGH (ref 70–99)
HCT VFR BLD CALC: 27 % — LOW (ref 34.5–45)
HGB BLD-MCNC: 8.8 G/DL — LOW (ref 11.5–15.5)
INR BLD: 2.41 RATIO — HIGH (ref 0.88–1.16)
MCHC RBC-ENTMCNC: 29.5 PG — SIGNIFICANT CHANGE UP (ref 27–34)
MCHC RBC-ENTMCNC: 32.6 GM/DL — SIGNIFICANT CHANGE UP (ref 32–36)
MCV RBC AUTO: 90.2 FL — SIGNIFICANT CHANGE UP (ref 80–100)
PLATELET # BLD AUTO: 322 K/UL — SIGNIFICANT CHANGE UP (ref 150–400)
POTASSIUM SERPL-MCNC: 4.5 MMOL/L — SIGNIFICANT CHANGE UP (ref 3.5–5.3)
POTASSIUM SERPL-SCNC: 4.5 MMOL/L — SIGNIFICANT CHANGE UP (ref 3.5–5.3)
PROTHROM AB SERPL-ACNC: 27.7 SEC — HIGH (ref 10–12.9)
RBC # BLD: 2.99 M/UL — LOW (ref 3.8–5.2)
RBC # FLD: 14.4 % — SIGNIFICANT CHANGE UP (ref 10.3–14.5)
SODIUM SERPL-SCNC: 132 MMOL/L — LOW (ref 135–145)
WBC # BLD: 10.4 K/UL — SIGNIFICANT CHANGE UP (ref 3.8–10.5)
WBC # FLD AUTO: 10.4 K/UL — SIGNIFICANT CHANGE UP (ref 3.8–10.5)

## 2018-12-07 PROCEDURE — 99232 SBSQ HOSP IP/OBS MODERATE 35: CPT | Mod: GC

## 2018-12-07 RX ORDER — WARFARIN SODIUM 2.5 MG/1
2 TABLET ORAL DAILY
Qty: 0 | Refills: 0 | Status: COMPLETED | OUTPATIENT
Start: 2018-12-07 | End: 2018-12-09

## 2018-12-07 RX ADMIN — LIDOCAINE 1 PATCH: 4 CREAM TOPICAL at 17:19

## 2018-12-07 RX ADMIN — Medication 10 MILLIEQUIVALENT(S): at 12:16

## 2018-12-07 RX ADMIN — TRAMADOL HYDROCHLORIDE 50 MILLIGRAM(S): 50 TABLET ORAL at 10:49

## 2018-12-07 RX ADMIN — ZINC SULFATE TAB 220 MG (50 MG ZINC EQUIVALENT) 220 MILLIGRAM(S): 220 (50 ZN) TAB at 12:17

## 2018-12-07 RX ADMIN — LIDOCAINE 1 PATCH: 4 CREAM TOPICAL at 07:59

## 2018-12-07 RX ADMIN — LIDOCAINE 1 PATCH: 4 CREAM TOPICAL at 06:36

## 2018-12-07 RX ADMIN — Medication 500 MILLIGRAM(S): at 17:15

## 2018-12-07 RX ADMIN — Medication 100 MILLIGRAM(S): at 21:59

## 2018-12-07 RX ADMIN — TRAMADOL HYDROCHLORIDE 50 MILLIGRAM(S): 50 TABLET ORAL at 11:32

## 2018-12-07 RX ADMIN — PANTOPRAZOLE SODIUM 40 MILLIGRAM(S): 20 TABLET, DELAYED RELEASE ORAL at 06:36

## 2018-12-07 RX ADMIN — Medication 800 MILLIGRAM(S): at 06:36

## 2018-12-07 RX ADMIN — DORZOLAMIDE HYDROCHLORIDE 1 DROP(S): 20 SOLUTION/ DROPS OPHTHALMIC at 22:12

## 2018-12-07 RX ADMIN — MONTELUKAST 10 MILLIGRAM(S): 4 TABLET, CHEWABLE ORAL at 21:59

## 2018-12-07 RX ADMIN — Medication 800 MILLIGRAM(S): at 21:59

## 2018-12-07 RX ADMIN — Medication 500 MILLIGRAM(S): at 06:36

## 2018-12-07 RX ADMIN — Medication 325 MILLIGRAM(S): at 12:17

## 2018-12-07 RX ADMIN — Medication 1000 UNIT(S): at 12:17

## 2018-12-07 RX ADMIN — BUDESONIDE AND FORMOTEROL FUMARATE DIHYDRATE 2 PUFF(S): 160; 4.5 AEROSOL RESPIRATORY (INHALATION) at 21:41

## 2018-12-07 RX ADMIN — WARFARIN SODIUM 2 MILLIGRAM(S): 2.5 TABLET ORAL at 21:58

## 2018-12-07 RX ADMIN — Medication 1 TABLET(S): at 12:17

## 2018-12-07 RX ADMIN — DORZOLAMIDE HYDROCHLORIDE 1 DROP(S): 20 SOLUTION/ DROPS OPHTHALMIC at 15:03

## 2018-12-07 RX ADMIN — BUDESONIDE AND FORMOTEROL FUMARATE DIHYDRATE 2 PUFF(S): 160; 4.5 AEROSOL RESPIRATORY (INHALATION) at 08:17

## 2018-12-07 RX ADMIN — DORZOLAMIDE HYDROCHLORIDE 1 DROP(S): 20 SOLUTION/ DROPS OPHTHALMIC at 06:36

## 2018-12-07 RX ADMIN — LIDOCAINE 1 PATCH: 4 CREAM TOPICAL at 06:37

## 2018-12-07 RX ADMIN — Medication 40 MILLIGRAM(S): at 06:36

## 2018-12-07 RX ADMIN — Medication 100 MILLIGRAM(S): at 06:36

## 2018-12-07 RX ADMIN — Medication 81 MILLIGRAM(S): at 12:17

## 2018-12-07 RX ADMIN — Medication 100 MILLIGRAM(S): at 15:03

## 2018-12-07 NOTE — PROGRESS NOTE ADULT - SUBJECTIVE AND OBJECTIVE BOX
HISTORY OF PRESENT ILLNESS: This is a 89 y/o female who reports that she had fallen at home after trying to get up from her dinning room table and tripped over a rug. She landed on her right hip. Her daughter took her for an xray that revealed a right hip fracture. Patient states that she was given the name of an orthopedic MD for evaluation and she did not go to that appointment. Pt states that she did not want to undergo surgery at that time, so she started outpatient PT and began using a RW. On the 23rd, the patient reports she was leaving the PT outpatient facilyt and while trying to make room on the sidewalk, her RW got stuck in some dirt and she lost her balance, again falling onto her right hip. She was brought to [facility]'s ER and found to have a high neck fracture of the right femur. Orthopedics were consulted and patient cleared for surgery. Patient underwent a right hemiarthroplasty by Dr Bhatia on 11/24. Post operatively patient has acute blood loss anemia. WBC are slightly elevated. No signs or symptoms of infection. Patient was deemed medically stable and discharged to acute rehab 11/27/18    TODAY'S SUBJECTIVE & REVIEW OF SYMPTOMS: Patient seen and examined bedside.  Patient with some discomfort in left arm and continues to do ROM exercises each day. Tolerating therapy.  Denies pain in her right hip.  Last BM 2 days ago.      [   ] Cardio WNL  [ x  ] Resp WNL  [ x  ] GI WNL  [   ] Heme WNL  [   ] Endo WNL  [   ] Skin WNL  [   ] MSK WNL  [  x ] Neuro WNL  [   ] Cognitive WNL  [ x  ] Psych WNL        PHYSICAL EXAM  Vital Signs Last 24 Hrs  T(C): 36.9 (07 Dec 2018 07:40), Max: 36.9 (07 Dec 2018 07:40)  T(F): 98.5 (07 Dec 2018 07:40), Max: 98.5 (07 Dec 2018 07:40)  HR: 72 (07 Dec 2018 08:18) (72 - 86)  BP: 107/61 (07 Dec 2018 07:40) (107/61 - 120/64)  BP(mean): --  RR: 14 (07 Dec 2018 07:40) (14 - 14)  SpO2: 97% (07 Dec 2018 08:18) (97% - 98%)    Physical Exam:   Mental Status - Patient is alert, awake, oriented X3. Speech is fluent. Normal attention and concentration.  Follows commands well and able to answer questions appropriately. Mood and affect  normal.  	Motor Exam -   	Right upper full AROM, 5/5 strengths throughout   	Left upper limited AROM to 40 degrees, PROM to 145 degrees, pain noted with internal rotation and abduction with passive movement, 5/5 distally  	Right lower 3/5 hip flexion, 4+/5 knee extension and 4/+ 5DF and PF  	Left lower 4+/5 hip flexion, 5/5 knee extension and 5/5 DF and PF   	Normal muscle bulk/tone  	Sensory    Intact to light touch bilaterally.		  	LUNGS Clear bilaterally    	HEART:	 Irreg, S1S2      	GI/ ABDOMEN:  Soft  Non tender +BS  	EXTREMITIES:  +1 bilat lower extremity edema  	SKIN:  Right hip incision with staples c/d/i.  No erythema noted,  ecchymosis to the left medial elbow. No signs of skin breakdown noted to heels, elbows, buttocks, sacral or coccygeal areas. No rashes noted.        FUNCTIONAL PROGRESS:  Bed mobility: Mod A  Transfers: Min A  Gait: 50' RW Min A  Stairs: Negotiates 4 steps 2HR min Ax1 and CG x1  ADLs: UE dress sup/set up, LE dress min A        RECENT LABS:                        8.8    10.4  )-----------( 322      ( 07 Dec 2018 06:55 )             27.0     07 Dec 2018 06:55    132    |  98     |  33     ----------------------------<  101    4.5     |  27     |  1.15     Ca    9.9        07 Dec 2018 06:55    PT/INR - ( 07 Dec 2018 06:55 )   PT: 27.7 sec;   INR: 2.41 ratio          Assessment and Plan:     This is a 89 y/o female s/p mechanical fall resulting in right femur fx (neck fx) s/p hemiarthroplasty now with functional deficits including gait and ADL dysfunction with decreased strength and endurance.       Comprehensive rehab WBAT RLE: PT/OT with post hip precautions    Pain management: Tramadol or tylenol prn.  Lidoderm patch, ice packs prn  Left shoulder pain: Xrays 11/28 neg for fx or dislocation.  Likely RTC tear.  Ortho consulted; recommended WBAT LUE, CT arthrogram.  Consider cortisone injection although pt declines at this time.     Chronic Afib: Coumadin  INR goal 2-3  INR therapeutic 12/7 = 2.41  Continue oumadin 2mg po qhs.  F/U INR 12/10    CAD/HTN/PPM: Cardizem, Lasix, Labetalol, ASA 81mg daily  H/O TIA: ASA    Colitis: Mesalamine.  Caution with bowel meds    GERD: Protonix    Asthma: Symbicort, Singulair    Acute blood loss anemia: H/H 8.8/27.  Added Fe daily    Moderate protein calorie malnutrition/Nutrition/wound care: Ensure bid, MVI, Vit C, Zn  Diet: DASH    Vitamin D deficiency: Vit D 25.  Added 1,000U daily    Dry mouth:  Cont Biotene and Cepacol lozenges prn, encouraged increased PO fluids    Precautions:    falls, posterior hip precautions                                     DVT Prophylaxis:   Coumadin; discontinued lovenox bridge once INR >2 HISTORY OF PRESENT ILLNESS: This is a 89 y/o female who reports that she had fallen at home after trying to get up from her dinning room table and tripped over a rug. She landed on her right hip. Her daughter took her for an xray that revealed a right hip fracture. Patient states that she was given the name of an orthopedic MD for evaluation and she did not go to that appointment. Pt states that she did not want to undergo surgery at that time, so she started outpatient PT and began using a RW. On the 23rd, the patient reports she was leaving the PT outpatient facilyt and while trying to make room on the sidewalk, her RW got stuck in some dirt and she lost her balance, again falling onto her right hip. She was brought to [facility]'s ER and found to have a high neck fracture of the right femur. Orthopedics were consulted and patient cleared for surgery. Patient underwent a right hemiarthroplasty by Dr Bhatia on 11/24. Post operatively patient has acute blood loss anemia. WBC are slightly elevated. No signs or symptoms of infection. Patient was deemed medically stable and discharged to acute rehab 11/27/18    TODAY'S SUBJECTIVE & REVIEW OF SYMPTOMS: Patient seen and examined bedside.  Patient with some discomfort in left arm and continues to do ROM exercises each day. Tolerating therapy.  Denies pain in her right hip.  Last BM 2 days ago.      [   ] Cardio WNL  [ x  ] Resp WNL  [ x  ] GI WNL  [   ] Heme WNL  [   ] Endo WNL  [   ] Skin WNL  [   ] MSK WNL  [  x ] Neuro WNL  [   ] Cognitive WNL  [ x  ] Psych WNL        PHYSICAL EXAM  Vital Signs Last 24 Hrs  T(C): 36.9 (07 Dec 2018 07:40), Max: 36.9 (07 Dec 2018 07:40)  T(F): 98.5 (07 Dec 2018 07:40), Max: 98.5 (07 Dec 2018 07:40)  HR: 72 (07 Dec 2018 08:18) (72 - 86)  BP: 107/61 (07 Dec 2018 07:40) (107/61 - 120/64)  BP(mean): --  RR: 14 (07 Dec 2018 07:40) (14 - 14)  SpO2: 97% (07 Dec 2018 08:18) (97% - 98%)    Physical Exam:   Mental Status - Patient is alert, awake, oriented X3. Speech is fluent. Normal attention and concentration.  Follows commands well and able to answer questions appropriately. Mood and affect  normal.  	Motor Exam -   	Right upper full AROM, 5/5 strengths throughout   	Left upper limited AROM to 40 degrees, PROM to 145 degrees, pain noted with internal rotation and abduction with passive movement, 5/5 distally  	Right lower 3/5 hip flexion, 4+/5 knee extension and 4/+ 5DF and PF  	Left lower 4+/5 hip flexion, 5/5 knee extension and 5/5 DF and PF   	Normal muscle bulk/tone  	Sensory    Intact to light touch bilaterally.		  	LUNGS Clear bilaterally    	HEART:	 Irreg, S1S2      	GI/ ABDOMEN:  Soft  Non tender +BS  	EXTREMITIES:  +1 bilat lower extremity edema  	SKIN:  Right hip incision with staples c/d/i.  No erythema noted,  ecchymosis to the left medial elbow. No signs of skin breakdown noted to heels, elbows, buttocks, sacral or coccygeal areas. No rashes noted.        FUNCTIONAL PROGRESS:  Bed mobility: Mod A  Transfers: Min A  Gait: 50' RW Min A  Stairs: Negotiates 4 steps 2HR min Ax1 and CG x1  ADLs: UE dress sup/set up, LE dress min A        RECENT LABS:                        8.8    10.4  )-----------( 322      ( 07 Dec 2018 06:55 )             27.0     07 Dec 2018 06:55    132    |  98     |  33     ----------------------------<  101    4.5     |  27     |  1.15     Ca    9.9        07 Dec 2018 06:55    PT/INR - ( 07 Dec 2018 06:55 )   PT: 27.7 sec;   INR: 2.41 ratio          Assessment and Plan:     This is a 89 y/o female s/p mechanical fall resulting in right femur fx (neck fx) s/p hemiarthroplasty now with functional deficits including gait and ADL dysfunction with decreased strength and endurance.       Comprehensive rehab WBAT RLE: PT/OT with post hip precautions    Pain management: Tramadol or tylenol prn.  Lidoderm patch, ice packs prn  Left shoulder pain: Xrays 11/28 neg for fx or dislocation.  Likely RTC tear.  Ortho consulted; recommended WBAT LUE, CT arthrogram.  Consider cortisone injection although pt declines at this time.     Chronic Afib: Coumadin  INR goal 2-3  INR therapeutic 12/7 = 2.41  Continue oumadin 2mg po qhs.  F/U INR 12/10    CAD/HTN/PPM: Cardizem, Lasix, Labetalol, ASA 81mg daily  H/O TIA: ASA    Colitis: Mesalamine.  Caution with bowel meds    GERD: Protonix    Asthma: Symbicort, Singulair    Acute blood loss anemia: H/H 8.8/27.  Added Fe daily.  F/U CBC and stool for occult blood    Moderate protein calorie malnutrition/Nutrition/wound care: Ensure bid, MVI, Vit C, Zn  Diet: DASH    Vitamin D deficiency: Vit D 25.  Added 1,000U daily    Dry mouth:  Cont Biotene and Cepacol lozenges prn, encouraged increased PO fluids    Precautions:    falls, posterior hip precautions                                     DVT Prophylaxis:   Coumadin; discontinued lovenox bridge once INR >2

## 2018-12-08 LAB
ANION GAP SERPL CALC-SCNC: 7 MMOL/L — SIGNIFICANT CHANGE UP (ref 5–17)
BUN SERPL-MCNC: 36 MG/DL — HIGH (ref 7–23)
CALCIUM SERPL-MCNC: 10.3 MG/DL — SIGNIFICANT CHANGE UP (ref 8.4–10.5)
CHLORIDE SERPL-SCNC: 95 MMOL/L — LOW (ref 96–108)
CO2 SERPL-SCNC: 27 MMOL/L — SIGNIFICANT CHANGE UP (ref 22–31)
CREAT SERPL-MCNC: 1.08 MG/DL — SIGNIFICANT CHANGE UP (ref 0.5–1.3)
GLUCOSE SERPL-MCNC: 96 MG/DL — SIGNIFICANT CHANGE UP (ref 70–99)
HCT VFR BLD CALC: 28.8 % — LOW (ref 34.5–45)
HGB BLD-MCNC: 9 G/DL — LOW (ref 11.5–15.5)
MCHC RBC-ENTMCNC: 28.2 PG — SIGNIFICANT CHANGE UP (ref 27–34)
MCHC RBC-ENTMCNC: 31.2 GM/DL — LOW (ref 32–36)
MCV RBC AUTO: 90.4 FL — SIGNIFICANT CHANGE UP (ref 80–100)
PLATELET # BLD AUTO: 333 K/UL — SIGNIFICANT CHANGE UP (ref 150–400)
POTASSIUM SERPL-MCNC: 4.3 MMOL/L — SIGNIFICANT CHANGE UP (ref 3.5–5.3)
POTASSIUM SERPL-SCNC: 4.3 MMOL/L — SIGNIFICANT CHANGE UP (ref 3.5–5.3)
RBC # BLD: 3.18 M/UL — LOW (ref 3.8–5.2)
RBC # FLD: 14.2 % — SIGNIFICANT CHANGE UP (ref 10.3–14.5)
SODIUM SERPL-SCNC: 129 MMOL/L — LOW (ref 135–145)
WBC # BLD: 9.4 K/UL — SIGNIFICANT CHANGE UP (ref 3.8–10.5)
WBC # FLD AUTO: 9.4 K/UL — SIGNIFICANT CHANGE UP (ref 3.8–10.5)

## 2018-12-08 PROCEDURE — 99232 SBSQ HOSP IP/OBS MODERATE 35: CPT

## 2018-12-08 RX ADMIN — TRAMADOL HYDROCHLORIDE 50 MILLIGRAM(S): 50 TABLET ORAL at 21:50

## 2018-12-08 RX ADMIN — LIDOCAINE 1 PATCH: 4 CREAM TOPICAL at 07:57

## 2018-12-08 RX ADMIN — Medication 800 MILLIGRAM(S): at 06:02

## 2018-12-08 RX ADMIN — TRAMADOL HYDROCHLORIDE 50 MILLIGRAM(S): 50 TABLET ORAL at 13:15

## 2018-12-08 RX ADMIN — DORZOLAMIDE HYDROCHLORIDE 1 DROP(S): 20 SOLUTION/ DROPS OPHTHALMIC at 21:47

## 2018-12-08 RX ADMIN — Medication 100 MILLIGRAM(S): at 21:47

## 2018-12-08 RX ADMIN — Medication 500 MILLIGRAM(S): at 17:16

## 2018-12-08 RX ADMIN — Medication 40 MILLIGRAM(S): at 06:03

## 2018-12-08 RX ADMIN — TRAMADOL HYDROCHLORIDE 50 MILLIGRAM(S): 50 TABLET ORAL at 12:35

## 2018-12-08 RX ADMIN — Medication 500 MILLIGRAM(S): at 06:02

## 2018-12-08 RX ADMIN — LIDOCAINE 1 PATCH: 4 CREAM TOPICAL at 06:10

## 2018-12-08 RX ADMIN — TRAMADOL HYDROCHLORIDE 50 MILLIGRAM(S): 50 TABLET ORAL at 22:50

## 2018-12-08 RX ADMIN — Medication 100 MILLIGRAM(S): at 14:45

## 2018-12-08 RX ADMIN — LIDOCAINE 1 PATCH: 4 CREAM TOPICAL at 17:17

## 2018-12-08 RX ADMIN — WARFARIN SODIUM 2 MILLIGRAM(S): 2.5 TABLET ORAL at 21:47

## 2018-12-08 RX ADMIN — Medication 800 MILLIGRAM(S): at 21:47

## 2018-12-08 RX ADMIN — Medication 81 MILLIGRAM(S): at 12:24

## 2018-12-08 RX ADMIN — Medication 100 MILLIGRAM(S): at 06:02

## 2018-12-08 RX ADMIN — BUDESONIDE AND FORMOTEROL FUMARATE DIHYDRATE 2 PUFF(S): 160; 4.5 AEROSOL RESPIRATORY (INHALATION) at 09:25

## 2018-12-08 RX ADMIN — PANTOPRAZOLE SODIUM 40 MILLIGRAM(S): 20 TABLET, DELAYED RELEASE ORAL at 06:03

## 2018-12-08 RX ADMIN — MONTELUKAST 10 MILLIGRAM(S): 4 TABLET, CHEWABLE ORAL at 21:47

## 2018-12-08 RX ADMIN — Medication 10 MILLIEQUIVALENT(S): at 12:24

## 2018-12-08 RX ADMIN — Medication 1 TABLET(S): at 12:24

## 2018-12-08 RX ADMIN — BUDESONIDE AND FORMOTEROL FUMARATE DIHYDRATE 2 PUFF(S): 160; 4.5 AEROSOL RESPIRATORY (INHALATION) at 20:35

## 2018-12-08 RX ADMIN — Medication 1000 UNIT(S): at 12:24

## 2018-12-08 RX ADMIN — Medication 325 MILLIGRAM(S): at 12:24

## 2018-12-08 RX ADMIN — DORZOLAMIDE HYDROCHLORIDE 1 DROP(S): 20 SOLUTION/ DROPS OPHTHALMIC at 14:45

## 2018-12-08 RX ADMIN — DORZOLAMIDE HYDROCHLORIDE 1 DROP(S): 20 SOLUTION/ DROPS OPHTHALMIC at 06:03

## 2018-12-09 PROCEDURE — 99232 SBSQ HOSP IP/OBS MODERATE 35: CPT

## 2018-12-09 RX ADMIN — TRAMADOL HYDROCHLORIDE 50 MILLIGRAM(S): 50 TABLET ORAL at 09:20

## 2018-12-09 RX ADMIN — Medication 800 MILLIGRAM(S): at 05:59

## 2018-12-09 RX ADMIN — PANTOPRAZOLE SODIUM 40 MILLIGRAM(S): 20 TABLET, DELAYED RELEASE ORAL at 05:59

## 2018-12-09 RX ADMIN — TRAMADOL HYDROCHLORIDE 50 MILLIGRAM(S): 50 TABLET ORAL at 08:24

## 2018-12-09 RX ADMIN — Medication 1 TABLET(S): at 12:04

## 2018-12-09 RX ADMIN — MONTELUKAST 10 MILLIGRAM(S): 4 TABLET, CHEWABLE ORAL at 22:23

## 2018-12-09 RX ADMIN — Medication 1000 UNIT(S): at 12:04

## 2018-12-09 RX ADMIN — Medication 800 MILLIGRAM(S): at 22:23

## 2018-12-09 RX ADMIN — LIDOCAINE 1 PATCH: 4 CREAM TOPICAL at 09:18

## 2018-12-09 RX ADMIN — Medication 100 MILLIGRAM(S): at 22:23

## 2018-12-09 RX ADMIN — Medication 81 MILLIGRAM(S): at 12:04

## 2018-12-09 RX ADMIN — BUDESONIDE AND FORMOTEROL FUMARATE DIHYDRATE 2 PUFF(S): 160; 4.5 AEROSOL RESPIRATORY (INHALATION) at 20:10

## 2018-12-09 RX ADMIN — LIDOCAINE 1 PATCH: 4 CREAM TOPICAL at 17:20

## 2018-12-09 RX ADMIN — DORZOLAMIDE HYDROCHLORIDE 1 DROP(S): 20 SOLUTION/ DROPS OPHTHALMIC at 13:40

## 2018-12-09 RX ADMIN — DORZOLAMIDE HYDROCHLORIDE 1 DROP(S): 20 SOLUTION/ DROPS OPHTHALMIC at 22:23

## 2018-12-09 RX ADMIN — LIDOCAINE 1 PATCH: 4 CREAM TOPICAL at 17:19

## 2018-12-09 RX ADMIN — DORZOLAMIDE HYDROCHLORIDE 1 DROP(S): 20 SOLUTION/ DROPS OPHTHALMIC at 05:59

## 2018-12-09 RX ADMIN — Medication 10 MILLIEQUIVALENT(S): at 12:04

## 2018-12-09 RX ADMIN — Medication 325 MILLIGRAM(S): at 12:04

## 2018-12-09 RX ADMIN — Medication 500 MILLIGRAM(S): at 17:19

## 2018-12-09 RX ADMIN — BUDESONIDE AND FORMOTEROL FUMARATE DIHYDRATE 2 PUFF(S): 160; 4.5 AEROSOL RESPIRATORY (INHALATION) at 09:39

## 2018-12-09 RX ADMIN — Medication 100 MILLIGRAM(S): at 05:59

## 2018-12-09 RX ADMIN — LIDOCAINE 1 PATCH: 4 CREAM TOPICAL at 22:30

## 2018-12-09 RX ADMIN — Medication 500 MILLIGRAM(S): at 05:59

## 2018-12-09 RX ADMIN — LIDOCAINE 1 PATCH: 4 CREAM TOPICAL at 22:31

## 2018-12-09 RX ADMIN — Medication 40 MILLIGRAM(S): at 05:59

## 2018-12-09 RX ADMIN — Medication 100 MILLIGRAM(S): at 14:55

## 2018-12-09 RX ADMIN — WARFARIN SODIUM 2 MILLIGRAM(S): 2.5 TABLET ORAL at 22:25

## 2018-12-09 RX ADMIN — LIDOCAINE 1 PATCH: 4 CREAM TOPICAL at 08:20

## 2018-12-09 NOTE — PROGRESS NOTE ADULT - SUBJECTIVE AND OBJECTIVE BOX
HISTORY OF PRESENT ILLNESS: This is a 89 y/o female who reports that she had fallen at home after trying to get up from her dinning room table and tripped over a rug. She landed on her right hip. Her daughter took her for an xray that revealed a right hip fracture. Patient states that she was given the name of an orthopedic MD for evaluation and she did not go to that appointment. Pt states that she did not want to undergo surgery at that time, so she started outpatient PT and began using a RW. On the 23rd, the patient reports she was leaving the PT outpatient facilyt and while trying to make room on the sidewalk, her RW got stuck in some dirt and she lost her balance, again falling onto her right hip. She was brought to [facility]'s ER and found to have a high neck fracture of the right femur. Orthopedics were consulted and patient cleared for surgery. Patient underwent a right hemiarthroplasty by Dr Bhatia on 11/24. Post operatively patient has acute blood loss anemia. WBC are slightly elevated. No signs or symptoms of infection. Patient was deemed medically stable and discharged to acute rehab 11/27/18    TODAY'S SUBJECTIVE & REVIEW OF SYMPTOMS: Patient seen and examined bedside.  Patient c/o persistent "achiness" right hip and left shoulder.  Slept well.  +BM yesterday.  Denies dysuria, SOB, dyspnea or cough.      [   ] Cardio WNL  [ x  ] Resp WNL  [ x  ] GI WNL  [   ] Heme WNL  [   ] Endo WNL  [   ] Skin WNL  [   ] MSK WNL  [  x ] Neuro WNL  [   ] Cognitive WNL  [ x  ] Psych WNL        PHYSICAL EXAM  Vital Signs Last 24 Hrs  T(C): 36.8 (09 Dec 2018 07:50), Max: 37 (08 Dec 2018 21:05)  T(F): 98.2 (09 Dec 2018 07:50), Max: 98.6 (08 Dec 2018 21:05)  HR: 75 (09 Dec 2018 07:50) (70 - 86)  BP: 104/55 (09 Dec 2018 07:50) (104/55 - 128/57)  BP(mean): --  RR: 14 (09 Dec 2018 07:50) (14 - 14)  SpO2: 98% (09 Dec 2018 09:39) (96% - 100%)    Physical Exam:   Mental Status - Patient is alert, awake, oriented X3. Speech is fluent. Normal attention and concentration.  Follows commands well and able to answer questions appropriately. Mood and affect  normal.  	Motor Exam -   	Right upper full AROM, 5/5 strengths throughout   	Left upper limited AROM to 40 degrees, PROM to 145 degrees, pain noted with internal rotation and abduction with passive movement, 5/5 distally  	Right lower 3/5 hip flexion, 4+/5 knee extension and 4/+ 5DF and PF  	Left lower 4+/5 hip flexion, 5/5 knee extension and 5/5 DF and PF   	Normal muscle bulk/tone  	Sensory    Intact to light touch bilaterally.		  	LUNGS Clear bilaterally    	HEART:	 Irreg, S1S2      	GI/ ABDOMEN:  Soft  Non tender +BS  	EXTREMITIES:  +1 RLE edema  	SKIN:  Right hip incision with staples c/d/i.  No erythema noted. No signs of skin breakdown noted to heels, elbows, buttocks, sacral or coccygeal areas. No rashes noted.        FUNCTIONAL PROGRESS:  Bed mobility: Mod A  Transfers: Min A  Gait: 50' RW Min A  Stairs: Negotiates 4 steps 2HR min Ax1 and CG x1  ADLs: UE dress sup/set up, LE dress min A        RECENT LABS:                        9.0    9.4   )-----------( 333      ( 08 Dec 2018 06:22 )             28.8   12-08    129<L>  |  95<L>  |  36<H>  ----------------------------<  96  4.3   |  27  |  1.08    Ca    10.3      08 Dec 2018 06:22                            8.8    10.4  )-----------( 322      ( 07 Dec 2018 06:55 )             27.0     07 Dec 2018 06:55    132    |  98     |  33     ----------------------------<  101    4.5     |  27     |  1.15     Ca    9.9        07 Dec 2018 06:55    PT/INR - ( 07 Dec 2018 06:55 )   PT: 27.7 sec;   INR: 2.41 ratio            Assessment and Plan:     This is a 89 y/o female s/p mechanical fall resulting in right femur fx (neck fx) s/p hemiarthroplasty now with functional deficits including gait and ADL dysfunction with decreased strength and endurance.       Comprehensive rehab WBAT RLE: PT/OT with post hip precautions    Pain management: Tramadol or tylenol prn.  Lidoderm patch, ice packs prn  Left shoulder pain: Xrays 11/28 neg for fx or dislocation.  Likely RTC tear.  Ortho consulted; recommended WBAT LUE, CT arthrogram.  Consider cortisone injection although pt declines at this time.     Chronic Afib: Coumadin  INR goal 2-3  INR therapeutic 12/7 = 2.41  Continue coumadin 2mg po qhs.  F/U INR 12/10    CAD/HTN/PPM: Cardizem, Lasix, Labetalol, ASA 81mg daily  H/O TIA: ASA    Hyponatremia: Na 132  Fluid restriction 1500cc  Removed Na restriction from diet  F/U BMP 12/10    Colitis: Mesalamine.  Caution with bowel meds    GERD: Protonix    Asthma: Symbicort, Singulair    Acute blood loss anemia: Added Fe daily.  F/U CBC with H/H improved.  F/U stool for occult blood    Moderate protein calorie malnutrition/Nutrition/wound care: Ensure bid, MVI, Vit C, Zn  Diet: DASH    Vitamin D deficiency: Vit D 25.  Added 1,000U daily    Dry mouth:  D/C Biotene as not requested.  Cont Cepacol lozenges prn    Precautions:    falls, posterior hip precautions                                     DVT Prophylaxis:   Coumadin; discontinued lovenox bridge once INR >2

## 2018-12-10 LAB
ANION GAP SERPL CALC-SCNC: 9 MMOL/L — SIGNIFICANT CHANGE UP (ref 5–17)
BUN SERPL-MCNC: 34 MG/DL — HIGH (ref 7–23)
CALCIUM SERPL-MCNC: 10.1 MG/DL — SIGNIFICANT CHANGE UP (ref 8.4–10.5)
CHLORIDE SERPL-SCNC: 93 MMOL/L — LOW (ref 96–108)
CO2 SERPL-SCNC: 24 MMOL/L — SIGNIFICANT CHANGE UP (ref 22–31)
CREAT SERPL-MCNC: 1.24 MG/DL — SIGNIFICANT CHANGE UP (ref 0.5–1.3)
GLUCOSE SERPL-MCNC: 109 MG/DL — HIGH (ref 70–99)
HCT VFR BLD CALC: 27.1 % — LOW (ref 34.5–45)
HGB BLD-MCNC: 8.7 G/DL — LOW (ref 11.5–15.5)
INR BLD: 2.42 RATIO — HIGH (ref 0.88–1.16)
MCHC RBC-ENTMCNC: 28.8 PG — SIGNIFICANT CHANGE UP (ref 27–34)
MCHC RBC-ENTMCNC: 32 GM/DL — SIGNIFICANT CHANGE UP (ref 32–36)
MCV RBC AUTO: 90 FL — SIGNIFICANT CHANGE UP (ref 80–100)
PLATELET # BLD AUTO: 349 K/UL — SIGNIFICANT CHANGE UP (ref 150–400)
POTASSIUM SERPL-MCNC: 3.9 MMOL/L — SIGNIFICANT CHANGE UP (ref 3.5–5.3)
POTASSIUM SERPL-SCNC: 3.9 MMOL/L — SIGNIFICANT CHANGE UP (ref 3.5–5.3)
PROTHROM AB SERPL-ACNC: 27.8 SEC — HIGH (ref 10–12.9)
RBC # BLD: 3.01 M/UL — LOW (ref 3.8–5.2)
RBC # FLD: 14.3 % — SIGNIFICANT CHANGE UP (ref 10.3–14.5)
SODIUM SERPL-SCNC: 126 MMOL/L — LOW (ref 135–145)
WBC # BLD: 8.5 K/UL — SIGNIFICANT CHANGE UP (ref 3.8–10.5)
WBC # FLD AUTO: 8.5 K/UL — SIGNIFICANT CHANGE UP (ref 3.8–10.5)

## 2018-12-10 PROCEDURE — 99232 SBSQ HOSP IP/OBS MODERATE 35: CPT

## 2018-12-10 RX ORDER — SODIUM CHLORIDE 9 MG/ML
1 INJECTION INTRAMUSCULAR; INTRAVENOUS; SUBCUTANEOUS THREE TIMES A DAY
Qty: 0 | Refills: 0 | Status: DISCONTINUED | OUTPATIENT
Start: 2018-12-10 | End: 2018-12-10

## 2018-12-10 RX ORDER — WARFARIN SODIUM 2.5 MG/1
2 TABLET ORAL DAILY
Qty: 0 | Refills: 0 | Status: DISCONTINUED | OUTPATIENT
Start: 2018-12-10 | End: 2018-12-12

## 2018-12-10 RX ORDER — TRAMADOL HYDROCHLORIDE 50 MG/1
50 TABLET ORAL EVERY 6 HOURS
Qty: 0 | Refills: 0 | Status: DISCONTINUED | OUTPATIENT
Start: 2018-12-10 | End: 2018-12-12

## 2018-12-10 RX ORDER — LABETALOL HCL 100 MG
100 TABLET ORAL EVERY 12 HOURS
Qty: 0 | Refills: 0 | Status: DISCONTINUED | OUTPATIENT
Start: 2018-12-10 | End: 2018-12-12

## 2018-12-10 RX ORDER — FUROSEMIDE 40 MG
40 TABLET ORAL
Qty: 0 | Refills: 0 | Status: DISCONTINUED | OUTPATIENT
Start: 2018-12-10 | End: 2018-12-12

## 2018-12-10 RX ADMIN — TRAMADOL HYDROCHLORIDE 50 MILLIGRAM(S): 50 TABLET ORAL at 13:23

## 2018-12-10 RX ADMIN — Medication 100 MILLIGRAM(S): at 21:10

## 2018-12-10 RX ADMIN — Medication 10 MILLIEQUIVALENT(S): at 12:02

## 2018-12-10 RX ADMIN — Medication 325 MILLIGRAM(S): at 12:02

## 2018-12-10 RX ADMIN — TRAMADOL HYDROCHLORIDE 50 MILLIGRAM(S): 50 TABLET ORAL at 14:00

## 2018-12-10 RX ADMIN — DORZOLAMIDE HYDROCHLORIDE 1 DROP(S): 20 SOLUTION/ DROPS OPHTHALMIC at 13:18

## 2018-12-10 RX ADMIN — LIDOCAINE 1 PATCH: 4 CREAM TOPICAL at 06:58

## 2018-12-10 RX ADMIN — Medication 500 MILLIGRAM(S): at 05:29

## 2018-12-10 RX ADMIN — BUDESONIDE AND FORMOTEROL FUMARATE DIHYDRATE 2 PUFF(S): 160; 4.5 AEROSOL RESPIRATORY (INHALATION) at 08:42

## 2018-12-10 RX ADMIN — Medication 1000 UNIT(S): at 12:02

## 2018-12-10 RX ADMIN — Medication 500 MILLIGRAM(S): at 17:16

## 2018-12-10 RX ADMIN — Medication 81 MILLIGRAM(S): at 12:02

## 2018-12-10 RX ADMIN — MONTELUKAST 10 MILLIGRAM(S): 4 TABLET, CHEWABLE ORAL at 21:09

## 2018-12-10 RX ADMIN — Medication 800 MILLIGRAM(S): at 05:29

## 2018-12-10 RX ADMIN — PANTOPRAZOLE SODIUM 40 MILLIGRAM(S): 20 TABLET, DELAYED RELEASE ORAL at 05:29

## 2018-12-10 RX ADMIN — DORZOLAMIDE HYDROCHLORIDE 1 DROP(S): 20 SOLUTION/ DROPS OPHTHALMIC at 21:09

## 2018-12-10 RX ADMIN — LIDOCAINE 1 PATCH: 4 CREAM TOPICAL at 17:18

## 2018-12-10 RX ADMIN — Medication 650 MILLIGRAM(S): at 14:47

## 2018-12-10 RX ADMIN — Medication 100 MILLIGRAM(S): at 13:18

## 2018-12-10 RX ADMIN — BUDESONIDE AND FORMOTEROL FUMARATE DIHYDRATE 2 PUFF(S): 160; 4.5 AEROSOL RESPIRATORY (INHALATION) at 20:29

## 2018-12-10 RX ADMIN — Medication 1 TABLET(S): at 12:02

## 2018-12-10 RX ADMIN — WARFARIN SODIUM 2 MILLIGRAM(S): 2.5 TABLET ORAL at 21:09

## 2018-12-10 RX ADMIN — Medication 40 MILLIGRAM(S): at 21:09

## 2018-12-10 RX ADMIN — Medication 800 MILLIGRAM(S): at 17:15

## 2018-12-10 RX ADMIN — DORZOLAMIDE HYDROCHLORIDE 1 DROP(S): 20 SOLUTION/ DROPS OPHTHALMIC at 05:30

## 2018-12-10 NOTE — CHART NOTE - NSCHARTNOTEFT_GEN_A_CORE
Nutrition Follow Up   Hospital Course (Per Electronic Medical Record):   Source: Medical Record [X] Patient [X] Family [ ]         Diet: Regular Diet w/ Thin Liquids   on 1,000ml/day Fluid Restriction - For Low/Decreased Sodium  Educated on Need fo Fluid Restriction  on Ensure Enlive 8oz PO TID - Takes Well  Consumes 50-80% of Meals (as Per Documentation)     Enteral/Parenteral Nutrition: N/A    Current Weight: 122.5lb on 12/9  % Weight Change: N/A  Weight Stable Since Admission  Obtain Weights Daily     Pertinent Medications: MEDICATIONS  (STANDING):  ascorbic acid 500 milliGRAM(s) Oral two times a day  aspirin enteric coated 81 milliGRAM(s) Oral daily  buDESOnide  80 MICROgram(s)/formoterol 4.5 MICROgram(s) Inhaler 2 Puff(s) Inhalation two times a day  cholecalciferol 1000 Unit(s) Oral daily  diltiazem    Tablet 30 milliGRAM(s) Oral every 8 hours  dorzolamide 2% Ophthalmic Solution 1 Drop(s) Left EYE three times a day  ferrous    sulfate 325 milliGRAM(s) Oral daily  furosemide    Tablet 40 milliGRAM(s) Oral daily  labetalol 100 milliGRAM(s) Oral every 8 hours  lidocaine   Patch 1 Patch Transdermal <User Schedule>  lidocaine   Patch 1 Patch Transdermal <User Schedule>  mesalamine DR Capsule 800 milliGRAM(s) Oral two times a day  montelukast 10 milliGRAM(s) Oral at bedtime  multivitamin 1 Tablet(s) Oral daily  pantoprazole    Tablet 40 milliGRAM(s) Oral before breakfast  potassium chloride    Tablet ER 10 milliEquivalent(s) Oral daily  warfarin 2 milliGRAM(s) Oral daily    MEDICATIONS  (PRN):  acetaminophen   Tablet .. 650 milliGRAM(s) Oral every 6 hours PRN Mild Pain (1 - 3)  benzocaine 15 mG/menthol 3.6 mG Lozenge 1 Lozenge Oral every 4 hours PRN Sore Throat  polyethylene glycol 3350 17 Gram(s) Oral daily PRN Constipation  traMADol 50 milliGRAM(s) Oral every 6 hours PRN Moderate Pain (4 - 6)    Pertinent Labs:  12-10 Na126 mmol/L<L> Glu 109 mg/dL<H> K+ 3.9 mmol/L Cr  1.24 mg/dL BUN 34 mg/dL<H>    Skin: No Pressure Ulcers     Edema: +1 Left Arm  (Potential for Weight Fluctuations)    Last BM: on 12/8    Estimated Needs:   [X] No Change since Previous Assessment    Previous Nutrition Diagnosis:   Moderate Malnutrition     Nutrition Diagnosis is [X] Ongoing    [ ] Resolved   [ ] Not Applicable      New Nutrition Diagnosis: [X] Not Applicable    Interventions:   1. Recommend Continue Nutrition Plan of Care   2. Educated on Fluid Restriction    Monitoring & Evaluation:   [X] PO Intake   [X] Tolerance to Diet Prescription   [X] Weights   [X] Follow Up (Per Protocol)  [X] Other: Labs     RD Remains Available.  Zev Wiggins RD

## 2018-12-10 NOTE — CONSULT NOTE ADULT - SUBJECTIVE AND OBJECTIVE BOX
NEPHROLOGY CONSULTATION    CHIEF COMPLAINT: R hip fx    HPI:  Pt is a 89 y/o female who reports that she had fallen at home after trying to get up from her dinning room table and tripped over a rug. She landed on her right hip. Her daughter took her for an xray that revealed a right hip fracture. Patient states that she was given the name of an orthopedic MD for evaluation and she did not go to that appointment. Pt states that she did not want to undergo surgery at that time, so she started outpatient PT and began using a RW. On the 23rd, the patient reports she was leaving the PT outpatient facility and while trying to make room on the sidewalk, her RW got stuck in some dirt and she lost her balance, again falling onto her right hip. She was brought to [facility]'s ER and found to have a high neck fracture of the right femur. Orthopedics were consulted and patient cleared for surgery. Patient underwent a right hemiarthroplasty on 11/24. Patient deemed medically stable 11/27/18, for admission to acute rehab here at [facility].     ROS:  as above    Allergies:  Keflex (Diarrhea)  Norvasc (Unknown)    PAST MEDICAL & SURGICAL HISTORY:  Pacemaker  Transient cerebral ischemia, unspecified transient cerebral ischemia type  HTN (hypertension), benign  Atrial fibrillation  Acid reflux disease  History of cataract surgery    SOCIAL HISTORY:  negative    FAMILY HISTORY:  No pertinent family history in first degree relatives    MEDICATIONS  (STANDING):  ascorbic acid 500 milliGRAM(s) Oral two times a day  aspirin enteric coated 81 milliGRAM(s) Oral daily  buDESOnide  80 MICROgram(s)/formoterol 4.5 MICROgram(s) Inhaler 2 Puff(s) Inhalation two times a day  cholecalciferol 1000 Unit(s) Oral daily  diltiazem    Tablet 30 milliGRAM(s) Oral every 8 hours  dorzolamide 2% Ophthalmic Solution 1 Drop(s) Left EYE three times a day  ferrous    sulfate 325 milliGRAM(s) Oral daily  furosemide    Tablet 40 milliGRAM(s) Oral daily  labetalol 100 milliGRAM(s) Oral every 8 hours  lidocaine   Patch 1 Patch Transdermal <User Schedule>  lidocaine   Patch 1 Patch Transdermal <User Schedule>  mesalamine DR Capsule 800 milliGRAM(s) Oral two times a day  montelukast 10 milliGRAM(s) Oral at bedtime  multivitamin 1 Tablet(s) Oral daily  pantoprazole    Tablet 40 milliGRAM(s) Oral before breakfast  potassium chloride    Tablet ER 10 milliEquivalent(s) Oral daily  sodium chloride 1 Gram(s) Oral three times a day  warfarin 2 milliGRAM(s) Oral daily    Vital Signs Last 24 Hrs  T(C): 36.7 (12-10-18 @ 08:16), Max: 37.1 (12-09-18 @ 20:07)  T(F): 98.1 (12-10-18 @ 08:16), Max: 98.7 (12-09-18 @ 20:07)  HR: 74 (12-10-18 @ 13:35) (68 - 86)  BP: 136/70 (12-10-18 @ 13:35) (104/55 - 136/70)  RR: 14 (12-10-18 @ 08:16) (14 - 14)  SpO2: 100% (12-10-18 @ 09:31) (96% - 100%)    Conversant, no apparent distress  PERRLA, pink conjunctivae, no ptosis  Neck non tender, no mass, no thyromegaly or nodules  Normal respiratory effort, lungs clear to auscultation  Heart with RRR, no murmurs or rubs, no peripheral edema  Abdomen soft, no masses, no organomegaly  Skin no rashes, ulcers or lesions, normal turgor and temperature  Appropriate affect, AO x 3    LABS:                        8.7    8.5   )-----------( 349      ( 10 Dec 2018 07:00 )             27.1     12-10    126<L>  |  93<L>  |  34<H>  ----------------------------<  109<H>  3.9   |  24  |  1.24    Ca    10.1      10 Dec 2018 07:00    PT/INR - ( 10 Dec 2018 07:00 )   PT: 27.8 sec;   INR: 2.42 ratio      A/P: NEPHROLOGY CONSULTATION    CHIEF COMPLAINT: R hip fx    HPI:  Pt is a 89 yo female who reports that she had fallen at home after trying to get up from her dinning room table and tripped over a rug. She landed on her right hip. Her daughter took her for an xray that revealed a right hip fracture. Patient states that she was given the name of an orthopedic MD for evaluation and she did not go to that appointment. Pt states that she did not want to undergo surgery at that time, so she started outpatient PT and began using a RW. On the 23rd, the patient reports she was leaving the PT outpatient facility and while trying to make room on the sidewalk, her RW got stuck in some dirt and she lost her balance, again falling onto her right hip. She was brought to [facility] ER and found to have fracture of the right femur. Patient underwent a right hemiarthroplasty on 11/24. Patient deemed medically stable 11/27/18 for admission to acute rehab here at [facility]. Asked to eval for worsening renal fx, hyponatremia. Patient is awake, alert, denies CP, SOB, N/V, D/C, F/C, dysuria.    ROS:  as above    Allergies:  Keflex (Diarrhea)  Norvasc (Unknown)    PAST MEDICAL & SURGICAL HISTORY:  Pacemaker  Transient cerebral ischemia, unspecified transient cerebral ischemia type  HTN (hypertension), benign  Atrial fibrillation  Acid reflux disease  History of cataract surgery    SOCIAL HISTORY:  negative    FAMILY HISTORY:  No pertinent family history in first degree relatives    MEDICATIONS  (STANDING):  ascorbic acid 500 milliGRAM(s) Oral two times a day  aspirin enteric coated 81 milliGRAM(s) Oral daily  buDESOnide  80 MICROgram(s)/formoterol 4.5 MICROgram(s) Inhaler 2 Puff(s) Inhalation two times a day  cholecalciferol 1000 Unit(s) Oral daily  diltiazem    Tablet 30 milliGRAM(s) Oral every 8 hours  dorzolamide 2% Ophthalmic Solution 1 Drop(s) Left EYE three times a day  ferrous    sulfate 325 milliGRAM(s) Oral daily  furosemide    Tablet 40 milliGRAM(s) Oral daily  labetalol 100 milliGRAM(s) Oral every 8 hours  lidocaine   Patch 1 Patch Transdermal <User Schedule>  lidocaine   Patch 1 Patch Transdermal <User Schedule>  mesalamine DR Capsule 800 milliGRAM(s) Oral two times a day  montelukast 10 milliGRAM(s) Oral at bedtime  multivitamin 1 Tablet(s) Oral daily  pantoprazole    Tablet 40 milliGRAM(s) Oral before breakfast  potassium chloride    Tablet ER 10 milliEquivalent(s) Oral daily  sodium chloride 1 Gram(s) Oral three times a day  warfarin 2 milliGRAM(s) Oral daily    Vital Signs Last 24 Hrs  T(C): 36.7 (12-10-18 @ 08:16), Max: 37.1 (12-09-18 @ 20:07)  T(F): 98.1 (12-10-18 @ 08:16), Max: 98.7 (12-09-18 @ 20:07)  HR: 74 (12-10-18 @ 13:35) (68 - 86)  BP: 136/70 (12-10-18 @ 13:35) (104/55 - 136/70)  RR: 14 (12-10-18 @ 08:16) (14 - 14)  SpO2: 100% (12-10-18 @ 09:31) (96% - 100%)    Conversant, no apparent distress  PERRLA, EOMI  Neck non tender, supple  Normal respiratory effort, lungs good air entry b/l  Heart S1S2  Extr 2+ edema  Abdomen soft, NT, ND, + BS  Appropriate affect, AO x 3    LABS:                        8.7    8.5   )-----------( 349      ( 10 Dec 2018 07:00 )             27.1     12-10    126<L>  |  93<L>  |  34<H>  ----------------------------<  109<H>  3.9   |  24  |  1.24    Ca    10.1      10 Dec 2018 07:00    PT/INR - ( 10 Dec 2018 07:00 )   PT: 27.8 sec;   INR: 2.42 ratio      A/P:    Hx CM, CHF  Hypervolemic hyponatremia  Will increase Lasix to 40mg BID  Fluid restriction 1L/day  Avoid salt tabs given edema  Will check UA, PVR, urine lytes, osms  Will check uric acid, TSH, Cortisol  Will f/u  No ACE/ARB

## 2018-12-10 NOTE — PROGRESS NOTE ADULT - SUBJECTIVE AND OBJECTIVE BOX
HISTORY OF PRESENT ILLNESS: This is a 89 y/o female who reports that she had fallen at home after trying to get up from her dinning room table and tripped over a rug. She landed on her right hip. Her daughter took her for an xray that revealed a right hip fracture. Patient states that she was given the name of an orthopedic MD for evaluation and she did not go to that appointment. Pt states that she did not want to undergo surgery at that time, so she started outpatient PT and began using a RW. On the 23rd, the patient reports she was leaving the PT outpatient facilyt and while trying to make room on the sidewalk, her RW got stuck in some dirt and she lost her balance, again falling onto her right hip. She was brought to [facility]'s ER and found to have a high neck fracture of the right femur. Orthopedics were consulted and patient cleared for surgery. Patient underwent a right hemiarthroplasty by Dr Bhatia on 11/24. Post operatively patient has acute blood loss anemia. WBC are slightly elevated. No signs or symptoms of infection. Patient was deemed medically stable and discharged to acute rehab 11/27/18    TODAY'S SUBJECTIVE & REVIEW OF SYMPTOMS: Patient seen and examined bedside.  Patient c/o persistent "achiness" right hip and left shoulder.  Slept well.  Last BM 12/8.  Denies dysuria, SOB, dyspnea or cough.      [   ] Cardio WNL  [ x  ] Resp WNL  [ x  ] GI WNL  [   ] Heme WNL  [   ] Endo WNL  [   ] Skin WNL  [   ] MSK WNL  [  x ] Neuro WNL  [   ] Cognitive WNL  [ x  ] Psych WNL        PHYSICAL EXAM  Vital Signs Last 24 Hrs  T(C): 36.7 (10 Dec 2018 08:16), Max: 37.1 (09 Dec 2018 20:07)  T(F): 98.1 (10 Dec 2018 08:16), Max: 98.7 (09 Dec 2018 20:07)  HR: 74 (10 Dec 2018 13:35) (68 - 86)  BP: 136/70 (10 Dec 2018 13:35) (104/55 - 136/70)  BP(mean): --  RR: 14 (10 Dec 2018 08:16) (14 - 14)  SpO2: 100% (10 Dec 2018 09:31) (96% - 100%)    Physical Exam:   Mental Status - Patient is alert, awake, oriented X3. Speech is fluent. Normal attention and concentration.  Follows commands well and able to answer questions appropriately. Mood and affect  normal.  	Motor Exam -   	Right upper full AROM, 5/5 strengths throughout   	Left upper limited AROM to 40 degrees, PROM to 145 degrees, pain noted with internal rotation and abduction with passive movement, 5/5 distally  	Right lower 3/5 hip flexion, 4+/5 knee extension and 4/+ 5DF and PF  	Left lower 4+/5 hip flexion, 5/5 knee extension and 5/5 DF and PF   	Normal muscle bulk/tone  	Sensory    Intact to light touch bilaterally.		  	LUNGS Clear bilaterally    	HEART:	 Irreg, S1S2      	GI/ ABDOMEN:  Soft  Non tender +BS  	EXTREMITIES:  +1 RLE edema and mild edema noted left hand  	SKIN:  Right hip incision with staples removed and steri strips applied; c/d/i.  No erythema noted. No signs of skin breakdown noted to heels, elbows, buttocks, sacral or coccygeal areas. No rashes noted.        FUNCTIONAL PROGRESS:  Bed mobility: Mod A  Transfers: Min A  Gait: 50' RW Min A  Stairs: Negotiates 4 steps 2HR min Ax1 and CG x1  ADLs: UE dress sup/set up, LE dress min A        RECENT LABS:                                   8.7    8.5   )-----------( 349      ( 10 Dec 2018 07:00 )             27.1     12-10    126<L>  |  93<L>  |  34<H>  ----------------------------<  109<H>  3.9   |  24  |  1.24    Ca    10.1      10 Dec 2018 07:00          PT/INR - ( 07 Dec 2018 06:55 )   PT: 27.7 sec;   INR: 2.41 ratio    PT/INR - ( 10 Dec 2018 07:00 )   PT: 27.8 sec;   INR: 2.42 ratio                   Assessment and Plan:     This is a 89 y/o female s/p mechanical fall resulting in right femur fx (neck fx) s/p hemiarthroplasty now with functional deficits including gait and ADL dysfunction with decreased strength and endurance.       Comprehensive rehab WBAT RLE: PT/OT with post hip precautions    Pain management: Tramadol or tylenol prn.  Lidoderm patch, ice packs prn  Left shoulder pain: Xrays 11/28 neg for fx or dislocation.  Likely RTC tear.  Ortho consulted; recommended WBAT LUE, CT arthrogram.  Consider cortisone injection although pt declines at this time.     Chronic Afib: Coumadin  INR goal 2-3  INR therapeutic 12/7 = 2.41  Continue coumadin 2mg po qhs.  F/U INR 12/10    CAD/HTN/PPM: Cardizem, Lasix, Labetalol, ASA 81mg daily  H/O TIA: ASA    Hyponatremia: Na 132  Fluid restriction 1500cc  Removed Na restriction from diet  F/U BMP 12/10    Colitis: Mesalamine.  Caution with bowel meds    GERD: Protonix    Asthma: Symbicort, Singulair    Acute blood loss anemia: Added Fe daily.  F/U CBC with H/H improved.  F/U stool for occult blood    Moderate protein calorie malnutrition/Nutrition/wound care: Ensure bid, MVI, Vit C, Zn  Diet: DASH    Vitamin D deficiency: Vit D 25.  Added 1,000U daily    Dry mouth:  D/C Biotene as not requested.  Cont Cepacol lozenges prn    Precautions:    falls, posterior hip precautions                                     DVT Prophylaxis:   Coumadin; discontinued lovenox bridge once INR >2 HISTORY OF PRESENT ILLNESS: This is a 89 y/o female who reports that she had fallen at home after trying to get up from her dinning room table and tripped over a rug. She landed on her right hip. Her daughter took her for an xray that revealed a right hip fracture. Patient states that she was given the name of an orthopedic MD for evaluation and she did not go to that appointment. Pt states that she did not want to undergo surgery at that time, so she started outpatient PT and began using a RW. On the 23rd, the patient reports she was leaving the PT outpatient facilyt and while trying to make room on the sidewalk, her RW got stuck in some dirt and she lost her balance, again falling onto her right hip. She was brought to [facility]'s ER and found to have a high neck fracture of the right femur. Orthopedics were consulted and patient cleared for surgery. Patient underwent a right hemiarthroplasty by Dr Bhatia on 11/24. Post operatively patient has acute blood loss anemia. WBC are slightly elevated. No signs or symptoms of infection. Patient was deemed medically stable and discharged to acute rehab 11/27/18    TODAY'S SUBJECTIVE & REVIEW OF SYMPTOMS: Patient seen and examined bedside.  Patient c/o persistent "achiness" right hip and left shoulder.  Slept well.  Last BM 12/8.  Denies dysuria, SOB, dyspnea or cough.      [   ] Cardio WNL  [ x  ] Resp WNL  [ x  ] GI WNL  [   ] Heme WNL  [   ] Endo WNL  [   ] Skin WNL  [   ] MSK WNL  [  x ] Neuro WNL  [   ] Cognitive WNL  [ x  ] Psych WNL        PHYSICAL EXAM  Vital Signs Last 24 Hrs  T(C): 36.7 (10 Dec 2018 08:16), Max: 37.1 (09 Dec 2018 20:07)  T(F): 98.1 (10 Dec 2018 08:16), Max: 98.7 (09 Dec 2018 20:07)  HR: 74 (10 Dec 2018 13:35) (68 - 86)  BP: 136/70 (10 Dec 2018 13:35) (104/55 - 136/70)  BP(mean): --  RR: 14 (10 Dec 2018 08:16) (14 - 14)  SpO2: 100% (10 Dec 2018 09:31) (96% - 100%)    Physical Exam:   Mental Status - Patient is alert, awake, oriented X3. Speech is fluent. Normal attention and concentration.  Follows commands well and able to answer questions appropriately. Mood and affect  normal.  	Motor Exam -   	Right upper full AROM, 5/5 strengths throughout   	Left upper limited AROM to 40 degrees, PROM to 145 degrees, pain noted with internal rotation and abduction with passive movement, 5/5 distally  	Right lower 3/5 hip flexion, 4+/5 knee extension and 4/+ 5DF and PF  	Left lower 4+/5 hip flexion, 5/5 knee extension and 5/5 DF and PF   	Normal muscle bulk/tone  	Sensory    Intact to light touch bilaterally.		  	LUNGS Clear bilaterally    	HEART:	 Irreg, S1S2      	GI/ ABDOMEN:  Soft  Non tender +BS  	EXTREMITIES:  +1 RLE edema and mild edema noted left hand  	SKIN:  Right hip incision with staples removed and steri strips applied; c/d/i.  No erythema noted. No signs of skin breakdown noted to heels, elbows, buttocks, sacral or coccygeal areas. No rashes noted.        FUNCTIONAL PROGRESS:  Bed mobility: Mod A  Transfers: Min A  Gait: 50' RW Min A  Stairs: Negotiates 4 steps 2HR min Ax1 and CG x1  ADLs: UE dress sup/set up, LE dress min A        RECENT LABS:                                   8.7    8.5   )-----------( 349      ( 10 Dec 2018 07:00 )             27.1     12-10    126<L>  |  93<L>  |  34<H>  ----------------------------<  109<H>  3.9   |  24  |  1.24    Ca    10.1      10 Dec 2018 07:00          PT/INR - ( 07 Dec 2018 06:55 )   PT: 27.7 sec;   INR: 2.41 ratio    PT/INR - ( 10 Dec 2018 07:00 )   PT: 27.8 sec;   INR: 2.42 ratio                   Assessment and Plan:     This is a 89 y/o female s/p mechanical fall resulting in right femur fx (neck fx) s/p hemiarthroplasty now with functional deficits including gait and ADL dysfunction with decreased strength and endurance.       Comprehensive rehab WBAT RLE: PT/OT with post hip precautions    Pain management: Tramadol or tylenol prn.  Lidoderm patch, ice packs prn  Left shoulder pain: Xrays 11/28 neg for fx or dislocation.  Likely RTC tear.  Ortho consulted; recommended WBAT LUE, CT arthrogram.  Consider cortisone injection although pt declines at this time.   Keep LUE elevated when resting    Chronic Afib: Coumadin  INR goal 2-3  INR therapeutic 12/10 = 2.42  Continue coumadin 2mg po qhs.  F/U INR 12/13    CAD/HTN/PPM: Cardizem, Lasix, Labetalol, ASA 81mg daily  H/O TIA: ASA    Hyponatremia: Na 132 12/7; fluid restriction placed at 1500cc  Removed Na restriction from diet  F/U BMP 12/10 with Na 126; increase fluid restriction to 1000cc.  Add salt tabs and requested renal consult    Colitis: Mesalamine.  Caution with bowel meds    GERD: Protonix    Asthma: Symbicort, Singulair    Acute blood loss anemia: Added Fe daily.  F/U CBC with H/H stable    Moderate protein calorie malnutrition/Nutrition/wound care: Ensure bid, MVI, Vit C, Zn  Diet: DASH    Vitamin D deficiency: Vit D 25.  Added 1,000U daily    Dry mouth:  D/Cd Biotene and Cepacol lozenges prn    Precautions:    falls, posterior hip precautions                                     DVT Prophylaxis:   Coumadin; discontinued lovenox bridge once INR >2

## 2018-12-11 ENCOUNTER — TRANSCRIPTION ENCOUNTER (OUTPATIENT)
Age: 83
End: 2018-12-11

## 2018-12-11 ENCOUNTER — APPOINTMENT (OUTPATIENT)
Dept: ORTHOPEDIC SURGERY | Facility: CLINIC | Age: 83
End: 2018-12-11

## 2018-12-11 LAB
ANION GAP SERPL CALC-SCNC: 9 MMOL/L — SIGNIFICANT CHANGE UP (ref 5–17)
BUN SERPL-MCNC: 39 MG/DL — HIGH (ref 7–23)
CALCIUM SERPL-MCNC: 10.5 MG/DL — SIGNIFICANT CHANGE UP (ref 8.4–10.5)
CHLORIDE SERPL-SCNC: 98 MMOL/L — SIGNIFICANT CHANGE UP (ref 96–108)
CO2 SERPL-SCNC: 24 MMOL/L — SIGNIFICANT CHANGE UP (ref 22–31)
CORTIS AM PEAK SERPL-MCNC: 16.6 UG/DL — SIGNIFICANT CHANGE UP (ref 6–18.4)
CREAT SERPL-MCNC: 1.18 MG/DL — SIGNIFICANT CHANGE UP (ref 0.5–1.3)
GLUCOSE SERPL-MCNC: 116 MG/DL — HIGH (ref 70–99)
POTASSIUM SERPL-MCNC: 4.4 MMOL/L — SIGNIFICANT CHANGE UP (ref 3.5–5.3)
POTASSIUM SERPL-SCNC: 4.4 MMOL/L — SIGNIFICANT CHANGE UP (ref 3.5–5.3)
SODIUM SERPL-SCNC: 131 MMOL/L — LOW (ref 135–145)
TSH SERPL-MCNC: 2.9 UIU/ML — SIGNIFICANT CHANGE UP (ref 0.27–4.2)
URATE SERPL-MCNC: 7.1 MG/DL — HIGH (ref 2.5–7)

## 2018-12-11 PROCEDURE — 93970 EXTREMITY STUDY: CPT | Mod: 26

## 2018-12-11 PROCEDURE — 99233 SBSQ HOSP IP/OBS HIGH 50: CPT

## 2018-12-11 PROCEDURE — 99232 SBSQ HOSP IP/OBS MODERATE 35: CPT

## 2018-12-11 RX ORDER — ASCORBIC ACID 60 MG
1 TABLET,CHEWABLE ORAL
Qty: 0 | Refills: 0 | DISCHARGE
Start: 2018-12-11

## 2018-12-11 RX ORDER — TRAMADOL HYDROCHLORIDE 50 MG/1
1 TABLET ORAL
Qty: 0 | Refills: 0 | DISCHARGE
Start: 2018-12-11

## 2018-12-11 RX ORDER — PANTOPRAZOLE SODIUM 20 MG/1
1 TABLET, DELAYED RELEASE ORAL
Qty: 0 | Refills: 0 | DISCHARGE
Start: 2018-12-11

## 2018-12-11 RX ORDER — DILTIAZEM HCL 120 MG
1 CAPSULE, EXT RELEASE 24 HR ORAL
Qty: 0 | Refills: 0 | DISCHARGE
Start: 2018-12-11

## 2018-12-11 RX ORDER — BUDESONIDE AND FORMOTEROL FUMARATE DIHYDRATE 160; 4.5 UG/1; UG/1
2 AEROSOL RESPIRATORY (INHALATION)
Qty: 0 | Refills: 0 | DISCHARGE
Start: 2018-12-11

## 2018-12-11 RX ORDER — FUROSEMIDE 40 MG
1 TABLET ORAL
Qty: 0 | Refills: 0 | DISCHARGE
Start: 2018-12-11

## 2018-12-11 RX ORDER — ASPIRIN/CALCIUM CARB/MAGNESIUM 324 MG
1 TABLET ORAL
Qty: 0 | Refills: 0 | DISCHARGE
Start: 2018-12-11

## 2018-12-11 RX ORDER — LABETALOL HCL 100 MG
1 TABLET ORAL
Qty: 0 | Refills: 0 | DISCHARGE
Start: 2018-12-11

## 2018-12-11 RX ORDER — DORZOLAMIDE HYDROCHLORIDE 20 MG/ML
1 SOLUTION/ DROPS OPHTHALMIC
Qty: 0 | Refills: 0 | DISCHARGE
Start: 2018-12-11

## 2018-12-11 RX ORDER — LIDOCAINE 4 G/100G
1 CREAM TOPICAL
Qty: 0 | Refills: 0 | DISCHARGE
Start: 2018-12-11

## 2018-12-11 RX ORDER — MONTELUKAST 4 MG/1
1 TABLET, CHEWABLE ORAL
Qty: 0 | Refills: 0 | DISCHARGE
Start: 2018-12-11

## 2018-12-11 RX ORDER — MESALAMINE 400 MG
2 TABLET, DELAYED RELEASE (ENTERIC COATED) ORAL
Qty: 0 | Refills: 0 | DISCHARGE
Start: 2018-12-11

## 2018-12-11 RX ORDER — POTASSIUM CHLORIDE 20 MEQ
1 PACKET (EA) ORAL
Qty: 0 | Refills: 0 | DISCHARGE
Start: 2018-12-11

## 2018-12-11 RX ORDER — WARFARIN SODIUM 2.5 MG/1
1 TABLET ORAL
Qty: 0 | Refills: 0 | DISCHARGE
Start: 2018-12-11

## 2018-12-11 RX ORDER — ACETAMINOPHEN 500 MG
2 TABLET ORAL
Qty: 0 | Refills: 0 | DISCHARGE
Start: 2018-12-11

## 2018-12-11 RX ORDER — FLUTICASONE FUROATE AND VILANTEROL TRIFENATATE 100; 25 UG/1; UG/1
1 POWDER RESPIRATORY (INHALATION)
Qty: 0 | Refills: 0 | COMMUNITY

## 2018-12-11 RX ADMIN — WARFARIN SODIUM 2 MILLIGRAM(S): 2.5 TABLET ORAL at 21:21

## 2018-12-11 RX ADMIN — Medication 325 MILLIGRAM(S): at 12:56

## 2018-12-11 RX ADMIN — DORZOLAMIDE HYDROCHLORIDE 1 DROP(S): 20 SOLUTION/ DROPS OPHTHALMIC at 15:04

## 2018-12-11 RX ADMIN — Medication 1 TABLET(S): at 12:56

## 2018-12-11 RX ADMIN — DORZOLAMIDE HYDROCHLORIDE 1 DROP(S): 20 SOLUTION/ DROPS OPHTHALMIC at 05:20

## 2018-12-11 RX ADMIN — Medication 10 MILLIEQUIVALENT(S): at 12:55

## 2018-12-11 RX ADMIN — PANTOPRAZOLE SODIUM 40 MILLIGRAM(S): 20 TABLET, DELAYED RELEASE ORAL at 06:28

## 2018-12-11 RX ADMIN — Medication 81 MILLIGRAM(S): at 12:56

## 2018-12-11 RX ADMIN — LIDOCAINE 1 PATCH: 4 CREAM TOPICAL at 06:27

## 2018-12-11 RX ADMIN — Medication 40 MILLIGRAM(S): at 17:43

## 2018-12-11 RX ADMIN — Medication 40 MILLIGRAM(S): at 05:19

## 2018-12-11 RX ADMIN — LIDOCAINE 1 PATCH: 4 CREAM TOPICAL at 06:28

## 2018-12-11 RX ADMIN — TRAMADOL HYDROCHLORIDE 50 MILLIGRAM(S): 50 TABLET ORAL at 09:10

## 2018-12-11 RX ADMIN — Medication 650 MILLIGRAM(S): at 12:55

## 2018-12-11 RX ADMIN — BUDESONIDE AND FORMOTEROL FUMARATE DIHYDRATE 2 PUFF(S): 160; 4.5 AEROSOL RESPIRATORY (INHALATION) at 20:53

## 2018-12-11 RX ADMIN — Medication 800 MILLIGRAM(S): at 05:19

## 2018-12-11 RX ADMIN — DORZOLAMIDE HYDROCHLORIDE 1 DROP(S): 20 SOLUTION/ DROPS OPHTHALMIC at 21:21

## 2018-12-11 RX ADMIN — LIDOCAINE 1 PATCH: 4 CREAM TOPICAL at 07:13

## 2018-12-11 RX ADMIN — Medication 500 MILLIGRAM(S): at 05:19

## 2018-12-11 RX ADMIN — TRAMADOL HYDROCHLORIDE 50 MILLIGRAM(S): 50 TABLET ORAL at 08:54

## 2018-12-11 RX ADMIN — BUDESONIDE AND FORMOTEROL FUMARATE DIHYDRATE 2 PUFF(S): 160; 4.5 AEROSOL RESPIRATORY (INHALATION) at 08:57

## 2018-12-11 RX ADMIN — LIDOCAINE 1 PATCH: 4 CREAM TOPICAL at 17:51

## 2018-12-11 RX ADMIN — MONTELUKAST 10 MILLIGRAM(S): 4 TABLET, CHEWABLE ORAL at 21:21

## 2018-12-11 RX ADMIN — Medication 800 MILLIGRAM(S): at 17:38

## 2018-12-11 RX ADMIN — Medication 500 MILLIGRAM(S): at 17:43

## 2018-12-11 RX ADMIN — Medication 100 MILLIGRAM(S): at 17:43

## 2018-12-11 NOTE — DISCHARGE NOTE ADULT - CARE PROVIDERS DIRECT ADDRESSES
[email or web address]

## 2018-12-11 NOTE — PROGRESS NOTE ADULT - SUBJECTIVE AND OBJECTIVE BOX
HISTORY OF PRESENT ILLNESS: This is a 89 y/o female who reports that she had fallen at home after trying to get up from her dinning room table and tripped over a rug. She landed on her right hip. Her daughter took her for an xray that revealed a right hip fracture. Patient states that she was given the name of an orthopedic MD for evaluation and she did not go to that appointment. Pt states that she did not want to undergo surgery at that time, so she started outpatient PT and began using a RW. On the 23rd, the patient reports she was leaving the PT outpatient facilyt and while trying to make room on the sidewalk, her RW got stuck in some dirt and she lost her balance, again falling onto her right hip. She was brought to [facility]'s ER and found to have a high neck fracture of the right femur. Orthopedics were consulted and patient cleared for surgery. Patient underwent a right hemiarthroplasty by Dr Bhatia on 11/24. Post operatively patient has acute blood loss anemia. WBC are slightly elevated. No signs or symptoms of infection. Patient was deemed medically stable and discharged to acute rehab 11/27/18    TODAY'S SUBJECTIVE & REVIEW OF SYMPTOMS: Patient seen and examined bedside.  Patient c/o persistent pain and discomfort in the left shoulder which is bothersome to her. Denies pain in her hip.  Denies dysuria, SOB, dyspnea or cough.      [   ] Cardio WNL  [ x  ] Resp WNL  [ x  ] GI WNL  [   ] Heme WNL  [   ] Endo WNL  [   ] Skin WNL  [   ] MSK WNL  [  x ] Neuro WNL  [   ] Cognitive WNL  [ x  ] Psych WNL        PHYSICAL EXAM  Vital Signs Last 24 Hrs  T(C): 36.4 (11 Dec 2018 08:53), Max: 36.4 (10 Dec 2018 21:18)  T(F): 97.6 (11 Dec 2018 08:53), Max: 97.6 (10 Dec 2018 21:18)  HR: 75 (11 Dec 2018 08:53) (73 - 84)  BP: 120/65 (11 Dec 2018 08:53) (112/60 - 136/70)  BP(mean): --  RR: 14 (11 Dec 2018 08:53) (14 - 14)  SpO2: 98% (11 Dec 2018 08:58) (96% - 100%)    Physical Exam:   Mental Status - Patient is alert, awake, oriented X3. Speech is fluent. Normal attention and concentration.  Follows commands well and able to answer questions appropriately. Mood and affect  normal.  	Motor Exam -   	Right upper full AROM, 5/5 strengths throughout   	Left upper limited AROM to 40 degrees, PROM to 145 degrees, pain noted with internal rotation and abduction with passive movement, 5/5 distally  	Right lower 3/5 hip flexion, 4+/5 knee extension and 4/+ 5DF and PF  	Left lower 4+/5 hip flexion, 5/5 knee extension and 5/5 DF and PF   	Normal muscle bulk/tone  	Sensory    Intact to light touch bilaterally.		  	LUNGS Clear bilaterally    	HEART:	 Irreg, S1S2      	GI/ ABDOMEN:  Soft  Non tender +BS  	EXTREMITIES:  +1 RLE edema and mild edema noted left hand  	SKIN:  Right hip incision with staples removed and steri strips applied; c/d/i.  No erythema noted. No signs of skin breakdown noted to heels, elbows, buttocks, sacral or coccygeal areas. No rashes noted.        FUNCTIONAL PROGRESS:  Bed mobility: Mod A  Transfers: Min A  Gait: 50' RW Min A  Stairs: Negotiates 4 steps 2HR min Ax1 and CG x1  ADLs: UE dress sup/set up, LE dress min A        RECENT LABS:               LABS:    11 Dec 2018 07:00    131    |  98     |  39     ----------------------------<  116    4.4     |  24     |  1.18     Ca    10.5       11 Dec 2018 07:00      PT/INR - ( 10 Dec 2018 07:00 )   PT: 27.8 sec;   INR: 2.42 ratio                                 8.7    8.5   )-----------( 349      ( 10 Dec 2018 07:00 )             27.1     12-10    126<L>  |  93<L>  |  34<H>  ----------------------------<  109<H>  3.9   |  24  |  1.24    Ca    10.1      10 Dec 2018 07:00                         Assessment and Plan:     This is a 89 y/o female s/p mechanical fall resulting in right femur fx (neck fx) s/p hemiarthroplasty now with functional deficits including gait and ADL dysfunction with decreased strength and endurance.       Comprehensive rehab WBAT RLE: PT/OT with post hip precautions    Pain management: Tramadol or tylenol prn.  Lidoderm patch, ice packs prn  Left shoulder pain: Xrays 11/28 neg for fx or dislocation.  Likely RTC tear.  Ortho consulted; recommended WBAT LUE, CT arthrogram.  Consider cortisone injection although pt declines at this time.   Keep LUE elevated when resting  Consider CT arthrogram given persistent pain but patient with MORELIA   Also consider cortisone injection     Chronic Afib: Coumadin  INR goal 2-3  INR therapeutic 12/10 = 2.42  Continue coumadin 2mg po qhs.  F/U INR 12/13    CAD/HTN/PPM: Cardizem, Lasix, Labetalol, ASA 81mg daily  H/O TIA: ASA    Hyponatremia: Na 132 12/7; fluid restriction placed at 1500cc  Removed Na restriction from diet  F/U BMP 12/10 with Na 126; increase fluid restriction to 1000cc.    Appreciate Renal consult - Lasix to 40mg BID  Fluid restriction 1L/day  Avoid salt tabs given edema  Will check UA, PVR, urine lytes, osms  Will check uric acid, TSH, Cortisol  No ACEI/ARBs     Colitis: Mesalamine.  Caution with bowel meds    GERD: Protonix    Asthma: Symbicort, Singulair    Acute blood loss anemia: Added Fe daily.  F/U CBC with H/H stable    Moderate protein calorie malnutrition/Nutrition/wound care: Ensure bid, MVI, Vit C, Zn  Diet: DASH    Vitamin D deficiency: Vit D 25.  Added 1,000U daily    Dry mouth:  D/Cd Biotene and Cepacol lozenges prn    Precautions:    falls, posterior hip precautions                                     DVT Prophylaxis:   Coumadin; discontinued lovenox bridge once INR >2 HISTORY OF PRESENT ILLNESS: This is a 89 y/o female who reports that she had fallen at home after trying to get up from her dinning room table and tripped over a rug. She landed on her right hip. Her daughter took her for an xray that revealed a right hip fracture. Patient states that she was given the name of an orthopedic MD for evaluation and she did not go to that appointment. Pt states that she did not want to undergo surgery at that time, so she started outpatient PT and began using a RW. On the 23rd, the patient reports she was leaving the PT outpatient facilyt and while trying to make room on the sidewalk, her RW got stuck in some dirt and she lost her balance, again falling onto her right hip. She was brought to [facility]'s ER and found to have a high neck fracture of the right femur. Orthopedics were consulted and patient cleared for surgery. Patient underwent a right hemiarthroplasty by Dr Bhatia on 11/24. Post operatively patient has acute blood loss anemia. WBC are slightly elevated. No signs or symptoms of infection. Patient was deemed medically stable and discharged to acute rehab 11/27/18    TODAY'S SUBJECTIVE & REVIEW OF SYMPTOMS: Patient seen and examined bedside.  Patient c/o persistent pain and discomfort in the left shoulder which is bothersome to her. Denies pain in her hip.  Denies dysuria, SOB, dyspnea or cough.      [   ] Cardio WNL  [ x  ] Resp WNL  [ x  ] GI WNL  [   ] Heme WNL  [   ] Endo WNL  [   ] Skin WNL  [   ] MSK WNL  [  x ] Neuro WNL  [   ] Cognitive WNL  [ x  ] Psych WNL        PHYSICAL EXAM  Vital Signs Last 24 Hrs  T(C): 36.4 (11 Dec 2018 08:53), Max: 36.4 (10 Dec 2018 21:18)  T(F): 97.6 (11 Dec 2018 08:53), Max: 97.6 (10 Dec 2018 21:18)  HR: 75 (11 Dec 2018 08:53) (73 - 84)  BP: 120/65 (11 Dec 2018 08:53) (112/60 - 136/70)  BP(mean): --  RR: 14 (11 Dec 2018 08:53) (14 - 14)  SpO2: 98% (11 Dec 2018 08:58) (96% - 100%)    Physical Exam:   Mental Status - Patient is alert, awake, oriented X3. Speech is fluent. Normal attention and concentration.  Follows commands well and able to answer questions appropriately. Mood and affect  normal.  	Motor Exam -   	Right upper full AROM, 5/5 strengths throughout   	Left upper limited AROM to 40 degrees, PROM to 145 degrees, pain noted with internal rotation and abduction with passive movement, 5/5 distally  	Right lower 3/5 hip flexion, 4+/5 knee extension and 4/+ 5DF and PF  	Left lower 4+/5 hip flexion, 5/5 knee extension and 5/5 DF and PF   	Normal muscle bulk/tone  	Sensory    Intact to light touch bilaterally.		  	LUNGS Clear bilaterally    	HEART:	 Irreg, S1S2      	GI/ ABDOMEN:  Soft  Non tender +BS  	EXTREMITIES:  +1 RLE edema and mild edema noted left hand  	SKIN:  Right hip incision with staples removed and steri strips applied; c/d/i.  No erythema noted. No signs of skin breakdown noted to heels, elbows, buttocks, sacral or coccygeal areas. No rashes noted.        FUNCTIONAL PROGRESS:  Bed mobility: Mod A  Transfers: Min A  Gait: 50' RW Min A  Stairs: Negotiates 4 steps 2HR min Ax1 and CG x1  ADLs: UE dress sup/set up, LE dress min A        RECENT LABS:               LABS:    11 Dec 2018 07:00    131    |  98     |  39     ----------------------------<  116    4.4     |  24     |  1.18     Ca    10.5       11 Dec 2018 07:00      PT/INR - ( 10 Dec 2018 07:00 )   PT: 27.8 sec;   INR: 2.42 ratio                                 8.7    8.5   )-----------( 349      ( 10 Dec 2018 07:00 )             27.1     12-10    126<L>  |  93<L>  |  34<H>  ----------------------------<  109<H>  3.9   |  24  |  1.24    Ca    10.1      10 Dec 2018 07:00        Venous duplex BLE neg for DVT 12/11                 Assessment and Plan:     This is a 89 y/o female s/p mechanical fall resulting in right femur fx (neck fx) s/p hemiarthroplasty now with functional deficits including gait and ADL dysfunction with decreased strength and endurance.       Comprehensive rehab WBAT RLE: PT/OT with post hip precautions    Pain management: Tramadol or tylenol prn.  Lidoderm patch, ice packs prn  Left shoulder pain: Xrays 11/28 neg for fx or dislocation.  Likely RTC tear.  Ortho consulted; recommended WBAT LUE, CT arthrogram.  Consider cortisone injection although pt declines at this time.   Keep LUE elevated when resting  Consider CT arthrogram given persistent pain but patient with MORELIA   Also consider cortisone injection     Chronic Afib: Coumadin  INR goal 2-3  INR therapeutic 12/10 = 2.42  Continue coumadin 2mg po qhs.  F/U INR 12/13    CAD/HTN/PPM: Cardizem, Lasix, Labetalol, ASA 81mg daily  H/O TIA: ASA    Hyponatremia: Na 132 12/7; fluid restriction placed at 1500cc  Removed Na restriction from diet  F/U BMP 12/10 with Na 126; increase fluid restriction to 1000cc.    Appreciate Renal consult - Lasix to 40mg BID  Fluid restriction 1L/day  Avoid salt tabs given edema  Will check UA, PVR, urine lytes, osms  Will check uric acid, TSH, Cortisol  No ACEI/ARBs     Colitis: Mesalamine.  Caution with bowel meds    GERD: Protonix    Asthma: Symbicort, Singulair    Acute blood loss anemia: Added Fe daily.  F/U CBC with H/H stable    Moderate protein calorie malnutrition/Nutrition/wound care: Ensure bid, MVI, Vit C, Zn  Diet: DASH    Vitamin D deficiency: Vit D 25.  Added 1,000U daily    Dry mouth:  D/Cd Biotene and Cepacol lozenges prn    Precautions:    falls, posterior hip precautions                                     DVT Prophylaxis:   Coumadin; discontinued lovenox bridge once INR >2

## 2018-12-11 NOTE — PROGRESS NOTE ADULT - ATTENDING COMMENTS
Agree with above.  Doing well.  Cont comprehensive rehab, dvt ppx, wound care.  F/U labs in am.
Pt. seen.  Agree with documentation above as per resident. Patient medically stable. Making progress towards rehab goals.   pain controlled.  bowels regular
Agree with above.  Labs noted; hyponatremia improved with fluid restriction and lasix.  F/U labs in am.  Venous duplex neg for DVT.  Continue comprehensive rehab, dvt ppx with Coumadin, wound care, dc planning
Agree with above.  Medically stable.  Cont comprehensive rehab, dvt ppx, f/u labs, wound care.  INR therapeutic; continue coumadin 2mg at hs
Agree with above.  Medically stable.  Decrease in H/H; f/u CBC and stool for occult blood.  Continue comprehensive rehab, wound care-will remove staples next week, pain management, dvt ppx with coumadin 2mg qhs as INR therapeutic on this dose
Agree with above.  Pt doing well.  Pain right hip and left shoulder controlled.  INR therapeutic; continue Coumadin.  Incision healing well.  Cont comprehensive rehab, f/u labs, wound care, pain management

## 2018-12-11 NOTE — DISCHARGE NOTE ADULT - CARE PLAN
Principal Discharge DX:	Closed fracture of neck of femur, unspecified laterality, initial encounter  Goal:	pain control, regain use of right leg  Assessment and plan of treatment:	- continue PT/OT at sub acute rehab program WBAT - posterior hip precautions    - Follow up with your surgeon Dr. Bhatia  Secondary Diagnosis:	Gastroesophageal reflux disease without esophagitis  Goal:	symptoms management  Assessment and plan of treatment:	- continue protonix  Secondary Diagnosis:	Chronic atrial fibrillation  Goal:	rate and anticoagulation to prevent clot formation  Assessment and plan of treatment:	continue coumadin 2 mg nightly.  Monitor INR at least every other day.  Secondary Diagnosis:	HTN (hypertension), benign  Goal:	goal <150 systolic  Assessment and plan of treatment:	continue your medications as prescribed.  follow up with your PMD as outpatient  Secondary Diagnosis:	Pacemaker  Goal:	prevent slow heart rate  Assessment and plan of treatment:	continue your medications  follow up with your cardiologist as outpatient

## 2018-12-11 NOTE — DISCHARGE NOTE ADULT - CARE PROVIDER_API CALL
Kiko Turner), Gastroenterology; Internal Medicine  [address] [facility]  [address]  [place], [place] [id number]  Phone: [phone]  Fax: [phone]    Ja Bhatia), Orthopaedic Surgery; Sports Medicine  [address] [place] [address]  Phone: [phone]  Fax: [phone]    Yolanda Mane), PhysicalRehab Medicine  [address] [place] [address]  Phone: [phone]  Fax: [phone]

## 2018-12-11 NOTE — DISCHARGE NOTE ADULT - PLAN OF CARE
pain control, regain use of right leg - continue PT/OT at sub acute rehab program WBAT - posterior hip precautions    - Follow up with your surgeon Dr. Bhatia symptoms management - continue protonix rate and anticoagulation to prevent clot formation continue coumadin 2 mg nightly.  Monitor INR at least every other day. goal <150 systolic continue your medications as prescribed.  follow up with your PMD as outpatient prevent slow heart rate continue your medications  follow up with your cardiologist as outpatient

## 2018-12-11 NOTE — DISCHARGE NOTE ADULT - HOSPITAL COURSE
89 y/o female with PMH HTN, Afib on coumadin , +PPM, asthma  who reports that she had fallen at home after trying to get up from her dinning room table and tripped over a rug. She landed on her right hip. Her daughter took her for an xray that revealed a right hip fracture. Patient states that she was given the name of an orthopedic MD for evaluation and she did not go to that appointment. Pt states that she did not want to undergo surgery at that time, so she started outpatient PT and began using a RW. On the 23rd, the patient reports she was leaving the PT outpatient facility and while trying to make room on the sidewalk, her RW got stuck in some dirt and she lost her balance, again falling onto her right hip. She was brought to [facility]'s ER on 11/23 and found to have a high neck fracture of the right femur. Orthopedics were consulted and patient cleared for surgery. Patient underwent a right hemiarthroplasty by Dr Bhatia on 11/24. Post operatively patient has acute blood loss anemia. WBC are slightly elevated. No signs or symptoms of infection. Patient was deemed medically stable and discharged to acute rehab 11/27/18.  In rehab patient noted to have left shoulder pain.  Xray left shoulder 11/28 negative for fracture.  Orthopedic surgery consulted and recommended consider CT arthrogram if patient continued to have pain.  Nephrology consulted for hypervolemic hyponatremia which improved after started on 1 L fluid restriction, increased lasix dose to 40 mg BID.  Patient complete course of inpatient rehab with functional improvement and is stable for discharge to snf.  Patient will need close monitoring of INR and coumadin dosing for atrial fibrillation.  Plan for orthopedic, cardiac, PMD, rehab follow up as outpatient. 89 y/o female with PMH HTN, Afib on coumadin , +PPM, asthma  who reports that she had fallen at home after trying to get up from her dinning room table and tripped over a rug. She landed on her right hip. Her daughter took her for an xray that revealed a right hip fracture. Patient states that she was given the name of an orthopedic MD for evaluation and she did not go to that appointment. Pt states that she did not want to undergo surgery at that time, so she started outpatient PT and began using a RW. On the 23rd, the patient reports she was leaving the PT outpatient facility and while trying to make room on the sidewalk, her RW got stuck in some dirt and she lost her balance, again falling onto her right hip. She was brought to [facility]'s ER on 11/23 and found to have a high neck fracture of the right femur. Orthopedics were consulted and patient cleared for surgery. Patient underwent a right hemiarthroplasty by Dr Bhatia on 11/24. Post operatively patient has acute blood loss anemia. WBC are slightly elevated. No signs or symptoms of infection. Patient was deemed medically stable and discharged to acute rehab 11/27/18.  In rehab patient noted to have left shoulder pain.  Xray left shoulder 11/28 negative for fracture.  Orthopedic surgery consulted and recommended consider CT arthrogram if patient continued to have pain.  Nephrology consulted for hypervolemic hyponatremia which improved after started on 1 L fluid restriction, increased lasix dose to 40 mg BID.  Na improved to 133 on discharge. INR has been therapeutic on Coumadin 2mg daily.  Patient complete course of inpatient rehab with functional improvement and is stable for discharge to snf.  Patient will need close monitoring of INR and coumadin dosing for atrial fibrillation.  Plan for orthopedic, cardiac, PMD, rehab follow up as outpatient.

## 2018-12-11 NOTE — DISCHARGE NOTE ADULT - OTHER SIGNIFICANT FINDINGS
< from: US Duplex Venous Lower Ext Complete, Bilateral (12.11.18 @ 12:45) >  FINDINGS:    There is normal compressibility of the bilateral common femoral, femoral   and popliteal veins. No calf vein thrombosis is detected.    Doppler examination shows normal spontaneous and phasic flow.    Complex fluid collections are identified within the popliteal fossa   bilaterally measuring 7.0 x 2.6 x 1.1 cm on the right and 6.2 x 2.8 x 1.0   cm onthe left.    Subcutaneous edema is identified within the bilateral popliteal fossa and   calf regions.    IMPRESSION:     No evidence of bilateral lower extremity deep venous thrombosis.   Bilateral popliteal fossa complex fluid collections.    < end of copied text >    < from: Xray Shoulder 2 Views, Left (11.28.18 @ 10:41) >    FINDINGS: The left glenohumeral articulation appears intact, without   evidence for dislocation. There is no evidence for acute fracture. No   widening of the acromioclavicular joint space is identified. There is no   evidence for clavicular or scapular fracture. There is no evidence for   fracture involving the visualized portion of the proximal humerus. Note   is made of an indwelling pacemaker device obscuring a portion of the left   lung parenchyma. The visualized thoracic ribsappear unremarkable. No   significant degenerative or inflammatory arthropathy of the shoulder   region is identified.    IMPRESSION: No evidence for acute fracture or dislocation. No significant   degenerative or inflammatory arthropathy identified.    < end of copied text > 12-12    133<L>  |  99  |  40<H>  ----------------------------<  98  3.8   |  29  |  1.06    Ca    9.9      12 Dec 2018 06:50    PT/INR - ( 12 Dec 2018 06:50 )   PT: 23.0 sec;   INR: 2.01 ratio             < from: US Duplex Venous Lower Ext Complete, Bilateral (12.11.18 @ 12:45) >  FINDINGS:    There is normal compressibility of the bilateral common femoral, femoral   and popliteal veins. No calf vein thrombosis is detected.    Doppler examination shows normal spontaneous and phasic flow.    Complex fluid collections are identified within the popliteal fossa   bilaterally measuring 7.0 x 2.6 x 1.1 cm on the right and 6.2 x 2.8 x 1.0   cm onthe left.    Subcutaneous edema is identified within the bilateral popliteal fossa and   calf regions.    IMPRESSION:     No evidence of bilateral lower extremity deep venous thrombosis.   Bilateral popliteal fossa complex fluid collections.    < end of copied text >    < from: Xray Shoulder 2 Views, Left (11.28.18 @ 10:41) >    FINDINGS: The left glenohumeral articulation appears intact, without   evidence for dislocation. There is no evidence for acute fracture. No   widening of the acromioclavicular joint space is identified. There is no   evidence for clavicular or scapular fracture. There is no evidence for   fracture involving the visualized portion of the proximal humerus. Note   is made of an indwelling pacemaker device obscuring a portion of the left   lung parenchyma. The visualized thoracic ribsappear unremarkable. No   significant degenerative or inflammatory arthropathy of the shoulder   region is identified.    IMPRESSION: No evidence for acute fracture or dislocation. No significant   degenerative or inflammatory arthropathy identified.    < end of copied text >

## 2018-12-11 NOTE — DISCHARGE NOTE ADULT - MEDICATION SUMMARY - MEDICATIONS TO TAKE
I will START or STAY ON the medications listed below when I get home from the hospital:    mesalamine 400 mg oral delayed release capsule  -- 2 cap(s) by mouth 2 times a day  -- Indication: For Ulcerative colitis    acetaminophen 325 mg oral tablet  -- 2 tab(s) by mouth every 6 hours, As needed, Mild Pain (1 - 3)  -- Indication: For Closed fracture of neck of right femur    traMADol 50 mg oral tablet  -- 1 tab(s) by mouth every 6 hours, As needed, Moderate Pain (4 - 6)  -- Indication: For Closed fracture of neck of right femur    aspirin 81 mg oral delayed release tablet  -- 1 tab(s) by mouth once a day  -- Indication: For Coronary artery disease    dilTIAZem 30 mg oral tablet  -- 1 tab(s) by mouth every 8 hours  -- Indication: For Hypertension, CAD    warfarin 2 mg oral tablet  -- 1 tab(s) by mouth once a day  -- Indication: For Atrial fibrillation    ondansetron 2 mg/mL injectable solution  -- 4 milligram(s) injectable every 6 hours, As Needed for nausea  -- Indication: For Nausea    labetalol 100 mg oral tablet  -- 1 tab(s) by mouth every 12 hours  -- Indication: For Hypertension    budesonide-formoterol 80 mcg-4.5 mcg/inh inhalation aerosol  -- 2 puff(s) inhaled 2 times a day  -- Indication: For Asthma    lidocaine 5% topical film  -- Apply on skin to affected area once a day  -- Indication: For Pain    lidocaine 5% topical film  -- 1 patch by transdermal patch once a day  -- Indication: For Pain     furosemide 40 mg oral tablet  -- 1 tab(s) by mouth 2 times a day  -- Indication: For Hypervolemic hyponatremia, CAD     senna oral tablet  -- 1 tab(s) by mouth once a day (at bedtime), As needed, Constipation  -- Indication: For Constipation    docusate sodium 100 mg oral capsule  -- 1 cap(s) by mouth 3 times a day  -- Indication: For Constipatoin    montelukast 10 mg oral tablet  -- 1 tab(s) by mouth once a day (at bedtime)  -- Indication: For Asthma    potassium chloride 10 mEq oral tablet, extended release  -- 1 tab(s) by mouth once a day  -- Indication: For Electrolyte supplementation     dorzolamide 2% ophthalmic solution  -- 1 drop(s) to each affected eye 3 times a day  -- Indication: For Glaucoma    Protonix 40 mg oral delayed release tablet  -- 1 tab(s) by mouth once a day (before a meal)  -- Indication: For Acid reflux disease    Multiple Vitamins oral tablet  -- 1 tab(s) by mouth once a day  -- Indication: For Nutrition, wound healing    ascorbic acid 500 mg oral tablet  -- 1 tab(s) by mouth 2 times a day  -- Indication: For Nutrition, wound healing

## 2018-12-11 NOTE — PROGRESS NOTE ADULT - SUBJECTIVE AND OBJECTIVE BOX
Patient is a 89y old  Female who presents with a chief complaint of right femur fx s/p hemiarthroplasty (11 Dec 2018 09:20)      Patient seen and examined at bedside. Patient c/o left arm and shoulder pain. State sthat she fell a few days ago and th earm has not stopped hurting since.   She is happy that therapy is helping her right leg.     ALLERGIES:  Keflex (Diarrhea)  Norvasc (Unknown)    MEDICATIONS:  aspirin enteric coated 81 milliGRAM(s) Oral daily  diltiazem    Tablet 30 milliGRAM(s) Oral every 8 hours  ferrous    sulfate 325 milliGRAM(s) Oral daily  furosemide    Tablet 40 milliGRAM(s) Oral two times a day  labetalol 100 milliGRAM(s) Oral every 12 hours  traMADol 50 milliGRAM(s) Oral every 6 hours PRN  warfarin 2 milliGRAM(s) Oral daily    Vital Signs Last 24 Hrs  T(F): 97.6 (11 Dec 2018 08:53), Max: 97.6 (10 Dec 2018 21:18)  HR: 75 (11 Dec 2018 08:53) (74 - 84)  BP: 120/65 (11 Dec 2018 08:53) (112/60 - 136/70)  RR: 14 (11 Dec 2018 08:53) (14 - 14)  SpO2: 98% (11 Dec 2018 08:58) (96% - 99%)  I&O's Summary      PHYSICAL EXAM:  General: NAD  Neck: Supple, No JVD  Lungs: CTA, BLAE   Cardio: Irregular, S1/S2, No murmurs  Abdomen: Soft, Nt/ND, BS+   Extremities: No clubbing, cyanosis, or edema  Psych: A/O x 3    LABS:                        8.7    8.5   )-----------( 349      ( 10 Dec 2018 07:00 )             27.1     12-11    131  |  98  |  39  ----------------------------<  116  4.4   |  24  |  1.18    Ca    10.5      11 Dec 2018 07:00      eGFR if Non African American: 40 mL/min/1.73M2 (12-11-18 @ 07:00)  eGFR if : 46 mL/min/1.73M2 (12-11-18 @ 07:00)    PT/INR - ( 10 Dec 2018 07:00 )   PT: 27.8 sec;   INR: 2.42 ratio                         CAPILLARY BLOOD GLUCOSE                RADIOLOGY & ADDITIONAL TESTS:    Care Discussed with Consultants/Other Providers:

## 2018-12-11 NOTE — PROGRESS NOTE ADULT - SUBJECTIVE AND OBJECTIVE BOX
Denies CP, SOB    Vital Signs Last 24 Hrs  T(C): 36.4 (12-11-18 @ 08:53), Max: 36.4 (12-10-18 @ 21:18)  T(F): 97.6 (12-11-18 @ 08:53), Max: 97.6 (12-10-18 @ 21:18)  HR: 78 (12-11-18 @ 15:04) (75 - 84)  BP: 114/74 (12-11-18 @ 15:04) (112/60 - 120/65)  RR: 14 (12-11-18 @ 08:53) (14 - 14)  SpO2: 98% (12-11-18 @ 08:58) (96% - 99%)    Conversant, no apparent distress  PERRLA, EOMI  Neck non tender, supple  Normal respiratory effort, lungs good air entry b/l  Heart S1S2  Extr + edema b/l  Abdomen soft, NT, ND, + BS  Appropriate affect, AO x 3                        8.7    8.5   )-----------( 349      ( 10 Dec 2018 07:00 )             27.1     11 Dec 2018 07:00    131    |  98     |  39     ----------------------------<  116    4.4     |  24     |  1.18     Ca    10.5       11 Dec 2018 07:00    acetaminophen   Tablet .. 650 milliGRAM(s) Oral every 6 hours PRN  ascorbic acid 500 milliGRAM(s) Oral two times a day  aspirin enteric coated 81 milliGRAM(s) Oral daily  buDESOnide  80 MICROgram(s)/formoterol 4.5 MICROgram(s) Inhaler 2 Puff(s) Inhalation two times a day  diltiazem    Tablet 30 milliGRAM(s) Oral every 8 hours  dorzolamide 2% Ophthalmic Solution 1 Drop(s) Left EYE three times a day  ferrous sulfate 325 milliGRAM(s) Oral daily  furosemide    Tablet 40 milliGRAM(s) Oral two times a day  labetalol 100 milliGRAM(s) Oral every 12 hours  lidocaine   Patch 1 Patch Transdermal <User Schedule>  lidocaine   Patch 1 Patch Transdermal <User Schedule>  mesalamine DR Capsule 800 milliGRAM(s) Oral two times a day  montelukast 10 milliGRAM(s) Oral at bedtime  multivitamin 1 Tablet(s) Oral daily  pantoprazole    Tablet 40 milliGRAM(s) Oral before breakfast  polyethylene glycol 3350 17 Gram(s) Oral daily PRN  potassium chloride    Tablet ER 10 milliEquivalent(s) Oral daily  traMADol 50 milliGRAM(s) Oral every 6 hours PRN  warfarin 2 milliGRAM(s) Oral daily    A/P:    Hx CM, CHF  Hypervolemic hyponatremia  Will increase Lasix to 40mg BID  Fluid restriction 1L/day  Avoid salt tabs given edema  Na has improved  No ACE/ARB  F/u BMP

## 2018-12-11 NOTE — DISCHARGE NOTE ADULT - PATIENT PORTAL LINK FT
You can access the [facility] Patient Portal, offered by , by registering with the following website: [email or web address]

## 2018-12-12 ENCOUNTER — APPOINTMENT (OUTPATIENT)
Dept: PULMONOLOGY | Facility: CLINIC | Age: 83
End: 2018-12-12

## 2018-12-12 VITALS — SYSTOLIC BLOOD PRESSURE: 114 MMHG | DIASTOLIC BLOOD PRESSURE: 56 MMHG | HEART RATE: 88 BPM

## 2018-12-12 LAB
ANION GAP SERPL CALC-SCNC: 5 MMOL/L — SIGNIFICANT CHANGE UP (ref 5–17)
BUN SERPL-MCNC: 40 MG/DL — HIGH (ref 7–23)
CALCIUM SERPL-MCNC: 9.9 MG/DL — SIGNIFICANT CHANGE UP (ref 8.4–10.5)
CHLORIDE SERPL-SCNC: 99 MMOL/L — SIGNIFICANT CHANGE UP (ref 96–108)
CO2 SERPL-SCNC: 29 MMOL/L — SIGNIFICANT CHANGE UP (ref 22–31)
CREAT SERPL-MCNC: 1.06 MG/DL — SIGNIFICANT CHANGE UP (ref 0.5–1.3)
GLUCOSE SERPL-MCNC: 98 MG/DL — SIGNIFICANT CHANGE UP (ref 70–99)
INR BLD: 2.01 RATIO — HIGH (ref 0.88–1.16)
POTASSIUM SERPL-MCNC: 3.8 MMOL/L — SIGNIFICANT CHANGE UP (ref 3.5–5.3)
POTASSIUM SERPL-SCNC: 3.8 MMOL/L — SIGNIFICANT CHANGE UP (ref 3.5–5.3)
PROTHROM AB SERPL-ACNC: 23 SEC — HIGH (ref 10–12.9)
SODIUM SERPL-SCNC: 133 MMOL/L — LOW (ref 135–145)

## 2018-12-12 PROCEDURE — 99238 HOSP IP/OBS DSCHRG MGMT 30/<: CPT

## 2018-12-12 PROCEDURE — 99233 SBSQ HOSP IP/OBS HIGH 50: CPT

## 2018-12-12 RX ADMIN — PANTOPRAZOLE SODIUM 40 MILLIGRAM(S): 20 TABLET, DELAYED RELEASE ORAL at 05:58

## 2018-12-12 RX ADMIN — TRAMADOL HYDROCHLORIDE 50 MILLIGRAM(S): 50 TABLET ORAL at 08:26

## 2018-12-12 RX ADMIN — TRAMADOL HYDROCHLORIDE 50 MILLIGRAM(S): 50 TABLET ORAL at 11:04

## 2018-12-12 RX ADMIN — LIDOCAINE 1 PATCH: 4 CREAM TOPICAL at 07:38

## 2018-12-12 RX ADMIN — LIDOCAINE 1 PATCH: 4 CREAM TOPICAL at 07:37

## 2018-12-12 RX ADMIN — TRAMADOL HYDROCHLORIDE 50 MILLIGRAM(S): 50 TABLET ORAL at 07:36

## 2018-12-12 RX ADMIN — TRAMADOL HYDROCHLORIDE 50 MILLIGRAM(S): 50 TABLET ORAL at 07:32

## 2018-12-12 RX ADMIN — Medication 40 MILLIGRAM(S): at 05:58

## 2018-12-12 RX ADMIN — Medication 650 MILLIGRAM(S): at 07:33

## 2018-12-12 RX ADMIN — BUDESONIDE AND FORMOTEROL FUMARATE DIHYDRATE 2 PUFF(S): 160; 4.5 AEROSOL RESPIRATORY (INHALATION) at 08:22

## 2018-12-12 RX ADMIN — Medication 10 MILLIEQUIVALENT(S): at 11:15

## 2018-12-12 RX ADMIN — Medication 650 MILLIGRAM(S): at 12:53

## 2018-12-12 RX ADMIN — DORZOLAMIDE HYDROCHLORIDE 1 DROP(S): 20 SOLUTION/ DROPS OPHTHALMIC at 14:14

## 2018-12-12 RX ADMIN — DORZOLAMIDE HYDROCHLORIDE 1 DROP(S): 20 SOLUTION/ DROPS OPHTHALMIC at 05:59

## 2018-12-12 RX ADMIN — LIDOCAINE 1 PATCH: 4 CREAM TOPICAL at 07:33

## 2018-12-12 RX ADMIN — Medication 800 MILLIGRAM(S): at 05:58

## 2018-12-12 RX ADMIN — Medication 100 MILLIGRAM(S): at 05:57

## 2018-12-12 RX ADMIN — LIDOCAINE 1 PATCH: 4 CREAM TOPICAL at 07:32

## 2018-12-12 RX ADMIN — LIDOCAINE 1 PATCH: 4 CREAM TOPICAL at 07:36

## 2018-12-12 RX ADMIN — Medication 650 MILLIGRAM(S): at 11:11

## 2018-12-12 RX ADMIN — Medication 325 MILLIGRAM(S): at 11:14

## 2018-12-12 RX ADMIN — Medication 1 TABLET(S): at 11:15

## 2018-12-12 RX ADMIN — Medication 500 MILLIGRAM(S): at 05:58

## 2018-12-12 RX ADMIN — Medication 81 MILLIGRAM(S): at 10:58

## 2018-12-12 NOTE — PROGRESS NOTE ADULT - WEIGHT IN KG
55.6
55.8
55.6
55.6
55.8
55.6
55.6
55.8

## 2018-12-12 NOTE — PROGRESS NOTE ADULT - PROVIDER SPECIALTY LIST ADULT
Hospitalist
Hospitalist
Internal Medicine
Nephrology
Nephrology
Physiatry
Rehab Medicine
Internal Medicine
Rehab Medicine
Intervent Cardiology
Physiatry
Internal Medicine

## 2018-12-12 NOTE — PROGRESS NOTE ADULT - HEIGHT IN CM
154.94

## 2018-12-12 NOTE — PROGRESS NOTE ADULT - REASON FOR ADMISSION
right femur fx s/p hemiarthroplasty

## 2018-12-12 NOTE — PROGRESS NOTE ADULT - SUBJECTIVE AND OBJECTIVE BOX
HISTORY OF PRESENT ILLNESS: This is a 89 y/o female who reports that she had fallen at home after trying to get up from her dinning room table and tripped over a rug. She landed on her right hip. Her daughter took her for an xray that revealed a right hip fracture. Patient states that she was given the name of an orthopedic MD for evaluation and she did not go to that appointment. Pt states that she did not want to undergo surgery at that time, so she started outpatient PT and began using a RW. On the 23rd, the patient reports she was leaving the PT outpatient facilyt and while trying to make room on the sidewalk, her RW got stuck in some dirt and she lost her balance, again falling onto her right hip. She was brought to [facility]'s ER and found to have a high neck fracture of the right femur. Orthopedics were consulted and patient cleared for surgery. Patient underwent a right hemiarthroplasty by Dr Bhatia on 11/24. Post operatively patient has acute blood loss anemia. WBC are slightly elevated. No signs or symptoms of infection. Patient was deemed medically stable and discharged to acute rehab 11/27/18    TODAY'S SUBJECTIVE & REVIEW OF SYMPTOMS: Patient seen and examined bedside.  Patient c/o persistent pain and discomfort in the left shoulder. Denies pain in her hip.  Denies dysuria, SOB, dyspnea or cough.  +BM 12/10    [   ] Cardio WNL  [ x  ] Resp WNL  [ x  ] GI WNL  [   ] Heme WNL  [   ] Endo WNL  [   ] Skin WNL  [   ] MSK WNL  [  x ] Neuro WNL  [   ] Cognitive WNL  [ x  ] Psych WNL        PHYSICAL EXAM  Vital Signs Last 24 Hrs  T(C): 37 (11 Dec 2018 21:19), Max: 37 (11 Dec 2018 21:19)  T(F): 98.6 (11 Dec 2018 21:19), Max: 98.6 (11 Dec 2018 21:19)  HR: 67 (12 Dec 2018 08:47) (67 - 78)  BP: 107/56 (12 Dec 2018 08:47) (107/56 - 149/68)  BP(mean): --  RR: 14 (12 Dec 2018 08:47) (14 - 15)  SpO2: 97% (12 Dec 2018 08:47) (97% - 99%)	    Physical Exam:   Mental Status - Patient is alert, awake, oriented X3. Speech is fluent. Normal attention and concentration.  Follows commands well and able to answer questions appropriately. Mood and affect  normal.  	Motor Exam -   	Right upper full AROM, 5/5 strengths throughout   	Left upper limited AROM to 40 degrees, PROM to 145 degrees, pain noted with internal rotation and abduction with passive movement, 5/5 distally  	Right lower 3/5 hip flexion, 4+/5 knee extension and 4/+ 5DF and PF  	Left lower 4+/5 hip flexion, 5/5 knee extension and 5/5 DF and PF   	Normal muscle bulk/tone  	Sensory    Intact to light touch bilaterally.		  	LUNGS Clear bilaterally    	HEART:	 Irreg, S1S2      	GI/ ABDOMEN:  Soft  Non tender +BS  	EXTREMITIES:  +1 RLE edema and mild edema noted left hand  	SKIN:  Right hip incision with staples removed and steri strips applied; c/d/i.  No erythema noted. No signs of skin breakdown noted to heels, elbows, buttocks, sacral or coccygeal areas. No rashes noted.        FUNCTIONAL PROGRESS:  Bed mobility: Mod A  Transfers: Min A  Gait: 50' RW Min A  Stairs: Negotiates 4 steps 2HR min Ax1 and CG x1  ADLs: UE dress sup/set up, LE dress min A        RECENT LABS:            8.7    8.5   )-----------( 349      ( 10 Dec 2018 07:00 )             27.1               LABS:  12-12    133<L>  |  99  |  40<H>  ----------------------------<  98  3.8   |  29  |  1.06    Ca    9.9      12 Dec 2018 06:50      11 Dec 2018 07:00    131    |  98     |  39     ----------------------------<  116    4.4     |  24     |  1.18     Ca    10.5       11 Dec 2018 07:00      PT/INR - ( 10 Dec 2018 07:00 )   PT: 27.8 sec;   INR: 2.42 ratio    PT/INR - ( 12 Dec 2018 06:50 )   PT: 23.0 sec;   INR: 2.01 ratio                                        8.7    8.5   )-----------( 349      ( 10 Dec 2018 07:00 )             27.1     12-10    126<L>  |  93<L>  |  34<H>  ----------------------------<  109<H>  3.9   |  24  |  1.24    Ca    10.1      10 Dec 2018 07:00        Venous duplex BLE neg for DVT 12/11                 Assessment and Plan:     This is a 89 y/o female s/p mechanical fall resulting in right femur fx (neck fx) s/p hemiarthroplasty now with functional deficits including gait and ADL dysfunction with decreased strength and endurance.       Comprehensive rehab WBAT RLE: PT/OT with post hip precautions    Pain management: Tramadol or tylenol prn.  Lidoderm patch, ice packs prn  Left shoulder pain: Xrays 11/28 neg for fx or dislocation.  Likely RTC tear.  Ortho consulted; recommended WBAT LUE, CT arthrogram.  Consider cortisone injection although pt declines at this time.   Keep LUE elevated when resting  Consider CT arthrogram given persistent pain but patient with MORELIA   Also consider cortisone injection     Chronic Afib: Coumadin  INR goal 2-3  INR therapeutic Continue coumadin 2mg po qhs.  F/U INR     CAD/HTN/PPM: Cardizem, Lasix, Labetalol, ASA 81mg daily  H/O TIA: ASA    Hyponatremia: Na 132 12/7; fluid restriction placed at 1500cc  Removed Na restriction from diet  F/U BMP 12/10 with Na 126; increase fluid restriction to 1000cc.    Appreciate Renal consult - Lasix to 40mg BID  Fluid restriction 1L/day; Na improved to 133  Avoid salt tabs given edema  Will check UA, PVR, urine lytes, osms  Will check uric acid, TSH, Cortisol  No ACEI/ARBs     Colitis: Mesalamine.  Caution with bowel meds    GERD: Protonix    Asthma: Symbicort, Singulair    Acute blood loss anemia: Added Fe daily.  F/U CBC with H/H stable    Moderate protein calorie malnutrition/Nutrition/wound care: Ensure bid, MVI, Vit C, Zn  Diet: DASH    Vitamin D deficiency: Vit D 25.  Added 1,000U daily    Precautions:    falls, posterior hip precautions                                     DVT Prophylaxis:   Coumadin; discontinued lovenox bridge once INR >2        Medically stable.  Pt progressing well in rehab although has not met all goals; d/c to [facility]

## 2018-12-12 NOTE — PROGRESS NOTE ADULT - ASSESSMENT
Past history significant for pacemaker and hypertension admitted after a fall and fractured hip that was surgically repaired transferred to acute rehabilitation this morning complaining of left shoulder discomfort and difficulty moving her left shoulder needed x-rays ordered May need CAT scan of her shoulder and orthopedic consultation
medically stable continue acute rehabilitation
Assessment and Plan:     This is a 89 y/o female s/p mechanical fall resulting in right femur fx (neck fx) s/p hemiarthroplasty now with functional deficits including gait and ADL dysfunction with decreased strength and endurance.     right femur fx s/p KEY, WBAT/THP  -Comprehensive rehab WBAT RLE: PT/OT with post hip precautions  -Pain management: Tramadol or tylenol prn.  Lidoderm patch, ice packs prn      Left shoulder pain: Xrays 11/28 neg for fx or dislocation.  Likely RTC tear.  Ortho consulted; recommended WBAT LUE, CT arthrogram.  Consider cortisone injection although pt declines at this time.     Chronic Afib: Coumadin  INR goal 2-3  INR 2.79 from 2.2. will give 1 mg tonight 12/1 and monitor INR 12/2    CAD/HTN/PPM: Cardizem, Lasix, Labetalol.    D/W Dr Turner-Resumed ASA 81mg daily    H/O TIA:   ASA    Colitis:   Mesalamine.  Caution with bowel meds    GERD:   Protonix    Asthma:   Symbicort, Singulair    Acute blood loss anemia: H/H 9.2/29.9.    Added Fe daily    Moderate protein calorie malutrition/Nutrition/wound care:   Ensure bid, MVI, Vit C, Zn  Diet: DASH    Vitamin D deficiency: Vit D 25.    Added 1,000U daily    Dry mouth:  Add Biotene and Cepacol lozenges prn    Precautions:    falls, posterior hip precautions      Labs:  INR 12/2
Assessment and Plan:     This is a 89 y/o female s/p mechanical fall resulting in right femur fx (neck fx) s/p hemiarthroplasty now with functional deficits including gait and ADL dysfunction with decreased strength and endurance.     right femur fx s/p KEY, WBAT/THP  -Comprehensive rehab WBAT RLE: PT/OT with post hip precautions  -Pain management: Tramadol or tylenol prn.  Lidoderm patch, ice packs prn      Left shoulder pain:   Xrays 11/28 neg for fx or dislocation.  Likely RTC tear.    Ortho consulted; recommended WBAT LUE, CT arthrogram.  Consider cortisone injection although pt declines at this time.     Chronic Afib: Coumadin  INR goal 2-3  INR 2.79 from 2.2. will give 1 mg tonight 12/1.  INR 2.73 12/2. continue 1mg and recheck INR in AM    CAD/HTN/PPM: Cardizem, Lasix, Labetalol.    D/W Dr Turner-Resumed ASA 81mg daily    H/O TIA:   ASA    Colitis:   Mesalamine.  Caution with bowel meds    GERD:   Protonix    Asthma:   Symbicort, Singulair    Acute blood loss anemia: H/H 9.2/29.9.    Added Fe daily    Moderate protein calorie malutrition/Nutrition/wound care:   Ensure bid, MVI, Vit C, Zn  Diet: DASH    Vitamin D deficiency: Vit D 25.    Added 1,000U daily    Dry mouth:  Add Biotene and Cepacol lozenges prn    Precautions:    falls, posterior hip precautions      Labs:  INR 12/3
This is a 89 y/o female s/p mechanical fall resulting in right femur fx (neck fx) s/p hemiarthroplasty now with functional deficits including gait and ADL dysfunction with decreased strength and endurance.     #Right femur fx s/p KEY  - WBAT   -c/w  PT/O as tolerated.   -Pain management      # Left shoulder pain S/p Fall ?   Xrays 11/28 neg for fx or dislocation.  Likely RTC tear.    c/w WBAT   Ortho consult appreciated, consider Cortisone injection     #Chronic Afib: Rate controlled.   INR therapeutic, Gaol 2-3   c/w  Cardizem, Labetalol.       #Hyponatremia - stable , improving from 126--> 131--> 133    f/u BMP   Nephrology consult appreciated.   Fluid restriction to 1 L /Day    Lasix 40 Bid     #HTN - controlled   c/w  Lasix, Labetalol, Cardizem    #H/O TIA: stable    c/w ASA    # Colitis: No diarrhea   c/w Mesalamine.     #Asthma: asymptomatic, stable    c/w Symbicort Q12 , Singulair    #Acute blood loss anemia:     c/w Iron supp
This is a 89 y/o female s/p mechanical fall resulting in right femur fx (neck fx) s/p hemiarthroplasty now with functional deficits including gait and ADL dysfunction with decreased strength and endurance.     #Right femur fx s/p KEY, WBAT/THP  -Comprehensive rehab WBAT RLE: PT/OT with post hip precautions  -Pain management: Tramadol or tylenol prn.  Lidoderm patch, ice packs prn      # Left shoulder pain S/p Fall ?   Xrays 11/28 neg for fx or dislocation.  Likely RTC tear.    c/w WBAT   Ortho consult appreciated, consider  Cortisone injection     #Chronic Afib: Rate controlled.   INR therapeutic, Gaol 2-3   c/w  Cardizem, Labetalol.       #Hyponatremia - stable , improving from 126--> 131   f/u BMP   Fluid restriction.     #HTN   c/w  Lasix, Labetalol, Cardizem    #H/O TIA:   c/w ASA    # Colitis: No diarrhea   c/w Mesalamine.     #Asthma: asymptomatic, stable    c/w Symbicort Q12 , Singulair    Acute blood loss anemia: H/H 9.2/29.9.    c/w Iron supp
X-rays of left shoulder without fracture or dislocation pain possibly related to rotator cuff injury discussed with her orthopedist who will evaluate
alert  no complaints continue rehab
medically stable
ortho note appreciated,,,,will check CBC  on iron
Fatigued in the morning did request a pain medication at bedtime only complaint really is of left shoulder discomfort acute rehabilitation proceeding well

## 2018-12-12 NOTE — PROGRESS NOTE ADULT - SUBJECTIVE AND OBJECTIVE BOX
Seen earlier, no CP, SOB    Vital Signs Last 24 Hrs  T(C): 37 (12-11-18 @ 21:19), Max: 37 (12-11-18 @ 21:19)  T(F): 98.6 (12-11-18 @ 21:19), Max: 98.6 (12-11-18 @ 21:19)  HR: 88 (12-12-18 @ 14:13) (67 - 88)  BP: 114/56 (12-12-18 @ 14:13) (107/56 - 114/56)  RR: 14 (12-12-18 @ 08:47) (14 - 15)  SpO2: 97% (12-12-18 @ 08:47) (97% - 99%)    Conversant, no apparent distress  PERRLA, EOMI  Neck non tender, supple  Normal respiratory effort, lungs good air entry b/l  Heart S1S2  Extr + edema b/l  Abdomen soft, NT, ND, + BS  Appropriate affect, AO x 3           12 Dec 2018 06:50    133    |  99     |  40     ----------------------------<  98     3.8     |  29     |  1.06     Ca    9.9        12 Dec 2018 06:50    PT/INR - ( 12 Dec 2018 06:50 )   PT: 23.0 sec;   INR: 2.01 ratio      acetaminophen   Tablet .. 650 milliGRAM(s) Oral every 6 hours PRN  ascorbic acid 500 milliGRAM(s) Oral two times a day  aspirin enteric coated 81 milliGRAM(s) Oral daily  buDESOnide  80 MICROgram(s)/formoterol 4.5 MICROgram(s) Inhaler 2 Puff(s) Inhalation two times a day  diltiazem    Tablet 30 milliGRAM(s) Oral every 8 hours  dorzolamide 2% Ophthalmic Solution 1 Drop(s) Left EYE three times a day  ferrous    sulfate 325 milliGRAM(s) Oral daily  furosemide    Tablet 40 milliGRAM(s) Oral two times a day  labetalol 100 milliGRAM(s) Oral every 12 hours  lidocaine   Patch 1 Patch Transdermal <User Schedule>  lidocaine   Patch 1 Patch Transdermal <User Schedule>  mesalamine DR Capsule 800 milliGRAM(s) Oral two times a day  montelukast 10 milliGRAM(s) Oral at bedtime  multivitamin 1 Tablet(s) Oral daily  pantoprazole    Tablet 40 milliGRAM(s) Oral before breakfast  polyethylene glycol 3350 17 Gram(s) Oral daily PRN  potassium chloride    Tablet ER 10 milliEquivalent(s) Oral daily  traMADol 50 milliGRAM(s) Oral every 6 hours PRN  warfarin 2 milliGRAM(s) Oral daily    A/P:    Hx CM, CHF  Hypervolemic hyponatremia  Resolved on Lasix  Fluid restriction 1L/day  Avoid salt tabs given edema  No ACE/ARB  [facility]

## 2018-12-12 NOTE — PROGRESS NOTE ADULT - WEIGHT IN LBS
122.5
123
122.5
122.5
123
122.5
122.5
123

## 2018-12-12 NOTE — PROGRESS NOTE ADULT - SUBJECTIVE AND OBJECTIVE BOX
Patient is a 89y old  Female who presents with a chief complaint of right femur fx s/p hemiarthroplasty (11 Dec 2018 16:59)      Patient seen and examined at bedside.    ALLERGIES:  Keflex (Diarrhea)  Norvasc (Unknown)    MEDICATIONS:  aspirin enteric coated 81 milliGRAM(s) Oral daily  diltiazem    Tablet 30 milliGRAM(s) Oral every 8 hours  ferrous    sulfate 325 milliGRAM(s) Oral daily  furosemide    Tablet 40 milliGRAM(s) Oral two times a day  labetalol 100 milliGRAM(s) Oral every 12 hours  traMADol 50 milliGRAM(s) Oral every 6 hours PRN  warfarin 2 milliGRAM(s) Oral daily    Vital Signs Last 24 Hrs  T(F): 98.6 (11 Dec 2018 21:19), Max: 98.6 (11 Dec 2018 21:19)  HR: 67 (12 Dec 2018 08:47) (67 - 78)  BP: 107/56 (12 Dec 2018 08:47) (107/56 - 149/68)  RR: 14 (12 Dec 2018 08:47) (14 - 15)  SpO2: 97% (12 Dec 2018 08:47) (97% - 99%)  I&O's Summary      PHYSICAL EXAM:  General: NAD  ENT: MMM  Neck: Supple, No JVD  Lungs: Clear to auscultation bilaterally, No added sounds.   Cardio: Irregular,  S1/S2, No murmurs  Abdomen: Soft, Nontender, Nondistended; Bowel sounds present  Extremities: No clubbing, cyanosis, or edema, Tenderness and limited ROM left Shoulder.  Psych: A/O x 3    LABS:                        8.7    8.5   )-----------( 349      ( 10 Dec 2018 07:00 )             27.1     12-12    133  |  99  |  40  ----------------------------<  98  3.8   |  29  |  1.06    Ca    9.9      12 Dec 2018 06:50      eGFR if African American: 52 mL/min/1.73M2 (12-12-18 @ 06:50)  eGFR if Non African American: 45 mL/min/1.73M2 (12-12-18 @ 06:50)    PT/INR - ( 12 Dec 2018 06:50 )   PT: 23.0 sec;   INR: 2.01 ratio                   TSH 2.90   TSH with FT4 reflex --  Total T3 --        CAPILLARY BLOOD GLUCOSE                RADIOLOGY & ADDITIONAL TESTS:    Care Discussed with Consultants/Other Providers:

## 2018-12-21 PROCEDURE — 85730 THROMBOPLASTIN TIME PARTIAL: CPT

## 2018-12-21 PROCEDURE — 97110 THERAPEUTIC EXERCISES: CPT

## 2018-12-21 PROCEDURE — 82306 VITAMIN D 25 HYDROXY: CPT

## 2018-12-21 PROCEDURE — 94640 AIRWAY INHALATION TREATMENT: CPT

## 2018-12-21 PROCEDURE — 85027 COMPLETE CBC AUTOMATED: CPT

## 2018-12-21 PROCEDURE — 97535 SELF CARE MNGMENT TRAINING: CPT

## 2018-12-21 PROCEDURE — 97163 PT EVAL HIGH COMPLEX 45 MIN: CPT

## 2018-12-21 PROCEDURE — 85610 PROTHROMBIN TIME: CPT

## 2018-12-21 PROCEDURE — 82533 TOTAL CORTISOL: CPT

## 2018-12-21 PROCEDURE — 80053 COMPREHEN METABOLIC PANEL: CPT

## 2018-12-21 PROCEDURE — 97167 OT EVAL HIGH COMPLEX 60 MIN: CPT

## 2018-12-21 PROCEDURE — 73030 X-RAY EXAM OF SHOULDER: CPT

## 2018-12-21 PROCEDURE — 97530 THERAPEUTIC ACTIVITIES: CPT

## 2018-12-21 PROCEDURE — 84550 ASSAY OF BLOOD/URIC ACID: CPT

## 2018-12-21 PROCEDURE — 80048 BASIC METABOLIC PNL TOTAL CA: CPT

## 2018-12-21 PROCEDURE — 93970 EXTREMITY STUDY: CPT

## 2018-12-21 PROCEDURE — 84443 ASSAY THYROID STIM HORMONE: CPT

## 2018-12-21 PROCEDURE — 97116 GAIT TRAINING THERAPY: CPT

## 2018-12-31 ENCOUNTER — APPOINTMENT (OUTPATIENT)
Dept: CARDIOLOGY | Facility: CLINIC | Age: 83
End: 2018-12-31

## 2019-01-04 ENCOUNTER — MEDICATION RENEWAL (OUTPATIENT)
Age: 84
End: 2019-01-04

## 2019-01-04 ENCOUNTER — RX RENEWAL (OUTPATIENT)
Age: 84
End: 2019-01-04

## 2019-01-04 DIAGNOSIS — J45.20 MILD INTERMITTENT ASTHMA, UNCOMPLICATED: ICD-10-CM

## 2019-01-04 DIAGNOSIS — R52 PAIN, UNSPECIFIED: ICD-10-CM

## 2019-01-04 DIAGNOSIS — H40.052 OCULAR HYPERTENSION, LEFT EYE: ICD-10-CM

## 2019-01-04 RX ORDER — BRINZOLAMIDE 10 MG/ML
1 SUSPENSION/ DROPS OPHTHALMIC
Refills: 0 | Status: DISCONTINUED | COMMUNITY
Start: 2017-01-05 | End: 2019-01-04

## 2019-01-04 RX ORDER — POTASSIUM CHLORIDE 10 MEQ
CAPSULE, EXTENDED RELEASE ORAL
Refills: 0 | Status: DISCONTINUED | COMMUNITY
End: 2019-01-04

## 2019-01-04 RX ORDER — DORZOLAMIDE HYDROCHLORIDE 20 MG/ML
2 SOLUTION OPHTHALMIC 3 TIMES DAILY
Qty: 3 | Refills: 1 | Status: ACTIVE | COMMUNITY
Start: 2019-01-04 | End: 1900-01-01

## 2019-01-04 RX ORDER — FLUTICASONE PROPIONATE AND SALMETEROL 113; 14 UG/1; UG/1
113-14 POWDER, METERED RESPIRATORY (INHALATION) TWICE DAILY
Qty: 1 | Refills: 2 | Status: ACTIVE | COMMUNITY
Start: 2019-01-04 | End: 1900-01-01

## 2019-01-04 RX ORDER — POTASSIUM CHLORIDE 1500 MG/1
20 TABLET, EXTENDED RELEASE ORAL
Qty: 90 | Refills: 0 | Status: DISCONTINUED | COMMUNITY
Start: 2018-01-10 | End: 2019-01-04

## 2019-01-04 RX ORDER — OMEPRAZOLE 20 MG/1
20 TABLET, DELAYED RELEASE ORAL
Qty: 90 | Refills: 1 | Status: ACTIVE | COMMUNITY
Start: 2019-01-04 | End: 1900-01-01

## 2019-01-04 RX ORDER — FUROSEMIDE 40 MG/1
40 TABLET ORAL TWICE DAILY
Refills: 0 | Status: DISCONTINUED | COMMUNITY
End: 2019-01-04

## 2019-01-04 RX ORDER — MESALAMINE 1.2 G/1
1.2 TABLET, DELAYED RELEASE ORAL
Qty: 180 | Refills: 1 | Status: ACTIVE | COMMUNITY
Start: 2017-09-08 | End: 1900-01-01

## 2019-01-04 RX ORDER — MESALAMINE 800 MG/1
800 TABLET, DELAYED RELEASE ORAL TWICE DAILY
Qty: 180 | Refills: 1 | Status: DISCONTINUED | COMMUNITY
Start: 2019-01-04 | End: 2019-01-04

## 2019-01-10 ENCOUNTER — APPOINTMENT (OUTPATIENT)
Dept: CARDIOLOGY | Facility: CLINIC | Age: 84
End: 2019-01-10

## 2019-01-10 NOTE — CHART NOTE - NSCHARTNOTEFT_GEN_A_CORE
Brief time of elevated BUN and creatinine noted subsequent creatinines returned to normal and etiology multifactorial and a mild degree of dehydration can certainly contribute.

## 2019-01-10 NOTE — CHART NOTE - NSCHARTNOTEFT_GEN_A_CORE
Brief time of elevated BUN and creatinine noted subsequent creatinines returned to normal and etiology multifactorial and a mild degree of dehydration can certainly contribute

## 2019-01-16 ENCOUNTER — APPOINTMENT (OUTPATIENT)
Dept: ORTHOPEDIC SURGERY | Facility: CLINIC | Age: 84
End: 2019-01-16
Payer: MEDICARE

## 2019-01-16 VITALS — HEART RATE: 62 BPM | DIASTOLIC BLOOD PRESSURE: 79 MMHG | SYSTOLIC BLOOD PRESSURE: 128 MMHG

## 2019-01-16 VITALS — BODY MASS INDEX: 21.8 KG/M2 | WEIGHT: 114 LBS | HEIGHT: 60.5 IN

## 2019-01-16 PROCEDURE — 73502 X-RAY EXAM HIP UNI 2-3 VIEWS: CPT

## 2019-01-16 PROCEDURE — 99024 POSTOP FOLLOW-UP VISIT: CPT

## 2019-01-16 NOTE — HISTORY OF PRESENT ILLNESS
[Procedure: ___] : status post [unfilled] [___ Months Post Op] : [unfilled] months post op [0] : no pain reported [Chills] : no chills [Fever] : no fever [Nausea] : no nausea [Vomiting] : no vomiting [Clean/Dry/Intact] : clean, dry and intact [Healed] : healed [Erythema] : not erythematous [Discharge] : absent of discharge [Swelling] : swollen [Dehiscence] : not dehisced [Neuro Intact] : an unremarkable neurological exam [Vascular Intact] : ~T peripheral vascular exam normal [Xray (Date:___)] : [unfilled] Xray -  [Doing Well] : is doing well [Excellent Pain Control] : has excellent pain control [No Sign of Infection] : is showing no signs of infection [de-identified] : DOS: 11/24/18\par clinically stable\par no pain complaints\par pleased with surgical results to date\par ambulating with a walker\par going to therapy 3 times a week [de-identified] : pelvis and lateral right hip xrays\par bipolar hemiarthroplasty components stable, in good position and alignment\par no change in component position or alignment vs prior postop films\par neg fx/dislocation  [de-identified] : continue current postop therapy/rehab\par WBAT RLE\par wean off walker when patient ready/able\par f/up in 1 month for repeat clinical and xray exam at that time

## 2019-01-29 ENCOUNTER — RX RENEWAL (OUTPATIENT)
Age: 84
End: 2019-01-29

## 2019-01-29 LAB
INR PPP: 1.19 RATIO
PT BLD: 13.6 SEC

## 2019-02-03 ENCOUNTER — FORM ENCOUNTER (OUTPATIENT)
Age: 84
End: 2019-02-03

## 2019-02-04 ENCOUNTER — RX RENEWAL (OUTPATIENT)
Age: 84
End: 2019-02-04

## 2019-02-04 ENCOUNTER — OUTPATIENT (OUTPATIENT)
Dept: OUTPATIENT SERVICES | Facility: HOSPITAL | Age: 84
LOS: 1 days | End: 2019-02-04
Payer: MEDICARE

## 2019-02-04 ENCOUNTER — APPOINTMENT (OUTPATIENT)
Dept: ULTRASOUND IMAGING | Facility: HOSPITAL | Age: 84
End: 2019-02-04

## 2019-02-04 ENCOUNTER — APPOINTMENT (OUTPATIENT)
Dept: INTERNAL MEDICINE | Facility: CLINIC | Age: 84
End: 2019-02-04
Payer: MEDICARE

## 2019-02-04 VITALS
HEIGHT: 60.5 IN | DIASTOLIC BLOOD PRESSURE: 60 MMHG | SYSTOLIC BLOOD PRESSURE: 120 MMHG | HEART RATE: 66 BPM | RESPIRATION RATE: 16 BRPM | OXYGEN SATURATION: 100 % | WEIGHT: 126 LBS | BODY MASS INDEX: 24.1 KG/M2 | TEMPERATURE: 98.2 F

## 2019-02-04 DIAGNOSIS — R60.0 LOCALIZED EDEMA: ICD-10-CM

## 2019-02-04 DIAGNOSIS — Z98.49 CATARACT EXTRACTION STATUS, UNSPECIFIED EYE: Chronic | ICD-10-CM

## 2019-02-04 DIAGNOSIS — S72.001D FRACTURE OF UNSPECIFIED PART OF NECK OF RIGHT FEMUR, SUBSEQUENT ENCOUNTER FOR CLOSED FRACTURE WITH ROUTINE HEALING: ICD-10-CM

## 2019-02-04 DIAGNOSIS — Z00.00 ENCOUNTER FOR GENERAL ADULT MEDICAL EXAMINATION W/OUT ABNORMAL FINDINGS: ICD-10-CM

## 2019-02-04 PROCEDURE — 93970 EXTREMITY STUDY: CPT | Mod: 26

## 2019-02-04 PROCEDURE — 99215 OFFICE O/P EST HI 40 MIN: CPT

## 2019-02-04 PROCEDURE — 93970 EXTREMITY STUDY: CPT

## 2019-02-04 NOTE — HISTORY OF PRESENT ILLNESS
[Family Member] : family member [FreeTextEntry1] : Comes to the office for followup accompanied by her son to review her medications and discuss her overall health main issue is severely increased leg edema [de-identified] : 89-year-old woman comes to the office for followup with a history of atrial fibrillation hypertension ulcerative colitis bronchiectasis and sick sinus syndrome with pacemaker is actively followed by her cardiologist main issues have been increasing leg swelling/edema she was recently discharged from the subacute rehabilitation at which time the edema has been progressive was recently operated on for a fractured hip both left and right legs are swollen she denies pain in the legs temperature chills sweats myalgias headache sinus congestion sore throat shortness of breath exertional dyspnea chest pain pleurisy dizziness vertigo or syncope she said no lightheadedness or palpitations she denies abdominal pain nausea vomiting diarrhea constipation bright red blood per rectum or black stools her appetite has been good and her weight is up slightly by 2 or 3 pounds

## 2019-02-04 NOTE — ASSESSMENT
[FreeTextEntry1] : Physical exam shows a well-developed female in no acute distress blood pressure 120/60 weight 126 pounds heart rate is 66 respirations of 15 HEENT was unremarkable chest was clear cardiovascular was irregularly irregular abdomen was soft extremities showed 2+ edema to the mid thigh with negative Homans sign with no evidence of cellulitis neurologic exam was nonfocal on review of her medications prior to her hospitalization versus after her discharge from subacute rehabilitation her diltiazem had been increased from 30 mg 3 times day to 60 mg 3 times a day this may be contributing significantly to her leg edema and we have reduced it back down to 30 mg 3 times a day she will watch her blood pressure and follow back with her cardiologist we have asked for elevation of her legs as much as possible she will go to radiology note to get a venous Doppler to rule out a DVT she possibly needs an echo from her cardiologist for right-sided heart failure I will order blood tests renal function is adequate and edema does not go down may need to increase her diuretic

## 2019-02-04 NOTE — HEALTH RISK ASSESSMENT
[No falls in past year] : Patient reported no falls in the past year [0] : 2) Feeling down, depressed, or hopeless: Not at all (0) [[id number]] : 0

## 2019-02-04 NOTE — REVIEW OF SYSTEMS
[Fatigue] : fatigue [Lower Ext Edema] : lower extremity edema [Nocturia] : nocturia [Joint Stiffness] : joint stiffness [Back Pain] : back pain [Anxiety] : anxiety [Fever] : no fever [Chills] : no chills [Hot Flashes] : no hot flashes [Night Sweats] : no night sweats [Recent Change In Weight] : ~T no recent weight change [Discharge] : no discharge [Pain] : no pain [Redness] : no redness [Dryness] : no dryness  [Vision Problems] : no vision problems [Itching] : no itching [Earache] : no earache [Hearing Loss] : no hearing loss [Nosebleed] : no nosebleeds [Nasal Discharge] : no nasal discharge [Sore Throat] : no sore throat [Postnasal Drip] : no postnasal drip [Chest Pain] : no chest pain [Palpitations] : no palpitations [Leg Claudication] : no leg claudication [Orthopnea] : no orthopnea [Paroysmal Nocturnal Dyspnea] : no paroysmal nocturnal dyspnea [Shortness Of Breath] : no shortness of breath [Wheezing] : no wheezing [Cough] : no cough [Dyspnea on Exertion] : no dyspnea on exertion [Abdominal Pain] : no abdominal pain [Nausea] : no nausea [Constipation] : no constipation [Diarrhea] : diarrhea [Vomiting] : no vomiting [Heartburn] : no heartburn [Melena] : no melena [Dysuria] : no dysuria [Incontinence] : no incontinence [Poor Libido] : libido not poor [Hematuria] : no hematuria [Frequency] : no frequency [Vaginal Discharge] : no vaginal discharge [Dysmenorrhea] : no dysmenorrhea [Joint Pain] : no joint pain [Joint Swelling] : no joint swelling [Muscle Weakness] : no muscle weakness [Muscle Pain] : no muscle pain [Mole Changes] : no mole changes [Nail Changes] : no nail changes [Hair Changes] : no hair changes [Skin Rash] : no skin rash [Headache] : no headache [Dizziness] : no dizziness [Fainting] : no fainting [Confusion] : no confusion [Memory Loss] : no memory loss [Unsteady Walking] : no ataxia [Suicidal] : not suicidal [Insomnia] : no insomnia [Depression] : no depression [Easy Bleeding] : no easy bleeding [Easy Bruising] : no easy bruising [Swollen Glands] : no swollen glands

## 2019-02-05 LAB
25(OH)D3 SERPL-MCNC: 33.4 NG/ML
ALBUMIN SERPL ELPH-MCNC: 3.5 G/DL
ALP BLD-CCNC: 109 U/L
ALT SERPL-CCNC: 9 U/L
ANION GAP SERPL CALC-SCNC: 12 MMOL/L
AST SERPL-CCNC: 11 U/L
BASOPHILS # BLD AUTO: 0.03 K/UL
BASOPHILS NFR BLD AUTO: 0.5 %
BILIRUB SERPL-MCNC: 0.2 MG/DL
BUN SERPL-MCNC: 24 MG/DL
CALCIUM SERPL-MCNC: 10.1 MG/DL
CHLORIDE SERPL-SCNC: 102 MMOL/L
CHOLEST SERPL-MCNC: 130 MG/DL
CHOLEST/HDLC SERPL: 2.3 RATIO
CO2 SERPL-SCNC: 22 MMOL/L
CREAT SERPL-MCNC: 1.14 MG/DL
EOSINOPHIL # BLD AUTO: 0.16 K/UL
EOSINOPHIL NFR BLD AUTO: 2.9 %
GLUCOSE BS SERPL-MCNC: 127 MG/DL
GLUCOSE SERPL-MCNC: 136 MG/DL
HBA1C MFR BLD HPLC: 5.3 %
HCT VFR BLD CALC: 30.4 %
HDLC SERPL-MCNC: 57 MG/DL
HGB BLD-MCNC: 9.1 G/DL
IMM GRANULOCYTES NFR BLD AUTO: 0 %
INR PPP: 1.3 RATIO
LDLC SERPL CALC-MCNC: 49 MG/DL
LYMPHOCYTES # BLD AUTO: 0.76 K/UL
LYMPHOCYTES NFR BLD AUTO: 13.8 %
MAN DIFF?: NORMAL
MCHC RBC-ENTMCNC: 26.8 PG
MCHC RBC-ENTMCNC: 29.9 GM/DL
MCV RBC AUTO: 89.4 FL
MONOCYTES # BLD AUTO: 0.44 K/UL
MONOCYTES NFR BLD AUTO: 8 %
NEUTROPHILS # BLD AUTO: 4.1 K/UL
NEUTROPHILS NFR BLD AUTO: 74.8 %
PLATELET # BLD AUTO: 255 K/UL
POTASSIUM SERPL-SCNC: 4.4 MMOL/L
PROT SERPL-MCNC: 5.8 G/DL
PT BLD: 14.9 SEC
RBC # BLD: 3.4 M/UL
RBC # FLD: 17.6 %
SODIUM SERPL-SCNC: 136 MMOL/L
T4 FREE SERPL-MCNC: 1.4 NG/DL
TRIGL SERPL-MCNC: 120 MG/DL
TSH SERPL-ACNC: 3.92 UIU/ML
VIT B12 SERPL-MCNC: 508 PG/ML
WBC # FLD AUTO: 5.49 K/UL

## 2019-02-20 ENCOUNTER — RESULT CHARGE (OUTPATIENT)
Age: 84
End: 2019-02-20

## 2019-02-22 LAB
INR PPP: 1.82 RATIO
PT BLD: 21.3 SEC

## 2019-02-25 ENCOUNTER — APPOINTMENT (OUTPATIENT)
Dept: ORTHOPEDIC SURGERY | Facility: CLINIC | Age: 84
End: 2019-02-25
Payer: MEDICARE

## 2019-02-25 VITALS
HEART RATE: 59 BPM | WEIGHT: 124 LBS | HEIGHT: 60.5 IN | DIASTOLIC BLOOD PRESSURE: 82 MMHG | SYSTOLIC BLOOD PRESSURE: 144 MMHG | BODY MASS INDEX: 23.72 KG/M2

## 2019-02-25 PROCEDURE — 99212 OFFICE O/P EST SF 10 MIN: CPT

## 2019-02-25 PROCEDURE — 73502 X-RAY EXAM HIP UNI 2-3 VIEWS: CPT | Mod: RT

## 2019-02-25 NOTE — PROGRESS NOTE ADULT - SUBJECTIVE AND OBJECTIVE BOX
From: Darío Teran  To: Perry Eaton DO  Sent: 2/24/2019 4:12 PM CST  Subject: Other    Would like to schedule an appointment in April for a follow up I cancelled late last year thank you.  Feel free to call me at [phone] thank you HISTORY OF PRESENT ILLNESS: This is a 89 y/o female who reports that she had fallen at home after trying to get up from her dinning room table and tripped over a rug. She landed on her right hip. Her daughter took her for an xray that revealed a right hip fracture. Patient states that she was given the name of an orthopedic MD for evaluation and she did not go to that appointment. Pt states that she did not want to undergo surgery at that time, so she started outpatient PT and began using a RW. On the 23rd, the patient reports she was leaving the PT outpatient facilyt and while trying to make room on the sidewalk, her RW got stuck in some dirt and she lost her balance, again falling onto her right hip. She was brought to [facility] ER and found to have a high neck fracture of the right femur. Orthopedics were consulted and patient cleared for surgery. Patient underwent a right hemiarthroplasty by Dr Bhatia on 11/24. Post operatively patient has acute blood loss anemia. WBC are slightly elevated. No signs or symptoms of infection. Patient was deemed medically stable and discharged to acute rehab 11/27/18    TODAY'S SUBJECTIVE & REVIEW OF SYMPTOMS: Patient seen and examined bedside.  Patient c/o "achiness" right hip and left shoulder.  Slept well.  +BM today.  Denies dysuria, SOB, dyspnea or cough.      [   ] Cardio WNL  [ x  ] Resp WNL  [ x  ] GI WNL  [   ] Heme WNL  [   ] Endo WNL  [   ] Skin WNL  [   ] MSK WNL  [  x ] Neuro WNL  [   ] Cognitive WNL  [ x  ] Psych WNL        PHYSICAL EXAM  Vital Signs Last 24 Hrs  T(C): 36.7 (08 Dec 2018 07:52), Max: 36.7 (07 Dec 2018 19:52)  T(F): 98 (08 Dec 2018 07:52), Max: 98 (07 Dec 2018 19:52)  HR: 70 (08 Dec 2018 07:52) (70 - 96)  BP: 108/62 (08 Dec 2018 07:52) (108/62 - 124/66)  BP(mean): --  RR: 14 (08 Dec 2018 07:52) (14 - 14)  SpO2: 98% (08 Dec 2018 09:25) (95% - 99%)    Physical Exam:   Mental Status - Patient is alert, awake, oriented X3. Speech is fluent. Normal attention and concentration.  Follows commands well and able to answer questions appropriately. Mood and affect  normal.  	Motor Exam -   	Right upper full AROM, 5/5 strengths throughout   	Left upper limited AROM to 40 degrees, PROM to 145 degrees, pain noted with internal rotation and abduction with passive movement, 5/5 distally  	Right lower 3/5 hip flexion, 4+/5 knee extension and 4/+ 5DF and PF  	Left lower 4+/5 hip flexion, 5/5 knee extension and 5/5 DF and PF   	Normal muscle bulk/tone  	Sensory    Intact to light touch bilaterally.		  	LUNGS Clear bilaterally    	HEART:	 Irreg, S1S2      	GI/ ABDOMEN:  Soft  Non tender +BS  	EXTREMITIES:  +1 bilat lower extremity edema  	SKIN:  Right hip incision with staples c/d/i.  No erythema noted,  ecchymosis to the left medial elbow. No signs of skin breakdown noted to heels, elbows, buttocks, sacral or coccygeal areas. No rashes noted.        FUNCTIONAL PROGRESS:  Bed mobility: Mod A  Transfers: Min A  Gait: 50' RW Min A  Stairs: Negotiates 4 steps 2HR min Ax1 and CG x1  ADLs: UE dress sup/set up, LE dress min A        RECENT LABS:                        9.0    9.4   )-----------( 333      ( 08 Dec 2018 06:22 )             28.8   12-08    129<L>  |  95<L>  |  36<H>  ----------------------------<  96  4.3   |  27  |  1.08    Ca    10.3      08 Dec 2018 06:22                            8.8    10.4  )-----------( 322      ( 07 Dec 2018 06:55 )             27.0     07 Dec 2018 06:55    132    |  98     |  33     ----------------------------<  101    4.5     |  27     |  1.15     Ca    9.9        07 Dec 2018 06:55    PT/INR - ( 07 Dec 2018 06:55 )   PT: 27.7 sec;   INR: 2.41 ratio          Assessment and Plan:     This is a 89 y/o female s/p mechanical fall resulting in right femur fx (neck fx) s/p hemiarthroplasty now with functional deficits including gait and ADL dysfunction with decreased strength and endurance.       Comprehensive rehab WBAT RLE: PT/OT with post hip precautions    Pain management: Tramadol or tylenol prn.  Lidoderm patch, ice packs prn  Left shoulder pain: Xrays 11/28 neg for fx or dislocation.  Likely RTC tear.  Ortho consulted; recommended WBAT LUE, CT arthrogram.  Consider cortisone injection although pt declines at this time.     Chronic Afib: Coumadin  INR goal 2-3  INR therapeutic 12/7 = 2.41  Continue coumadin 2mg po qhs.  F/U INR 12/10    CAD/HTN/PPM: Cardizem, Lasix, Labetalol, ASA 81mg daily  H/O TIA: ASA    Hyponatremia: Na 132  Fluid restriction 1500cc  Remove Na restriction from diet  F/U BMP 12/10    Colitis: Mesalamine.  Caution with bowel meds    GERD: Protonix    Asthma: Symbicort, Singulair    Acute blood loss anemia: Added Fe daily.  F/U CBC with H/H improved.  F/U stool for occult blood    Moderate protein calorie malnutrition/Nutrition/wound care: Ensure bid, MVI, Vit C, Zn  Diet: DASH    Vitamin D deficiency: Vit D 25.  Added 1,000U daily    Dry mouth:  D/C Biotene as not requested.  Cont Cepacol lozenges prn    Precautions:    falls, posterior hip precautions                                     DVT Prophylaxis:   Coumadin; discontinued lovenox bridge once INR >2

## 2019-03-02 NOTE — HISTORY OF PRESENT ILLNESS
[Procedure: ___] : status post [unfilled] [___ Months Post Op] : [unfilled] months post op [Chills] : no chills [Fever] : no fever [Clean/Dry/Intact] : clean, dry and intact [Healed] : healed [Erythema] : not erythematous [Discharge] : absent of discharge [Swelling] : not swollen [Dehiscence] : not dehisced [Neuro Intact] : an unremarkable neurological exam [Vascular Intact] : ~T peripheral vascular exam normal [Xray (Date:___)] : [unfilled] Xray -  [Slow Progress] : is progressing slowly [No Sign of Infection] : is showing no signs of infection [Adequate Pain Control] : has adequate pain control [de-identified] : clinically stable\par overall feeling OK\par still going to therapy (3 times a week)\par intermittent residual right thigh pain\par using a cane to ambulate\par  [de-identified] : pelvis and right hip xrays\par s/p right cemented bipolar hemiarthroplasty\par neg fx/dislocation\par no change in component position or alignment vs prior postop films\par neg component loosening, migration, osteolysis, subsidence [de-identified] : Rx: physical therapy\par continue therapy until reaches maximum medical benefit/recovery\par f/up in 3 months for repeat clinical and xray f/up then\par continue progressive activity as tolerated\par otherwise f/up prn

## 2019-03-04 ENCOUNTER — RX RENEWAL (OUTPATIENT)
Age: 84
End: 2019-03-04

## 2019-03-04 ENCOUNTER — RESULT CHARGE (OUTPATIENT)
Age: 84
End: 2019-03-04

## 2019-03-05 ENCOUNTER — RX RENEWAL (OUTPATIENT)
Age: 84
End: 2019-03-05

## 2019-03-05 LAB
INR PPP: 2.23 RATIO
PT BLD: 26.1 SEC

## 2019-03-06 ENCOUNTER — RESULT CHARGE (OUTPATIENT)
Age: 84
End: 2019-03-06

## 2019-03-13 ENCOUNTER — EMERGENCY (EMERGENCY)
Facility: HOSPITAL | Age: 84
LOS: 1 days | Discharge: HOME | End: 2019-03-13
Attending: EMERGENCY MEDICINE | Admitting: EMERGENCY MEDICINE
Payer: MEDICARE

## 2019-03-13 VITALS
TEMPERATURE: 98 F | RESPIRATION RATE: 18 BRPM | WEIGHT: 115.08 LBS | SYSTOLIC BLOOD PRESSURE: 130 MMHG | DIASTOLIC BLOOD PRESSURE: 52 MMHG | HEART RATE: 94 BPM | HEIGHT: 60 IN | OXYGEN SATURATION: 100 %

## 2019-03-13 DIAGNOSIS — S69.90XA UNSPECIFIED INJURY OF UNSPECIFIED WRIST, HAND AND FINGER(S), INITIAL ENCOUNTER: ICD-10-CM

## 2019-03-13 DIAGNOSIS — Z98.49 CATARACT EXTRACTION STATUS, UNSPECIFIED EYE: Chronic | ICD-10-CM

## 2019-03-13 PROCEDURE — 73110 X-RAY EXAM OF WRIST: CPT

## 2019-03-13 PROCEDURE — 99283 EMERGENCY DEPT VISIT LOW MDM: CPT | Mod: 25

## 2019-03-13 PROCEDURE — 99284 EMERGENCY DEPT VISIT MOD MDM: CPT | Mod: 25

## 2019-03-13 PROCEDURE — 29125 APPL SHORT ARM SPLINT STATIC: CPT

## 2019-03-13 PROCEDURE — 29125 APPL SHORT ARM SPLINT STATIC: CPT | Mod: LT

## 2019-03-13 PROCEDURE — 73110 X-RAY EXAM OF WRIST: CPT | Mod: 26,LT

## 2019-03-13 NOTE — ED PROVIDER NOTE - CARE PROVIDER_API CALL
Gabriel Acosta)  Orthopaedic Sports Medicine; Orthopaedic Surgery  [address] [address]  Phone: [phone]  Fax: [phone]  Follow Up Time:

## 2019-03-13 NOTE — ED PROVIDER NOTE - ATTENDING CONTRIBUTION TO CARE
Swelling, deformity, TTP of left distal forearm/wrist.  Unable to palpate radial pulse but pulse found on doppler.    Micheal APPLEWagener, DO

## 2019-03-13 NOTE — ED ADULT NURSE NOTE - CHPI ED NUR SYMPTOMS NEG
no loss of consciousness/no confusion/no numbness/no vomiting/no weakness/no abrasion/no tingling/no bleeding/no fever

## 2019-03-13 NOTE — ED PROVIDER NOTE - OBJECTIVE STATEMENT
89 y/o F with h/o Afib (Warfarin) presents with left  wrist pain s/p trip and fall yesterday trying to get her dog outside. States she fell with her hands outstretched in front of her. Did not hit her head nor injure anything else. Sent in by [facility] for r/o radial fracture. Accompanied with daughter in which she lives downstairs from in a 2 family house. Daughter just learned of the fall today.  Right hand dominant 89 y/o F with h/o Afib (Warfarin) presents with left  wrist pain s/p trip and fall yesterday trying to get her dog outside. States she fell with her hands outstretched in front of her. Did not hit her head nor injure anything else. Sent in by [facility] for r/o radial fracture. Accompanied with daughter in which she lives downstairs from in a 2 family house. Daughter just learned of the fall today.  Right hand dominant  PMD- Carly

## 2019-03-13 NOTE — ED ADULT NURSE NOTE - OBJECTIVE STATEMENT
Pt arrived from [facility] stating "They told me I have a left arm fracture." Pt s/p fall and states "I was taking my dog out last night and slip and landed on my left side." Pt states she fell on wood floor, denies hitting head, or LOC. Pt states she is on coumadin 2mg. Pt presents with left hand and arm swelling and bruising. Pt able to move digits and denies any pain, numbness, or tingling. Pts digits on left hand are cool to touch and weak radial pulse compared to right.

## 2019-03-13 NOTE — ED PROVIDER NOTE - PROGRESS NOTE DETAILS
ROSSI Martinez ROSSI Naranjo: Impacted dital radius fracture with decreased pulse with palpation but heard on doppler-  Dave called. Came down to the ED- viewed xray- no reduction warranted nor surgical intervention at based on her age- recommends sugar tong splint and ortho outpt follow up PA Tiberio- sugar tong splint applied. patient tolerated well.

## 2019-03-13 NOTE — ED PROVIDER NOTE - CLINICAL SUMMARY MEDICAL DECISION MAKING FREE TEXT BOX
89 y/o F with h/o Afib (Warfarin) presents with left wrist pain s/p trip and fall yesterday on an outstretched hand with obvious deformity to the distal left wrist likely with a distal radius fracture- xrays, pain, control, splint

## 2019-03-13 NOTE — ED PROVIDER NOTE - NSFOLLOWUPINSTRUCTIONS_ED_ALL_ED_FT
Follow up with your PMD within 48-72 hrs. Show copies of your reports given to you. Take all of your medications as previously prescribed. Follow up with the Orthopedist above within the week. Keep your arm elevated. Take Tylenol 650mg every 4-6 hours as needed for pain. Worsening, continued or ANY new concerning symptoms return to the emergency department.

## 2019-03-13 NOTE — ED PROCEDURE NOTE - ATTENDING CONTRIBUTION TO CARE
I performed a hematoma block of the left forearm with 10 cc of Lidocaine 1% without Epi prior to ACPs placing splint.  Micheal Bowen, DO

## 2019-03-18 ENCOUNTER — RESULT CHARGE (OUTPATIENT)
Age: 84
End: 2019-03-18

## 2019-03-19 ENCOUNTER — APPOINTMENT (OUTPATIENT)
Dept: ORTHOPEDIC SURGERY | Facility: CLINIC | Age: 84
End: 2019-03-19
Payer: MEDICARE

## 2019-03-19 VITALS — HEART RATE: 60 BPM | SYSTOLIC BLOOD PRESSURE: 120 MMHG | DIASTOLIC BLOOD PRESSURE: 71 MMHG

## 2019-03-19 DIAGNOSIS — S52.562A BARTON'S FRACTURE OF LEFT RADIUS, INITIAL ENCOUNTER FOR CLOSED FRACTURE: ICD-10-CM

## 2019-03-19 PROCEDURE — 73110 X-RAY EXAM OF WRIST: CPT | Mod: 59

## 2019-03-19 PROCEDURE — 99214 OFFICE O/P EST MOD 30 MIN: CPT

## 2019-03-20 ENCOUNTER — RESULT CHARGE (OUTPATIENT)
Age: 84
End: 2019-03-20

## 2019-03-20 LAB
INR PPP: 1.9 RATIO
PT BLD: 21.9 SEC

## 2019-03-21 PROBLEM — S52.562A: Status: ACTIVE | Noted: 2019-03-21

## 2019-03-22 ENCOUNTER — APPOINTMENT (OUTPATIENT)
Dept: ORTHOPEDIC SURGERY | Facility: CLINIC | Age: 84
End: 2019-03-22
Payer: MEDICARE

## 2019-03-22 VITALS — WEIGHT: 124 LBS | BODY MASS INDEX: 23.72 KG/M2 | HEIGHT: 60.5 IN

## 2019-03-22 PROCEDURE — 99213 OFFICE O/P EST LOW 20 MIN: CPT

## 2019-03-22 PROCEDURE — 73110 X-RAY EXAM OF WRIST: CPT | Mod: LT

## 2019-03-22 NOTE — HISTORY OF PRESENT ILLNESS
[Right] : right hand dominant [FreeTextEntry1] : She comes in today for evaluation of a left distal radius fracture.  She was referred by Dr. GARCÍA.  He saw her 3 days ago, and reduced.  The fracture.  There was some residual displacement, and he wanted her to see me in consultation.\par \par - She was accompanied by her daughter today.  She lives at home with her daughter.

## 2019-03-22 NOTE — PHYSICAL EXAM
[de-identified] : This is an elderly female in no obvious distress.  She is alert and oriented.  She was accompanied by her daughter today.  She uses a cane.\par \par Examination of her left wrist demonstrates a short arm cast.  It appears somewhat tight.  She has swelling of the digits.  She has normal sensation to light touch throughout the digits.  Denies numbness or tingling.  She has good capillary refill. [de-identified] : \par PA, lateral, and oblique radiographs of her left wrist today demonstrate a comminuted distal radius fracture.  The fracture is shortened with neutral tilt on the lateral.

## 2019-03-22 NOTE — DISCUSSION/SUMMARY
[FreeTextEntry1] : She has findings consistent with a shortened left distal radius fracture, status post reduction, 3 days ago.\par \par I had a discussion with the patient and her daughter regarding today's visit, the diagnosis, and treatment options / recommendations.  Despite this shortening, given her age, as she is 89 years old, I have recommended nonoperative management.  Even with surgery, given her age and osteoporosis, she is at risk for hardware failure, and other complications.  They both understand that her wrist will not be perfect, but if it maintains its current position, I do believe that it will be functional.  They also would like to avoid surgery if at all possible.  I recommended maintenance of the cast on followup in one week.  The cast was split radially and ulnarly to allow for swelling and was trimmed around the thumb.  She was instructed on strict elevation and range of motion exercises to the digits.\par \par They have agreed to this plan of management and has expressed full understanding.  All questions were fully answered to their satisfaction. \par \par Over 50% of the time spent with the patient was on counseling the patient on the above diagnosis, treatment plan and prognosis.

## 2019-03-25 ENCOUNTER — APPOINTMENT (OUTPATIENT)
Dept: CARDIOLOGY | Facility: CLINIC | Age: 84
End: 2019-03-25
Payer: MEDICARE

## 2019-03-25 ENCOUNTER — NON-APPOINTMENT (OUTPATIENT)
Age: 84
End: 2019-03-25

## 2019-03-25 VITALS
SYSTOLIC BLOOD PRESSURE: 158 MMHG | HEART RATE: 68 BPM | RESPIRATION RATE: 17 BRPM | HEIGHT: 60 IN | DIASTOLIC BLOOD PRESSURE: 72 MMHG | BODY MASS INDEX: 24.35 KG/M2 | OXYGEN SATURATION: 99 % | WEIGHT: 124 LBS

## 2019-03-25 PROCEDURE — 93306 TTE W/DOPPLER COMPLETE: CPT

## 2019-03-25 PROCEDURE — 93000 ELECTROCARDIOGRAM COMPLETE: CPT

## 2019-03-25 PROCEDURE — 99214 OFFICE O/P EST MOD 30 MIN: CPT

## 2019-03-25 NOTE — REASON FOR VISIT
[Follow-Up - Clinic] : a clinic follow-up of [Hypertension] : hypertension [FreeTextEntry2] : no new sob or sscp, feels well, s/p fall, arm fx [FreeTextEntry1] : no recent cp, less dizziness and fatigue with low bp, no more chest pain

## 2019-03-25 NOTE — REVIEW OF SYSTEMS
[see HPI] : see HPI [Anxiety] : anxiety [Negative] : Heme/Lymph [Shortness Of Breath] : no shortness of breath [Chest Pain] : no chest pain [Palpitations] : no palpitations

## 2019-03-25 NOTE — DISCUSSION/SUMMARY
[Persistent Atrial Fibrillation] : persistent atrial fibrillation [Hypertension] : hypertension [Responding to Treatment] : responding to treatment [Outpatient Evaluation] : outpatient evaluation [Ambulatory BP Monitoring] : ambulatory blood pressure monitoring [Continue] : continuing thiazide diuretics [None] : none [Moderate Mitral Regurgitation] : moderate mitral regurgitation [Echocardiogram] : echocardiogram [Atrial Fibrillation] : atrial fibrillation [Stable] : stable [Chest X-Ray] : a chest x-ray [de-identified] : in afib since 9/14, holter reviewed, some pauses at night [de-identified] : better on labetolol  [de-identified] : better BPs at home, machine matches office BP 5/9/18 [de-identified] : follows with dr hess-renal [de-identified] : pleural effusion on echo

## 2019-03-25 NOTE — PHYSICAL EXAM
[General Appearance - Well Developed] : well developed [Normal Appearance] : normal appearance [Well Groomed] : well groomed [General Appearance - Well Nourished] : well nourished [No Deformities] : no deformities [General Appearance - In No Acute Distress] : no acute distress [Normal Conjunctiva] : the conjunctiva exhibited no abnormalities [Eyelids - No Xanthelasma] : the eyelids demonstrated no xanthelasmas [Normal Oral Mucosa] : normal oral mucosa [No Oral Pallor] : no oral pallor [No Oral Cyanosis] : no oral cyanosis [Normal Jugular Venous A Waves Present] : normal jugular venous A waves present [Normal Jugular Venous V Waves Present] : normal jugular venous V waves present [No Jugular Venous Francis A Waves] : no jugular venous francis A waves [Respiration, Rhythm And Depth] : normal respiratory rhythm and effort [Exaggerated Use Of Accessory Muscles For Inspiration] : no accessory muscle use [Auscultation Breath Sounds / Voice Sounds] : lungs were clear to auscultation bilaterally [Heart Rate And Rhythm] : heart rate and rhythm were normal [Heart Sounds] : normal S1 and S2 [Murmurs] : no murmurs present [Abdomen Soft] : soft [Abdomen Tenderness] : non-tender [Abdomen Mass (___ Cm)] : no abdominal mass palpated [Abnormal Walk] : normal gait [Gait - Sufficient For Exercise Testing] : the gait was sufficient for exercise testing [Nail Clubbing] : no clubbing of the fingernails [Cyanosis, Localized] : no localized cyanosis [Petechial Hemorrhages (___cm)] : no petechial hemorrhages [Skin Color & Pigmentation] : normal skin color and pigmentation [] : no rash [No Venous Stasis] : no venous stasis [Skin Lesions] : no skin lesions [No Skin Ulcers] : no skin ulcer [No Xanthoma] : no  xanthoma was observed [Oriented To Time, Place, And Person] : oriented to person, place, and time [Affect] : the affect was normal [Mood] : the mood was normal [No Anxiety] : not feeling anxious [FreeTextEntry1] : Doing well for given her age

## 2019-03-26 ENCOUNTER — RX RENEWAL (OUTPATIENT)
Age: 84
End: 2019-03-26

## 2019-03-27 ENCOUNTER — FORM ENCOUNTER (OUTPATIENT)
Age: 84
End: 2019-03-27

## 2019-03-28 ENCOUNTER — FORM ENCOUNTER (OUTPATIENT)
Age: 84
End: 2019-03-28

## 2019-03-28 ENCOUNTER — OUTPATIENT (OUTPATIENT)
Dept: OUTPATIENT SERVICES | Facility: HOSPITAL | Age: 84
LOS: 1 days | End: 2019-03-28
Payer: MEDICARE

## 2019-03-28 ENCOUNTER — APPOINTMENT (OUTPATIENT)
Age: 84
End: 2019-03-28
Payer: MEDICARE

## 2019-03-28 DIAGNOSIS — Z98.49 CATARACT EXTRACTION STATUS, UNSPECIFIED EYE: Chronic | ICD-10-CM

## 2019-03-28 DIAGNOSIS — J90 PLEURAL EFFUSION, NOT ELSEWHERE CLASSIFIED: ICD-10-CM

## 2019-03-28 PROCEDURE — 71046 X-RAY EXAM CHEST 2 VIEWS: CPT

## 2019-03-28 PROCEDURE — 71046 X-RAY EXAM CHEST 2 VIEWS: CPT | Mod: 26

## 2019-03-29 ENCOUNTER — APPOINTMENT (OUTPATIENT)
Dept: PULMONOLOGY | Facility: CLINIC | Age: 84
End: 2019-03-29
Payer: MEDICARE

## 2019-03-29 ENCOUNTER — APPOINTMENT (OUTPATIENT)
Dept: CT IMAGING | Facility: HOSPITAL | Age: 84
End: 2019-03-29
Payer: MEDICARE

## 2019-03-29 ENCOUNTER — LABORATORY RESULT (OUTPATIENT)
Age: 84
End: 2019-03-29

## 2019-03-29 ENCOUNTER — OUTPATIENT (OUTPATIENT)
Dept: OUTPATIENT SERVICES | Facility: HOSPITAL | Age: 84
LOS: 1 days | End: 2019-03-29
Payer: MEDICARE

## 2019-03-29 VITALS
RESPIRATION RATE: 20 BRPM | HEART RATE: 109 BPM | OXYGEN SATURATION: 96 % | DIASTOLIC BLOOD PRESSURE: 80 MMHG | BODY MASS INDEX: 24.15 KG/M2 | TEMPERATURE: 97.9 F | SYSTOLIC BLOOD PRESSURE: 150 MMHG | HEIGHT: 60 IN | WEIGHT: 123 LBS

## 2019-03-29 DIAGNOSIS — R07.9 CHEST PAIN, UNSPECIFIED: ICD-10-CM

## 2019-03-29 DIAGNOSIS — Z98.49 CATARACT EXTRACTION STATUS, UNSPECIFIED EYE: Chronic | ICD-10-CM

## 2019-03-29 PROCEDURE — 71250 CT THORAX DX C-: CPT

## 2019-03-29 PROCEDURE — 99213 OFFICE O/P EST LOW 20 MIN: CPT

## 2019-03-29 PROCEDURE — 71250 CT THORAX DX C-: CPT | Mod: 26

## 2019-03-29 NOTE — REVIEW OF SYSTEMS
[Negative] : Endocrine [FreeTextEntry2] : Recovering from shingles of the scalp [de-identified] : recent episode of shingles with continued itching over right eye [de-identified] : Double vision being followed by ophthalmology

## 2019-03-29 NOTE — PHYSICAL EXAM
[General Appearance - Well Developed] : well developed [Normal Appearance] : normal appearance [Well Groomed] : well groomed [General Appearance - Well Nourished] : well nourished [No Deformities] : no deformities [General Appearance - In No Acute Distress] : no acute distress [Normal Conjunctiva] : the conjunctiva exhibited no abnormalities [Eyelids - No Xanthelasma] : the eyelids demonstrated no xanthelasmas [Normal Oropharynx] : normal oropharynx [Neck Appearance] : the appearance of the neck was normal [Neck Cervical Mass (___cm)] : no neck mass was observed [Jugular Venous Distention Increased] : there was no jugular-venous distention [Thyroid Diffuse Enlargement] : the thyroid was not enlarged [Thyroid Nodule] : there were no palpable thyroid nodules [Heart Rate And Rhythm] : heart rate and rhythm were normal [Heart Sounds] : normal S1 and S2 [Abdomen Soft] : soft [Abdomen Tenderness] : non-tender [] : no hepato-splenomegaly [Abdomen Mass (___ Cm)] : no abdominal mass palpated [Abnormal Walk] : normal gait [FreeTextEntry1] : Scabbing from shingles resolved [No Focal Deficits] : no focal deficits

## 2019-03-29 NOTE — REVIEW OF SYSTEMS
[Negative] : Endocrine [FreeTextEntry2] : Recovering from shingles of the scalp [de-identified] : recent episode of shingles with continued itching over right eye [de-identified] : Double vision being followed by ophthalmology

## 2019-03-29 NOTE — REASON FOR VISIT
[Follow-Up] : a follow-up visit [Abnormal Chest X-Ray] : abnormal Chest X-Ray [Shortness of Breath] : shortness of Breath [FreeTextEntry2] : Malaise

## 2019-04-01 ENCOUNTER — RESULT CHARGE (OUTPATIENT)
Age: 84
End: 2019-04-01

## 2019-04-03 ENCOUNTER — RESULT CHARGE (OUTPATIENT)
Age: 84
End: 2019-04-03

## 2019-04-05 ENCOUNTER — APPOINTMENT (OUTPATIENT)
Dept: ULTRASOUND IMAGING | Facility: HOSPITAL | Age: 84
End: 2019-04-05
Payer: MEDICARE

## 2019-04-05 ENCOUNTER — OUTPATIENT (OUTPATIENT)
Dept: OUTPATIENT SERVICES | Facility: HOSPITAL | Age: 84
LOS: 1 days | End: 2019-04-05
Payer: MEDICARE

## 2019-04-05 ENCOUNTER — LABORATORY RESULT (OUTPATIENT)
Age: 84
End: 2019-04-05

## 2019-04-05 ENCOUNTER — APPOINTMENT (OUTPATIENT)
Dept: ORTHOPEDIC SURGERY | Facility: CLINIC | Age: 84
End: 2019-04-05
Payer: MEDICARE

## 2019-04-05 VITALS — BODY MASS INDEX: 24.15 KG/M2 | HEIGHT: 60 IN | WEIGHT: 123 LBS

## 2019-04-05 DIAGNOSIS — Z98.49 CATARACT EXTRACTION STATUS, UNSPECIFIED EYE: Chronic | ICD-10-CM

## 2019-04-05 DIAGNOSIS — S52.502A UNSPECIFIED FRACTURE OF THE LOWER END OF LEFT RADIUS, INITIAL ENCOUNTER FOR CLOSED FRACTURE: ICD-10-CM

## 2019-04-05 PROCEDURE — 99214 OFFICE O/P EST MOD 30 MIN: CPT | Mod: 25

## 2019-04-05 PROCEDURE — 93970 EXTREMITY STUDY: CPT | Mod: 26

## 2019-04-05 PROCEDURE — 73110 X-RAY EXAM OF WRIST: CPT | Mod: LT

## 2019-04-05 PROCEDURE — 29125 APPL SHORT ARM SPLINT STATIC: CPT | Mod: LT

## 2019-04-05 PROCEDURE — 93970 EXTREMITY STUDY: CPT

## 2019-04-05 NOTE — HISTORY OF PRESENT ILLNESS
[FreeTextEntry1] : 17 days status post left distal radius fracture.  Her daughter told me that her cast came off on its own.  2 days ago.  It was previously split.  She has complaints of pain and swelling.\par \par - She was accompanied by her daughter today.  She lives at home with her daughter.

## 2019-04-05 NOTE — PHYSICAL EXAM
[de-identified] : This is an elderly female in no obvious distress.  She is alert and oriented.  She was accompanied by her daughter today.  She uses a cane.\par \par Examination of her left wrist Demonstrate diffuse swelling of the wrist, hand, and fingers.  It is quite swollen.  There is pitting edema.  She has limited motion.  She is neurovascularly intact distally. [de-identified] : \par PA, lateral, and oblique radiographs of her left wrist today demonstrate her distal radius fracture to be further displaced.  There is significant shortening with positive ulnar variance.

## 2019-04-05 NOTE — PROCEDURE
[FreeTextEntry1] : She was placed into a well-padded, well molded, left short arm Sugar tong splint.  She and her daughter were instructed on strict elevation and range of motion exercises of the digits.  She will follow up in 2 weeks.  She is going straight to get a Doppler ultrasound.  If it is positive, I told him to call me and I will make appropriate arrangements.

## 2019-04-05 NOTE — DISCUSSION/SUMMARY
[FreeTextEntry1] : I recommended placement of a sugar tong splint today.  I ordered a Doppler ultrasound to rule out DVT.  She is on Coumadin, but given the swelling, I would like to make certain that she does not have a DVT.  Finally, I did tell her and her daughter, that she does have a significant fracture, which is displaced and shortened, and she will likely have residual problems with the wrist.  However, given her age, the swelling, her poor bone quality and comorbidities, I am hesitant to recommend surgery, and they would like to avoid it at all costs as well.\par \par had a discussion regarding today's visit, the diagnosis, and my treatment recommendations.  The patient and her daughter have agreed to this plan of management and has expressed full understanding.  All questions were fully answered to their satisfaction.\par \par I spent at least 25 minutes of face-to-face time with the patient.  Over 50% of this time was spent on counseling the patient on the above diagnosis, treatment plan and prognosis.

## 2019-04-15 ENCOUNTER — RESULT CHARGE (OUTPATIENT)
Age: 84
End: 2019-04-15

## 2019-04-17 ENCOUNTER — RESULT CHARGE (OUTPATIENT)
Age: 84
End: 2019-04-17

## 2019-04-17 ENCOUNTER — APPOINTMENT (OUTPATIENT)
Dept: ORTHOPEDIC SURGERY | Facility: CLINIC | Age: 84
End: 2019-04-17
Payer: MEDICARE

## 2019-04-17 PROCEDURE — 99213 OFFICE O/P EST LOW 20 MIN: CPT

## 2019-04-17 PROCEDURE — 73110 X-RAY EXAM OF WRIST: CPT | Mod: LT

## 2019-04-17 NOTE — DISCUSSION/SUMMARY
[FreeTextEntry1] : I again discussed the radiographic findings with her and her daughter.  They do understand that this is a displaced fracture, and she will likely have a deformity of the wrist, and some limitation of motion, and, possibly ulnar sided wrist pain.  However, given her age and physical limitations, a both have agreed to continue nonoperative management.  I did tell him that if she has a significant problem in the future, there are treatment options, such as a distal ulna resection.\par \par I have recommended continued immobilization in the splint, elevation, and range of motion exercises.  She will followup in 2 weeks for xrays out of the splint.\par \par had a discussion regarding today's visit, the diagnosis, and my treatment recommendations.  The patient and her daughter have agreed to this plan of management and has expressed full understanding.  All questions were fully answered to their satisfaction.\par \par I spent at least 25 minutes of face-to-face time with the patient.  Over 50% of this time was spent on counseling the patient on the above diagnosis, treatment plan and prognosis.

## 2019-04-17 NOTE — PHYSICAL EXAM
[de-identified] : This is an elderly female in no obvious distress.  She is alert and oriented.  She was accompanied by her daughter today.  She uses a cane.\par \par Examination of her left wrist demonstrates her sugar tong splint to be well fitting.  There is decreased swelling of the digit with improved motion.  She remains neurovascularly intact distally. [de-identified] : \par PA, lateral, and oblique radiographs of her left wrist demonstrate her distal radius fracture to be healing with significant shortening.  There is significant positive ulnar variance.  There is approximately neutral tilt on the lateral.

## 2019-04-17 NOTE — HISTORY OF PRESENT ILLNESS
[FreeTextEntry1] : 29 days status post left distal radius fracture.  See notes from when she was seen in the office 12 days ago.  She is doing well and her pain is much improved.  She did have a Doppler ultrasound, which demonstrated no evidence of a DVT.\par \par - She was accompanied by her daughter today.  She lives at home with her daughter.

## 2019-04-22 ENCOUNTER — LABORATORY RESULT (OUTPATIENT)
Age: 84
End: 2019-04-22

## 2019-04-29 ENCOUNTER — RESULT CHARGE (OUTPATIENT)
Age: 84
End: 2019-04-29

## 2019-05-01 ENCOUNTER — RESULT CHARGE (OUTPATIENT)
Age: 84
End: 2019-05-01

## 2019-05-02 PROBLEM — S52.502A DISTAL RADIUS FRACTURE, LEFT: Status: ACTIVE | Noted: 2019-04-04

## 2019-05-03 ENCOUNTER — APPOINTMENT (OUTPATIENT)
Dept: ORTHOPEDIC SURGERY | Facility: CLINIC | Age: 84
End: 2019-05-03
Payer: MEDICARE

## 2019-05-03 ENCOUNTER — APPOINTMENT (OUTPATIENT)
Dept: PULMONOLOGY | Facility: CLINIC | Age: 84
End: 2019-05-03
Payer: MEDICARE

## 2019-05-03 VITALS
WEIGHT: 111 LBS | TEMPERATURE: 97.6 F | HEIGHT: 60 IN | OXYGEN SATURATION: 98 % | BODY MASS INDEX: 21.79 KG/M2 | RESPIRATION RATE: 18 BRPM | HEART RATE: 97 BPM | SYSTOLIC BLOOD PRESSURE: 120 MMHG | DIASTOLIC BLOOD PRESSURE: 80 MMHG

## 2019-05-03 DIAGNOSIS — S52.502A UNSPECIFIED FRACTURE OF THE LOWER END OF LEFT RADIUS, INITIAL ENCOUNTER FOR CLOSED FRACTURE: ICD-10-CM

## 2019-05-03 PROCEDURE — 99213 OFFICE O/P EST LOW 20 MIN: CPT

## 2019-05-03 NOTE — REVIEW OF SYSTEMS
[Right] : right [FreeTextEntry2] : Recovering from shingles of the scalp [Negative] : Endocrine [de-identified] : recent episode of shingles with continued itching over right eye [de-identified] : Double vision being followed by ophthalmology

## 2019-05-03 NOTE — REASON FOR VISIT
[Follow-Up Visit] : a follow-up visit for [Follow-Up] : a follow-up visit [COPD] : COPD [FreeTextEntry2] : left distal radius fracture

## 2019-05-03 NOTE — REVIEW OF SYSTEMS
[Right] : right [FreeTextEntry2] : Recovering from shingles of the scalp [Negative] : Endocrine [de-identified] : recent episode of shingles with continued itching over right eye [de-identified] : Double vision being followed by ophthalmology

## 2019-05-03 NOTE — PHYSICAL EXAM
[General Appearance - Well Developed] : well developed [Normal Appearance] : normal appearance [Well Groomed] : well groomed [General Appearance - Well Nourished] : well nourished [No Deformities] : no deformities [Normal Conjunctiva] : the conjunctiva exhibited no abnormalities [Eyelids - No Xanthelasma] : the eyelids demonstrated no xanthelasmas [General Appearance - In No Acute Distress] : no acute distress [Normal Oropharynx] : normal oropharynx [Neck Appearance] : the appearance of the neck was normal [Neck Cervical Mass (___cm)] : no neck mass was observed [Thyroid Diffuse Enlargement] : the thyroid was not enlarged [Jugular Venous Distention Increased] : there was no jugular-venous distention [Thyroid Nodule] : there were no palpable thyroid nodules [Heart Rate And Rhythm] : heart rate and rhythm were normal [Heart Sounds] : normal S1 and S2 [Abdomen Soft] : soft [Abdomen Tenderness] : non-tender [] : no hepato-splenomegaly [Abdomen Mass (___ Cm)] : no abdominal mass palpated [FreeTextEntry1] : Scabbing from shingles resolved [Abnormal Walk] : normal gait [No Focal Deficits] : no focal deficits

## 2019-05-03 NOTE — REVIEW OF SYSTEMS
[Negative] : Endocrine [FreeTextEntry2] : Recovering from shingles of the scalp [de-identified] : recent episode of shingles with continued itching over right eye [de-identified] : Double vision being followed by ophthalmology

## 2019-05-03 NOTE — HISTORY OF PRESENT ILLNESS
[FreeTextEntry1] : 45 days status post left distal radius fracture.  She is improved, and has minimal discomfort.\par \par - She was accompanied by her daughter today.  She lives at home with her daughter.

## 2019-05-03 NOTE — PHYSICAL EXAM
[General Appearance - Well Developed] : well developed [Normal Appearance] : normal appearance [Well Groomed] : well groomed [General Appearance - Well Nourished] : well nourished [No Deformities] : no deformities [General Appearance - In No Acute Distress] : no acute distress [Normal Conjunctiva] : the conjunctiva exhibited no abnormalities [Eyelids - No Xanthelasma] : the eyelids demonstrated no xanthelasmas [Normal Oropharynx] : normal oropharynx [Neck Cervical Mass (___cm)] : no neck mass was observed [Neck Appearance] : the appearance of the neck was normal [Thyroid Diffuse Enlargement] : the thyroid was not enlarged [Jugular Venous Distention Increased] : there was no jugular-venous distention [Thyroid Nodule] : there were no palpable thyroid nodules [Heart Sounds] : normal S1 and S2 [Heart Rate And Rhythm] : heart rate and rhythm were normal [Abdomen Tenderness] : non-tender [Abdomen Soft] : soft [] : no hepato-splenomegaly [Abdomen Mass (___ Cm)] : no abdominal mass palpated [Abnormal Walk] : normal gait [No Focal Deficits] : no focal deficits [FreeTextEntry1] : Scabbing from shingles resolved [de-identified] : This is an elderly female in no obvious distress.  She is alert and oriented.  She was accompanied by her daughter today.  She uses a cane.\par \par Examination of her left wrist after the splint was removed, demonstrates residual although decreased swelling.  She has minimal residual tenderness along the distal radius dorsally.  She has improved motion of the digits.  She remains neurovascularly intact distally. [de-identified] : \par PA, lateral, and oblique radiographs of her left wrist demonstrate her distal radius fracture to be further healed with significant shortening.  There is significant positive ulnar variance.  There is some volar displacement on the lateral.

## 2019-05-03 NOTE — DISCUSSION/SUMMARY
[FreeTextEntry1] : At this time, she was fitted with a removable splint.  She and her daughter were instructed on protection and gentle range of motion exercises.  She will followup in 2 weeks.  Again, they understand that this is a significant fracture and she will have some permanent stiffness and swelling, and likely some pain.  However, given her age, I do believe that she will have an acceptable functional outcome.\par \par I had a discussion regarding today's visit, the diagnosis, and my treatment recommendations.  The patient has agreed to this plan of management and has expressed full understanding.  All questions were fully answered to the patient's satisfaction.\par \par I spent at least 25 minutes of face-to-face time with the patient.  Over 50% of this time was spent on counseling the patient on the above diagnosis, treatment plan and prognosis.

## 2019-05-03 NOTE — PHYSICAL EXAM
[General Appearance - Well Developed] : well developed [Normal Appearance] : normal appearance [Well Groomed] : well groomed [General Appearance - Well Nourished] : well nourished [No Deformities] : no deformities [General Appearance - In No Acute Distress] : no acute distress [Normal Conjunctiva] : the conjunctiva exhibited no abnormalities [Eyelids - No Xanthelasma] : the eyelids demonstrated no xanthelasmas [Normal Oropharynx] : normal oropharynx [Neck Cervical Mass (___cm)] : no neck mass was observed [Neck Appearance] : the appearance of the neck was normal [Jugular Venous Distention Increased] : there was no jugular-venous distention [Thyroid Diffuse Enlargement] : the thyroid was not enlarged [Thyroid Nodule] : there were no palpable thyroid nodules [Heart Sounds] : normal S1 and S2 [Heart Rate And Rhythm] : heart rate and rhythm were normal [Abdomen Tenderness] : non-tender [Abdomen Soft] : soft [] : no hepato-splenomegaly [Abnormal Walk] : normal gait [Abdomen Mass (___ Cm)] : no abdominal mass palpated [No Focal Deficits] : no focal deficits [FreeTextEntry1] : Scabbing from shingles resolved [de-identified] : This is an elderly female in no obvious distress.  She is alert and oriented.  She was accompanied by her daughter today.  She uses a cane.\par \par Examination of her left wrist after the splint was removed, demonstrates residual although decreased swelling.  She has minimal residual tenderness along the distal radius dorsally.  She has improved motion of the digits.  She remains neurovascularly intact distally. [de-identified] : \par PA, lateral, and oblique radiographs of her left wrist demonstrate her distal radius fracture to be further healed with significant shortening.  There is significant positive ulnar variance.  There is some volar displacement on the lateral.

## 2019-05-10 ENCOUNTER — LABORATORY RESULT (OUTPATIENT)
Age: 84
End: 2019-05-10

## 2019-05-13 ENCOUNTER — RESULT CHARGE (OUTPATIENT)
Age: 84
End: 2019-05-13

## 2019-05-15 ENCOUNTER — RESULT CHARGE (OUTPATIENT)
Age: 84
End: 2019-05-15

## 2019-05-20 PROBLEM — S52.502A DISTAL RADIUS FRACTURE, LEFT: Status: ACTIVE | Noted: 2019-03-22

## 2019-05-22 ENCOUNTER — APPOINTMENT (OUTPATIENT)
Dept: ORTHOPEDIC SURGERY | Facility: CLINIC | Age: 84
End: 2019-05-22
Payer: MEDICARE

## 2019-05-22 DIAGNOSIS — S52.502A UNSPECIFIED FRACTURE OF THE LOWER END OF LEFT RADIUS, INITIAL ENCOUNTER FOR CLOSED FRACTURE: ICD-10-CM

## 2019-05-22 PROCEDURE — 99213 OFFICE O/P EST LOW 20 MIN: CPT

## 2019-05-22 PROCEDURE — 73110 X-RAY EXAM OF WRIST: CPT | Mod: LT

## 2019-05-22 NOTE — HISTORY OF PRESENT ILLNESS
[FreeTextEntry1] : 64 days status post left distal radius fracture.  She is improved, and has minimal discomfort.  She is more bothered by the stiffness.\par \par - She was accompanied by her daughter today.  She lives at home with her daughter.

## 2019-05-22 NOTE — PHYSICAL EXAM
[de-identified] : This is an elderly female in no obvious distress.  She is alert and oriented.  She was accompanied by her daughter today.  She uses a cane.\par \par Examination of her left wrist demonstrates residual although decreased swelling.  She has no residual tenderness along the distal radius dorsally.  There is an obvious deformity with prominence of the distal ulna.  She has improved motion of the digits, but does lack terminal flexion and extension.  She has soft endpoints.  She remains neurovascularly intact distally. [de-identified] : \par PA, lateral, and oblique radiographs of her left wrist demonstrate her distal radius fracture to be Essentially healed, with significant radial shortening.

## 2019-05-22 NOTE — DISCUSSION/SUMMARY
[FreeTextEntry1] : She no longer requires the splint.  She and her daughter were instructed on range of motion exercises, and she will advance her activities, according to her symptoms.  She does not wish to go for formal hand therapy.  She will followup in 4-6 weeks if needed.\par \par I had a discussion regarding today's visit, the diagnosis, and my treatment recommendations.  The patient has agreed to this plan of management and has expressed full understanding.  All questions were fully answered to the patient's satisfaction.\par \par I spent at least 25 minutes of face-to-face time with the patient.  Over 50% of this time was spent on counseling the patient on the above diagnosis, treatment plan and prognosis.

## 2019-05-27 ENCOUNTER — RESULT CHARGE (OUTPATIENT)
Age: 84
End: 2019-05-27

## 2019-05-28 ENCOUNTER — LABORATORY RESULT (OUTPATIENT)
Age: 84
End: 2019-05-28

## 2019-05-28 ENCOUNTER — APPOINTMENT (OUTPATIENT)
Dept: ORTHOPEDIC SURGERY | Facility: CLINIC | Age: 84
End: 2019-05-28
Payer: MEDICARE

## 2019-05-28 VITALS
BODY MASS INDEX: 21.6 KG/M2 | HEART RATE: 85 BPM | DIASTOLIC BLOOD PRESSURE: 72 MMHG | WEIGHT: 110 LBS | SYSTOLIC BLOOD PRESSURE: 156 MMHG | HEIGHT: 60 IN

## 2019-05-28 DIAGNOSIS — Z96.649 PRESENCE OF UNSPECIFIED ARTIFICIAL HIP JOINT: ICD-10-CM

## 2019-05-28 PROCEDURE — 99213 OFFICE O/P EST LOW 20 MIN: CPT

## 2019-05-28 PROCEDURE — 73502 X-RAY EXAM HIP UNI 2-3 VIEWS: CPT

## 2019-05-29 ENCOUNTER — RESULT CHARGE (OUTPATIENT)
Age: 84
End: 2019-05-29

## 2019-06-10 ENCOUNTER — LABORATORY RESULT (OUTPATIENT)
Age: 84
End: 2019-06-10

## 2019-06-10 ENCOUNTER — RESULT CHARGE (OUTPATIENT)
Age: 84
End: 2019-06-10

## 2019-06-12 ENCOUNTER — RESULT CHARGE (OUTPATIENT)
Age: 84
End: 2019-06-12

## 2019-06-23 ENCOUNTER — RX RENEWAL (OUTPATIENT)
Age: 84
End: 2019-06-23

## 2019-06-24 ENCOUNTER — RESULT CHARGE (OUTPATIENT)
Age: 84
End: 2019-06-24

## 2019-06-24 ENCOUNTER — LABORATORY RESULT (OUTPATIENT)
Age: 84
End: 2019-06-24

## 2019-06-26 ENCOUNTER — RESULT CHARGE (OUTPATIENT)
Age: 84
End: 2019-06-26

## 2019-07-08 ENCOUNTER — RESULT CHARGE (OUTPATIENT)
Age: 84
End: 2019-07-08

## 2019-07-10 ENCOUNTER — RESULT CHARGE (OUTPATIENT)
Age: 84
End: 2019-07-10

## 2019-07-15 ENCOUNTER — LABORATORY RESULT (OUTPATIENT)
Age: 84
End: 2019-07-15

## 2019-07-22 ENCOUNTER — RESULT CHARGE (OUTPATIENT)
Age: 84
End: 2019-07-22

## 2019-07-22 ENCOUNTER — APPOINTMENT (OUTPATIENT)
Dept: CARDIOLOGY | Facility: CLINIC | Age: 84
End: 2019-07-22
Payer: MEDICARE

## 2019-07-22 ENCOUNTER — NON-APPOINTMENT (OUTPATIENT)
Age: 84
End: 2019-07-22

## 2019-07-22 VITALS
DIASTOLIC BLOOD PRESSURE: 80 MMHG | HEIGHT: 60 IN | SYSTOLIC BLOOD PRESSURE: 160 MMHG | HEART RATE: 75 BPM | RESPIRATION RATE: 17 BRPM | BODY MASS INDEX: 21.6 KG/M2 | OXYGEN SATURATION: 98 % | WEIGHT: 110 LBS

## 2019-07-22 PROCEDURE — 99214 OFFICE O/P EST MOD 30 MIN: CPT

## 2019-07-22 PROCEDURE — 93000 ELECTROCARDIOGRAM COMPLETE: CPT

## 2019-07-22 NOTE — DISCUSSION/SUMMARY
[Persistent Atrial Fibrillation] : persistent atrial fibrillation [Hypertension] : hypertension [Responding to Treatment] : responding to treatment [Outpatient Evaluation] : outpatient evaluation [Ambulatory BP Monitoring] : ambulatory blood pressure monitoring [Continue] : continuing thiazide diuretics [None] : none [Moderate Mitral Regurgitation] : moderate mitral regurgitation [Echocardiogram] : echocardiogram [Atrial Fibrillation] : atrial fibrillation [Stable] : stable [Chest X-Ray] : a chest x-ray [de-identified] : in afib since 9/14, holter reviewed, some pauses at night [de-identified] : better on labetolol  [de-identified] : better BPs at home, machine matches office BP 5/9/18 [de-identified] : follows with dr hess-renal [de-identified] : pleural effusion on echo

## 2019-07-22 NOTE — REVIEW OF SYSTEMS
[see HPI] : see HPI [Anxiety] : anxiety [Negative] : Endocrine [Shortness Of Breath] : no shortness of breath [Chest Pain] : no chest pain [Palpitations] : no palpitations

## 2019-07-22 NOTE — PHYSICAL EXAM
[General Appearance - Well Developed] : well developed [Normal Appearance] : normal appearance [Well Groomed] : well groomed [General Appearance - Well Nourished] : well nourished [No Deformities] : no deformities [General Appearance - In No Acute Distress] : no acute distress [Normal Conjunctiva] : the conjunctiva exhibited no abnormalities [Normal Oral Mucosa] : normal oral mucosa [Eyelids - No Xanthelasma] : the eyelids demonstrated no xanthelasmas [No Oral Pallor] : no oral pallor [No Oral Cyanosis] : no oral cyanosis [Normal Jugular Venous A Waves Present] : normal jugular venous A waves present [Normal Jugular Venous V Waves Present] : normal jugular venous V waves present [No Jugular Venous Francis A Waves] : no jugular venous francis A waves [Respiration, Rhythm And Depth] : normal respiratory rhythm and effort [Auscultation Breath Sounds / Voice Sounds] : lungs were clear to auscultation bilaterally [Exaggerated Use Of Accessory Muscles For Inspiration] : no accessory muscle use [Heart Sounds] : normal S1 and S2 [Heart Rate And Rhythm] : heart rate and rhythm were normal [Murmurs] : no murmurs present [Abdomen Soft] : soft [Abdomen Tenderness] : non-tender [Abdomen Mass (___ Cm)] : no abdominal mass palpated [Abnormal Walk] : normal gait [Gait - Sufficient For Exercise Testing] : the gait was sufficient for exercise testing [Nail Clubbing] : no clubbing of the fingernails [Cyanosis, Localized] : no localized cyanosis [Petechial Hemorrhages (___cm)] : no petechial hemorrhages [Skin Color & Pigmentation] : normal skin color and pigmentation [No Venous Stasis] : no venous stasis [] : no rash [No Skin Ulcers] : no skin ulcer [Skin Lesions] : no skin lesions [No Xanthoma] : no  xanthoma was observed [Oriented To Time, Place, And Person] : oriented to person, place, and time [Affect] : the affect was normal [Mood] : the mood was normal [No Anxiety] : not feeling anxious [FreeTextEntry1] : Doing well for given her age

## 2019-07-29 ENCOUNTER — LABORATORY RESULT (OUTPATIENT)
Age: 84
End: 2019-07-29

## 2019-08-26 ENCOUNTER — LABORATORY RESULT (OUTPATIENT)
Age: 84
End: 2019-08-26

## 2019-09-09 ENCOUNTER — LABORATORY RESULT (OUTPATIENT)
Age: 84
End: 2019-09-09

## 2019-09-17 ENCOUNTER — RX RENEWAL (OUTPATIENT)
Age: 84
End: 2019-09-17

## 2019-09-18 ENCOUNTER — RX RENEWAL (OUTPATIENT)
Age: 84
End: 2019-09-18

## 2019-09-19 ENCOUNTER — RX RENEWAL (OUTPATIENT)
Age: 84
End: 2019-09-19

## 2019-09-23 ENCOUNTER — LABORATORY RESULT (OUTPATIENT)
Age: 84
End: 2019-09-23

## 2019-10-02 ENCOUNTER — APPOINTMENT (OUTPATIENT)
Dept: CARDIOLOGY | Facility: CLINIC | Age: 84
End: 2019-10-02
Payer: MEDICARE

## 2019-10-02 PROCEDURE — 93288 INTERROG EVL PM/LDLS PM IP: CPT

## 2019-10-02 NOTE — PROCEDURE
[Atrial Fibrillation] : atrial fibrillation [Pacemaker] : pacemaker [VVI] : VVI [Voltage: ___ volts] : Voltage was [unfilled] volts [Longevity: ___ months] : The estimated remaining battery life is [unfilled] months [Threshold Testing Performed] : Threshold testing was performed [Ventricular] : Ventricular [Lead Imp:  ___ohms] : lead impedance was [unfilled] ohms [Sensing Amplitude ___mv] : sensing amplitude was [unfilled] mv [___V @] : [unfilled] V [___ ms] : [unfilled] ms [Sense ___ %] : Sense [unfilled]% [Pace ___ %] : Pace [unfilled]% [de-identified] : medtronic [de-identified] : [id number] [de-identified] : 12/2/2015 [de-identified] : 60 [de-identified] : \par normal device function

## 2019-10-07 ENCOUNTER — LABORATORY RESULT (OUTPATIENT)
Age: 84
End: 2019-10-07

## 2019-10-21 ENCOUNTER — LABORATORY RESULT (OUTPATIENT)
Age: 84
End: 2019-10-21

## 2019-11-04 ENCOUNTER — LABORATORY RESULT (OUTPATIENT)
Age: 84
End: 2019-11-04

## 2019-11-07 ENCOUNTER — FORM ENCOUNTER (OUTPATIENT)
Age: 84
End: 2019-11-07

## 2019-11-08 ENCOUNTER — OUTPATIENT (OUTPATIENT)
Dept: OUTPATIENT SERVICES | Facility: HOSPITAL | Age: 84
LOS: 1 days | End: 2019-11-08
Payer: MEDICARE

## 2019-11-08 ENCOUNTER — APPOINTMENT (OUTPATIENT)
Dept: INTERNAL MEDICINE | Facility: CLINIC | Age: 84
End: 2019-11-08
Payer: MEDICARE

## 2019-11-08 ENCOUNTER — APPOINTMENT (OUTPATIENT)
Dept: PULMONOLOGY | Facility: CLINIC | Age: 84
End: 2019-11-08
Payer: MEDICARE

## 2019-11-08 ENCOUNTER — APPOINTMENT (OUTPATIENT)
Dept: RADIOLOGY | Facility: HOSPITAL | Age: 84
End: 2019-11-08
Payer: MEDICARE

## 2019-11-08 VITALS
OXYGEN SATURATION: 97 % | BODY MASS INDEX: 23.36 KG/M2 | DIASTOLIC BLOOD PRESSURE: 80 MMHG | WEIGHT: 119 LBS | SYSTOLIC BLOOD PRESSURE: 120 MMHG | HEIGHT: 60 IN | RESPIRATION RATE: 18 BRPM | HEART RATE: 102 BPM

## 2019-11-08 DIAGNOSIS — Z98.49 CATARACT EXTRACTION STATUS, UNSPECIFIED EYE: Chronic | ICD-10-CM

## 2019-11-08 DIAGNOSIS — Z23 ENCOUNTER FOR IMMUNIZATION: ICD-10-CM

## 2019-11-08 DIAGNOSIS — E83.52 HYPERCALCEMIA: ICD-10-CM

## 2019-11-08 DIAGNOSIS — J18.1 LOBAR PNEUMONIA, UNSPECIFIED ORGANISM: ICD-10-CM

## 2019-11-08 DIAGNOSIS — J90 PLEURAL EFFUSION, NOT ELSEWHERE CLASSIFIED: ICD-10-CM

## 2019-11-08 PROCEDURE — 90662 IIV NO PRSV INCREASED AG IM: CPT

## 2019-11-08 PROCEDURE — G0008: CPT

## 2019-11-08 PROCEDURE — 71046 X-RAY EXAM CHEST 2 VIEWS: CPT | Mod: 26

## 2019-11-08 PROCEDURE — 99213 OFFICE O/P EST LOW 20 MIN: CPT

## 2019-11-08 PROCEDURE — 71046 X-RAY EXAM CHEST 2 VIEWS: CPT

## 2019-11-08 NOTE — PHYSICAL EXAM
[General Appearance - Well Developed] : well developed [Well Groomed] : well groomed [Normal Appearance] : normal appearance [General Appearance - Well Nourished] : well nourished [No Deformities] : no deformities [General Appearance - In No Acute Distress] : no acute distress [Normal Conjunctiva] : the conjunctiva exhibited no abnormalities [Eyelids - No Xanthelasma] : the eyelids demonstrated no xanthelasmas [Normal Oropharynx] : normal oropharynx [Neck Appearance] : the appearance of the neck was normal [Neck Cervical Mass (___cm)] : no neck mass was observed [Thyroid Diffuse Enlargement] : the thyroid was not enlarged [Jugular Venous Distention Increased] : there was no jugular-venous distention [Thyroid Nodule] : there were no palpable thyroid nodules [Heart Rate And Rhythm] : heart rate and rhythm were normal [Heart Sounds] : normal S1 and S2 [Abdomen Soft] : soft [Abdomen Tenderness] : non-tender [] : no hepato-splenomegaly [Abdomen Mass (___ Cm)] : no abdominal mass palpated [Abnormal Walk] : normal gait [FreeTextEntry1] : Scabbing from shingles resolved [No Focal Deficits] : no focal deficits

## 2019-11-11 PROBLEM — E83.52 HYPERCALCEMIA: Status: ACTIVE | Noted: 2019-11-11

## 2019-11-11 LAB
ANION GAP SERPL CALC-SCNC: 12 MMOL/L
BASOPHILS # BLD AUTO: 0.02 K/UL
BASOPHILS NFR BLD AUTO: 0.3 %
BUN SERPL-MCNC: 28 MG/DL
CALCIUM SERPL-MCNC: 10.8 MG/DL
CHLORIDE SERPL-SCNC: 102 MMOL/L
CO2 SERPL-SCNC: 27 MMOL/L
CREAT SERPL-MCNC: 1.29 MG/DL
EOSINOPHIL # BLD AUTO: 0.12 K/UL
EOSINOPHIL NFR BLD AUTO: 2.1 %
GLUCOSE SERPL-MCNC: 93 MG/DL
HCT VFR BLD CALC: 37.7 %
HGB BLD-MCNC: 11.9 G/DL
IMM GRANULOCYTES NFR BLD AUTO: 0.3 %
LYMPHOCYTES # BLD AUTO: 1.03 K/UL
LYMPHOCYTES NFR BLD AUTO: 17.9 %
MAN DIFF?: NORMAL
MCHC RBC-ENTMCNC: 29.6 PG
MCHC RBC-ENTMCNC: 31.6 GM/DL
MCV RBC AUTO: 93.8 FL
MONOCYTES # BLD AUTO: 0.57 K/UL
MONOCYTES NFR BLD AUTO: 9.9 %
NEUTROPHILS # BLD AUTO: 3.98 K/UL
NEUTROPHILS NFR BLD AUTO: 69.5 %
PLATELET # BLD AUTO: 159 K/UL
POTASSIUM SERPL-SCNC: 4.7 MMOL/L
RBC # BLD: 4.02 M/UL
RBC # FLD: 14.1 %
SODIUM SERPL-SCNC: 141 MMOL/L
WBC # FLD AUTO: 5.74 K/UL

## 2019-11-25 ENCOUNTER — LABORATORY RESULT (OUTPATIENT)
Age: 84
End: 2019-11-25

## 2019-11-25 ENCOUNTER — APPOINTMENT (OUTPATIENT)
Dept: CARDIOLOGY | Facility: CLINIC | Age: 84
End: 2019-11-25
Payer: MEDICARE

## 2019-11-25 ENCOUNTER — NON-APPOINTMENT (OUTPATIENT)
Age: 84
End: 2019-11-25

## 2019-11-25 VITALS
OXYGEN SATURATION: 98 % | HEART RATE: 78 BPM | WEIGHT: 120 LBS | RESPIRATION RATE: 17 BRPM | SYSTOLIC BLOOD PRESSURE: 160 MMHG | HEIGHT: 60 IN | BODY MASS INDEX: 23.56 KG/M2 | DIASTOLIC BLOOD PRESSURE: 63 MMHG

## 2019-11-25 VITALS — DIASTOLIC BLOOD PRESSURE: 60 MMHG | SYSTOLIC BLOOD PRESSURE: 148 MMHG

## 2019-11-25 PROCEDURE — 99214 OFFICE O/P EST MOD 30 MIN: CPT

## 2019-11-25 PROCEDURE — 93000 ELECTROCARDIOGRAM COMPLETE: CPT

## 2019-11-25 NOTE — REASON FOR VISIT
[Follow-Up - Clinic] : a clinic follow-up of [Hypertension] : hypertension [FreeTextEntry2] : no new sob or sscp, feels well, s/p fall, arm fx,leg fx [FreeTextEntry1] : no recent cp, less dizziness and fatigue with low bp, no more chest pain

## 2019-11-25 NOTE — PHYSICAL EXAM
[Well Groomed] : well groomed [Normal Appearance] : normal appearance [General Appearance - Well Developed] : well developed [No Deformities] : no deformities [General Appearance - Well Nourished] : well nourished [General Appearance - In No Acute Distress] : no acute distress [Normal Conjunctiva] : the conjunctiva exhibited no abnormalities [Eyelids - No Xanthelasma] : the eyelids demonstrated no xanthelasmas [Normal Oral Mucosa] : normal oral mucosa [No Oral Pallor] : no oral pallor [No Oral Cyanosis] : no oral cyanosis [Normal Jugular Venous A Waves Present] : normal jugular venous A waves present [Normal Jugular Venous V Waves Present] : normal jugular venous V waves present [No Jugular Venous Francis A Waves] : no jugular venous francis A waves [Respiration, Rhythm And Depth] : normal respiratory rhythm and effort [Exaggerated Use Of Accessory Muscles For Inspiration] : no accessory muscle use [Auscultation Breath Sounds / Voice Sounds] : lungs were clear to auscultation bilaterally [Murmurs] : no murmurs present [Heart Rate And Rhythm] : heart rate and rhythm were normal [Heart Sounds] : normal S1 and S2 [Abdomen Soft] : soft [Abdomen Tenderness] : non-tender [Abdomen Mass (___ Cm)] : no abdominal mass palpated [Abnormal Walk] : normal gait [Nail Clubbing] : no clubbing of the fingernails [Gait - Sufficient For Exercise Testing] : the gait was sufficient for exercise testing [Petechial Hemorrhages (___cm)] : no petechial hemorrhages [Cyanosis, Localized] : no localized cyanosis [Skin Color & Pigmentation] : normal skin color and pigmentation [] : no rash [No Venous Stasis] : no venous stasis [Skin Lesions] : no skin lesions [No Xanthoma] : no  xanthoma was observed [No Skin Ulcers] : no skin ulcer [Oriented To Time, Place, And Person] : oriented to person, place, and time [Affect] : the affect was normal [Mood] : the mood was normal [No Anxiety] : not feeling anxious [FreeTextEntry1] : Doing well for given her age

## 2019-11-25 NOTE — DISCUSSION/SUMMARY
[Persistent Atrial Fibrillation] : persistent atrial fibrillation [Hypertension] : hypertension [Outpatient Evaluation] : outpatient evaluation [Responding to Treatment] : responding to treatment [Ambulatory BP Monitoring] : ambulatory blood pressure monitoring [None] : none [Continue] : continuing thiazide diuretics [Moderate Mitral Regurgitation] : moderate mitral regurgitation [Atrial Fibrillation] : atrial fibrillation [Stable] : stable [Echocardiogram] : echocardiogram [Chest X-Ray] : a chest x-ray [de-identified] : in afib since 9/14, holter reviewed, some pauses at night [de-identified] : better on labetolol  [de-identified] : better BPs at home, machine matches office BP 5/9/18 [de-identified] : follows with dr hess-renal [de-identified] : pleural effusion on echo

## 2019-11-30 ENCOUNTER — RX RENEWAL (OUTPATIENT)
Age: 84
End: 2019-11-30

## 2019-12-09 ENCOUNTER — LABORATORY RESULT (OUTPATIENT)
Age: 84
End: 2019-12-09

## 2019-12-23 ENCOUNTER — LABORATORY RESULT (OUTPATIENT)
Age: 84
End: 2019-12-23

## 2019-12-26 ENCOUNTER — RX RENEWAL (OUTPATIENT)
Age: 84
End: 2019-12-26

## 2020-01-01 ENCOUNTER — INPATIENT (INPATIENT)
Facility: HOSPITAL | Age: 85
LOS: 5 days | Discharge: HOME | DRG: 85 | End: 2020-09-23
Attending: INTERNAL MEDICINE | Admitting: INTERNAL MEDICINE
Payer: MEDICARE

## 2020-01-01 ENCOUNTER — TRANSCRIPTION ENCOUNTER (OUTPATIENT)
Age: 85
End: 2020-01-01

## 2020-01-01 ENCOUNTER — RX RENEWAL (OUTPATIENT)
Age: 85
End: 2020-01-01

## 2020-01-01 ENCOUNTER — APPOINTMENT (OUTPATIENT)
Dept: NEUROLOGY | Facility: CLINIC | Age: 85
End: 2020-01-01
Payer: MEDICARE

## 2020-01-01 ENCOUNTER — NON-APPOINTMENT (OUTPATIENT)
Age: 85
End: 2020-01-01

## 2020-01-01 ENCOUNTER — OUTPATIENT (OUTPATIENT)
Dept: OUTPATIENT SERVICES | Facility: HOSPITAL | Age: 85
LOS: 1 days | End: 2020-01-01
Payer: MEDICARE

## 2020-01-01 ENCOUNTER — APPOINTMENT (OUTPATIENT)
Dept: PHYSICAL MEDICINE AND REHAB | Facility: CLINIC | Age: 85
End: 2020-01-01

## 2020-01-01 ENCOUNTER — APPOINTMENT (OUTPATIENT)
Dept: CT IMAGING | Facility: HOSPITAL | Age: 85
End: 2020-01-01

## 2020-01-01 ENCOUNTER — LABORATORY RESULT (OUTPATIENT)
Age: 85
End: 2020-01-01

## 2020-01-01 ENCOUNTER — APPOINTMENT (OUTPATIENT)
Dept: INTERNAL MEDICINE | Facility: CLINIC | Age: 85
End: 2020-01-01
Payer: MEDICARE

## 2020-01-01 ENCOUNTER — APPOINTMENT (OUTPATIENT)
Dept: CARDIOLOGY | Facility: CLINIC | Age: 85
End: 2020-01-01
Payer: MEDICARE

## 2020-01-01 ENCOUNTER — APPOINTMENT (OUTPATIENT)
Dept: OTOLARYNGOLOGY | Facility: CLINIC | Age: 85
End: 2020-01-01
Payer: MEDICARE

## 2020-01-01 ENCOUNTER — MED ADMIN CHARGE (OUTPATIENT)
Age: 85
End: 2020-01-01

## 2020-01-01 ENCOUNTER — INPATIENT (INPATIENT)
Facility: HOSPITAL | Age: 85
LOS: 3 days | Discharge: HOME | DRG: 280 | End: 2020-09-03
Attending: HOSPITALIST | Admitting: INTERNAL MEDICINE
Payer: MEDICARE

## 2020-01-01 ENCOUNTER — INPATIENT (INPATIENT)
Facility: HOSPITAL | Age: 85
LOS: 13 days | Discharge: HOME HEALTH | DRG: 949 | End: 2020-10-07
Attending: STUDENT IN AN ORGANIZED HEALTH CARE EDUCATION/TRAINING PROGRAM | Admitting: STUDENT IN AN ORGANIZED HEALTH CARE EDUCATION/TRAINING PROGRAM
Payer: MEDICARE

## 2020-01-01 ENCOUNTER — APPOINTMENT (OUTPATIENT)
Dept: PULMONOLOGY | Facility: CLINIC | Age: 85
End: 2020-01-01

## 2020-01-01 ENCOUNTER — APPOINTMENT (OUTPATIENT)
Dept: CARDIOLOGY | Facility: CLINIC | Age: 85
End: 2020-01-01

## 2020-01-01 ENCOUNTER — APPOINTMENT (OUTPATIENT)
Dept: INTERNAL MEDICINE | Facility: CLINIC | Age: 85
End: 2020-01-01

## 2020-01-01 VITALS
WEIGHT: 118 LBS | SYSTOLIC BLOOD PRESSURE: 138 MMHG | DIASTOLIC BLOOD PRESSURE: 80 MMHG | TEMPERATURE: 98.1 F | HEIGHT: 60 IN | BODY MASS INDEX: 23.16 KG/M2

## 2020-01-01 VITALS
SYSTOLIC BLOOD PRESSURE: 220 MMHG | OXYGEN SATURATION: 98 % | HEART RATE: 124 BPM | HEIGHT: 60 IN | RESPIRATION RATE: 20 BRPM | DIASTOLIC BLOOD PRESSURE: 112 MMHG | WEIGHT: 100.09 LBS

## 2020-01-01 VITALS
TEMPERATURE: 97 F | OXYGEN SATURATION: 94 % | DIASTOLIC BLOOD PRESSURE: 58 MMHG | WEIGHT: 118 LBS | SYSTOLIC BLOOD PRESSURE: 116 MMHG | HEIGHT: 60 IN | HEART RATE: 73 BPM | BODY MASS INDEX: 23.16 KG/M2

## 2020-01-01 VITALS
RESPIRATION RATE: 16 BRPM | SYSTOLIC BLOOD PRESSURE: 118 MMHG | OXYGEN SATURATION: 99 % | HEART RATE: 72 BPM | TEMPERATURE: 97.5 F | HEIGHT: 60 IN | DIASTOLIC BLOOD PRESSURE: 64 MMHG

## 2020-01-01 VITALS
HEART RATE: 90 BPM | OXYGEN SATURATION: 96 % | DIASTOLIC BLOOD PRESSURE: 70 MMHG | SYSTOLIC BLOOD PRESSURE: 183 MMHG | TEMPERATURE: 98 F | RESPIRATION RATE: 14 BRPM

## 2020-01-01 VITALS
DIASTOLIC BLOOD PRESSURE: 64 MMHG | HEART RATE: 107 BPM | OXYGEN SATURATION: 97 % | RESPIRATION RATE: 18 BRPM | SYSTOLIC BLOOD PRESSURE: 151 MMHG | TEMPERATURE: 98 F

## 2020-01-01 VITALS
TEMPERATURE: 98 F | SYSTOLIC BLOOD PRESSURE: 146 MMHG | RESPIRATION RATE: 18 BRPM | HEART RATE: 83 BPM | OXYGEN SATURATION: 98 % | DIASTOLIC BLOOD PRESSURE: 63 MMHG

## 2020-01-01 VITALS
WEIGHT: 118 LBS | SYSTOLIC BLOOD PRESSURE: 122 MMHG | HEIGHT: 60 IN | RESPIRATION RATE: 16 BRPM | HEART RATE: 74 BPM | BODY MASS INDEX: 23.16 KG/M2 | DIASTOLIC BLOOD PRESSURE: 64 MMHG | TEMPERATURE: 97.9 F | OXYGEN SATURATION: 98 %

## 2020-01-01 VITALS
SYSTOLIC BLOOD PRESSURE: 161 MMHG | RESPIRATION RATE: 15 BRPM | HEART RATE: 75 BPM | DIASTOLIC BLOOD PRESSURE: 71 MMHG | WEIGHT: 110.01 LBS | TEMPERATURE: 98 F | OXYGEN SATURATION: 99 % | HEIGHT: 60 IN

## 2020-01-01 VITALS — WEIGHT: 100.09 LBS | HEIGHT: 60 IN

## 2020-01-01 DIAGNOSIS — S06.6X9A TRAUMATIC SUBARACHNOID HEMORRHAGE WITH LOSS OF CONSCIOUSNESS OF UNSPECIFIED DURATION, INITIAL ENCOUNTER: ICD-10-CM

## 2020-01-01 DIAGNOSIS — R53.1 WEAKNESS: ICD-10-CM

## 2020-01-01 DIAGNOSIS — M79.89 OTHER SPECIFIED SOFT TISSUE DISORDERS: ICD-10-CM

## 2020-01-01 DIAGNOSIS — H35.30 UNSPECIFIED MACULAR DEGENERATION: ICD-10-CM

## 2020-01-01 DIAGNOSIS — I48.91 UNSPECIFIED ATRIAL FIBRILLATION: ICD-10-CM

## 2020-01-01 DIAGNOSIS — I10 ESSENTIAL (PRIMARY) HYPERTENSION: ICD-10-CM

## 2020-01-01 DIAGNOSIS — Z23 ENCOUNTER FOR IMMUNIZATION: ICD-10-CM

## 2020-01-01 DIAGNOSIS — H61.22 IMPACTED CERUMEN, LEFT EAR: ICD-10-CM

## 2020-01-01 DIAGNOSIS — I60.9 NONTRAUMATIC SUBARACHNOID HEMORRHAGE, UNSPECIFIED: ICD-10-CM

## 2020-01-01 DIAGNOSIS — H90.5 UNSPECIFIED SENSORINEURAL HEARING LOSS: ICD-10-CM

## 2020-01-01 DIAGNOSIS — N39.0 URINARY TRACT INFECTION, SITE NOT SPECIFIED: ICD-10-CM

## 2020-01-01 DIAGNOSIS — Z98.49 CATARACT EXTRACTION STATUS, UNSPECIFIED EYE: Chronic | ICD-10-CM

## 2020-01-01 DIAGNOSIS — Z95.0 PRESENCE OF CARDIAC PACEMAKER: ICD-10-CM

## 2020-01-01 DIAGNOSIS — J47.9 BRONCHIECTASIS, UNCOMPLICATED: ICD-10-CM

## 2020-01-01 DIAGNOSIS — I63.9 CEREBRAL INFARCTION, UNSPECIFIED: ICD-10-CM

## 2020-01-01 DIAGNOSIS — S06.6X0D TRAUMATIC SUBARACHNOID HEMORRHAGE WITHOUT LOSS OF CONSCIOUSNESS, SUBSEQUENT ENCOUNTER: ICD-10-CM

## 2020-01-01 DIAGNOSIS — R42 DIZZINESS AND GIDDINESS: ICD-10-CM

## 2020-01-01 DIAGNOSIS — R55 SYNCOPE AND COLLAPSE: ICD-10-CM

## 2020-01-01 DIAGNOSIS — F03.90 UNSPECIFIED DEMENTIA W/OUT BEHAVIORAL DISTURBANCE: ICD-10-CM

## 2020-01-01 DIAGNOSIS — F41.9 ANXIETY DISORDER, UNSPECIFIED: ICD-10-CM

## 2020-01-01 DIAGNOSIS — N17.9 ACUTE KIDNEY FAILURE, UNSPECIFIED: ICD-10-CM

## 2020-01-01 DIAGNOSIS — F32.9 ANXIETY DISORDER, UNSPECIFIED: ICD-10-CM

## 2020-01-01 DIAGNOSIS — R60.0 LOCALIZED EDEMA: ICD-10-CM

## 2020-01-01 DIAGNOSIS — K51.90 ULCERATIVE COLITIS, UNSPECIFIED, W/OUT COMPLICATIONS: ICD-10-CM

## 2020-01-01 LAB
ALBUMIN SERPL ELPH-MCNC: 2.2 G/DL — LOW (ref 3.3–5)
ALBUMIN SERPL ELPH-MCNC: 2.4 G/DL — LOW (ref 3.3–5)
ALBUMIN SERPL ELPH-MCNC: 2.5 G/DL — LOW (ref 3.3–5)
ALBUMIN SERPL ELPH-MCNC: 2.5 G/DL — LOW (ref 3.3–5)
ALBUMIN SERPL ELPH-MCNC: 3 G/DL — LOW (ref 3.3–5)
ALBUMIN SERPL ELPH-MCNC: 3.4 G/DL — SIGNIFICANT CHANGE UP (ref 3.3–5)
ALBUMIN SERPL ELPH-MCNC: 3.7 G/DL — SIGNIFICANT CHANGE UP (ref 3.3–5)
ALP SERPL-CCNC: 103 U/L — SIGNIFICANT CHANGE UP (ref 40–120)
ALP SERPL-CCNC: 107 U/L — SIGNIFICANT CHANGE UP (ref 40–120)
ALP SERPL-CCNC: 77 U/L — SIGNIFICANT CHANGE UP (ref 40–120)
ALP SERPL-CCNC: 80 U/L — SIGNIFICANT CHANGE UP (ref 40–120)
ALP SERPL-CCNC: 88 U/L — SIGNIFICANT CHANGE UP (ref 40–120)
ALP SERPL-CCNC: 94 U/L — SIGNIFICANT CHANGE UP (ref 40–120)
ALP SERPL-CCNC: 99 U/L — SIGNIFICANT CHANGE UP (ref 40–120)
ALT FLD-CCNC: 16 U/L — SIGNIFICANT CHANGE UP (ref 10–45)
ALT FLD-CCNC: 19 U/L — SIGNIFICANT CHANGE UP (ref 10–45)
ALT FLD-CCNC: 20 U/L — SIGNIFICANT CHANGE UP (ref 10–45)
ALT FLD-CCNC: 26 U/L — SIGNIFICANT CHANGE UP (ref 10–45)
ANION GAP SERPL CALC-SCNC: 10 MMOL/L — SIGNIFICANT CHANGE UP (ref 5–17)
ANION GAP SERPL CALC-SCNC: 12 MMOL/L — SIGNIFICANT CHANGE UP (ref 5–17)
ANION GAP SERPL CALC-SCNC: 13 MMOL/L — SIGNIFICANT CHANGE UP (ref 5–17)
ANION GAP SERPL CALC-SCNC: 5 MMOL/L — SIGNIFICANT CHANGE UP (ref 5–17)
ANION GAP SERPL CALC-SCNC: 6 MMOL/L — SIGNIFICANT CHANGE UP (ref 5–17)
ANION GAP SERPL CALC-SCNC: 7 MMOL/L — SIGNIFICANT CHANGE UP (ref 5–17)
ANION GAP SERPL CALC-SCNC: 8 MMOL/L — SIGNIFICANT CHANGE UP (ref 5–17)
ANION GAP SERPL CALC-SCNC: 9 MMOL/L — SIGNIFICANT CHANGE UP (ref 5–17)
APPEARANCE UR: ABNORMAL
APPEARANCE UR: CLEAR — SIGNIFICANT CHANGE UP
APPEARANCE UR: CLEAR — SIGNIFICANT CHANGE UP
APTT BLD: 32.4 SEC — SIGNIFICANT CHANGE UP (ref 27.5–35.5)
AST SERPL-CCNC: 13 U/L — SIGNIFICANT CHANGE UP (ref 10–40)
AST SERPL-CCNC: 16 U/L — SIGNIFICANT CHANGE UP (ref 10–40)
AST SERPL-CCNC: 19 U/L — SIGNIFICANT CHANGE UP (ref 10–40)
AST SERPL-CCNC: 23 U/L — SIGNIFICANT CHANGE UP (ref 10–40)
AST SERPL-CCNC: 23 U/L — SIGNIFICANT CHANGE UP (ref 10–40)
AST SERPL-CCNC: 24 U/L — SIGNIFICANT CHANGE UP (ref 10–40)
AST SERPL-CCNC: 30 U/L — SIGNIFICANT CHANGE UP (ref 10–40)
BACTERIA # UR AUTO: ABNORMAL /HPF
BACTERIA # UR AUTO: ABNORMAL /HPF
BACTERIA # UR AUTO: NEGATIVE /HPF — SIGNIFICANT CHANGE UP
BASOPHILS # BLD AUTO: 0.02 K/UL — SIGNIFICANT CHANGE UP (ref 0–0.2)
BASOPHILS # BLD AUTO: 0.03 K/UL — SIGNIFICANT CHANGE UP (ref 0–0.2)
BASOPHILS # BLD AUTO: 0.04 K/UL — SIGNIFICANT CHANGE UP (ref 0–0.2)
BASOPHILS # BLD AUTO: 0.04 K/UL — SIGNIFICANT CHANGE UP (ref 0–0.2)
BASOPHILS NFR BLD AUTO: 0.3 % — SIGNIFICANT CHANGE UP (ref 0–2)
BASOPHILS NFR BLD AUTO: 0.4 % — SIGNIFICANT CHANGE UP (ref 0–2)
BASOPHILS NFR BLD AUTO: 0.5 % — SIGNIFICANT CHANGE UP (ref 0–2)
BASOPHILS NFR BLD AUTO: 0.6 % — SIGNIFICANT CHANGE UP (ref 0–2)
BILIRUB SERPL-MCNC: 0.3 MG/DL — SIGNIFICANT CHANGE UP (ref 0.2–1.2)
BILIRUB SERPL-MCNC: 0.4 MG/DL — SIGNIFICANT CHANGE UP (ref 0.2–1.2)
BILIRUB SERPL-MCNC: 0.4 MG/DL — SIGNIFICANT CHANGE UP (ref 0.2–1.2)
BILIRUB SERPL-MCNC: 0.6 MG/DL — SIGNIFICANT CHANGE UP (ref 0.2–1.2)
BILIRUB SERPL-MCNC: 0.6 MG/DL — SIGNIFICANT CHANGE UP (ref 0.2–1.2)
BILIRUB SERPL-MCNC: 0.8 MG/DL — SIGNIFICANT CHANGE UP (ref 0.2–1.2)
BILIRUB SERPL-MCNC: 1.2 MG/DL — SIGNIFICANT CHANGE UP (ref 0.2–1.2)
BILIRUB UR-MCNC: NEGATIVE — SIGNIFICANT CHANGE UP
BLD GP AB SCN SERPL QL: SIGNIFICANT CHANGE UP
BUN SERPL-MCNC: 18 MG/DL — SIGNIFICANT CHANGE UP (ref 7–23)
BUN SERPL-MCNC: 21 MG/DL — SIGNIFICANT CHANGE UP (ref 7–23)
BUN SERPL-MCNC: 22 MG/DL — SIGNIFICANT CHANGE UP (ref 7–23)
BUN SERPL-MCNC: 23 MG/DL — SIGNIFICANT CHANGE UP (ref 7–23)
BUN SERPL-MCNC: 24 MG/DL — HIGH (ref 7–23)
BUN SERPL-MCNC: 24 MG/DL — HIGH (ref 7–23)
BUN SERPL-MCNC: 28 MG/DL — HIGH (ref 7–23)
BUN SERPL-MCNC: 28 MG/DL — HIGH (ref 7–23)
BUN SERPL-MCNC: 29 MG/DL — HIGH (ref 7–23)
BUN SERPL-MCNC: 30 MG/DL — HIGH (ref 7–23)
BUN SERPL-MCNC: 33 MG/DL — HIGH (ref 7–23)
BUN SERPL-MCNC: 38 MG/DL — HIGH (ref 7–23)
BUN SERPL-MCNC: 51 MG/DL — HIGH (ref 7–23)
BUN SERPL-MCNC: 59 MG/DL — HIGH (ref 7–23)
CALCIUM SERPL-MCNC: 10 MG/DL — SIGNIFICANT CHANGE UP (ref 8.4–10.5)
CALCIUM SERPL-MCNC: 10.1 MG/DL — SIGNIFICANT CHANGE UP (ref 8.4–10.5)
CALCIUM SERPL-MCNC: 10.1 MG/DL — SIGNIFICANT CHANGE UP (ref 8.4–10.5)
CALCIUM SERPL-MCNC: 10.4 MG/DL — SIGNIFICANT CHANGE UP (ref 8.4–10.5)
CALCIUM SERPL-MCNC: 10.4 MG/DL — SIGNIFICANT CHANGE UP (ref 8.4–10.5)
CALCIUM SERPL-MCNC: 10.5 MG/DL — SIGNIFICANT CHANGE UP (ref 8.4–10.5)
CALCIUM SERPL-MCNC: 10.6 MG/DL — HIGH (ref 8.4–10.5)
CALCIUM SERPL-MCNC: 10.8 MG/DL — HIGH (ref 8.4–10.5)
CALCIUM SERPL-MCNC: 10.9 MG/DL — HIGH (ref 8.4–10.5)
CALCIUM SERPL-MCNC: 10.9 MG/DL — HIGH (ref 8.4–10.5)
CALCIUM SERPL-MCNC: 11 MG/DL — HIGH (ref 8.4–10.5)
CALCIUM SERPL-MCNC: 9.9 MG/DL — SIGNIFICANT CHANGE UP (ref 8.4–10.5)
CHLORIDE SERPL-SCNC: 100 MMOL/L — SIGNIFICANT CHANGE UP (ref 96–108)
CHLORIDE SERPL-SCNC: 100 MMOL/L — SIGNIFICANT CHANGE UP (ref 96–108)
CHLORIDE SERPL-SCNC: 101 MMOL/L — SIGNIFICANT CHANGE UP (ref 96–108)
CHLORIDE SERPL-SCNC: 101 MMOL/L — SIGNIFICANT CHANGE UP (ref 96–108)
CHLORIDE SERPL-SCNC: 102 MMOL/L — SIGNIFICANT CHANGE UP (ref 96–108)
CHLORIDE SERPL-SCNC: 103 MMOL/L — SIGNIFICANT CHANGE UP (ref 96–108)
CHLORIDE SERPL-SCNC: 104 MMOL/L — SIGNIFICANT CHANGE UP (ref 96–108)
CHLORIDE SERPL-SCNC: 104 MMOL/L — SIGNIFICANT CHANGE UP (ref 96–108)
CHLORIDE SERPL-SCNC: 98 MMOL/L — SIGNIFICANT CHANGE UP (ref 96–108)
CK SERPL-CCNC: 83 U/L — SIGNIFICANT CHANGE UP (ref 25–170)
CO2 SERPL-SCNC: 22 MMOL/L — SIGNIFICANT CHANGE UP (ref 22–31)
CO2 SERPL-SCNC: 24 MMOL/L — SIGNIFICANT CHANGE UP (ref 22–31)
CO2 SERPL-SCNC: 25 MMOL/L — SIGNIFICANT CHANGE UP (ref 22–31)
CO2 SERPL-SCNC: 25 MMOL/L — SIGNIFICANT CHANGE UP (ref 22–31)
CO2 SERPL-SCNC: 26 MMOL/L — SIGNIFICANT CHANGE UP (ref 22–31)
CO2 SERPL-SCNC: 27 MMOL/L — SIGNIFICANT CHANGE UP (ref 22–31)
CO2 SERPL-SCNC: 28 MMOL/L — SIGNIFICANT CHANGE UP (ref 22–31)
COLOR SPEC: YELLOW — SIGNIFICANT CHANGE UP
COMMENT - URINE: SIGNIFICANT CHANGE UP
CREAT SERPL-MCNC: 0.82 MG/DL — SIGNIFICANT CHANGE UP (ref 0.5–1.3)
CREAT SERPL-MCNC: 0.86 MG/DL — SIGNIFICANT CHANGE UP (ref 0.5–1.3)
CREAT SERPL-MCNC: 0.89 MG/DL — SIGNIFICANT CHANGE UP (ref 0.5–1.3)
CREAT SERPL-MCNC: 0.91 MG/DL — SIGNIFICANT CHANGE UP (ref 0.5–1.3)
CREAT SERPL-MCNC: 0.94 MG/DL — SIGNIFICANT CHANGE UP (ref 0.5–1.3)
CREAT SERPL-MCNC: 0.94 MG/DL — SIGNIFICANT CHANGE UP (ref 0.5–1.3)
CREAT SERPL-MCNC: 0.95 MG/DL — SIGNIFICANT CHANGE UP (ref 0.5–1.3)
CREAT SERPL-MCNC: 0.96 MG/DL — SIGNIFICANT CHANGE UP (ref 0.5–1.3)
CREAT SERPL-MCNC: 1.1 MG/DL — SIGNIFICANT CHANGE UP (ref 0.5–1.3)
CREAT SERPL-MCNC: 1.21 MG/DL — SIGNIFICANT CHANGE UP (ref 0.5–1.3)
CREAT SERPL-MCNC: 1.23 MG/DL — SIGNIFICANT CHANGE UP (ref 0.5–1.3)
CREAT SERPL-MCNC: 1.26 MG/DL — SIGNIFICANT CHANGE UP (ref 0.5–1.3)
CREAT SERPL-MCNC: 1.31 MG/DL — HIGH (ref 0.5–1.3)
CREAT SERPL-MCNC: 1.39 MG/DL — HIGH (ref 0.5–1.3)
CREAT SERPL-MCNC: 1.81 MG/DL — HIGH (ref 0.5–1.3)
CREAT SERPL-MCNC: 2.04 MG/DL — HIGH (ref 0.5–1.3)
CULTURE RESULTS: SIGNIFICANT CHANGE UP
DIFF PNL FLD: ABNORMAL
DIFF PNL FLD: ABNORMAL
DIFF PNL FLD: NEGATIVE — SIGNIFICANT CHANGE UP
EOSINOPHIL # BLD AUTO: 0.05 K/UL — SIGNIFICANT CHANGE UP (ref 0–0.5)
EOSINOPHIL # BLD AUTO: 0.11 K/UL — SIGNIFICANT CHANGE UP (ref 0–0.5)
EOSINOPHIL # BLD AUTO: 0.13 K/UL — SIGNIFICANT CHANGE UP (ref 0–0.5)
EOSINOPHIL # BLD AUTO: 0.17 K/UL — SIGNIFICANT CHANGE UP (ref 0–0.5)
EOSINOPHIL NFR BLD AUTO: 0.6 % — SIGNIFICANT CHANGE UP (ref 0–6)
EOSINOPHIL NFR BLD AUTO: 1.7 % — SIGNIFICANT CHANGE UP (ref 0–6)
EOSINOPHIL NFR BLD AUTO: 1.7 % — SIGNIFICANT CHANGE UP (ref 0–6)
EOSINOPHIL NFR BLD AUTO: 2.6 % — SIGNIFICANT CHANGE UP (ref 0–6)
EPI CELLS # UR: ABNORMAL
EPI CELLS # UR: SIGNIFICANT CHANGE UP
EPI CELLS # UR: SIGNIFICANT CHANGE UP
GLUCOSE BLDC GLUCOMTR-MCNC: 145 MG/DL — HIGH (ref 70–99)
GLUCOSE BLDC GLUCOMTR-MCNC: 77 MG/DL — SIGNIFICANT CHANGE UP (ref 70–99)
GLUCOSE SERPL-MCNC: 101 MG/DL — HIGH (ref 70–99)
GLUCOSE SERPL-MCNC: 102 MG/DL — HIGH (ref 70–99)
GLUCOSE SERPL-MCNC: 104 MG/DL — HIGH (ref 70–99)
GLUCOSE SERPL-MCNC: 106 MG/DL — HIGH (ref 70–99)
GLUCOSE SERPL-MCNC: 107 MG/DL — HIGH (ref 70–99)
GLUCOSE SERPL-MCNC: 114 MG/DL — HIGH (ref 70–99)
GLUCOSE SERPL-MCNC: 135 MG/DL — HIGH (ref 70–99)
GLUCOSE SERPL-MCNC: 212 MG/DL — HIGH (ref 70–99)
GLUCOSE SERPL-MCNC: 78 MG/DL — SIGNIFICANT CHANGE UP (ref 70–99)
GLUCOSE SERPL-MCNC: 89 MG/DL — SIGNIFICANT CHANGE UP (ref 70–99)
GLUCOSE SERPL-MCNC: 94 MG/DL — SIGNIFICANT CHANGE UP (ref 70–99)
GLUCOSE SERPL-MCNC: 95 MG/DL — SIGNIFICANT CHANGE UP (ref 70–99)
GLUCOSE SERPL-MCNC: 95 MG/DL — SIGNIFICANT CHANGE UP (ref 70–99)
GLUCOSE SERPL-MCNC: 96 MG/DL — SIGNIFICANT CHANGE UP (ref 70–99)
GLUCOSE SERPL-MCNC: 96 MG/DL — SIGNIFICANT CHANGE UP (ref 70–99)
GLUCOSE SERPL-MCNC: 99 MG/DL — SIGNIFICANT CHANGE UP (ref 70–99)
GLUCOSE UR QL: 50 MG/DL
GLUCOSE UR QL: NEGATIVE — SIGNIFICANT CHANGE UP
GLUCOSE UR QL: NEGATIVE — SIGNIFICANT CHANGE UP
HCT VFR BLD CALC: 32.4 % — LOW (ref 34.5–45)
HCT VFR BLD CALC: 33.8 % — LOW (ref 34.5–45)
HCT VFR BLD CALC: 34.7 % — SIGNIFICANT CHANGE UP (ref 34.5–45)
HCT VFR BLD CALC: 35.8 % — SIGNIFICANT CHANGE UP (ref 34.5–45)
HCT VFR BLD CALC: 36.9 % — SIGNIFICANT CHANGE UP (ref 34.5–45)
HCT VFR BLD CALC: 37.2 % — SIGNIFICANT CHANGE UP (ref 34.5–45)
HCT VFR BLD CALC: 39.1 % — SIGNIFICANT CHANGE UP (ref 34.5–45)
HCT VFR BLD CALC: 40.6 % — SIGNIFICANT CHANGE UP (ref 34.5–45)
HCT VFR BLD CALC: 42.4 % — SIGNIFICANT CHANGE UP (ref 34.5–45)
HCT VFR BLD CALC: 44.7 % — SIGNIFICANT CHANGE UP (ref 34.5–45)
HCT VFR BLD CALC: 47.9 % — HIGH (ref 34.5–45)
HGB BLD-MCNC: 10.7 G/DL — LOW (ref 11.5–15.5)
HGB BLD-MCNC: 10.9 G/DL — LOW (ref 11.5–15.5)
HGB BLD-MCNC: 11.6 G/DL — SIGNIFICANT CHANGE UP (ref 11.5–15.5)
HGB BLD-MCNC: 11.8 G/DL — SIGNIFICANT CHANGE UP (ref 11.5–15.5)
HGB BLD-MCNC: 12 G/DL — SIGNIFICANT CHANGE UP (ref 11.5–15.5)
HGB BLD-MCNC: 12.3 G/DL — SIGNIFICANT CHANGE UP (ref 11.5–15.5)
HGB BLD-MCNC: 12.9 G/DL — SIGNIFICANT CHANGE UP (ref 11.5–15.5)
HGB BLD-MCNC: 13.3 G/DL — SIGNIFICANT CHANGE UP (ref 11.5–15.5)
HGB BLD-MCNC: 13.3 G/DL — SIGNIFICANT CHANGE UP (ref 11.5–15.5)
HGB BLD-MCNC: 14.2 G/DL — SIGNIFICANT CHANGE UP (ref 11.5–15.5)
HGB BLD-MCNC: 15.5 G/DL — SIGNIFICANT CHANGE UP (ref 11.5–15.5)
IMM GRANULOCYTES NFR BLD AUTO: 0.2 % — SIGNIFICANT CHANGE UP (ref 0–1.5)
IMM GRANULOCYTES NFR BLD AUTO: 0.3 % — SIGNIFICANT CHANGE UP (ref 0–1.5)
INR BLD: 1.2 RATIO — HIGH (ref 0.88–1.16)
INR BLD: 1.2 RATIO — HIGH (ref 0.88–1.16)
INR BLD: 1.28 RATIO — HIGH (ref 0.88–1.16)
INR BLD: 1.3 RATIO — HIGH (ref 0.88–1.16)
INR BLD: 1.38 RATIO — HIGH (ref 0.88–1.16)
INR BLD: 1.48 RATIO — HIGH (ref 0.88–1.16)
INR BLD: 1.52 RATIO — HIGH (ref 0.88–1.16)
INR BLD: 1.63 RATIO — HIGH (ref 0.88–1.16)
INR BLD: 1.64 RATIO — HIGH (ref 0.88–1.16)
INR BLD: 1.64 RATIO — HIGH (ref 0.88–1.16)
INR BLD: 1.7 RATIO — HIGH (ref 0.88–1.16)
INR BLD: 1.78 RATIO — HIGH (ref 0.88–1.16)
INR BLD: 1.92 RATIO — HIGH (ref 0.88–1.16)
INR BLD: 2.02 RATIO — HIGH (ref 0.88–1.16)
INR BLD: 2.06 RATIO — HIGH (ref 0.88–1.16)
INR BLD: 2.07 RATIO — HIGH (ref 0.88–1.16)
INR BLD: 2.11 RATIO — HIGH (ref 0.88–1.16)
INR BLD: 2.26 RATIO — HIGH (ref 0.88–1.16)
INR BLD: 2.29 RATIO — HIGH (ref 0.88–1.16)
INR BLD: 2.73 RATIO — HIGH (ref 0.88–1.16)
INR BLD: 3.19 RATIO — HIGH (ref 0.88–1.16)
INR BLD: 3.33 RATIO — HIGH (ref 0.88–1.16)
KETONES UR-MCNC: NEGATIVE — SIGNIFICANT CHANGE UP
LACTATE SERPL-SCNC: 1.6 MMOL/L — SIGNIFICANT CHANGE UP (ref 0.7–2)
LACTATE SERPL-SCNC: 2.7 MMOL/L — HIGH (ref 0.7–2)
LACTATE SERPL-SCNC: 3.6 MMOL/L — HIGH (ref 0.7–2)
LEUKOCYTE ESTERASE UR-ACNC: ABNORMAL
LEUKOCYTE ESTERASE UR-ACNC: ABNORMAL
LEUKOCYTE ESTERASE UR-ACNC: NEGATIVE — SIGNIFICANT CHANGE UP
LYMPHOCYTES # BLD AUTO: 1.11 K/UL — SIGNIFICANT CHANGE UP (ref 1–3.3)
LYMPHOCYTES # BLD AUTO: 1.29 K/UL — SIGNIFICANT CHANGE UP (ref 1–3.3)
LYMPHOCYTES # BLD AUTO: 1.41 K/UL — SIGNIFICANT CHANGE UP (ref 1–3.3)
LYMPHOCYTES # BLD AUTO: 1.41 K/UL — SIGNIFICANT CHANGE UP (ref 1–3.3)
LYMPHOCYTES # BLD AUTO: 14.6 % — SIGNIFICANT CHANGE UP (ref 13–44)
LYMPHOCYTES # BLD AUTO: 15.7 % — SIGNIFICANT CHANGE UP (ref 13–44)
LYMPHOCYTES # BLD AUTO: 19.8 % — SIGNIFICANT CHANGE UP (ref 13–44)
LYMPHOCYTES # BLD AUTO: 22.1 % — SIGNIFICANT CHANGE UP (ref 13–44)
MAGNESIUM SERPL-MCNC: 1.5 MG/DL — LOW (ref 1.6–2.6)
MAGNESIUM SERPL-MCNC: 1.7 MG/DL — SIGNIFICANT CHANGE UP (ref 1.6–2.6)
MAGNESIUM SERPL-MCNC: 1.7 MG/DL — SIGNIFICANT CHANGE UP (ref 1.6–2.6)
MAGNESIUM SERPL-MCNC: 1.9 MG/DL — SIGNIFICANT CHANGE UP (ref 1.6–2.6)
MAGNESIUM SERPL-MCNC: 2.1 MG/DL — SIGNIFICANT CHANGE UP (ref 1.6–2.6)
MCHC RBC-ENTMCNC: 29.1 PG — SIGNIFICANT CHANGE UP (ref 27–34)
MCHC RBC-ENTMCNC: 29.2 PG — SIGNIFICANT CHANGE UP (ref 27–34)
MCHC RBC-ENTMCNC: 29.9 PG — SIGNIFICANT CHANGE UP (ref 27–34)
MCHC RBC-ENTMCNC: 30.2 PG — SIGNIFICANT CHANGE UP (ref 27–34)
MCHC RBC-ENTMCNC: 30.4 PG — SIGNIFICANT CHANGE UP (ref 27–34)
MCHC RBC-ENTMCNC: 30.4 PG — SIGNIFICANT CHANGE UP (ref 27–34)
MCHC RBC-ENTMCNC: 30.5 PG — SIGNIFICANT CHANGE UP (ref 27–34)
MCHC RBC-ENTMCNC: 30.5 PG — SIGNIFICANT CHANGE UP (ref 27–34)
MCHC RBC-ENTMCNC: 30.6 PG — SIGNIFICANT CHANGE UP (ref 27–34)
MCHC RBC-ENTMCNC: 31.4 GM/DL — LOW (ref 32–36)
MCHC RBC-ENTMCNC: 31.8 GM/DL — LOW (ref 32–36)
MCHC RBC-ENTMCNC: 32.2 GM/DL — SIGNIFICANT CHANGE UP (ref 32–36)
MCHC RBC-ENTMCNC: 32.3 GM/DL — SIGNIFICANT CHANGE UP (ref 32–36)
MCHC RBC-ENTMCNC: 32.4 GM/DL — SIGNIFICANT CHANGE UP (ref 32–36)
MCHC RBC-ENTMCNC: 32.4 GM/DL — SIGNIFICANT CHANGE UP (ref 32–36)
MCHC RBC-ENTMCNC: 32.8 GM/DL — SIGNIFICANT CHANGE UP (ref 32–36)
MCHC RBC-ENTMCNC: 33 GM/DL — SIGNIFICANT CHANGE UP (ref 32–36)
MCHC RBC-ENTMCNC: 33 GM/DL — SIGNIFICANT CHANGE UP (ref 32–36)
MCHC RBC-ENTMCNC: 33.3 GM/DL — SIGNIFICANT CHANGE UP (ref 32–36)
MCHC RBC-ENTMCNC: 34 GM/DL — SIGNIFICANT CHANGE UP (ref 32–36)
MCV RBC AUTO: 89.9 FL — SIGNIFICANT CHANGE UP (ref 80–100)
MCV RBC AUTO: 90.1 FL — SIGNIFICANT CHANGE UP (ref 80–100)
MCV RBC AUTO: 90.4 FL — SIGNIFICANT CHANGE UP (ref 80–100)
MCV RBC AUTO: 91.6 FL — SIGNIFICANT CHANGE UP (ref 80–100)
MCV RBC AUTO: 91.8 FL — SIGNIFICANT CHANGE UP (ref 80–100)
MCV RBC AUTO: 92 FL — SIGNIFICANT CHANGE UP (ref 80–100)
MCV RBC AUTO: 92 FL — SIGNIFICANT CHANGE UP (ref 80–100)
MCV RBC AUTO: 92.5 FL — SIGNIFICANT CHANGE UP (ref 80–100)
MCV RBC AUTO: 92.8 FL — SIGNIFICANT CHANGE UP (ref 80–100)
MCV RBC AUTO: 93.1 FL — SIGNIFICANT CHANGE UP (ref 80–100)
MCV RBC AUTO: 93.2 FL — SIGNIFICANT CHANGE UP (ref 80–100)
MONOCYTES # BLD AUTO: 0.56 K/UL — SIGNIFICANT CHANGE UP (ref 0–0.9)
MONOCYTES # BLD AUTO: 0.6 K/UL — SIGNIFICANT CHANGE UP (ref 0–0.9)
MONOCYTES # BLD AUTO: 0.65 K/UL — SIGNIFICANT CHANGE UP (ref 0–0.9)
MONOCYTES # BLD AUTO: 0.87 K/UL — SIGNIFICANT CHANGE UP (ref 0–0.9)
MONOCYTES NFR BLD AUTO: 10 % — SIGNIFICANT CHANGE UP (ref 2–14)
MONOCYTES NFR BLD AUTO: 11.4 % — SIGNIFICANT CHANGE UP (ref 2–14)
MONOCYTES NFR BLD AUTO: 6.3 % — SIGNIFICANT CHANGE UP (ref 2–14)
MONOCYTES NFR BLD AUTO: 9.4 % — SIGNIFICANT CHANGE UP (ref 2–14)
NEUTROPHILS # BLD AUTO: 4.2 K/UL — SIGNIFICANT CHANGE UP (ref 1.8–7.4)
NEUTROPHILS # BLD AUTO: 4.34 K/UL — SIGNIFICANT CHANGE UP (ref 1.8–7.4)
NEUTROPHILS # BLD AUTO: 5.46 K/UL — SIGNIFICANT CHANGE UP (ref 1.8–7.4)
NEUTROPHILS # BLD AUTO: 6.88 K/UL — SIGNIFICANT CHANGE UP (ref 1.8–7.4)
NEUTROPHILS NFR BLD AUTO: 66 % — SIGNIFICANT CHANGE UP (ref 43–77)
NEUTROPHILS NFR BLD AUTO: 66.7 % — SIGNIFICANT CHANGE UP (ref 43–77)
NEUTROPHILS NFR BLD AUTO: 71.7 % — SIGNIFICANT CHANGE UP (ref 43–77)
NEUTROPHILS NFR BLD AUTO: 76.8 % — SIGNIFICANT CHANGE UP (ref 43–77)
NITRITE UR-MCNC: NEGATIVE — SIGNIFICANT CHANGE UP
NITRITE UR-MCNC: NEGATIVE — SIGNIFICANT CHANGE UP
NITRITE UR-MCNC: POSITIVE
NRBC # BLD: 0 /100 WBCS — SIGNIFICANT CHANGE UP (ref 0–0)
PH UR: 5 — SIGNIFICANT CHANGE UP (ref 5–8)
PH UR: 6 — SIGNIFICANT CHANGE UP (ref 5–8)
PH UR: 8 — SIGNIFICANT CHANGE UP (ref 5–8)
PHOSPHATE SERPL-MCNC: 2.1 MG/DL — LOW (ref 2.5–4.5)
PHOSPHATE SERPL-MCNC: 2.7 MG/DL — SIGNIFICANT CHANGE UP (ref 2.5–4.5)
PHOSPHATE SERPL-MCNC: 3.4 MG/DL — SIGNIFICANT CHANGE UP (ref 2.5–4.5)
PLATELET # BLD AUTO: 128 K/UL — LOW (ref 150–400)
PLATELET # BLD AUTO: 138 K/UL — LOW (ref 150–400)
PLATELET # BLD AUTO: 147 K/UL — LOW (ref 150–400)
PLATELET # BLD AUTO: 150 K/UL — SIGNIFICANT CHANGE UP (ref 150–400)
PLATELET # BLD AUTO: 155 K/UL — SIGNIFICANT CHANGE UP (ref 150–400)
PLATELET # BLD AUTO: 158 K/UL — SIGNIFICANT CHANGE UP (ref 150–400)
PLATELET # BLD AUTO: 164 K/UL — SIGNIFICANT CHANGE UP (ref 150–400)
PLATELET # BLD AUTO: 183 K/UL — SIGNIFICANT CHANGE UP (ref 150–400)
PLATELET # BLD AUTO: 185 K/UL — SIGNIFICANT CHANGE UP (ref 150–400)
PLATELET # BLD AUTO: 226 K/UL — SIGNIFICANT CHANGE UP (ref 150–400)
PLATELET # BLD AUTO: 239 K/UL — SIGNIFICANT CHANGE UP (ref 150–400)
POTASSIUM SERPL-MCNC: 3 MMOL/L — LOW (ref 3.5–5.3)
POTASSIUM SERPL-MCNC: 3.2 MMOL/L — LOW (ref 3.5–5.3)
POTASSIUM SERPL-MCNC: 3.2 MMOL/L — LOW (ref 3.5–5.3)
POTASSIUM SERPL-MCNC: 3.5 MMOL/L — SIGNIFICANT CHANGE UP (ref 3.5–5.3)
POTASSIUM SERPL-MCNC: 3.7 MMOL/L — SIGNIFICANT CHANGE UP (ref 3.5–5.3)
POTASSIUM SERPL-MCNC: 3.8 MMOL/L — SIGNIFICANT CHANGE UP (ref 3.5–5.3)
POTASSIUM SERPL-MCNC: 3.9 MMOL/L — SIGNIFICANT CHANGE UP (ref 3.5–5.3)
POTASSIUM SERPL-MCNC: 3.9 MMOL/L — SIGNIFICANT CHANGE UP (ref 3.5–5.3)
POTASSIUM SERPL-MCNC: 4 MMOL/L — SIGNIFICANT CHANGE UP (ref 3.5–5.3)
POTASSIUM SERPL-MCNC: 4 MMOL/L — SIGNIFICANT CHANGE UP (ref 3.5–5.3)
POTASSIUM SERPL-MCNC: 4.2 MMOL/L — SIGNIFICANT CHANGE UP (ref 3.5–5.3)
POTASSIUM SERPL-MCNC: 4.8 MMOL/L — SIGNIFICANT CHANGE UP (ref 3.5–5.3)
POTASSIUM SERPL-MCNC: 4.8 MMOL/L — SIGNIFICANT CHANGE UP (ref 3.5–5.3)
POTASSIUM SERPL-MCNC: 6.3 MMOL/L — CRITICAL HIGH (ref 3.5–5.3)
POTASSIUM SERPL-SCNC: 3 MMOL/L — LOW (ref 3.5–5.3)
POTASSIUM SERPL-SCNC: 3.2 MMOL/L — LOW (ref 3.5–5.3)
POTASSIUM SERPL-SCNC: 3.2 MMOL/L — LOW (ref 3.5–5.3)
POTASSIUM SERPL-SCNC: 3.5 MMOL/L — SIGNIFICANT CHANGE UP (ref 3.5–5.3)
POTASSIUM SERPL-SCNC: 3.7 MMOL/L — SIGNIFICANT CHANGE UP (ref 3.5–5.3)
POTASSIUM SERPL-SCNC: 3.8 MMOL/L — SIGNIFICANT CHANGE UP (ref 3.5–5.3)
POTASSIUM SERPL-SCNC: 3.9 MMOL/L — SIGNIFICANT CHANGE UP (ref 3.5–5.3)
POTASSIUM SERPL-SCNC: 3.9 MMOL/L — SIGNIFICANT CHANGE UP (ref 3.5–5.3)
POTASSIUM SERPL-SCNC: 4 MMOL/L — SIGNIFICANT CHANGE UP (ref 3.5–5.3)
POTASSIUM SERPL-SCNC: 4 MMOL/L — SIGNIFICANT CHANGE UP (ref 3.5–5.3)
POTASSIUM SERPL-SCNC: 4.2 MMOL/L — SIGNIFICANT CHANGE UP (ref 3.5–5.3)
POTASSIUM SERPL-SCNC: 4.8 MMOL/L — SIGNIFICANT CHANGE UP (ref 3.5–5.3)
POTASSIUM SERPL-SCNC: 4.8 MMOL/L — SIGNIFICANT CHANGE UP (ref 3.5–5.3)
POTASSIUM SERPL-SCNC: 6.3 MMOL/L — CRITICAL HIGH (ref 3.5–5.3)
PROCALCITONIN SERPL-MCNC: 0.11 NG/ML — HIGH
PROT SERPL-MCNC: 5.4 G/DL — LOW (ref 6–8.3)
PROT SERPL-MCNC: 5.6 G/DL — LOW (ref 6–8.3)
PROT SERPL-MCNC: 5.6 G/DL — LOW (ref 6–8.3)
PROT SERPL-MCNC: 5.8 G/DL — LOW (ref 6–8.3)
PROT SERPL-MCNC: 5.9 G/DL — LOW (ref 6–8.3)
PROT SERPL-MCNC: 6.1 G/DL — SIGNIFICANT CHANGE UP (ref 6–8.3)
PROT SERPL-MCNC: 6.6 G/DL — SIGNIFICANT CHANGE UP (ref 6–8.3)
PROT UR-MCNC: 100
PROT UR-MCNC: 15
PROT UR-MCNC: 500 MG/DL
PROTHROM AB SERPL-ACNC: 14.4 SEC — HIGH (ref 10.6–13.6)
PROTHROM AB SERPL-ACNC: 14.4 SEC — HIGH (ref 10.6–13.6)
PROTHROM AB SERPL-ACNC: 15.3 SEC — HIGH (ref 10.6–13.6)
PROTHROM AB SERPL-ACNC: 15.5 SEC — HIGH (ref 10.6–13.6)
PROTHROM AB SERPL-ACNC: 16.4 SEC — HIGH (ref 10.6–13.6)
PROTHROM AB SERPL-ACNC: 17.6 SEC — HIGH (ref 10.6–13.6)
PROTHROM AB SERPL-ACNC: 18 SEC — HIGH (ref 10.6–13.6)
PROTHROM AB SERPL-ACNC: 19.3 SEC — HIGH (ref 10.6–13.6)
PROTHROM AB SERPL-ACNC: 19.4 SEC — HIGH (ref 10.6–13.6)
PROTHROM AB SERPL-ACNC: 19.4 SEC — HIGH (ref 10.6–13.6)
PROTHROM AB SERPL-ACNC: 20.1 SEC — HIGH (ref 10.6–13.6)
PROTHROM AB SERPL-ACNC: 20.9 SEC — HIGH (ref 10.6–13.6)
PROTHROM AB SERPL-ACNC: 22.5 SEC — HIGH (ref 10.6–13.6)
PROTHROM AB SERPL-ACNC: 23.6 SEC — HIGH (ref 10.6–13.6)
PROTHROM AB SERPL-ACNC: 24.1 SEC — HIGH (ref 10.6–13.6)
PROTHROM AB SERPL-ACNC: 24.2 SEC — HIGH (ref 10.6–13.6)
PROTHROM AB SERPL-ACNC: 24.6 SEC — HIGH (ref 10.6–13.6)
PROTHROM AB SERPL-ACNC: 26.3 SEC — HIGH (ref 10.6–13.6)
PROTHROM AB SERPL-ACNC: 26.6 SEC — HIGH (ref 10.6–13.6)
PROTHROM AB SERPL-ACNC: 31.5 SEC — HIGH (ref 10.6–13.6)
PROTHROM AB SERPL-ACNC: 36.5 SEC — HIGH (ref 10.6–13.6)
PROTHROM AB SERPL-ACNC: 38 SEC — HIGH (ref 10.6–13.6)
PTH-INTACT FLD-MCNC: 71 PG/ML — HIGH (ref 15–65)
RBC # BLD: 3.52 M/UL — LOW (ref 3.8–5.2)
RBC # BLD: 3.74 M/UL — LOW (ref 3.8–5.2)
RBC # BLD: 3.85 M/UL — SIGNIFICANT CHANGE UP (ref 3.8–5.2)
RBC # BLD: 3.98 M/UL — SIGNIFICANT CHANGE UP (ref 3.8–5.2)
RBC # BLD: 4.02 M/UL — SIGNIFICANT CHANGE UP (ref 3.8–5.2)
RBC # BLD: 4.03 M/UL — SIGNIFICANT CHANGE UP (ref 3.8–5.2)
RBC # BLD: 4.25 M/UL — SIGNIFICANT CHANGE UP (ref 3.8–5.2)
RBC # BLD: 4.36 M/UL — SIGNIFICANT CHANGE UP (ref 3.8–5.2)
RBC # BLD: 4.57 M/UL — SIGNIFICANT CHANGE UP (ref 3.8–5.2)
RBC # BLD: 4.87 M/UL — SIGNIFICANT CHANGE UP (ref 3.8–5.2)
RBC # BLD: 5.14 M/UL — SIGNIFICANT CHANGE UP (ref 3.8–5.2)
RBC # FLD: 13 % — SIGNIFICANT CHANGE UP (ref 10.3–14.5)
RBC # FLD: 13.3 % — SIGNIFICANT CHANGE UP (ref 10.3–14.5)
RBC # FLD: 13.3 % — SIGNIFICANT CHANGE UP (ref 10.3–14.5)
RBC # FLD: 13.5 % — SIGNIFICANT CHANGE UP (ref 10.3–14.5)
RBC # FLD: 13.6 % — SIGNIFICANT CHANGE UP (ref 10.3–14.5)
RBC # FLD: 13.7 % — SIGNIFICANT CHANGE UP (ref 10.3–14.5)
RBC # FLD: 13.7 % — SIGNIFICANT CHANGE UP (ref 10.3–14.5)
RBC # FLD: 14 % — SIGNIFICANT CHANGE UP (ref 10.3–14.5)
RBC # FLD: 14.6 % — HIGH (ref 10.3–14.5)
RBC CASTS # UR COMP ASSIST: ABNORMAL /HPF (ref 0–4)
RBC CASTS # UR COMP ASSIST: SIGNIFICANT CHANGE UP /HPF (ref 0–4)
RBC CASTS # UR COMP ASSIST: SIGNIFICANT CHANGE UP /HPF (ref 0–4)
SARS-COV-2 IGG SERPL QL IA: NEGATIVE — SIGNIFICANT CHANGE UP
SARS-COV-2 IGG SERPL QL IA: NEGATIVE — SIGNIFICANT CHANGE UP
SARS-COV-2 IGM SERPL IA-ACNC: <0.1 INDEX — SIGNIFICANT CHANGE UP
SARS-COV-2 IGM SERPL IA-ACNC: <0.1 INDEX — SIGNIFICANT CHANGE UP
SARS-COV-2 RNA SPEC QL NAA+PROBE: SIGNIFICANT CHANGE UP
SARS-COV-2 RNA SPEC QL NAA+PROBE: SIGNIFICANT CHANGE UP
SODIUM SERPL-SCNC: 134 MMOL/L — LOW (ref 135–145)
SODIUM SERPL-SCNC: 135 MMOL/L — SIGNIFICANT CHANGE UP (ref 135–145)
SODIUM SERPL-SCNC: 136 MMOL/L — SIGNIFICANT CHANGE UP (ref 135–145)
SODIUM SERPL-SCNC: 137 MMOL/L — SIGNIFICANT CHANGE UP (ref 135–145)
SODIUM SERPL-SCNC: 138 MMOL/L — SIGNIFICANT CHANGE UP (ref 135–145)
SODIUM SERPL-SCNC: 139 MMOL/L — SIGNIFICANT CHANGE UP (ref 135–145)
SODIUM SERPL-SCNC: 139 MMOL/L — SIGNIFICANT CHANGE UP (ref 135–145)
SODIUM SERPL-SCNC: 140 MMOL/L — SIGNIFICANT CHANGE UP (ref 135–145)
SP GR SPEC: 1.01 — SIGNIFICANT CHANGE UP (ref 1.01–1.02)
SP GR SPEC: 1.02 — SIGNIFICANT CHANGE UP (ref 1.01–1.02)
SP GR SPEC: 1.02 — SIGNIFICANT CHANGE UP (ref 1.01–1.02)
SPECIMEN SOURCE: SIGNIFICANT CHANGE UP
TROPONIN I SERPL-MCNC: 0.02 NG/ML — SIGNIFICANT CHANGE UP (ref 0.02–0.06)
TROPONIN I SERPL-MCNC: 0.03 NG/ML — SIGNIFICANT CHANGE UP (ref 0.02–0.06)
TROPONIN I SERPL-MCNC: 0.07 NG/ML — HIGH (ref 0.02–0.06)
TROPONIN I SERPL-MCNC: 0.13 NG/ML — HIGH (ref 0.02–0.06)
TROPONIN I SERPL-MCNC: 0.3 NG/ML — HIGH (ref 0.02–0.06)
TROPONIN I SERPL-MCNC: 0.49 NG/ML — HIGH (ref 0.02–0.06)
UROBILINOGEN FLD QL: NEGATIVE — SIGNIFICANT CHANGE UP
WBC # BLD: 5.13 K/UL — SIGNIFICANT CHANGE UP (ref 3.8–10.5)
WBC # BLD: 5.38 K/UL — SIGNIFICANT CHANGE UP (ref 3.8–10.5)
WBC # BLD: 5.98 K/UL — SIGNIFICANT CHANGE UP (ref 3.8–10.5)
WBC # BLD: 6.37 K/UL — SIGNIFICANT CHANGE UP (ref 3.8–10.5)
WBC # BLD: 6.51 K/UL — SIGNIFICANT CHANGE UP (ref 3.8–10.5)
WBC # BLD: 7.18 K/UL — SIGNIFICANT CHANGE UP (ref 3.8–10.5)
WBC # BLD: 7.61 K/UL — SIGNIFICANT CHANGE UP (ref 3.8–10.5)
WBC # BLD: 8.13 K/UL — SIGNIFICANT CHANGE UP (ref 3.8–10.5)
WBC # BLD: 8.28 K/UL — SIGNIFICANT CHANGE UP (ref 3.8–10.5)
WBC # BLD: 8.96 K/UL — SIGNIFICANT CHANGE UP (ref 3.8–10.5)
WBC # BLD: 9.39 K/UL — SIGNIFICANT CHANGE UP (ref 3.8–10.5)
WBC # FLD AUTO: 5.13 K/UL — SIGNIFICANT CHANGE UP (ref 3.8–10.5)
WBC # FLD AUTO: 5.38 K/UL — SIGNIFICANT CHANGE UP (ref 3.8–10.5)
WBC # FLD AUTO: 5.98 K/UL — SIGNIFICANT CHANGE UP (ref 3.8–10.5)
WBC # FLD AUTO: 6.37 K/UL — SIGNIFICANT CHANGE UP (ref 3.8–10.5)
WBC # FLD AUTO: 6.51 K/UL — SIGNIFICANT CHANGE UP (ref 3.8–10.5)
WBC # FLD AUTO: 7.18 K/UL — SIGNIFICANT CHANGE UP (ref 3.8–10.5)
WBC # FLD AUTO: 7.61 K/UL — SIGNIFICANT CHANGE UP (ref 3.8–10.5)
WBC # FLD AUTO: 8.13 K/UL — SIGNIFICANT CHANGE UP (ref 3.8–10.5)
WBC # FLD AUTO: 8.28 K/UL — SIGNIFICANT CHANGE UP (ref 3.8–10.5)
WBC # FLD AUTO: 8.96 K/UL — SIGNIFICANT CHANGE UP (ref 3.8–10.5)
WBC # FLD AUTO: 9.39 K/UL — SIGNIFICANT CHANGE UP (ref 3.8–10.5)
WBC UR QL: NEGATIVE /HPF — SIGNIFICANT CHANGE UP (ref 0–5)
WBC UR QL: SIGNIFICANT CHANGE UP /HPF (ref 0–5)
WBC UR QL: SIGNIFICANT CHANGE UP /HPF (ref 0–5)

## 2020-01-01 PROCEDURE — 99232 SBSQ HOSP IP/OBS MODERATE 35: CPT

## 2020-01-01 PROCEDURE — 80053 COMPREHEN METABOLIC PANEL: CPT

## 2020-01-01 PROCEDURE — 99497 ADVNCD CARE PLAN 30 MIN: CPT | Mod: 25

## 2020-01-01 PROCEDURE — 99214 OFFICE O/P EST MOD 30 MIN: CPT

## 2020-01-01 PROCEDURE — 73030 X-RAY EXAM OF SHOULDER: CPT

## 2020-01-01 PROCEDURE — 70450 CT HEAD/BRAIN W/O DYE: CPT

## 2020-01-01 PROCEDURE — G0444 DEPRESSION SCREEN ANNUAL: CPT | Mod: 59

## 2020-01-01 PROCEDURE — 97166 OT EVAL MOD COMPLEX 45 MIN: CPT

## 2020-01-01 PROCEDURE — U0003: CPT

## 2020-01-01 PROCEDURE — 70450 CT HEAD/BRAIN W/O DYE: CPT | Mod: 26

## 2020-01-01 PROCEDURE — 99235 HOSP IP/OBS SAME DATE MOD 70: CPT

## 2020-01-01 PROCEDURE — 80048 BASIC METABOLIC PNL TOTAL CA: CPT

## 2020-01-01 PROCEDURE — 85610 PROTHROMBIN TIME: CPT

## 2020-01-01 PROCEDURE — 84100 ASSAY OF PHOSPHORUS: CPT

## 2020-01-01 PROCEDURE — 85027 COMPLETE CBC AUTOMATED: CPT

## 2020-01-01 PROCEDURE — 99233 SBSQ HOSP IP/OBS HIGH 50: CPT

## 2020-01-01 PROCEDURE — 36000 PLACE NEEDLE IN VEIN: CPT

## 2020-01-01 PROCEDURE — 73080 X-RAY EXAM OF ELBOW: CPT | Mod: 26,RT

## 2020-01-01 PROCEDURE — 82550 ASSAY OF CK (CPK): CPT

## 2020-01-01 PROCEDURE — 73110 X-RAY EXAM OF WRIST: CPT | Mod: 26,RT

## 2020-01-01 PROCEDURE — 87040 BLOOD CULTURE FOR BACTERIA: CPT

## 2020-01-01 PROCEDURE — 97112 NEUROMUSCULAR REEDUCATION: CPT

## 2020-01-01 PROCEDURE — 99223 1ST HOSP IP/OBS HIGH 75: CPT

## 2020-01-01 PROCEDURE — 76775 US EXAM ABDO BACK WALL LIM: CPT | Mod: 26

## 2020-01-01 PROCEDURE — 97535 SELF CARE MNGMENT TRAINING: CPT

## 2020-01-01 PROCEDURE — 93306 TTE W/DOPPLER COMPLETE: CPT | Mod: 26

## 2020-01-01 PROCEDURE — 99222 1ST HOSP IP/OBS MODERATE 55: CPT | Mod: GC

## 2020-01-01 PROCEDURE — 86850 RBC ANTIBODY SCREEN: CPT

## 2020-01-01 PROCEDURE — 73030 X-RAY EXAM OF SHOULDER: CPT | Mod: 26,RT

## 2020-01-01 PROCEDURE — 83735 ASSAY OF MAGNESIUM: CPT

## 2020-01-01 PROCEDURE — G0008: CPT

## 2020-01-01 PROCEDURE — 93010 ELECTROCARDIOGRAM REPORT: CPT

## 2020-01-01 PROCEDURE — 99285 EMERGENCY DEPT VISIT HI MDM: CPT | Mod: 25

## 2020-01-01 PROCEDURE — 86769 SARS-COV-2 COVID-19 ANTIBODY: CPT

## 2020-01-01 PROCEDURE — 70450 CT HEAD/BRAIN W/O DYE: CPT | Mod: 26,77

## 2020-01-01 PROCEDURE — 95812 EEG 41-60 MINUTES: CPT

## 2020-01-01 PROCEDURE — 81001 URINALYSIS AUTO W/SCOPE: CPT

## 2020-01-01 PROCEDURE — 97530 THERAPEUTIC ACTIVITIES: CPT

## 2020-01-01 PROCEDURE — 71045 X-RAY EXAM CHEST 1 VIEW: CPT | Mod: 26

## 2020-01-01 PROCEDURE — 82962 GLUCOSE BLOOD TEST: CPT

## 2020-01-01 PROCEDURE — 85025 COMPLETE CBC W/AUTO DIFF WBC: CPT

## 2020-01-01 PROCEDURE — 99215 OFFICE O/P EST HI 40 MIN: CPT | Mod: 25

## 2020-01-01 PROCEDURE — 85730 THROMBOPLASTIN TIME PARTIAL: CPT

## 2020-01-01 PROCEDURE — 71045 X-RAY EXAM CHEST 1 VIEW: CPT

## 2020-01-01 PROCEDURE — 99366 TEAM CONF W/PAT BY HC PROF: CPT

## 2020-01-01 PROCEDURE — 99231 SBSQ HOSP IP/OBS SF/LOW 25: CPT

## 2020-01-01 PROCEDURE — 83605 ASSAY OF LACTIC ACID: CPT

## 2020-01-01 PROCEDURE — 97163 PT EVAL HIGH COMPLEX 45 MIN: CPT

## 2020-01-01 PROCEDURE — 99239 HOSP IP/OBS DSCHRG MGMT >30: CPT

## 2020-01-01 PROCEDURE — 84145 PROCALCITONIN (PCT): CPT

## 2020-01-01 PROCEDURE — 93971 EXTREMITY STUDY: CPT | Mod: 26,RT

## 2020-01-01 PROCEDURE — 97116 GAIT TRAINING THERAPY: CPT

## 2020-01-01 PROCEDURE — 87086 URINE CULTURE/COLONY COUNT: CPT

## 2020-01-01 PROCEDURE — 92523 SPEECH SOUND LANG COMPREHEN: CPT

## 2020-01-01 PROCEDURE — 83970 ASSAY OF PARATHORMONE: CPT

## 2020-01-01 PROCEDURE — 36430 TRANSFUSION BLD/BLD COMPNT: CPT

## 2020-01-01 PROCEDURE — 99222 1ST HOSP IP/OBS MODERATE 55: CPT

## 2020-01-01 PROCEDURE — 97161 PT EVAL LOW COMPLEX 20 MIN: CPT

## 2020-01-01 PROCEDURE — P9037: CPT

## 2020-01-01 PROCEDURE — 97110 THERAPEUTIC EXERCISES: CPT

## 2020-01-01 PROCEDURE — 99285 EMERGENCY DEPT VISIT HI MDM: CPT | Mod: CS

## 2020-01-01 PROCEDURE — 73110 X-RAY EXAM OF WRIST: CPT

## 2020-01-01 PROCEDURE — 36415 COLL VENOUS BLD VENIPUNCTURE: CPT

## 2020-01-01 PROCEDURE — 84484 ASSAY OF TROPONIN QUANT: CPT

## 2020-01-01 PROCEDURE — 73080 X-RAY EXAM OF ELBOW: CPT

## 2020-01-01 PROCEDURE — 86900 BLOOD TYPING SEROLOGIC ABO: CPT

## 2020-01-01 PROCEDURE — 99496 TRANSJ CARE MGMT HIGH F2F 7D: CPT | Mod: 25

## 2020-01-01 PROCEDURE — 90662 IIV NO PRSV INCREASED AG IM: CPT

## 2020-01-01 PROCEDURE — 92610 EVALUATE SWALLOWING FUNCTION: CPT

## 2020-01-01 PROCEDURE — 99203 OFFICE O/P NEW LOW 30 MIN: CPT | Mod: 25

## 2020-01-01 PROCEDURE — 93005 ELECTROCARDIOGRAM TRACING: CPT

## 2020-01-01 PROCEDURE — 86901 BLOOD TYPING SEROLOGIC RH(D): CPT

## 2020-01-01 PROCEDURE — 92507 TX SP LANG VOICE COMM INDIV: CPT

## 2020-01-01 PROCEDURE — 99291 CRITICAL CARE FIRST HOUR: CPT | Mod: CS

## 2020-01-01 PROCEDURE — 82310 ASSAY OF CALCIUM: CPT

## 2020-01-01 PROCEDURE — 97162 PT EVAL MOD COMPLEX 30 MIN: CPT

## 2020-01-01 PROCEDURE — 99233 SBSQ HOSP IP/OBS HIGH 50: CPT | Mod: CS

## 2020-01-01 PROCEDURE — 69210 REMOVE IMPACTED EAR WAX UNI: CPT

## 2020-01-01 PROCEDURE — 99291 CRITICAL CARE FIRST HOUR: CPT | Mod: 25

## 2020-01-01 PROCEDURE — 93971 EXTREMITY STUDY: CPT

## 2020-01-01 PROCEDURE — 76775 US EXAM ABDO BACK WALL LIM: CPT

## 2020-01-01 PROCEDURE — 96374 THER/PROPH/DIAG INJ IV PUSH: CPT

## 2020-01-01 PROCEDURE — 99238 HOSP IP/OBS DSCHRG MGMT 30/<: CPT

## 2020-01-01 PROCEDURE — 97167 OT EVAL HIGH COMPLEX 60 MIN: CPT

## 2020-01-01 PROCEDURE — 96361 HYDRATE IV INFUSION ADD-ON: CPT

## 2020-01-01 PROCEDURE — 93306 TTE W/DOPPLER COMPLETE: CPT

## 2020-01-01 PROCEDURE — 93288 INTERROG EVL PM/LDLS PM IP: CPT

## 2020-01-01 RX ORDER — MAGNESIUM SULFATE 500 MG/ML
1 VIAL (ML) INJECTION ONCE
Refills: 0 | Status: COMPLETED | OUTPATIENT
Start: 2020-01-01 | End: 2020-01-01

## 2020-01-01 RX ORDER — WARFARIN SODIUM 2.5 MG/1
2.5 TABLET ORAL ONCE
Refills: 0 | Status: COMPLETED | OUTPATIENT
Start: 2020-01-01 | End: 2020-01-01

## 2020-01-01 RX ORDER — MESALAMINE 400 MG
400 TABLET, DELAYED RELEASE (ENTERIC COATED) ORAL
Refills: 0 | Status: DISCONTINUED | OUTPATIENT
Start: 2020-01-01 | End: 2020-01-01

## 2020-01-01 RX ORDER — FUROSEMIDE 40 MG
40 TABLET ORAL
Refills: 0 | Status: DISCONTINUED | OUTPATIENT
Start: 2020-01-01 | End: 2020-01-01

## 2020-01-01 RX ORDER — MONTELUKAST 4 MG/1
10 TABLET, CHEWABLE ORAL AT BEDTIME
Refills: 0 | Status: DISCONTINUED | OUTPATIENT
Start: 2020-01-01 | End: 2020-01-01

## 2020-01-01 RX ORDER — HALOPERIDOL DECANOATE 100 MG/ML
0.5 INJECTION INTRAMUSCULAR ONCE
Refills: 0 | Status: DISCONTINUED | OUTPATIENT
Start: 2020-01-01 | End: 2020-01-01

## 2020-01-01 RX ORDER — WARFARIN SODIUM 2.5 MG/1
3 TABLET ORAL ONCE
Refills: 0 | Status: COMPLETED | OUTPATIENT
Start: 2020-01-01 | End: 2020-01-01

## 2020-01-01 RX ORDER — DORZOLAMIDE HYDROCHLORIDE 20 MG/ML
1 SOLUTION/ DROPS OPHTHALMIC THREE TIMES A DAY
Refills: 0 | Status: DISCONTINUED | OUTPATIENT
Start: 2020-01-01 | End: 2020-01-01

## 2020-01-01 RX ORDER — LABETALOL HCL 100 MG
300 TABLET ORAL THREE TIMES A DAY
Refills: 0 | Status: DISCONTINUED | OUTPATIENT
Start: 2020-01-01 | End: 2020-01-01

## 2020-01-01 RX ORDER — BRIMONIDINE TARTRATE, TIMOLOL MALEATE 2; 5 MG/ML; MG/ML
1 SOLUTION/ DROPS OPHTHALMIC
Qty: 0 | Refills: 0 | DISCHARGE

## 2020-01-01 RX ORDER — LABETALOL HCL 100 MG
50 TABLET ORAL ONCE
Refills: 0 | Status: DISCONTINUED | OUTPATIENT
Start: 2020-01-01 | End: 2020-01-01

## 2020-01-01 RX ORDER — CINACALCET 30 MG/1
30 TABLET ORAL DAILY
Qty: 90 | Refills: 0 | Status: ACTIVE | COMMUNITY
Start: 2020-01-01 | End: 1900-01-01

## 2020-01-01 RX ORDER — DONEPEZIL HYDROCHLORIDE 10 MG/1
5 TABLET, FILM COATED ORAL AT BEDTIME
Refills: 0 | Status: DISCONTINUED | OUTPATIENT
Start: 2020-01-01 | End: 2020-01-01

## 2020-01-01 RX ORDER — WARFARIN SODIUM 2.5 MG/1
3.5 TABLET ORAL ONCE
Refills: 0 | Status: COMPLETED | OUTPATIENT
Start: 2020-01-01 | End: 2020-01-01

## 2020-01-01 RX ORDER — METOPROLOL TARTRATE 50 MG
50 TABLET ORAL DAILY
Refills: 0 | Status: DISCONTINUED | OUTPATIENT
Start: 2020-01-01 | End: 2020-01-01

## 2020-01-01 RX ORDER — LABETALOL HCL 100 MG
1 TABLET ORAL
Qty: 90 | Refills: 0
Start: 2020-01-01

## 2020-01-01 RX ORDER — HYDRALAZINE HCL 50 MG
10 TABLET ORAL ONCE
Refills: 0 | Status: COMPLETED | OUTPATIENT
Start: 2020-01-01 | End: 2020-01-01

## 2020-01-01 RX ORDER — DILTIAZEM HCL 120 MG
30 CAPSULE, EXT RELEASE 24 HR ORAL EVERY 8 HOURS
Refills: 0 | Status: DISCONTINUED | OUTPATIENT
Start: 2020-01-01 | End: 2020-01-01

## 2020-01-01 RX ORDER — PANTOPRAZOLE SODIUM 20 MG/1
40 TABLET, DELAYED RELEASE ORAL ONCE
Refills: 0 | Status: COMPLETED | OUTPATIENT
Start: 2020-01-01 | End: 2020-01-01

## 2020-01-01 RX ORDER — LEVETIRACETAM 250 MG/1
1 TABLET, FILM COATED ORAL
Qty: 0 | Refills: 0 | DISCHARGE
Start: 2020-01-01

## 2020-01-01 RX ORDER — ASPIRIN 81 MG/1
81 TABLET, CHEWABLE ORAL DAILY
Qty: 90 | Refills: 1 | Status: DISCONTINUED | COMMUNITY
Start: 2019-01-04 | End: 2020-01-01

## 2020-01-01 RX ORDER — WARFARIN SODIUM 2.5 MG/1
1 TABLET ORAL
Qty: 0 | Refills: 0 | DISCHARGE

## 2020-01-01 RX ORDER — LABETALOL HYDROCHLORIDE 200 MG/1
200 TABLET, FILM COATED ORAL TWICE DAILY
Qty: 180 | Refills: 1 | Status: ACTIVE | COMMUNITY
Start: 2020-01-01 | End: 1900-01-01

## 2020-01-01 RX ORDER — SPIRONOLACTONE 25 MG/1
25 TABLET, FILM COATED ORAL DAILY
Refills: 0 | Status: DISCONTINUED | OUTPATIENT
Start: 2020-01-01 | End: 2020-01-01

## 2020-01-01 RX ORDER — TRAMADOL HYDROCHLORIDE 50 MG/1
50 TABLET, COATED ORAL
Qty: 60 | Refills: 0 | Status: DISCONTINUED | COMMUNITY
Start: 2019-01-04 | End: 2020-01-01

## 2020-01-01 RX ORDER — OMEPRAZOLE 20 MG/1
20 CAPSULE, DELAYED RELEASE ORAL
Qty: 90 | Refills: 0 | Status: DISCONTINUED | COMMUNITY
Start: 2019-01-04 | End: 2020-01-01

## 2020-01-01 RX ORDER — LABETALOL HCL 100 MG
1 TABLET ORAL
Qty: 0 | Refills: 0 | DISCHARGE
Start: 2020-01-01

## 2020-01-01 RX ORDER — LABETALOL HCL 100 MG
100 TABLET ORAL
Refills: 0 | Status: DISCONTINUED | OUTPATIENT
Start: 2020-01-01 | End: 2020-01-01

## 2020-01-01 RX ORDER — DILTIAZEM HYDROCHLORIDE 30 MG/1
30 TABLET ORAL 3 TIMES DAILY
Qty: 270 | Refills: 1 | Status: DISCONTINUED | COMMUNITY
Start: 2019-01-04 | End: 2020-01-01

## 2020-01-01 RX ORDER — LABETALOL HYDROCHLORIDE 100 MG/1
100 TABLET, FILM COATED ORAL
Qty: 180 | Refills: 1 | Status: DISCONTINUED | COMMUNITY
Start: 2019-01-04 | End: 2020-01-01

## 2020-01-01 RX ORDER — WARFARIN SODIUM 2.5 MG/1
7.5 TABLET ORAL ONCE
Refills: 0 | Status: DISCONTINUED | OUTPATIENT
Start: 2020-01-01 | End: 2020-01-01

## 2020-01-01 RX ORDER — ACETAMINOPHEN 500 MG
2 TABLET ORAL
Qty: 0 | Refills: 0 | DISCHARGE
Start: 2020-01-01

## 2020-01-01 RX ORDER — BRINZOLAMIDE 10 MG/ML
1 SUSPENSION/ DROPS OPHTHALMIC
Qty: 0 | Refills: 0 | DISCHARGE
Start: 2020-01-01

## 2020-01-01 RX ORDER — WARFARIN SODIUM 2.5 MG/1
5 TABLET ORAL ONCE
Refills: 0 | Status: COMPLETED | OUTPATIENT
Start: 2020-01-01 | End: 2020-01-01

## 2020-01-01 RX ORDER — WARFARIN SODIUM 2.5 MG/1
5 TABLET ORAL AT BEDTIME
Refills: 0 | Status: DISCONTINUED | OUTPATIENT
Start: 2020-01-01 | End: 2020-01-01

## 2020-01-01 RX ORDER — LISINOPRIL 2.5 MG/1
10 TABLET ORAL DAILY
Refills: 0 | Status: DISCONTINUED | OUTPATIENT
Start: 2020-01-01 | End: 2020-01-01

## 2020-01-01 RX ORDER — LISINOPRIL 2.5 MG/1
1 TABLET ORAL
Qty: 30 | Refills: 0
Start: 2020-01-01 | End: 2020-01-01

## 2020-01-01 RX ORDER — SODIUM CHLORIDE 9 MG/ML
1000 INJECTION INTRAMUSCULAR; INTRAVENOUS; SUBCUTANEOUS
Refills: 0 | Status: DISCONTINUED | OUTPATIENT
Start: 2020-01-01 | End: 2020-01-01

## 2020-01-01 RX ORDER — DORZOLAMIDE HYDROCHLORIDE 20 MG/ML
1 SOLUTION/ DROPS OPHTHALMIC
Qty: 0 | Refills: 0 | DISCHARGE
Start: 2020-01-01

## 2020-01-01 RX ORDER — DEXTROSE 50 % IN WATER 50 %
12.5 SYRINGE (ML) INTRAVENOUS ONCE
Refills: 0 | Status: DISCONTINUED | OUTPATIENT
Start: 2020-01-01 | End: 2020-01-01

## 2020-01-01 RX ORDER — POTASSIUM CHLORIDE 20 MEQ
40 PACKET (EA) ORAL EVERY 4 HOURS
Refills: 0 | Status: COMPLETED | OUTPATIENT
Start: 2020-01-01 | End: 2020-01-01

## 2020-01-01 RX ORDER — LISINOPRIL 2.5 MG/1
10 TABLET ORAL
Refills: 0 | Status: DISCONTINUED | OUTPATIENT
Start: 2020-01-01 | End: 2020-01-01

## 2020-01-01 RX ORDER — CINACALCET 30 MG/1
30 TABLET, FILM COATED ORAL DAILY
Refills: 0 | Status: DISCONTINUED | OUTPATIENT
Start: 2020-01-01 | End: 2020-01-01

## 2020-01-01 RX ORDER — POTASSIUM CHLORIDE 20 MEQ
20 PACKET (EA) ORAL
Refills: 0 | Status: DISCONTINUED | OUTPATIENT
Start: 2020-01-01 | End: 2020-01-01

## 2020-01-01 RX ORDER — POTASSIUM CHLORIDE 1500 MG/1
20 TABLET, FILM COATED, EXTENDED RELEASE ORAL
Refills: 0 | Status: DISCONTINUED | COMMUNITY
End: 2020-01-01

## 2020-01-01 RX ORDER — DEXTROSE 50 % IN WATER 50 %
25 SYRINGE (ML) INTRAVENOUS ONCE
Refills: 0 | Status: COMPLETED | OUTPATIENT
Start: 2020-01-01 | End: 2020-01-01

## 2020-01-01 RX ORDER — POTASSIUM CHLORIDE 20 MEQ
20 PACKET (EA) ORAL
Refills: 0 | Status: COMPLETED | OUTPATIENT
Start: 2020-01-01 | End: 2020-01-01

## 2020-01-01 RX ORDER — HYDRALAZINE HCL 50 MG
5 TABLET ORAL ONCE
Refills: 0 | Status: COMPLETED | OUTPATIENT
Start: 2020-01-01 | End: 2020-01-01

## 2020-01-01 RX ORDER — WARFARIN SODIUM 2.5 MG/1
2 TABLET ORAL ONCE
Refills: 0 | Status: COMPLETED | OUTPATIENT
Start: 2020-01-01 | End: 2020-01-01

## 2020-01-01 RX ORDER — LABETALOL HCL 100 MG
100 TABLET ORAL EVERY 12 HOURS
Refills: 0 | Status: DISCONTINUED | OUTPATIENT
Start: 2020-01-01 | End: 2020-01-01

## 2020-01-01 RX ORDER — SODIUM CHLORIDE 9 MG/ML
1400 INJECTION INTRAMUSCULAR; INTRAVENOUS; SUBCUTANEOUS ONCE
Refills: 0 | Status: COMPLETED | OUTPATIENT
Start: 2020-01-01 | End: 2020-01-01

## 2020-01-01 RX ORDER — FLUTICASONE PROPIONATE 50 UG/1
50 SPRAY, METERED NASAL
Qty: 48 | Refills: 0 | Status: DISCONTINUED | COMMUNITY
Start: 2017-08-12 | End: 2020-01-01

## 2020-01-01 RX ORDER — ASPIRIN/CALCIUM CARB/MAGNESIUM 324 MG
162 TABLET ORAL ONCE
Refills: 0 | Status: COMPLETED | OUTPATIENT
Start: 2020-01-01 | End: 2020-01-01

## 2020-01-01 RX ORDER — POTASSIUM CHLORIDE 20 MEQ
10 PACKET (EA) ORAL DAILY
Refills: 0 | Status: DISCONTINUED | OUTPATIENT
Start: 2020-01-01 | End: 2020-01-01

## 2020-01-01 RX ORDER — WARFARIN SODIUM 2.5 MG/1
10 TABLET ORAL ONCE
Refills: 0 | Status: DISCONTINUED | OUTPATIENT
Start: 2020-01-01 | End: 2020-01-01

## 2020-01-01 RX ORDER — SPIRONOLACTONE 25 MG/1
1 TABLET, FILM COATED ORAL
Qty: 30 | Refills: 0
Start: 2020-01-01 | End: 2020-01-01

## 2020-01-01 RX ORDER — CINACALCET 30 MG/1
1 TABLET, FILM COATED ORAL
Qty: 30 | Refills: 0
Start: 2020-01-01 | End: 2020-01-01

## 2020-01-01 RX ORDER — METOCLOPRAMIDE HCL 10 MG
5 TABLET ORAL ONCE
Refills: 0 | Status: COMPLETED | OUTPATIENT
Start: 2020-01-01 | End: 2020-01-01

## 2020-01-01 RX ORDER — DORZOLAMIDE HYDROCHLORIDE 20 MG/ML
1 SOLUTION/ DROPS OPHTHALMIC
Refills: 0 | Status: DISCONTINUED | OUTPATIENT
Start: 2020-01-01 | End: 2020-01-01

## 2020-01-01 RX ORDER — WARFARIN SODIUM 2.5 MG/1
1 TABLET ORAL
Qty: 14 | Refills: 0
Start: 2020-01-01 | End: 2020-01-01

## 2020-01-01 RX ORDER — ONDANSETRON 8 MG/1
4 TABLET, FILM COATED ORAL ONCE
Refills: 0 | Status: COMPLETED | OUTPATIENT
Start: 2020-01-01 | End: 2020-01-01

## 2020-01-01 RX ORDER — HALOPERIDOL DECANOATE 100 MG/ML
1 INJECTION INTRAMUSCULAR ONCE
Refills: 0 | Status: COMPLETED | OUTPATIENT
Start: 2020-01-01 | End: 2020-01-01

## 2020-01-01 RX ORDER — SODIUM CHLORIDE 9 MG/ML
1000 INJECTION, SOLUTION INTRAVENOUS
Refills: 0 | Status: DISCONTINUED | OUTPATIENT
Start: 2020-01-01 | End: 2020-01-01

## 2020-01-01 RX ORDER — APIXABAN 2.5 MG/1
2.5 TABLET, FILM COATED ORAL
Qty: 180 | Refills: 1 | Status: ACTIVE | COMMUNITY
Start: 2020-01-01 | End: 1900-01-01

## 2020-01-01 RX ORDER — CINACALCET 30 MG/1
30 TABLET, FILM COATED ORAL ONCE
Refills: 0 | Status: COMPLETED | OUTPATIENT
Start: 2020-01-01 | End: 2020-01-01

## 2020-01-01 RX ORDER — LABETALOL HCL 100 MG
100 TABLET ORAL EVERY 6 HOURS
Refills: 0 | Status: DISCONTINUED | OUTPATIENT
Start: 2020-01-01 | End: 2020-01-01

## 2020-01-01 RX ORDER — ASPIRIN/CALCIUM CARB/MAGNESIUM 324 MG
81 TABLET ORAL DAILY
Refills: 0 | Status: DISCONTINUED | OUTPATIENT
Start: 2020-01-01 | End: 2020-01-01

## 2020-01-01 RX ORDER — DEXTROSE 50 % IN WATER 50 %
50 SYRINGE (ML) INTRAVENOUS ONCE
Refills: 0 | Status: COMPLETED | OUTPATIENT
Start: 2020-01-01 | End: 2020-01-01

## 2020-01-01 RX ORDER — INFLUENZA VIRUS VACCINE 15; 15; 15; 15 UG/.5ML; UG/.5ML; UG/.5ML; UG/.5ML
0.5 SUSPENSION INTRAMUSCULAR ONCE
Refills: 0 | Status: DISCONTINUED | OUTPATIENT
Start: 2020-01-01 | End: 2020-01-01

## 2020-01-01 RX ORDER — BRIMONIDINE TARTRATE, TIMOLOL MALEATE 2; 5 MG/ML; MG/ML
1 SOLUTION/ DROPS OPHTHALMIC
Qty: 0 | Refills: 0 | DISCHARGE
Start: 2020-01-01

## 2020-01-01 RX ORDER — LISINOPRIL 2.5 MG/1
1 TABLET ORAL
Qty: 60 | Refills: 0
Start: 2020-01-01 | End: 2020-01-01

## 2020-01-01 RX ORDER — SENNA PLUS 8.6 MG/1
2 TABLET ORAL AT BEDTIME
Refills: 0 | Status: DISCONTINUED | OUTPATIENT
Start: 2020-01-01 | End: 2020-01-01

## 2020-01-01 RX ORDER — LABETALOL HCL 100 MG
200 TABLET ORAL THREE TIMES A DAY
Refills: 0 | Status: DISCONTINUED | OUTPATIENT
Start: 2020-01-01 | End: 2020-01-01

## 2020-01-01 RX ORDER — WARFARIN SODIUM 2.5 MG/1
2.5 TABLET ORAL AT BEDTIME
Refills: 0 | Status: DISCONTINUED | OUTPATIENT
Start: 2020-01-01 | End: 2020-01-01

## 2020-01-01 RX ORDER — MAGNESIUM OXIDE 400 MG ORAL TABLET 241.3 MG
400 TABLET ORAL ONCE
Refills: 0 | Status: COMPLETED | OUTPATIENT
Start: 2020-01-01 | End: 2020-01-01

## 2020-01-01 RX ORDER — SPIRONOLACTONE 25 MG/1
25 TABLET ORAL
Qty: 90 | Refills: 1 | Status: DISCONTINUED | COMMUNITY
Start: 2020-01-01 | End: 2020-01-01

## 2020-01-01 RX ORDER — WARFARIN SODIUM 2.5 MG/1
2 TABLET ORAL AT BEDTIME
Refills: 0 | Status: COMPLETED | OUTPATIENT
Start: 2020-01-01 | End: 2020-01-01

## 2020-01-01 RX ORDER — WARFARIN SODIUM 2.5 MG/1
3 TABLET ORAL ONCE
Refills: 0 | Status: DISCONTINUED | OUTPATIENT
Start: 2020-01-01 | End: 2020-01-01

## 2020-01-01 RX ORDER — DONEPEZIL HYDROCHLORIDE 10 MG/1
1 TABLET, FILM COATED ORAL
Qty: 30 | Refills: 0
Start: 2020-01-01 | End: 2020-01-01

## 2020-01-01 RX ORDER — WARFARIN SODIUM 2.5 MG/1
2.5 TABLET ORAL AT BEDTIME
Refills: 0 | Status: COMPLETED | OUTPATIENT
Start: 2020-01-01 | End: 2020-01-01

## 2020-01-01 RX ORDER — LABETALOL HCL 100 MG
1.25 TABLET ORAL
Qty: 200 | Refills: 0 | Status: DISCONTINUED | OUTPATIENT
Start: 2020-01-01 | End: 2020-01-01

## 2020-01-01 RX ORDER — LEVETIRACETAM 250 MG/1
250 TABLET, FILM COATED ORAL EVERY 12 HOURS
Refills: 0 | Status: DISCONTINUED | OUTPATIENT
Start: 2020-01-01 | End: 2020-01-01

## 2020-01-01 RX ORDER — SERTRALINE HYDROCHLORIDE 50 MG/1
50 TABLET, FILM COATED ORAL DAILY
Qty: 30 | Refills: 3 | Status: ACTIVE | COMMUNITY
Start: 2020-01-01 | End: 1900-01-01

## 2020-01-01 RX ORDER — ONDANSETRON 8 MG/1
4 TABLET, FILM COATED ORAL EVERY 6 HOURS
Refills: 0 | Status: DISCONTINUED | OUTPATIENT
Start: 2020-01-01 | End: 2020-01-01

## 2020-01-01 RX ORDER — LABETALOL HCL 100 MG
0.5 TABLET ORAL
Qty: 200 | Refills: 0 | Status: DISCONTINUED | OUTPATIENT
Start: 2020-01-01 | End: 2020-01-01

## 2020-01-01 RX ORDER — FUROSEMIDE 40 MG/1
40 TABLET ORAL
Qty: 270 | Refills: 0 | Status: DISCONTINUED | COMMUNITY
Start: 2019-01-04 | End: 2020-01-01

## 2020-01-01 RX ORDER — LABETALOL HCL 100 MG
5 TABLET ORAL ONCE
Refills: 0 | Status: COMPLETED | OUTPATIENT
Start: 2020-01-01 | End: 2020-01-01

## 2020-01-01 RX ORDER — LABETALOL HCL 100 MG
100 TABLET ORAL EVERY 8 HOURS
Refills: 0 | Status: DISCONTINUED | OUTPATIENT
Start: 2020-01-01 | End: 2020-01-01

## 2020-01-01 RX ORDER — ACETAMINOPHEN 500 MG
650 TABLET ORAL EVERY 6 HOURS
Refills: 0 | Status: DISCONTINUED | OUTPATIENT
Start: 2020-01-01 | End: 2020-01-01

## 2020-01-01 RX ORDER — POTASSIUM CHLORIDE 750 MG/1
10 CAPSULE, EXTENDED RELEASE ORAL
Qty: 90 | Refills: 0 | Status: DISCONTINUED | COMMUNITY
Start: 2019-01-04 | End: 2020-01-01

## 2020-01-01 RX ORDER — BRIMONIDINE TARTRATE, TIMOLOL MALEATE 2; 5 MG/ML; MG/ML
1 SOLUTION/ DROPS OPHTHALMIC
Refills: 0 | Status: DISCONTINUED | OUTPATIENT
Start: 2020-01-01 | End: 2020-01-01

## 2020-01-01 RX ORDER — WARFARIN SODIUM 2.5 MG/1
2 TABLET ORAL
Qty: 0 | Refills: 0 | DISCHARGE

## 2020-01-01 RX ORDER — DONEPEZIL HYDROCHLORIDE 5 MG/1
5 TABLET ORAL
Qty: 90 | Refills: 0 | Status: ACTIVE | COMMUNITY
Start: 2020-01-01 | End: 1900-01-01

## 2020-01-01 RX ORDER — INSULIN HUMAN 100 [IU]/ML
10 INJECTION, SOLUTION SUBCUTANEOUS ONCE
Refills: 0 | Status: COMPLETED | OUTPATIENT
Start: 2020-01-01 | End: 2020-01-01

## 2020-01-01 RX ORDER — BRINZOLAMIDE 10 MG/ML
1 SUSPENSION/ DROPS OPHTHALMIC
Refills: 0 | Status: DISCONTINUED | OUTPATIENT
Start: 2020-01-01 | End: 2020-01-01

## 2020-01-01 RX ORDER — LABETALOL HCL 100 MG
10 TABLET ORAL ONCE
Refills: 0 | Status: COMPLETED | OUTPATIENT
Start: 2020-01-01 | End: 2020-01-01

## 2020-01-01 RX ORDER — FLUTICASONE PROPIONATE AND SALMETEROL 113; 14 UG/1; UG/1
113-14 POWDER, METERED RESPIRATORY (INHALATION)
Qty: 1 | Refills: 2 | Status: DISCONTINUED | COMMUNITY
Start: 2019-01-04 | End: 2020-01-01

## 2020-01-01 RX ORDER — BRINZOLAMIDE 10 MG/ML
1 SUSPENSION/ DROPS OPHTHALMIC
Qty: 0 | Refills: 0 | DISCHARGE

## 2020-01-01 RX ORDER — WARFARIN SODIUM 2.5 MG/1
1 TABLET ORAL
Qty: 0 | Refills: 0 | DISCHARGE
Start: 2020-01-01

## 2020-01-01 RX ORDER — NITROFURANTOIN MACROCRYSTAL 50 MG
100 CAPSULE ORAL
Refills: 0 | Status: DISCONTINUED | OUTPATIENT
Start: 2020-01-01 | End: 2020-01-01

## 2020-01-01 RX ADMIN — DORZOLAMIDE HYDROCHLORIDE 1 DROP(S): 20 SOLUTION/ DROPS OPHTHALMIC at 14:11

## 2020-01-01 RX ADMIN — Medication 100 MILLIGRAM(S): at 17:22

## 2020-01-01 RX ADMIN — Medication 1 TABLET(S): at 12:18

## 2020-01-01 RX ADMIN — WARFARIN SODIUM 3.5 MILLIGRAM(S): 2.5 TABLET ORAL at 21:40

## 2020-01-01 RX ADMIN — LISINOPRIL 10 MILLIGRAM(S): 2.5 TABLET ORAL at 06:40

## 2020-01-01 RX ADMIN — Medication 100 MILLIGRAM(S): at 13:35

## 2020-01-01 RX ADMIN — Medication 650 MILLIGRAM(S): at 21:22

## 2020-01-01 RX ADMIN — BRIMONIDINE TARTRATE, TIMOLOL MALEATE 1 DROP(S): 2; 5 SOLUTION/ DROPS OPHTHALMIC at 17:11

## 2020-01-01 RX ADMIN — CINACALCET 30 MILLIGRAM(S): 30 TABLET, FILM COATED ORAL at 17:12

## 2020-01-01 RX ADMIN — Medication 100 MILLIGRAM(S): at 18:04

## 2020-01-01 RX ADMIN — Medication 30 MILLIGRAM(S): at 13:42

## 2020-01-01 RX ADMIN — Medication 200 MILLIGRAM(S): at 21:22

## 2020-01-01 RX ADMIN — BRIMONIDINE TARTRATE, TIMOLOL MALEATE 1 DROP(S): 2; 5 SOLUTION/ DROPS OPHTHALMIC at 05:21

## 2020-01-01 RX ADMIN — MONTELUKAST 10 MILLIGRAM(S): 4 TABLET, CHEWABLE ORAL at 21:52

## 2020-01-01 RX ADMIN — MONTELUKAST 10 MILLIGRAM(S): 4 TABLET, CHEWABLE ORAL at 21:25

## 2020-01-01 RX ADMIN — LISINOPRIL 10 MILLIGRAM(S): 2.5 TABLET ORAL at 05:21

## 2020-01-01 RX ADMIN — Medication 40 MILLIGRAM(S): at 05:51

## 2020-01-01 RX ADMIN — MONTELUKAST 10 MILLIGRAM(S): 4 TABLET, CHEWABLE ORAL at 21:07

## 2020-01-01 RX ADMIN — MONTELUKAST 10 MILLIGRAM(S): 4 TABLET, CHEWABLE ORAL at 21:38

## 2020-01-01 RX ADMIN — Medication 1 TABLET(S): at 11:34

## 2020-01-01 RX ADMIN — Medication 50 MILLIGRAM(S): at 18:43

## 2020-01-01 RX ADMIN — DORZOLAMIDE HYDROCHLORIDE 1 DROP(S): 20 SOLUTION/ DROPS OPHTHALMIC at 21:45

## 2020-01-01 RX ADMIN — Medication 40 MILLIGRAM(S): at 16:46

## 2020-01-01 RX ADMIN — DORZOLAMIDE HYDROCHLORIDE 1 DROP(S): 20 SOLUTION/ DROPS OPHTHALMIC at 05:46

## 2020-01-01 RX ADMIN — DORZOLAMIDE HYDROCHLORIDE 1 DROP(S): 20 SOLUTION/ DROPS OPHTHALMIC at 13:35

## 2020-01-01 RX ADMIN — MONTELUKAST 10 MILLIGRAM(S): 4 TABLET, CHEWABLE ORAL at 22:06

## 2020-01-01 RX ADMIN — WARFARIN SODIUM 2 MILLIGRAM(S): 2.5 TABLET ORAL at 21:25

## 2020-01-01 RX ADMIN — LISINOPRIL 10 MILLIGRAM(S): 2.5 TABLET ORAL at 05:17

## 2020-01-01 RX ADMIN — DORZOLAMIDE HYDROCHLORIDE 1 DROP(S): 20 SOLUTION/ DROPS OPHTHALMIC at 21:30

## 2020-01-01 RX ADMIN — BRIMONIDINE TARTRATE, TIMOLOL MALEATE 1 DROP(S): 2; 5 SOLUTION/ DROPS OPHTHALMIC at 05:33

## 2020-01-01 RX ADMIN — DORZOLAMIDE HYDROCHLORIDE 1 DROP(S): 20 SOLUTION/ DROPS OPHTHALMIC at 21:33

## 2020-01-01 RX ADMIN — Medication 650 MILLIGRAM(S): at 10:12

## 2020-01-01 RX ADMIN — DORZOLAMIDE HYDROCHLORIDE 1 DROP(S): 20 SOLUTION/ DROPS OPHTHALMIC at 06:55

## 2020-01-01 RX ADMIN — BRIMONIDINE TARTRATE, TIMOLOL MALEATE 1 DROP(S): 2; 5 SOLUTION/ DROPS OPHTHALMIC at 17:22

## 2020-01-01 RX ADMIN — DORZOLAMIDE HYDROCHLORIDE 1 DROP(S): 20 SOLUTION/ DROPS OPHTHALMIC at 21:36

## 2020-01-01 RX ADMIN — Medication 100 MILLIGRAM(S): at 17:12

## 2020-01-01 RX ADMIN — Medication 650 MILLIGRAM(S): at 16:45

## 2020-01-01 RX ADMIN — Medication 650 MILLIGRAM(S): at 06:21

## 2020-01-01 RX ADMIN — WARFARIN SODIUM 3.5 MILLIGRAM(S): 2.5 TABLET ORAL at 21:35

## 2020-01-01 RX ADMIN — DORZOLAMIDE HYDROCHLORIDE 1 DROP(S): 20 SOLUTION/ DROPS OPHTHALMIC at 21:39

## 2020-01-01 RX ADMIN — Medication 400 MILLIGRAM(S): at 10:38

## 2020-01-01 RX ADMIN — DORZOLAMIDE HYDROCHLORIDE 1 DROP(S): 20 SOLUTION/ DROPS OPHTHALMIC at 05:20

## 2020-01-01 RX ADMIN — LEVETIRACETAM 250 MILLIGRAM(S): 250 TABLET, FILM COATED ORAL at 05:38

## 2020-01-01 RX ADMIN — DORZOLAMIDE HYDROCHLORIDE 1 DROP(S): 20 SOLUTION/ DROPS OPHTHALMIC at 13:27

## 2020-01-01 RX ADMIN — DORZOLAMIDE HYDROCHLORIDE 1 DROP(S): 20 SOLUTION/ DROPS OPHTHALMIC at 06:39

## 2020-01-01 RX ADMIN — Medication 100 MILLIGRAM(S): at 05:39

## 2020-01-01 RX ADMIN — Medication 40 MILLIEQUIVALENT(S): at 11:29

## 2020-01-01 RX ADMIN — Medication 650 MILLIGRAM(S): at 11:10

## 2020-01-01 RX ADMIN — Medication 650 MILLIGRAM(S): at 10:00

## 2020-01-01 RX ADMIN — Medication 50 MILLILITER(S): at 01:20

## 2020-01-01 RX ADMIN — Medication 100 MILLIGRAM(S): at 21:41

## 2020-01-01 RX ADMIN — Medication 100 MILLIGRAM(S): at 05:33

## 2020-01-01 RX ADMIN — SODIUM CHLORIDE 1400 MILLILITER(S): 9 INJECTION INTRAMUSCULAR; INTRAVENOUS; SUBCUTANEOUS at 03:35

## 2020-01-01 RX ADMIN — MONTELUKAST 10 MILLIGRAM(S): 4 TABLET, CHEWABLE ORAL at 21:22

## 2020-01-01 RX ADMIN — DONEPEZIL HYDROCHLORIDE 5 MILLIGRAM(S): 10 TABLET, FILM COATED ORAL at 21:14

## 2020-01-01 RX ADMIN — Medication 1 TABLET(S): at 12:33

## 2020-01-01 RX ADMIN — DORZOLAMIDE HYDROCHLORIDE 1 DROP(S): 20 SOLUTION/ DROPS OPHTHALMIC at 05:48

## 2020-01-01 RX ADMIN — DORZOLAMIDE HYDROCHLORIDE 1 DROP(S): 20 SOLUTION/ DROPS OPHTHALMIC at 21:55

## 2020-01-01 RX ADMIN — DORZOLAMIDE HYDROCHLORIDE 1 DROP(S): 20 SOLUTION/ DROPS OPHTHALMIC at 21:28

## 2020-01-01 RX ADMIN — DORZOLAMIDE HYDROCHLORIDE 1 DROP(S): 20 SOLUTION/ DROPS OPHTHALMIC at 21:25

## 2020-01-01 RX ADMIN — LEVETIRACETAM 250 MILLIGRAM(S): 250 TABLET, FILM COATED ORAL at 05:48

## 2020-01-01 RX ADMIN — DORZOLAMIDE HYDROCHLORIDE 1 DROP(S): 20 SOLUTION/ DROPS OPHTHALMIC at 21:21

## 2020-01-01 RX ADMIN — DORZOLAMIDE HYDROCHLORIDE 1 DROP(S): 20 SOLUTION/ DROPS OPHTHALMIC at 05:39

## 2020-01-01 RX ADMIN — CINACALCET 30 MILLIGRAM(S): 30 TABLET, FILM COATED ORAL at 11:52

## 2020-01-01 RX ADMIN — Medication 100 MILLIGRAM(S): at 13:31

## 2020-01-01 RX ADMIN — Medication 5 MILLIGRAM(S): at 06:14

## 2020-01-01 RX ADMIN — Medication 100 MILLIGRAM(S): at 21:56

## 2020-01-01 RX ADMIN — Medication 20 MILLIEQUIVALENT(S): at 12:08

## 2020-01-01 RX ADMIN — LEVETIRACETAM 250 MILLIGRAM(S): 250 TABLET, FILM COATED ORAL at 18:08

## 2020-01-01 RX ADMIN — Medication 650 MILLIGRAM(S): at 10:19

## 2020-01-01 RX ADMIN — Medication 650 MILLIGRAM(S): at 15:55

## 2020-01-01 RX ADMIN — DORZOLAMIDE HYDROCHLORIDE 1 DROP(S): 20 SOLUTION/ DROPS OPHTHALMIC at 21:56

## 2020-01-01 RX ADMIN — DORZOLAMIDE HYDROCHLORIDE 1 DROP(S): 20 SOLUTION/ DROPS OPHTHALMIC at 15:43

## 2020-01-01 RX ADMIN — Medication 400 MILLIGRAM(S): at 11:05

## 2020-01-01 RX ADMIN — Medication 25 MILLILITER(S): at 05:45

## 2020-01-01 RX ADMIN — WARFARIN SODIUM 2.5 MILLIGRAM(S): 2.5 TABLET ORAL at 22:07

## 2020-01-01 RX ADMIN — LISINOPRIL 10 MILLIGRAM(S): 2.5 TABLET ORAL at 05:39

## 2020-01-01 RX ADMIN — Medication 100 MILLIGRAM(S): at 23:43

## 2020-01-01 RX ADMIN — Medication 650 MILLIGRAM(S): at 21:52

## 2020-01-01 RX ADMIN — INSULIN HUMAN 10 UNIT(S): 100 INJECTION, SOLUTION SUBCUTANEOUS at 01:20

## 2020-01-01 RX ADMIN — Medication 100 MILLIGRAM(S): at 21:36

## 2020-01-01 RX ADMIN — Medication 30 MILLIGRAM(S): at 05:59

## 2020-01-01 RX ADMIN — Medication 100 MILLIGRAM(S): at 23:40

## 2020-01-01 RX ADMIN — LEVETIRACETAM 250 MILLIGRAM(S): 250 TABLET, FILM COATED ORAL at 18:04

## 2020-01-01 RX ADMIN — BRINZOLAMIDE 1 DROP(S): 10 SUSPENSION/ DROPS OPHTHALMIC at 05:39

## 2020-01-01 RX ADMIN — Medication 10 MILLIGRAM(S): at 16:04

## 2020-01-01 RX ADMIN — Medication 75 MG/MIN: at 09:38

## 2020-01-01 RX ADMIN — Medication 1 TABLET(S): at 11:45

## 2020-01-01 RX ADMIN — Medication 100 MILLIGRAM(S): at 21:14

## 2020-01-01 RX ADMIN — SODIUM CHLORIDE 1400 MILLILITER(S): 9 INJECTION INTRAMUSCULAR; INTRAVENOUS; SUBCUTANEOUS at 04:35

## 2020-01-01 RX ADMIN — PANTOPRAZOLE SODIUM 40 MILLIGRAM(S): 20 TABLET, DELAYED RELEASE ORAL at 06:14

## 2020-01-01 RX ADMIN — BRINZOLAMIDE 1 DROP(S): 10 SUSPENSION/ DROPS OPHTHALMIC at 05:08

## 2020-01-01 RX ADMIN — DORZOLAMIDE HYDROCHLORIDE 1 DROP(S): 20 SOLUTION/ DROPS OPHTHALMIC at 05:56

## 2020-01-01 RX ADMIN — Medication 50 MILLIGRAM(S): at 05:12

## 2020-01-01 RX ADMIN — DORZOLAMIDE HYDROCHLORIDE 1 DROP(S): 20 SOLUTION/ DROPS OPHTHALMIC at 21:54

## 2020-01-01 RX ADMIN — DORZOLAMIDE HYDROCHLORIDE 1 DROP(S): 20 SOLUTION/ DROPS OPHTHALMIC at 16:30

## 2020-01-01 RX ADMIN — LISINOPRIL 10 MILLIGRAM(S): 2.5 TABLET ORAL at 21:25

## 2020-01-01 RX ADMIN — BRIMONIDINE TARTRATE, TIMOLOL MALEATE 1 DROP(S): 2; 5 SOLUTION/ DROPS OPHTHALMIC at 05:56

## 2020-01-01 RX ADMIN — Medication 100 MILLIGRAM(S): at 15:58

## 2020-01-01 RX ADMIN — Medication 100 MILLIGRAM(S): at 11:24

## 2020-01-01 RX ADMIN — Medication 200 MILLIGRAM(S): at 13:22

## 2020-01-01 RX ADMIN — Medication 100 MILLIGRAM(S): at 14:37

## 2020-01-01 RX ADMIN — WARFARIN SODIUM 2.5 MILLIGRAM(S): 2.5 TABLET ORAL at 21:55

## 2020-01-01 RX ADMIN — DORZOLAMIDE HYDROCHLORIDE 1 DROP(S): 20 SOLUTION/ DROPS OPHTHALMIC at 13:22

## 2020-01-01 RX ADMIN — Medication 1 TABLET(S): at 11:24

## 2020-01-01 RX ADMIN — BRIMONIDINE TARTRATE, TIMOLOL MALEATE 1 DROP(S): 2; 5 SOLUTION/ DROPS OPHTHALMIC at 17:33

## 2020-01-01 RX ADMIN — LISINOPRIL 10 MILLIGRAM(S): 2.5 TABLET ORAL at 05:27

## 2020-01-01 RX ADMIN — Medication 100 MILLIGRAM(S): at 16:29

## 2020-01-01 RX ADMIN — Medication 400 MILLIGRAM(S): at 16:47

## 2020-01-01 RX ADMIN — Medication 1 TABLET(S): at 12:20

## 2020-01-01 RX ADMIN — CINACALCET 30 MILLIGRAM(S): 30 TABLET, FILM COATED ORAL at 11:45

## 2020-01-01 RX ADMIN — Medication 81 MILLIGRAM(S): at 12:50

## 2020-01-01 RX ADMIN — Medication 100 MILLIGRAM(S): at 05:48

## 2020-01-01 RX ADMIN — Medication 20 MILLIEQUIVALENT(S): at 16:47

## 2020-01-01 RX ADMIN — Medication 40 MILLIGRAM(S): at 05:59

## 2020-01-01 RX ADMIN — Medication 100 MILLIGRAM(S): at 05:55

## 2020-01-01 RX ADMIN — Medication 650 MILLIGRAM(S): at 07:35

## 2020-01-01 RX ADMIN — BRIMONIDINE TARTRATE, TIMOLOL MALEATE 1 DROP(S): 2; 5 SOLUTION/ DROPS OPHTHALMIC at 05:17

## 2020-01-01 RX ADMIN — MONTELUKAST 10 MILLIGRAM(S): 4 TABLET, CHEWABLE ORAL at 21:20

## 2020-01-01 RX ADMIN — LISINOPRIL 10 MILLIGRAM(S): 2.5 TABLET ORAL at 17:08

## 2020-01-01 RX ADMIN — SODIUM CHLORIDE 50 MILLILITER(S): 9 INJECTION INTRAMUSCULAR; INTRAVENOUS; SUBCUTANEOUS at 17:35

## 2020-01-01 RX ADMIN — Medication 1 TABLET(S): at 11:57

## 2020-01-01 RX ADMIN — DORZOLAMIDE HYDROCHLORIDE 1 DROP(S): 20 SOLUTION/ DROPS OPHTHALMIC at 08:00

## 2020-01-01 RX ADMIN — DORZOLAMIDE HYDROCHLORIDE 1 DROP(S): 20 SOLUTION/ DROPS OPHTHALMIC at 05:03

## 2020-01-01 RX ADMIN — MAGNESIUM OXIDE 400 MG ORAL TABLET 400 MILLIGRAM(S): 241.3 TABLET ORAL at 16:46

## 2020-01-01 RX ADMIN — CINACALCET 30 MILLIGRAM(S): 30 TABLET, FILM COATED ORAL at 13:32

## 2020-01-01 RX ADMIN — DORZOLAMIDE HYDROCHLORIDE 1 DROP(S): 20 SOLUTION/ DROPS OPHTHALMIC at 05:27

## 2020-01-01 RX ADMIN — LISINOPRIL 10 MILLIGRAM(S): 2.5 TABLET ORAL at 05:51

## 2020-01-01 RX ADMIN — Medication 650 MILLIGRAM(S): at 21:45

## 2020-01-01 RX ADMIN — Medication 1 TABLET(S): at 12:06

## 2020-01-01 RX ADMIN — DORZOLAMIDE HYDROCHLORIDE 1 DROP(S): 20 SOLUTION/ DROPS OPHTHALMIC at 10:37

## 2020-01-01 RX ADMIN — Medication 650 MILLIGRAM(S): at 14:10

## 2020-01-01 RX ADMIN — BRIMONIDINE TARTRATE, TIMOLOL MALEATE 1 DROP(S): 2; 5 SOLUTION/ DROPS OPHTHALMIC at 06:40

## 2020-01-01 RX ADMIN — Medication 100 MILLIGRAM(S): at 06:00

## 2020-01-01 RX ADMIN — DORZOLAMIDE HYDROCHLORIDE 1 DROP(S): 20 SOLUTION/ DROPS OPHTHALMIC at 08:38

## 2020-01-01 RX ADMIN — LISINOPRIL 10 MILLIGRAM(S): 2.5 TABLET ORAL at 06:18

## 2020-01-01 RX ADMIN — Medication 30 MILLIGRAM(S): at 05:06

## 2020-01-01 RX ADMIN — DONEPEZIL HYDROCHLORIDE 5 MILLIGRAM(S): 10 TABLET, FILM COATED ORAL at 21:21

## 2020-01-01 RX ADMIN — MONTELUKAST 10 MILLIGRAM(S): 4 TABLET, CHEWABLE ORAL at 21:01

## 2020-01-01 RX ADMIN — DORZOLAMIDE HYDROCHLORIDE 1 DROP(S): 20 SOLUTION/ DROPS OPHTHALMIC at 05:17

## 2020-01-01 RX ADMIN — DONEPEZIL HYDROCHLORIDE 5 MILLIGRAM(S): 10 TABLET, FILM COATED ORAL at 21:41

## 2020-01-01 RX ADMIN — Medication 81 MILLIGRAM(S): at 12:15

## 2020-01-01 RX ADMIN — DORZOLAMIDE HYDROCHLORIDE 1 DROP(S): 20 SOLUTION/ DROPS OPHTHALMIC at 13:38

## 2020-01-01 RX ADMIN — Medication 5 MILLIGRAM(S): at 11:19

## 2020-01-01 RX ADMIN — Medication 40 MILLIGRAM(S): at 17:36

## 2020-01-01 RX ADMIN — DONEPEZIL HYDROCHLORIDE 5 MILLIGRAM(S): 10 TABLET, FILM COATED ORAL at 21:34

## 2020-01-01 RX ADMIN — Medication 100 MILLIGRAM(S): at 17:53

## 2020-01-01 RX ADMIN — DORZOLAMIDE HYDROCHLORIDE 1 DROP(S): 20 SOLUTION/ DROPS OPHTHALMIC at 22:07

## 2020-01-01 RX ADMIN — MONTELUKAST 10 MILLIGRAM(S): 4 TABLET, CHEWABLE ORAL at 21:45

## 2020-01-01 RX ADMIN — Medication 650 MILLIGRAM(S): at 09:38

## 2020-01-01 RX ADMIN — DORZOLAMIDE HYDROCHLORIDE 1 DROP(S): 20 SOLUTION/ DROPS OPHTHALMIC at 14:24

## 2020-01-01 RX ADMIN — Medication 40 MILLIGRAM(S): at 05:12

## 2020-01-01 RX ADMIN — LISINOPRIL 10 MILLIGRAM(S): 2.5 TABLET ORAL at 05:55

## 2020-01-01 RX ADMIN — Medication 1 TABLET(S): at 18:07

## 2020-01-01 RX ADMIN — Medication 40 MILLIGRAM(S): at 05:07

## 2020-01-01 RX ADMIN — Medication 100 MILLIGRAM(S): at 13:38

## 2020-01-01 RX ADMIN — DORZOLAMIDE HYDROCHLORIDE 1 DROP(S): 20 SOLUTION/ DROPS OPHTHALMIC at 06:02

## 2020-01-01 RX ADMIN — DORZOLAMIDE HYDROCHLORIDE 1 DROP(S): 20 SOLUTION/ DROPS OPHTHALMIC at 05:15

## 2020-01-01 RX ADMIN — DORZOLAMIDE HYDROCHLORIDE 1 DROP(S): 20 SOLUTION/ DROPS OPHTHALMIC at 14:52

## 2020-01-01 RX ADMIN — Medication 650 MILLIGRAM(S): at 11:41

## 2020-01-01 RX ADMIN — Medication 650 MILLIGRAM(S): at 15:28

## 2020-01-01 RX ADMIN — Medication 100 MILLIGRAM(S): at 18:19

## 2020-01-01 RX ADMIN — Medication 1 TABLET(S): at 11:17

## 2020-01-01 RX ADMIN — Medication 1 TABLET(S): at 11:25

## 2020-01-01 RX ADMIN — Medication 5 MILLIGRAM(S): at 15:10

## 2020-01-01 RX ADMIN — DONEPEZIL HYDROCHLORIDE 5 MILLIGRAM(S): 10 TABLET, FILM COATED ORAL at 21:56

## 2020-01-01 RX ADMIN — DORZOLAMIDE HYDROCHLORIDE 1 DROP(S): 20 SOLUTION/ DROPS OPHTHALMIC at 21:14

## 2020-01-01 RX ADMIN — WARFARIN SODIUM 3 MILLIGRAM(S): 2.5 TABLET ORAL at 21:56

## 2020-01-01 RX ADMIN — Medication 200 MILLIGRAM(S): at 05:05

## 2020-01-01 RX ADMIN — LISINOPRIL 10 MILLIGRAM(S): 2.5 TABLET ORAL at 17:21

## 2020-01-01 RX ADMIN — HALOPERIDOL DECANOATE 1 MILLIGRAM(S): 100 INJECTION INTRAMUSCULAR at 16:46

## 2020-01-01 RX ADMIN — MONTELUKAST 10 MILLIGRAM(S): 4 TABLET, CHEWABLE ORAL at 21:36

## 2020-01-01 RX ADMIN — LISINOPRIL 10 MILLIGRAM(S): 2.5 TABLET ORAL at 17:33

## 2020-01-01 RX ADMIN — DORZOLAMIDE HYDROCHLORIDE 1 DROP(S): 20 SOLUTION/ DROPS OPHTHALMIC at 21:40

## 2020-01-01 RX ADMIN — DORZOLAMIDE HYDROCHLORIDE 1 DROP(S): 20 SOLUTION/ DROPS OPHTHALMIC at 07:59

## 2020-01-01 RX ADMIN — Medication 100 MILLIGRAM(S): at 12:33

## 2020-01-01 RX ADMIN — DORZOLAMIDE HYDROCHLORIDE 1 DROP(S): 20 SOLUTION/ DROPS OPHTHALMIC at 13:12

## 2020-01-01 RX ADMIN — Medication 650 MILLIGRAM(S): at 07:48

## 2020-01-01 RX ADMIN — BRIMONIDINE TARTRATE, TIMOLOL MALEATE 1 DROP(S): 2; 5 SOLUTION/ DROPS OPHTHALMIC at 17:57

## 2020-01-01 RX ADMIN — Medication 100 MILLIGRAM(S): at 21:35

## 2020-01-01 RX ADMIN — Medication 100 MILLIGRAM(S): at 05:16

## 2020-01-01 RX ADMIN — MONTELUKAST 10 MILLIGRAM(S): 4 TABLET, CHEWABLE ORAL at 21:14

## 2020-01-01 RX ADMIN — Medication 650 MILLIGRAM(S): at 08:45

## 2020-01-01 RX ADMIN — Medication 100 MILLIGRAM(S): at 06:18

## 2020-01-01 RX ADMIN — DORZOLAMIDE HYDROCHLORIDE 1 DROP(S): 20 SOLUTION/ DROPS OPHTHALMIC at 21:01

## 2020-01-01 RX ADMIN — Medication 650 MILLIGRAM(S): at 06:36

## 2020-01-01 RX ADMIN — LEVETIRACETAM 400 MILLIGRAM(S): 250 TABLET, FILM COATED ORAL at 21:38

## 2020-01-01 RX ADMIN — Medication 650 MILLIGRAM(S): at 10:16

## 2020-01-01 RX ADMIN — Medication 50 MILLIGRAM(S): at 05:51

## 2020-01-01 RX ADMIN — Medication 1 TABLET(S): at 11:38

## 2020-01-01 RX ADMIN — Medication 650 MILLIGRAM(S): at 13:32

## 2020-01-01 RX ADMIN — SPIRONOLACTONE 25 MILLIGRAM(S): 25 TABLET, FILM COATED ORAL at 05:12

## 2020-01-01 RX ADMIN — BRIMONIDINE TARTRATE, TIMOLOL MALEATE 1 DROP(S): 2; 5 SOLUTION/ DROPS OPHTHALMIC at 05:55

## 2020-01-01 RX ADMIN — CINACALCET 30 MILLIGRAM(S): 30 TABLET, FILM COATED ORAL at 12:38

## 2020-01-01 RX ADMIN — Medication 650 MILLIGRAM(S): at 22:52

## 2020-01-01 RX ADMIN — DONEPEZIL HYDROCHLORIDE 5 MILLIGRAM(S): 10 TABLET, FILM COATED ORAL at 21:36

## 2020-01-01 RX ADMIN — Medication 20 MILLIEQUIVALENT(S): at 09:20

## 2020-01-01 RX ADMIN — Medication 100 GRAM(S): at 09:53

## 2020-01-01 RX ADMIN — WARFARIN SODIUM 2 MILLIGRAM(S): 2.5 TABLET ORAL at 21:14

## 2020-01-01 RX ADMIN — Medication 650 MILLIGRAM(S): at 09:49

## 2020-01-01 RX ADMIN — Medication 650 MILLIGRAM(S): at 09:19

## 2020-01-01 RX ADMIN — Medication 400 MILLIGRAM(S): at 17:24

## 2020-01-01 RX ADMIN — DONEPEZIL HYDROCHLORIDE 5 MILLIGRAM(S): 10 TABLET, FILM COATED ORAL at 21:22

## 2020-01-01 RX ADMIN — DORZOLAMIDE HYDROCHLORIDE 1 DROP(S): 20 SOLUTION/ DROPS OPHTHALMIC at 13:31

## 2020-01-01 RX ADMIN — Medication 40 MILLIGRAM(S): at 17:25

## 2020-01-01 RX ADMIN — LISINOPRIL 10 MILLIGRAM(S): 2.5 TABLET ORAL at 17:03

## 2020-01-01 RX ADMIN — DORZOLAMIDE HYDROCHLORIDE 1 DROP(S): 20 SOLUTION/ DROPS OPHTHALMIC at 21:08

## 2020-01-01 RX ADMIN — BRIMONIDINE TARTRATE, TIMOLOL MALEATE 1 DROP(S): 2; 5 SOLUTION/ DROPS OPHTHALMIC at 05:29

## 2020-01-01 RX ADMIN — Medication 100 MILLIGRAM(S): at 05:07

## 2020-01-01 RX ADMIN — Medication 100 MILLIGRAM(S): at 05:21

## 2020-01-01 RX ADMIN — MONTELUKAST 10 MILLIGRAM(S): 4 TABLET, CHEWABLE ORAL at 21:30

## 2020-01-01 RX ADMIN — Medication 1 TABLET(S): at 11:52

## 2020-01-01 RX ADMIN — Medication 300 MILLIGRAM(S): at 14:24

## 2020-01-01 RX ADMIN — LISINOPRIL 10 MILLIGRAM(S): 2.5 TABLET ORAL at 05:33

## 2020-01-01 RX ADMIN — BRIMONIDINE TARTRATE, TIMOLOL MALEATE 1 DROP(S): 2; 5 SOLUTION/ DROPS OPHTHALMIC at 05:16

## 2020-01-01 RX ADMIN — Medication 100 MILLIGRAM(S): at 05:46

## 2020-01-01 RX ADMIN — Medication 1 TABLET(S): at 12:38

## 2020-01-01 RX ADMIN — WARFARIN SODIUM 3 MILLIGRAM(S): 2.5 TABLET ORAL at 21:20

## 2020-01-01 RX ADMIN — DORZOLAMIDE HYDROCHLORIDE 1 DROP(S): 20 SOLUTION/ DROPS OPHTHALMIC at 14:37

## 2020-01-01 RX ADMIN — BRIMONIDINE TARTRATE, TIMOLOL MALEATE 1 DROP(S): 2; 5 SOLUTION/ DROPS OPHTHALMIC at 17:02

## 2020-01-01 RX ADMIN — BRINZOLAMIDE 1 DROP(S): 10 SUSPENSION/ DROPS OPHTHALMIC at 17:15

## 2020-01-01 RX ADMIN — Medication 400 MILLIGRAM(S): at 17:36

## 2020-01-01 RX ADMIN — LISINOPRIL 10 MILLIGRAM(S): 2.5 TABLET ORAL at 05:05

## 2020-01-01 RX ADMIN — BRIMONIDINE TARTRATE, TIMOLOL MALEATE 1 DROP(S): 2; 5 SOLUTION/ DROPS OPHTHALMIC at 20:26

## 2020-01-01 RX ADMIN — Medication 1 TABLET(S): at 12:30

## 2020-01-01 RX ADMIN — Medication 100 MILLIGRAM(S): at 18:13

## 2020-01-01 RX ADMIN — WARFARIN SODIUM 3 MILLIGRAM(S): 2.5 TABLET ORAL at 21:30

## 2020-01-01 RX ADMIN — LISINOPRIL 10 MILLIGRAM(S): 2.5 TABLET ORAL at 17:29

## 2020-01-01 RX ADMIN — BRINZOLAMIDE 1 DROP(S): 10 SUSPENSION/ DROPS OPHTHALMIC at 05:47

## 2020-01-01 RX ADMIN — LEVETIRACETAM 250 MILLIGRAM(S): 250 TABLET, FILM COATED ORAL at 05:05

## 2020-01-01 RX ADMIN — LISINOPRIL 10 MILLIGRAM(S): 2.5 TABLET ORAL at 05:16

## 2020-01-01 RX ADMIN — WARFARIN SODIUM 2 MILLIGRAM(S): 2.5 TABLET ORAL at 21:16

## 2020-01-01 RX ADMIN — WARFARIN SODIUM 2.5 MILLIGRAM(S): 2.5 TABLET ORAL at 21:22

## 2020-01-01 RX ADMIN — Medication 30 MILLIGRAM(S): at 21:14

## 2020-01-01 RX ADMIN — WARFARIN SODIUM 3 MILLIGRAM(S): 2.5 TABLET ORAL at 21:45

## 2020-01-01 RX ADMIN — MONTELUKAST 10 MILLIGRAM(S): 4 TABLET, CHEWABLE ORAL at 21:21

## 2020-01-01 RX ADMIN — Medication 650 MILLIGRAM(S): at 10:09

## 2020-01-01 RX ADMIN — Medication 200 MILLIGRAM(S): at 05:26

## 2020-01-01 RX ADMIN — Medication 650 MILLIGRAM(S): at 09:23

## 2020-01-01 RX ADMIN — DORZOLAMIDE HYDROCHLORIDE 1 DROP(S): 20 SOLUTION/ DROPS OPHTHALMIC at 13:41

## 2020-01-01 RX ADMIN — SODIUM CHLORIDE 50 MILLILITER(S): 9 INJECTION, SOLUTION INTRAVENOUS at 04:45

## 2020-01-01 RX ADMIN — Medication 40 MILLIGRAM(S): at 05:28

## 2020-01-01 RX ADMIN — Medication 400 MILLIGRAM(S): at 18:43

## 2020-01-01 RX ADMIN — BRIMONIDINE TARTRATE, TIMOLOL MALEATE 1 DROP(S): 2; 5 SOLUTION/ DROPS OPHTHALMIC at 18:17

## 2020-01-01 RX ADMIN — Medication 650 MILLIGRAM(S): at 07:21

## 2020-01-01 RX ADMIN — Medication 10 MILLIEQUIVALENT(S): at 12:50

## 2020-01-01 RX ADMIN — DORZOLAMIDE HYDROCHLORIDE 1 DROP(S): 20 SOLUTION/ DROPS OPHTHALMIC at 13:32

## 2020-01-01 RX ADMIN — BRIMONIDINE TARTRATE, TIMOLOL MALEATE 1 DROP(S): 2; 5 SOLUTION/ DROPS OPHTHALMIC at 18:13

## 2020-01-01 RX ADMIN — BRIMONIDINE TARTRATE, TIMOLOL MALEATE 1 DROP(S): 2; 5 SOLUTION/ DROPS OPHTHALMIC at 06:02

## 2020-01-01 RX ADMIN — BRIMONIDINE TARTRATE, TIMOLOL MALEATE 1 DROP(S): 2; 5 SOLUTION/ DROPS OPHTHALMIC at 17:04

## 2020-01-01 RX ADMIN — Medication 400 MILLIGRAM(S): at 05:13

## 2020-01-01 RX ADMIN — CINACALCET 30 MILLIGRAM(S): 30 TABLET, FILM COATED ORAL at 12:05

## 2020-01-01 RX ADMIN — LISINOPRIL 10 MILLIGRAM(S): 2.5 TABLET ORAL at 05:29

## 2020-01-01 RX ADMIN — MONTELUKAST 10 MILLIGRAM(S): 4 TABLET, CHEWABLE ORAL at 21:41

## 2020-01-01 RX ADMIN — LISINOPRIL 10 MILLIGRAM(S): 2.5 TABLET ORAL at 05:46

## 2020-01-01 RX ADMIN — DORZOLAMIDE HYDROCHLORIDE 1 DROP(S): 20 SOLUTION/ DROPS OPHTHALMIC at 19:54

## 2020-01-01 RX ADMIN — DORZOLAMIDE HYDROCHLORIDE 1 DROP(S): 20 SOLUTION/ DROPS OPHTHALMIC at 14:39

## 2020-01-01 RX ADMIN — Medication 10 MILLIEQUIVALENT(S): at 11:10

## 2020-01-01 RX ADMIN — Medication 40 MILLIGRAM(S): at 18:43

## 2020-01-01 RX ADMIN — LISINOPRIL 10 MILLIGRAM(S): 2.5 TABLET ORAL at 05:26

## 2020-01-01 RX ADMIN — BRIMONIDINE TARTRATE, TIMOLOL MALEATE 1 DROP(S): 2; 5 SOLUTION/ DROPS OPHTHALMIC at 06:03

## 2020-01-01 RX ADMIN — Medication 20 MILLIEQUIVALENT(S): at 05:28

## 2020-01-01 RX ADMIN — Medication 9.6 MG/MIN: at 12:19

## 2020-01-01 RX ADMIN — Medication 0.25 MILLIGRAM(S): at 03:46

## 2020-01-01 RX ADMIN — ONDANSETRON 4 MILLIGRAM(S): 8 TABLET, FILM COATED ORAL at 05:39

## 2020-01-01 RX ADMIN — Medication 650 MILLIGRAM(S): at 13:48

## 2020-01-01 RX ADMIN — Medication 100 MILLIGRAM(S): at 06:40

## 2020-01-01 RX ADMIN — Medication 10 MILLIGRAM(S): at 00:53

## 2020-01-01 RX ADMIN — Medication 100 MILLIGRAM(S): at 09:54

## 2020-01-01 RX ADMIN — LEVETIRACETAM 400 MILLIGRAM(S): 250 TABLET, FILM COATED ORAL at 12:18

## 2020-01-01 RX ADMIN — Medication 5 MILLIGRAM(S): at 01:20

## 2020-01-01 RX ADMIN — WARFARIN SODIUM 5 MILLIGRAM(S): 2.5 TABLET ORAL at 23:20

## 2020-01-01 RX ADMIN — MONTELUKAST 10 MILLIGRAM(S): 4 TABLET, CHEWABLE ORAL at 21:06

## 2020-01-01 RX ADMIN — LEVETIRACETAM 250 MILLIGRAM(S): 250 TABLET, FILM COATED ORAL at 05:46

## 2020-01-01 RX ADMIN — Medication 650 MILLIGRAM(S): at 00:44

## 2020-01-01 RX ADMIN — Medication 100 MILLIGRAM(S): at 05:17

## 2020-01-01 RX ADMIN — BRIMONIDINE TARTRATE, TIMOLOL MALEATE 1 DROP(S): 2; 5 SOLUTION/ DROPS OPHTHALMIC at 05:40

## 2020-01-01 RX ADMIN — BRIMONIDINE TARTRATE, TIMOLOL MALEATE 1 DROP(S): 2; 5 SOLUTION/ DROPS OPHTHALMIC at 17:13

## 2020-01-01 RX ADMIN — DORZOLAMIDE HYDROCHLORIDE 1 DROP(S): 20 SOLUTION/ DROPS OPHTHALMIC at 13:48

## 2020-01-01 RX ADMIN — Medication 650 MILLIGRAM(S): at 12:31

## 2020-01-01 RX ADMIN — Medication 100 MILLIGRAM(S): at 05:29

## 2020-01-01 RX ADMIN — SPIRONOLACTONE 25 MILLIGRAM(S): 25 TABLET, FILM COATED ORAL at 05:51

## 2020-01-01 RX ADMIN — LEVETIRACETAM 250 MILLIGRAM(S): 250 TABLET, FILM COATED ORAL at 05:45

## 2020-01-01 RX ADMIN — Medication 1 TABLET(S): at 11:43

## 2020-01-01 RX ADMIN — MONTELUKAST 10 MILLIGRAM(S): 4 TABLET, CHEWABLE ORAL at 21:55

## 2020-01-01 RX ADMIN — BRIMONIDINE TARTRATE, TIMOLOL MALEATE 1 DROP(S): 2; 5 SOLUTION/ DROPS OPHTHALMIC at 18:19

## 2020-01-01 RX ADMIN — CINACALCET 30 MILLIGRAM(S): 30 TABLET, FILM COATED ORAL at 11:34

## 2020-01-01 RX ADMIN — Medication 400 MILLIGRAM(S): at 07:59

## 2020-01-01 RX ADMIN — LISINOPRIL 10 MILLIGRAM(S): 2.5 TABLET ORAL at 05:45

## 2020-01-01 RX ADMIN — Medication 650 MILLIGRAM(S): at 13:28

## 2020-01-01 RX ADMIN — LEVETIRACETAM 250 MILLIGRAM(S): 250 TABLET, FILM COATED ORAL at 17:16

## 2020-01-01 RX ADMIN — SPIRONOLACTONE 25 MILLIGRAM(S): 25 TABLET, FILM COATED ORAL at 05:28

## 2020-01-01 RX ADMIN — LEVETIRACETAM 400 MILLIGRAM(S): 250 TABLET, FILM COATED ORAL at 06:55

## 2020-01-01 RX ADMIN — Medication 200 MILLIGRAM(S): at 21:21

## 2020-01-01 RX ADMIN — BRINZOLAMIDE 1 DROP(S): 10 SUSPENSION/ DROPS OPHTHALMIC at 18:12

## 2020-01-01 RX ADMIN — Medication 650 MILLIGRAM(S): at 16:20

## 2020-01-01 RX ADMIN — SODIUM CHLORIDE 50 MILLILITER(S): 9 INJECTION INTRAMUSCULAR; INTRAVENOUS; SUBCUTANEOUS at 14:40

## 2020-01-01 RX ADMIN — Medication 400 MILLIGRAM(S): at 07:58

## 2020-01-01 RX ADMIN — SENNA PLUS 2 TABLET(S): 8.6 TABLET ORAL at 22:07

## 2020-01-01 RX ADMIN — Medication 162 MILLIGRAM(S): at 05:21

## 2020-01-01 RX ADMIN — Medication 650 MILLIGRAM(S): at 22:22

## 2020-01-01 RX ADMIN — Medication 50 MILLIGRAM(S): at 05:28

## 2020-01-01 RX ADMIN — BRIMONIDINE TARTRATE, TIMOLOL MALEATE 1 DROP(S): 2; 5 SOLUTION/ DROPS OPHTHALMIC at 05:04

## 2020-01-01 RX ADMIN — DORZOLAMIDE HYDROCHLORIDE 1 DROP(S): 20 SOLUTION/ DROPS OPHTHALMIC at 21:06

## 2020-01-01 RX ADMIN — Medication 10 MILLIGRAM(S): at 08:31

## 2020-01-01 RX ADMIN — DORZOLAMIDE HYDROCHLORIDE 1 DROP(S): 20 SOLUTION/ DROPS OPHTHALMIC at 05:34

## 2020-01-01 RX ADMIN — LISINOPRIL 10 MILLIGRAM(S): 2.5 TABLET ORAL at 06:01

## 2020-01-01 RX ADMIN — DORZOLAMIDE HYDROCHLORIDE 1 DROP(S): 20 SOLUTION/ DROPS OPHTHALMIC at 05:06

## 2020-01-01 RX ADMIN — LISINOPRIL 10 MILLIGRAM(S): 2.5 TABLET ORAL at 14:38

## 2020-01-01 RX ADMIN — Medication 100 MILLIGRAM(S): at 05:45

## 2020-01-01 RX ADMIN — Medication 81 MILLIGRAM(S): at 11:29

## 2020-01-01 RX ADMIN — WARFARIN SODIUM 2.5 MILLIGRAM(S): 2.5 TABLET ORAL at 21:28

## 2020-01-01 RX ADMIN — DORZOLAMIDE HYDROCHLORIDE 1 DROP(S): 20 SOLUTION/ DROPS OPHTHALMIC at 16:31

## 2020-01-01 RX ADMIN — Medication 650 MILLIGRAM(S): at 22:45

## 2020-01-01 RX ADMIN — Medication 100 MILLIGRAM(S): at 23:31

## 2020-01-01 RX ADMIN — DORZOLAMIDE HYDROCHLORIDE 1 DROP(S): 20 SOLUTION/ DROPS OPHTHALMIC at 05:29

## 2020-01-01 RX ADMIN — LEVETIRACETAM 250 MILLIGRAM(S): 250 TABLET, FILM COATED ORAL at 18:12

## 2020-01-01 RX ADMIN — LISINOPRIL 10 MILLIGRAM(S): 2.5 TABLET ORAL at 06:36

## 2020-01-01 RX ADMIN — Medication 650 MILLIGRAM(S): at 01:44

## 2020-01-01 RX ADMIN — Medication 100 MILLIGRAM(S): at 17:27

## 2020-01-01 RX ADMIN — DORZOLAMIDE HYDROCHLORIDE 1 DROP(S): 20 SOLUTION/ DROPS OPHTHALMIC at 21:41

## 2020-01-01 RX ADMIN — BRINZOLAMIDE 1 DROP(S): 10 SUSPENSION/ DROPS OPHTHALMIC at 18:04

## 2020-01-01 RX ADMIN — BRIMONIDINE TARTRATE, TIMOLOL MALEATE 1 DROP(S): 2; 5 SOLUTION/ DROPS OPHTHALMIC at 05:27

## 2020-01-01 RX ADMIN — Medication 100 MILLIGRAM(S): at 17:25

## 2020-01-01 RX ADMIN — BRIMONIDINE TARTRATE, TIMOLOL MALEATE 1 DROP(S): 2; 5 SOLUTION/ DROPS OPHTHALMIC at 17:29

## 2020-01-01 RX ADMIN — MONTELUKAST 10 MILLIGRAM(S): 4 TABLET, CHEWABLE ORAL at 21:28

## 2020-01-01 RX ADMIN — CINACALCET 30 MILLIGRAM(S): 30 TABLET, FILM COATED ORAL at 11:37

## 2020-01-01 RX ADMIN — Medication 10 MILLIGRAM(S): at 11:40

## 2020-01-01 RX ADMIN — CINACALCET 30 MILLIGRAM(S): 30 TABLET, FILM COATED ORAL at 12:30

## 2020-01-01 RX ADMIN — WARFARIN SODIUM 2.5 MILLIGRAM(S): 2.5 TABLET ORAL at 21:21

## 2020-01-01 RX ADMIN — Medication 100 MILLIGRAM(S): at 23:44

## 2020-01-01 RX ADMIN — Medication 1 TABLET(S): at 13:31

## 2020-01-01 RX ADMIN — MONTELUKAST 10 MILLIGRAM(S): 4 TABLET, CHEWABLE ORAL at 21:56

## 2020-01-01 RX ADMIN — DORZOLAMIDE HYDROCHLORIDE 1 DROP(S): 20 SOLUTION/ DROPS OPHTHALMIC at 21:20

## 2020-01-01 RX ADMIN — WARFARIN SODIUM 5 MILLIGRAM(S): 2.5 TABLET ORAL at 21:16

## 2020-01-01 RX ADMIN — Medication 81 MILLIGRAM(S): at 12:08

## 2020-01-01 RX ADMIN — MONTELUKAST 10 MILLIGRAM(S): 4 TABLET, CHEWABLE ORAL at 21:33

## 2020-01-01 RX ADMIN — BRINZOLAMIDE 1 DROP(S): 10 SUSPENSION/ DROPS OPHTHALMIC at 05:48

## 2020-01-01 RX ADMIN — WARFARIN SODIUM 3.5 MILLIGRAM(S): 2.5 TABLET ORAL at 21:36

## 2020-01-01 RX ADMIN — Medication 100 MILLIGRAM(S): at 06:02

## 2020-01-01 RX ADMIN — DORZOLAMIDE HYDROCHLORIDE 1 DROP(S): 20 SOLUTION/ DROPS OPHTHALMIC at 05:45

## 2020-01-01 RX ADMIN — CINACALCET 30 MILLIGRAM(S): 30 TABLET, FILM COATED ORAL at 13:31

## 2020-01-06 ENCOUNTER — LABORATORY RESULT (OUTPATIENT)
Age: 85
End: 2020-01-06

## 2020-01-20 NOTE — PATIENT PROFILE ADULT - NSPROPASSIVESMOKEEXPOSURE_GEN_A_NUR
VASCULAR NOTE         Patient: Yaya Gee Date of Service: 2020   : 1981 MRN: [id number]     SUBJECTIVE:   HISTORY OF PRESENT ILLNESS:  Yaya Gee is a 38 year old male who presents today for varicose veins with pain   - symptoms: Right leg throbbing and pain over his varicose veins  - compression trial: Wearing compression stockings for 2 months  - 2019 reflux us: + right calf GSV   - 2019 right GSV EVLT     No left leg pain        PAST MEDICAL HISTORY:  Past Medical History:   Diagnosis Date   • Essential (primary) hypertension    • Varicose veins of both lower extremities with pain        MEDICATIONS:  Current Outpatient Medications   Medication Sig   • hydrochlorothiazide (HYDRODIURIL) 25 MG tablet Take 25 mg by mouth daily.     No current facility-administered medications for this visit.        ALLERGIES:  ALLERGIES:  No Known Allergies    PAST SURGICAL HISTORY:  Past Surgical History:   Procedure Laterality Date   • No past surgeries     • Varicose vein surgery Right 2019    CALF GSV        FAMILY HISTORY:  Family History   Problem Relation Age of Onset   • Patient is unaware of any medical problems Mother    • Diabetes Father        SOCIAL HISTORY:  Social History     Tobacco Use   • Smoking status: Never Smoker   • Smokeless tobacco: Never Used   Substance Use Topics   • Alcohol use: Yes     Comment: occasional    • Drug use: Never           OBJECTIVE:     Physical Exam     cta b  rrr    Large right bulky varicose veins in the right inner thigh over 12 inches, largest diameter 1.5 cm  Right inner calf varicose veins largest diameter 9 mm    Few scattered left leg varicose veins over 6 inches, largest diameter 6 mm    No dermatitis or ulcers bilaterally  Palpable pedal pulses bilaterally        DIAGNOSTIC STUDIES:   2019 reflux us   IMPRESSION:     Demonstrated reflux in the proximal right calf greater saphenous vein.  The greater saphenous   vein is unable to be traced distal  from the proximal calf secondary to varicosities.     No acute deep venous thrombosis within the right lower extremity.    Assessment AND PLAN:   This is a 38 year old year-old male       RIGHT  leg painful varicose veins, now with bleeding right inner calf varicose vein few episodes over the past couple weeks he reports  - CEAP classification is 3  - we discussed vein anatomy in detail  - this is significantly affecting the patients activities of daily living, patient needs to sit down multiple times per day due to pain and discomfort in legs  - RIGHT  leg venous reflux Ultrasound     - 9/16/2019 reflux us: + right calf GSV   -Continue daily compression, knee high  30-40 mm hg  - also elevate legs at least 3 times per day for minimum 15 minutes   - tried analgesics ( tylenol, or NSAIDS)  -Failed medical management    - 12/6/2019 right GSV EVLT   -Healing well, no phlebitis    - PLAN is to perform RIGHT  leg microphlebectomy  Under general  anesthesia, as an outpatient procedure.   - this is a LOW risk procedure, no pre operative labs or testing needed  - risks of bleeding, infection, nerve pain, not all of the varicose veins going away, future recurrence, etc discussed, patient agrees to proceed  - Patient knows there will be multile small scars that will be permanent - agrees to proceed     cc PCP                 No

## 2020-01-27 ENCOUNTER — LABORATORY RESULT (OUTPATIENT)
Age: 85
End: 2020-01-27

## 2020-05-17 NOTE — CHART NOTE - NSCHARTNOTEFT_GEN_A_CORE
Nutrition Follow Up   Hospital Course (Per Electronic Medical Record):   Source: Patient [X]    Family [ ]     Medical Record [X]     Diet: DASH-TLC Diet w/ Thin Liquids   on Ensure Enlive 8oz PO BID - Takes Well (Educated on Supplementation)  Tolerates Diet Well  No Chewing/Swallowing Difficulties   No Recent Nausea, Vomiting, Diarrhea or Constipation   Consumes 75-85% of Meals (as Per Documentation)     Enteral/Parenteral Nutrition: N/A    Current Weight: 124lb on 12/4  % Weight Change: N/A  Obtain New Weight   Obtain Weights Weekly     Pertinent Medications: MEDICATIONS  (STANDING):  ascorbic acid 500 milliGRAM(s) Oral two times a day  aspirin enteric coated 81 milliGRAM(s) Oral daily  buDESOnide  80 MICROgram(s)/formoterol 4.5 MICROgram(s) Inhaler 2 Puff(s) Inhalation two times a day  cholecalciferol 1000 Unit(s) Oral daily  diltiazem    Tablet 60 milliGRAM(s) Oral every 8 hours  dorzolamide 2% Ophthalmic Solution 1 Drop(s) Left EYE three times a day  ferrous    sulfate 325 milliGRAM(s) Oral daily  furosemide    Tablet 40 milliGRAM(s) Oral daily  labetalol 200 milliGRAM(s) Oral three times a day  lidocaine   Patch 1 Patch Transdermal <User Schedule>  lidocaine   Patch 1 Patch Transdermal <User Schedule>  mesalamine DR Capsule 800 milliGRAM(s) Oral two times a day  montelukast 10 milliGRAM(s) Oral at bedtime  multivitamin 1 Tablet(s) Oral daily  pantoprazole    Tablet 40 milliGRAM(s) Oral before breakfast  potassium chloride    Tablet ER 10 milliEquivalent(s) Oral daily  warfarin 2 milliGRAM(s) Oral once  zinc sulfate 220 milliGRAM(s) Oral daily    MEDICATIONS  (PRN):  acetaminophen   Tablet .. 650 milliGRAM(s) Oral every 6 hours PRN Mild Pain (1 - 3)  benzocaine 15 mG/menthol 3.6 mG Lozenge 1 Lozenge Oral every 4 hours PRN Sore Throat  Biotene Dry Mouth Oral Rinse 5 milliLiter(s) Swish and Spit four times a day PRN Mouth Care  docusate sodium 100 milliGRAM(s) Oral three times a day PRN Constipation  polyethylene glycol 3350 17 Gram(s) Oral daily PRN Constipation  senna 1 Tablet(s) Oral at bedtime PRN Constipation  traMADol 50 milliGRAM(s) Oral every 6 hours PRN Moderate Pain (4 - 6)    Pertinent Labs:   11-28 Alb 2.2 g/dL<L>    Skin: No Pressure Ulcers     Edema: None Noted     Last BM: 11/23    Estimated Needs:   [X] No Change since Previous Assessment  [ ] Recalculated:     Previous Nutrition Diagnosis:   Moderate Malnutrition    Nutrition Diagnosis is [X] Ongoing  [ ] Resolved [ ] Not Applicable   Continues on Ensure Enlive 8oz PO BID     New Nutrition Diagnosis: [X] Not Applicable    Interventions:   1. Recommend Continue Nutrition Plan of Care     Monitoring & Evaluation:   [X] PO Intake   [X] Tolerance to Diet Prescription   [X] Weights   [X] Follow Up (Per Protocol)  [X] Other: Labs     RD Remains Available.  Zev Wiggins RD Please contact patient to schedule telephone or video visit within the next 2 weeks. Schedule nurse visit at our office 1 to 5 days prior for [facility] lab draw. Reassured her precautions are being taken.   She should also check home blood pressure and weight

## 2020-06-14 PROBLEM — J47.9 BRONCHIECTASIS: Status: ACTIVE | Noted: 2017-01-26

## 2020-06-14 NOTE — PHYSICAL EXAM
[Well Nourished] : well nourished [No Acute Distress] : no acute distress [Normal Voice/Communication] : normal voice/communication [Well-Appearing] : well-appearing [Well Developed] : well developed [Normal Sclera/Conjunctiva] : normal sclera/conjunctiva [PERRL] : pupils equal round and reactive to light [EOMI] : extraocular movements intact [Normal Oropharynx] : the oropharynx was normal [Normal TMs] : both tympanic membranes were normal [Normal Outer Ear/Nose] : the outer ears and nose were normal in appearance [Normal Nasal Mucosa] : the nasal mucosa was normal [No JVD] : no jugular venous distention [No Lymphadenopathy] : no lymphadenopathy [Supple] : supple [Thyroid Normal, No Nodules] : the thyroid was normal and there were no nodules present [Clear to Auscultation] : lungs were clear to auscultation bilaterally [No Respiratory Distress] : no respiratory distress  [No Accessory Muscle Use] : no accessory muscle use [Normal Rate] : normal rate  [Normal Percussion] : the chest was normal to percussion [Regular Rhythm] : with a regular rhythm [No Carotid Bruits] : no carotid bruits [Normal S1, S2] : normal S1 and S2 [Pedal Pulses Present] : the pedal pulses are present [No Abdominal Bruit] : a ~M bruit was not heard ~T in the abdomen [No Varicosities] : no varicosities [No Palpable Aorta] : no palpable aorta [No Extremity Clubbing/Cyanosis] : no extremity clubbing/cyanosis [Declined Breast Exam] : declined breast exam  [Soft] : abdomen soft [No Masses] : no abdominal mass palpated [Non Tender] : non-tender [Non-distended] : non-distended [Declined Rectal Exam] : declined rectal exam [Normal Bowel Sounds] : normal bowel sounds [No HSM] : no HSM [No Hernias] : no hernias [Normal Axillary Nodes] : no axillary lymphadenopathy [Normal Supraclavicular Nodes] : no supraclavicular lymphadenopathy [Normal Femoral Nodes] : no femoral lymphadenopathy [Normal Anterior Cervical Nodes] : no anterior cervical lymphadenopathy [Normal Posterior Cervical Nodes] : no posterior cervical lymphadenopathy [Normal Inguinal Nodes] : no inguinal lymphadenopathy [No CVA Tenderness] : no CVA  tenderness [No Spinal Tenderness] : no spinal tenderness [No Joint Swelling] : no joint swelling [Kyphosis] : no kyphosis [Scoliosis] : no scoliosis [No Rash] : no rash [Grossly Normal Strength/Tone] : grossly normal strength/tone [Acne] : no acne [Coordination Grossly Intact] : coordination grossly intact [No Focal Deficits] : no focal deficits [Normal Gait] : normal gait [Deep Tendon Reflexes (DTR)] : deep tendon reflexes were 2+ and symmetric [Speech Grossly Normal] : speech grossly normal [Normal Affect] : the affect was normal [Memory Grossly Normal] : memory grossly normal [Alert and Oriented x3] : oriented to person, place, and time [Normal Mood] : the mood was normal [Normal Insight/Judgement] : insight and judgment were intact [de-identified] : 1/6 systolic

## 2020-06-14 NOTE — HISTORY OF PRESENT ILLNESS
[Family Member] : family member [FreeTextEntry1] : Comes to the office for followup accompanied by her daughter to review her medications and discuss her overall health issues with appetite and some weight loss [de-identified] : 85-year-old woman comes to the office for followup with a history of atrial fibrillation cardiac pacemaker ulcerative colitis severe macular degeneration chronic leg edema objective cysts and hypertension patient is chronically anticoagulated patient's main issue has been weakness and some weight loss she denies temperature chills sweats or myalgias she has had no headache sinus congestion sore throat cough wheezing pleurisy chest pain shortness of breath at rest exertional dyspnea lightheadedness palpitations dizziness vertigo or syncope she has had no abdominal pain nausea vomiting diarrhea constipation bright red blood per rectum or black stool her appetite has been diminished she has lost 2-3 pounds recently over the past several months she has chronic musculoskeletal pains worse in the back knees and hips she urinates frequently and does get up at night to urinate she denies dysuria or gross hematuria she has chronic leg edema has had no recent skin rashes and has been sleeping well

## 2020-06-14 NOTE — ASSESSMENT
[FreeTextEntry1] : Physical exam shows an elderly woman in no acute distress blood pressure 122/64 height 5 feet weight 118 pounds BMI 23.05 temperature 97.9°F orally pulse is 74-80 irregularly irregular respiratory rate was 16 HEENT was unremarkable chest was clear cardiovascular was irregular irregular with a 1/6 systolic murmur abdomen was soft and nontender extremities show bilateral edema to the mid calf neurologic exam was nonfocal patient encourage small frequent meals boost supplements she was also advised to keep active and moving as much as possible she has regular INRs performed through her cardiologist who she sees regularly he is up-to-date with her ophthalmologist she 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

## 2020-06-14 NOTE — REVIEW OF SYSTEMS
[Fever] : no fever [Chills] : no chills [Hot Flashes] : no hot flashes [Fatigue] : fatigue [Night Sweats] : no night sweats [Recent Change In Weight] : ~T recent weight change [Discharge] : no discharge [Dryness] : no dryness  [Redness] : no redness [Pain] : no pain [Vision Problems] : no vision problems [Itching] : no itching [Hearing Loss] : no hearing loss [Nosebleed] : no nosebleeds [Earache] : no earache [Nasal Discharge] : no nasal discharge [Chest Pain] : no chest pain [Sore Throat] : no sore throat [Postnasal Drip] : no postnasal drip [Leg Claudication] : no leg claudication [Palpitations] : no palpitations [Lower Ext Edema] : lower extremity edema [Orthopnea] : no orthopnea [Shortness Of Breath] : no shortness of breath [Dyspnea on Exertion] : no dyspnea on exertion [Abdominal Pain] : no abdominal pain [Cough] : no cough [Wheezing] : no wheezing [Nausea] : no nausea [Diarrhea] : diarrhea [Constipation] : no constipation [Heartburn] : no heartburn [Vomiting] : no vomiting [Incontinence] : no incontinence [Melena] : no melena [Dysuria] : no dysuria [Nocturia] : nocturia [Poor Libido] : libido not poor [Vaginal Discharge] : no vaginal discharge [Hematuria] : no hematuria [Frequency] : no frequency [Dysmenorrhea] : no dysmenorrhea [Joint Pain] : no joint pain [Joint Stiffness] : joint stiffness [Muscle Weakness] : no muscle weakness [Joint Swelling] : no joint swelling [Muscle Pain] : no muscle pain [Back Pain] : back pain [Mole Changes] : no mole changes [Nail Changes] : no nail changes [Skin Rash] : no skin rash [Hair Changes] : no hair changes [Headache] : no headache [Confusion] : no confusion [Dizziness] : no dizziness [Fainting] : no fainting [Memory Loss] : no memory loss [Unsteady Walking] : no ataxia [Suicidal] : not suicidal [Insomnia] : no insomnia [Anxiety] : anxiety [Depression] : no depression [Easy Bleeding] : no easy bleeding [Easy Bruising] : no easy bruising [Swollen Glands] : no swollen glands [FreeTextEntry2] : 2 lbs down

## 2020-06-14 NOTE — HEALTH RISK ASSESSMENT
[] : No [No falls in past year] : Patient reported no falls in the past year [No] : No [0] : 1) Little interest or pleasure doing things: Not at all (0) [[id number]] : 0

## 2020-07-06 PROBLEM — H61.22 IMPACTED CERUMEN OF LEFT EAR: Status: ACTIVE | Noted: 2020-01-01

## 2020-07-06 PROBLEM — H90.5 ACQUIRED SENSORINEURAL HEARING LOSS: Status: ACTIVE | Noted: 2020-01-01

## 2020-07-06 NOTE — HISTORY OF PRESENT ILLNESS
[Hearing Loss] : hearing loss [de-identified] : Ms. CHAHAL is a 89 year female who presents with L cerumen impaction.  She reports that she has seen Dr. Guillen in the past.  She also has hearing aids but occasionally wears them.  Denies any pain or discomfort.  She was seen at [facility] recently and was told that before she could put on a new hearing aid she would need to be cleaned out.

## 2020-07-06 NOTE — REASON FOR VISIT
[Initial Consultation] : an initial consultation for [FreeTextEntry2] : hearing loss and cerumen impaction

## 2020-07-06 NOTE — PHYSICAL EXAM
[Midline] : trachea located in midline position [Normal] : no rashes [FreeTextEntry1] : Frail.  Walks with a walker. [de-identified] : L EAC full of moist wax.  Debrided.

## 2020-07-06 NOTE — CONSULT LETTER
[Dear  ___] : Dear  [unfilled], [Consult Letter:] : I had the pleasure of evaluating your patient, [unfilled]. [Please see my note below.] : Please see my note below. [Consult Closing:] : Thank you very much for allowing me to participate in the care of this patient.  If you have any questions, please do not hesitate to contact me. [Sincerely,] : Sincerely, [FreeTextEntry2] : Kiko Carroll MD [FreeTextEntry3] : Juan Antonio Chua MD, FACS\par Chief of Otolaryngology [facility]\par  - Dept. of Otolaryngology\par [facility] [employer]\par \par  [DrLiam  ___] : Dr. VALDEZ

## 2020-07-06 NOTE — ASSESSMENT
[FreeTextEntry1] : Pt to f/u with her Audiologist re: potential need for replacement hearing aids.  There is no contraindication to hearing aids at this tiime.

## 2020-08-30 NOTE — CONSULT NOTE ADULT - SUBJECTIVE AND OBJECTIVE BOX
Chief Complaint:   89 y/o female with HTN, afib on Coumadin, +PPM, brought in by daughter, noted to be more lethargic than usual, state patient has decreased oral intake for the past 2 days.  Pt states she feels fatigued, has generalized weakness, was feeling dizzy today, 'not feeling well'.  Denies chest pain, dyspnea, cough, abd pain, nausea, diarrhea, dysuria, fever nor chills. In the ED, BP noted to be 195/110, Pulse 110's-120's.Urinalysis with +ve Nit, Leuk est. seen by dr nicole 11/25/19 for persistent afib and mr ef 3/25/19 55% pisa .3 cm2    HPI:    PMH:   Pacemaker  Transient cerebral ischemia, unspecified transient cerebral ischemia type  HTN (hypertension), benign  Atrial fibrillation  Acid reflux disease    PSH:   History of cataract surgery    Family History:  FAMILY HISTORY:  No pertinent family history in first degree relatives      Social History:  Smoking:  Alcohol:  Drugs:    Allergies:  Keflex (Diarrhea)  Norvasc (Unknown)      Medications:  diltiazem    Tablet 30 milliGRAM(s) Oral every 8 hours  furosemide    Tablet 40 milliGRAM(s) Oral two times a day  labetalol 100 milliGRAM(s) Oral every 12 hours  mesalamine DR Capsule 400 milliGRAM(s) Oral two times a day  montelukast 10 milliGRAM(s) Oral at bedtime  nitrofurantoin monohydrate/macrocrystals (MACROBID) 100 milliGRAM(s) Oral two times a day with meals  potassium chloride    Tablet ER 10 milliEquivalent(s) Oral daily  warfarin 2 milliGRAM(s) Oral at bedtime      REVIEW OF SYSTEMS:  CONSTITUTIONAL: No fever, weight loss, or fatigue  EYES: No eye pain, visual disturbances, or discharge  ENMT:  No difficulty hearing, tinnitus, vertigo; No sinus or throat pain  NECK: No pain or stiffness  BREASTS: No pain, masses, or nipple discharge  RESPIRATORY: No cough, wheezing, chills or hemoptysis; No shortness of breath  CARDIOVASCULAR: No chest pain, palpitations, dizziness, or leg swelling  GASTROINTESTINAL: No abdominal or epigastric pain. No nausea, vomiting, or hematemesis; No diarrhea or constipation. No melena or hematochezia.  GENITOURINARY: No dysuria, frequency, hematuria, or incontinence  NEUROLOGICAL: No headaches, memory loss, loss of strength, numbness, or tremors  SKIN: No itching, burning, rashes, or lesions   LYMPH NODES: No enlarged glands  ENDOCRINE: No heat or cold intolerance; No hair loss  MUSCULOSKELETAL: No joint pain or swelling; No muscle, back, or extremity pain  PSYCHIATRIC: No depression, anxiety, mood swings, or difficulty sleeping  HEME/LYMPH: No easy bruising, or bleeding gums  ALLERY AND IMMUNOLOGIC: No hives or eczema    Physical Exam:  T(C): 36.5 (08-30-20 @ 08:39), Max: 37.3 (08-30-20 @ 03:34)  HR: 78 (08-30-20 @ 08:39) (78 - 124)  BP: 147/83 (08-30-20 @ 08:39) (136/61 - 220/112)  RR: 20 (08-30-20 @ 08:39) (17 - 20)  SpO2: 98% (08-30-20 @ 08:39) (95% - 98%)  Wt(kg): --    GENERAL: NAD, elderly woman [place]   HEAD:  Atraumatic, Normocephalic  EYES: EOMI, conjunctiva and sclera clear  ENT: Moist mucous membranes,  NECK: Supple, No JVD, no bruits  CHEST/LUNG: Clear to percussion bilaterally; No rales, rhonchi, wheezing, or rubs  HEART: Regular rate and rhythm; No murmurs, rubs, or gallops PMI non displaced.  ABDOMEN: Soft, Nontender, Nondistended; Bowel sounds present  EXTREMITIES:  2+ Peripheral Pulses, No clubbing, cyanosis, or edema  SKIN: No rashes or lesions  NERVOUS SYSTEM:  Cranial Nerves II-XII intact     Cardiovascular Diagnostic Testing:  ECG:      < from: 12 Lead ECG (08.30.20 @ 03:40) >  Diagnosis Line Atrial fibrillation  Rightward axis  Incomplete right bundle branch block  Voltage criteria for left ventricular hypertrophy  Anteroseptal infarct , age undetermined  ST and T wave abnormality, consider inferolateral ischemia  Abnormal ECG  When compared with ECG of 23-NOV-2018 13:32,  Atrial fibrillation has replaced Electronic ventricular pacemaker  Vent. rate has increased BY  34 BPM    Confirmed by IAN ALMONTE MD ([id number]) on 8/30/2020 8:25:09 AM    < end of copied text >    ECHO:    < from: TTE Echo Complete w/o Contrast w/ Doppler (08.30.20 @ 08:55) >  Summary:   1. Left ventricular ejection fraction, by visual estimation, is 25 to 30%.   2. Moderately decreased global left ventricular systolic function.   3. Severely enlarged right atrium.   4. Multiple left ventricular regional wall motion abnormalities exist. See wall motion findings.   5. Spectral Doppler shows impaired relaxation pattern of left ventricular myocardial filling (Grade I diastolic dysfunction).   6. There is severe concentric left ventricular hypertrophy.   7. Biatrial enlargement consistent with restrictive physiology.   8. Moderately enlarged left atrium.   9. Mild thickening and calcification of the anterior and posterior mitral valveleaflets.  10. Moderate mitral valve regurgitation.  11. Moderate-severe tricuspid regurgitation.  12. Moderate aortic regurgitation.  13. Sclerotic aortic valve with normal opening.  14. Moderate pulmonic valve regurgitation.  15. Estimated pulmonary artery systolic pressure is 59.6 mmHg assuming a right atrial pressure of 10 mmHg, which is consistent with moderate pulmonary hypertension.  16. The main pulmonary artery is normal in size.  17. LA volume Index is 86.3 ml/m² ml/m2.    [id number] Ian Almonte , Electronically signed on 8/30/2020 at 12:27:13 PM    *** Final ***      IAN ALMONTE   This document has been electronically signed. Aug 30 2020  8:55AM    < end of copied text >      Labs:                        15.5   8.96  )-----------( 185      ( 30 Aug 2020 03:24 )             47.9     08-30    134<L>  |  100  |  18  ----------------------------<  212<H>  4.8   |  22  |  1.26    Ca    10.9<H>      30 Aug 2020 03:24    TPro  6.6  /  Alb  3.7  /  TBili  1.2  /  DBili  x   /  AST  30  /  ALT  20  /  AlkPhos  107  08-30    PT/INR - ( 30 Aug 2020 03:24 )   PT: 26.6 sec;   INR: 2.29 ratio         PTT - ( 30 Aug 2020 03:24 )  PTT:32.4 sec  CARDIAC MARKERS ( 30 Aug 2020 08:29 )  .299 ng/mL / x     / x     / x     / x      CARDIAC MARKERS ( 30 Aug 2020 03:27 )  .072 ng/mL / x     / x     / x     / x        Imaging:

## 2020-08-30 NOTE — H&P ADULT - NSHPPHYSICALEXAM_GEN_ALL_CORE
Vital Signs (24 Hrs):  T(C): 37.3 (08-30-20 @ 03:34), Max: 37.3 (08-30-20 @ 03:34)  HR: 107 (08-30-20 @ 04:30) (107 - 124)  BP: 181/102 (08-30-20 @ 04:30) (160/85 - 220/112)  RR: 18 (08-30-20 @ 04:30) (17 - 20)  SpO2: 96% (08-30-20 @ 04:30) (95% - 98%)  Daily Height in cm: 152.4 (30 Aug 2020 03:07)

## 2020-08-30 NOTE — H&P ADULT - ASSESSMENT
89 y/o female with HTN, afib on Coumadin, +PPM, presents with generalized weakness, dizziness, lethargy, decreased oral intake for the past 2 days.  Hypertensive Urgency, Afib with RVR, elev trop  Chronic diastolic CHF  UTI    Admit to telemetry  Give Labetalol and Cardizem now  Increase Cardizem to 60 mg every 8 hours  Cont Labetalol 100 mg q 12  Cont ASA, Furosemide, Coumadin  Initiate Levaquin pending cultures  Trend trops, echo, ffup labs  Cardio eval - Dr Velez  GI prophylaxis  PT Eval  Nutrition eval    CAPRINI SCORE [CLOT]  [ ] Age= 75 years                                              (3 Points)                  Total Score [ 3   ]  Caprini Score 0 - 2:  Low Risk, No VTE Prophylaxis required for most patients, encourage ambulation  Caprini Score 3 - 6:  At Risk, pharmacologic VTE prophylaxis is indicated for most patients (in the absence of a contraindication)  Caprini Score Greater than or = 7:  High Risk, pharmacologic VTE prophylaxis is indicated for most patients (in the absence of a contraindication)  *Pt is already on Coumadin 89 y/o female with HTN, afib on Coumadin, +PPM, presents with generalized weakness, dizziness, lethargy, decreased oral intake for the past 2 days.  Hypertensive Urgency, Afib with RVR, elev trop  Chronic diastolic CHF  UTI    Admit to telemetry  Give Labetalol and Cardizem now  Cont Labetalol 100 mg q 12, Cardizem 30 q 8  Cont ASA, Furosemide, Coumadin  Initiate Levaquin pending cultures  Trend trops, echo, ffup labs  Cardio eval - Dr Velez  GI prophylaxis  PT Eval  Nutrition eval    CAPRINI SCORE [CLOT]  [ ] Age= 75 years                                              (3 Points)                  Total Score [ 3   ]  Caprini Score 0 - 2:  Low Risk, No VTE Prophylaxis required for most patients, encourage ambulation  Caprini Score 3 - 6:  At Risk, pharmacologic VTE prophylaxis is indicated for most patients (in the absence of a contraindication)  Caprini Score Greater than or = 7:  High Risk, pharmacologic VTE prophylaxis is indicated for most patients (in the absence of a contraindication)  *Pt is already on Coumadin

## 2020-08-30 NOTE — ED PROVIDER NOTE - CLINICAL SUMMARY MEDICAL DECISION MAKING FREE TEXT BOX
89 y/o F with h/o Afib. was BIB her daughter for generalized weakness, cxr has ? pneumonia, and urine is positive for infection, Also pt had elevated lactate , pt ws given IV fluids and started on broad  spectrum IV abx and was admitted to hospital

## 2020-08-30 NOTE — ED ADULT NURSE NOTE - TEMPLATE LIST FOR HEAD TO TOE ASSESSMENT
LYMPHEDEMA PT FLOWSHEET    PT Lymph Exercises Current Session Time   THER ACT TOTAL TIME FOR SESSION Not performed   2nd visit Self MLD           Patient Education Risk factors/precautions; use of compression   THER EX TOTAL TIME FOR SESSION Not performed                               NEURO RE-ED TOTAL TIME FOR SESSION Not performed   KINESIOTAPE    MANUAL  TOTAL TIME FOR SESSION 53-60min   Stretching Left arm dynamic stretch   Mobilization SO release cervical area; gentle manual cervical traction; UT stretch (passive)   Massage- Deep Tissue, Scar, Transverse Friction    Manual Lymph Drainage Short neck; trunk sequence; left arm sequence   Skin Care- Lotion/Wrapping    Measurement/Fitting    Skin Stretching/Mobilization for Cording            
General

## 2020-08-30 NOTE — H&P ADULT - NSICDXPASTMEDICALHX_GEN_ALL_CORE_FT
PAST MEDICAL HISTORY:  Acid reflux disease     Atrial fibrillation     HTN (hypertension), benign     Pacemaker     Transient cerebral ischemia, unspecified transient cerebral ischemia type

## 2020-08-30 NOTE — ED ADULT NURSE NOTE - NS ED NURSE LEVEL OF CONSCIOUSNESS AFFECT
"Problem: Depressive Symptoms  Goal: Depressive Symptoms  Therapeutic Goals include:  1. Efrain will develop and identify coping strategies. Stressors include: family dynamics, peers, school, legal issues, and trauma  2. Pt will participate in milieu activities and psychiatric assessment.  3. Pt will complete coping plan prior to d/c.  4. No signs or symptoms of med AEs will be observed or reported.  5. Pt will express willingness to participate in f/u care.  6. Pt will report a decrease in depressive symptoms.  7. Ingrown toenail on pt s R great toe will remain C/D/I and free of s/o infection.    Outcome: Improving  Efrain had a good shift overall.  Per staff that has worked with him since his admission, he has opened up more. When he first got her, he was much more isolative, not wanting a roommate and appearing to want to be alone. This shift he was very social with his peers and needed some reminding to do his worksheets.     He states that he feels ready to leave as soon as tomorrow. He was not able to articulate in what way he feels better since when he got here, or when he was here recently, but states he is \"better\".     Efrain has adequate hygiene and intake.       " Calm

## 2020-08-30 NOTE — H&P ADULT - HISTORY OF PRESENT ILLNESS
89 y/o female with HTN, afib on Coumadin, +PPM, brought in by Patient states she has not been feeling well since yesterday. Patient feeling dizzy, generalized weakness, Daughter states she has had decreased PO intake in the last few days 89 y/o female with HTN, afib on Coumadin, +PPM, brought in by daughter, noted to be more lethargic than usual, state patient has decreased oral intake for the past 2 days.  Pt states she feels fatigued, has generalized weakness, was feeling dizzy today, 'not feeling well'.  Denies chest pain, dyspnea, cough, abd pain, nausea, diarrhea, dysuria, fever nor chills.   In the ED, BP noted to be 195/110, Pulse 110's-120's.  Urinalysis with +ve Nit, Leuk est.

## 2020-08-30 NOTE — ED PROVIDER NOTE - PHYSICAL EXAMINATION
General:    elderly female frail,  NAD, well-nourished, well-appearing  Head:     NC/AT, EOMI, oral mucosa moist  Neck:     supple  Lungs:     CTA b/l, no w/r/r  CVS:     S1S2, RRR, no m/g/r  Abd:     +BS, s/nt/nd, no organomegaly  Ext:    2+ radial and pedal pulses, no c/c/e  Neuro: grossly intact

## 2020-08-30 NOTE — H&P ADULT - NEUROLOGICAL DETAILS
responds to pain/responds to verbal commands/deep reflexes intact/cranial nerves intact/sensation intact/alert and oriented x 3

## 2020-08-30 NOTE — ED ADULT TRIAGE NOTE - CHIEF COMPLAINT QUOTE
Patient states she has not been feeling well since yesterday. Patient feeling dizzy, generalized weakness, Daughter states she has had decreased PO intake in the last few days.

## 2020-08-30 NOTE — ED ADULT NURSE NOTE - OBJECTIVE STATEMENT
Pt is alert, brought to the ER by daughter due to feel weak since yesterday. Denies any pain or SOB or nausea or vomiting or fever or cough. Pt has been eating well the last couple of days. On Coumadin for A Fib. denies black stools .

## 2020-08-30 NOTE — PATIENT PROFILE ADULT - BRADEN MOBILITY
January 8, 2020     Patient: Skylar Mahajan   YOB: 1944       To Whom it May Concern:    Skylar Mahajan has dementia and Parkinson's disease. He is unable to perform  activities of daily living on his own. Patient is home bound and requires 24 hour care.       If you have any questions or concerns, please don't hesitate to call.       Sincerely,         MD Diane Jane     Medical information is confidential and cannot be disclosed without the written consent of the patient or his representative.    CC: No Recipients    
(3) slightly limited

## 2020-08-30 NOTE — CONSULT NOTE ADULT - ATTENDING COMMENTS
elevation in biomarker   fall in ef may be related to longstanding atrial fibrillation coronary disease also a consider nstemi type II especially given elevation in biomarkers however absent ischemics would consider patient for type II nstemi due to a new wall motion abnormalities which are diffused and elevated biomarkers . would treat with asa beta blockier see below and statin as tolerated. suggest add asa 81 avoid clopidogrel (triple anticoagulants high risk of bleed)    impaired lv chf    impaired ef would consider patient for ace arb arni aldactone  metoprolol succinate or carvedilol if patient deemed a candidate. patient is questionable candidate icd given advanced age and comorbidities kimberly discuss with primary cardiology dr Velez. suggest  discontinue diltiazem  change labetalol to metoprolol succinate 50 add lisinopril 10 mg and aldactone 25 mg    atrial fibrillation  rate control afib anticoagulation strategy target inr 2-3

## 2020-08-30 NOTE — ED PROVIDER NOTE - OBJECTIVE STATEMENT
89 y/o F with h/o afib BIB daughter c/o she is not feeling well since yesterday. c/o that she has decreased oral intake, feels weak and dizzy, no fever

## 2020-08-30 NOTE — PROGRESS NOTE ADULT - ASSESSMENT
chart reviewed and patient examined adnitted for generalized weakness and dizzyness   urine many bacteria but minimal leucocytes ? contamination   minimal troponin elevation discuss with cardiology  ...continue telemetry

## 2020-08-31 NOTE — DIETITIAN INITIAL EVALUATION ADULT. - OTHER INFO
89 y/o female with HTN, afib on Coumadin, +PPM, brought in by daughter, noted to be more lethargic than usual, state patient has decreased oral intake Hypertensive Urgency, Afib with RVR, elev trop, Chronic diastolic CHF, UTI noted . Patient reports feeling better , A&O x 4 ,no longer noted with confusion, reports her appetite Is usual good , No issue chewing /swallowing reported .  Diet history patient consumes a Low Sodium diet PTA . No recent reported weight change , Last reported BM was (8/29) as per patient Skin is intact . patient consuming ~75% of her meal as per Nursing flow sheet . Received consult regarding poor po intake , noted with confusion / UTI . patient reports she usually has a good appetite. patient aware of low sodium diet restrictions . 89 y/o female with HTN, afib on Coumadin, +PPM, brought in by daughter, noted to be more lethargic than usual, state patient has decreased oral intake Hypertensive Urgency, Afib with RVR, elev trop, Chronic diastolic CHF, UTI noted . Patient reports feeling better , A&O x 4 ,no longer noted with confusion, reports her appetite Is usual good , No issue chewing /swallowing reported .  Diet history patient consumes a Low Sodium diet PTA . No recent reported weight change , Last reported BM was (8/29) as per patient Skin is intact . patient consuming ~75% of her meal as per Nursing flow sheet . Received consult regarding poor po intake , noted with confusion / UTI . patient reports she usually has a good appetite. patient aware of low sodium diet restrictions .Encourage p intake & consumption of Po supplement

## 2020-08-31 NOTE — PROGRESS NOTE ADULT - ASSESSMENT
Imp    weakness and lethargy ? acs    given pacer unclear whether stemi or non in a 89 year old woman who is comfortable and hemodynamically stable.    would continue asa and warfarin bblocker ace diuretic    risk of plavix may very well outweigh benefit in this VERY elderly FRAIL woman

## 2020-08-31 NOTE — CHART NOTE - NSCHARTNOTEFT_GEN_A_CORE
Reviewed prior progress notes, labs and imaging.    Discussed with Dr. Turner    Primary Diagnosis: Lethargy/weakness 2/2 ACS, Possible UTI  hx of HTN, afib on Coumadin, +PPM      Plan:    #Weakness/Lethargy likely 2/2 ACS NSTEMI  Troponin rising, pt without chest pain or SOB  continue to trend  Cards c/s noted. Continue ASA, Plavix, added BB, ARB, and Aldactone today  risk of Plavix may very well outweigh benefit in this VERY elderly FRAIL woman per cards  Echo reviewed- EF 25-30%, multiple L sided wall motion abnormalties    #?UTI  Pt was on Macrobid but dc'd due to contraindication  Suspect initial UA contaminated.  Will dc abx and repeat UA today. If positive consider ceftriaxone ( documented cephal allergy but diarhea noted)  Pt afebrile, no WBC, no complaints    #Afib  Continue BB  Dose Coumadin for INR 2-3  PTINR in am    #Hx PPm  cards f/u    Dispo:   dc planning when cards clears, eventual PT eval

## 2020-08-31 NOTE — DIETITIAN INITIAL EVALUATION ADULT. - PHYSICAL APPEARANCE
other (specify) Frail,  normal muscle loss noted due to advanced age. Frail,  muscle loss noted, appropriate for advanced age

## 2020-08-31 NOTE — PHARMACOTHERAPY INTERVENTION NOTE - COMMENTS
Patient ordered for Macrobid for possible UTI.  patient 89 years old with CrCl of 19 .  informed provider Macrobid contraindicated in elderly with CrCl <60.  recommended to d/c ABX and no signs of actual UTI or alternative treatment

## 2020-08-31 NOTE — PHYSICAL THERAPY INITIAL EVALUATION ADULT - ADDITIONAL COMMENTS
pt lives w/dtr in private house, cannot recall # of steps to enter. pt uses a rolling walker to ambulate, also has a straight cane. pts dtr assists w/ADL's

## 2020-09-01 NOTE — CONSULT NOTE ADULT - ASSESSMENT
89 yo female with A Fib on warfarin pacemaker HTN TIA and elevated troponin CHF possible UTI is confused and encephalopathic.  Consider metabolic encephalopathy decompensated dementia in hospital setting.  Neuro exam is non focal and she could be sedated after Haldol.     REC:  CT head non contrast, EEG, routine labs, will follow.

## 2020-09-01 NOTE — CONSULT NOTE ADULT - SUBJECTIVE AND OBJECTIVE BOX
Neurology consult    AZRA [id number]     Patient is a 89y old  Female who presents with a chief complaint of generalized weakness, dizziness, decreased oral intake (01 Sep 2020 08:19)      HPI:  89 y/o female with HTN, afib on Coumadin, +PPM, brought in by daughter, noted to be more lethargic than usual, state patient has decreased oral intake for the past 2 days.  Pt states she feels fatigued, has generalized weakness, was feeling dizzy today, 'not feeling well'.  Denies chest pain, dyspnea, cough, abd pain, nausea, diarrhea, dysuria, fever nor chills.   In the ED, BP noted to be 195/110, Pulse 110's-120's.  Urinalysis with +ve Nit, Leuk est. (30 Aug 2020 04:39)    Patient confused since admission.  Received Haldol on 1 to .      REVIEW OF SYSTEMS:  Confused      MEDICATIONS    aspirin enteric coated 81 milliGRAM(s) Oral daily  dorzolamide 2% Ophthalmic Solution 1 Drop(s) Left EYE <User Schedule>  furosemide    Tablet 40 milliGRAM(s) Oral two times a day  haloperidol    Injectable 0.5 milliGRAM(s) IV Push once  lisinopril 10 milliGRAM(s) Oral daily  mesalamine DR Capsule 400 milliGRAM(s) Oral two times a day  metoprolol succinate ER 50 milliGRAM(s) Oral daily  montelukast 10 milliGRAM(s) Oral at bedtime  potassium chloride    Tablet ER 20 milliEquivalent(s) Oral every 2 hours  spironolactone 25 milliGRAM(s) Oral daily      PMH: Pacemaker  Transient cerebral ischemia, unspecified transient cerebral ischemia type  HTN (hypertension), benign  Atrial fibrillation  Acid reflux disease       PSH: History of cataract surgery      Family history:   FAMILY HISTORY:  No pertinent family history in first degree relatives      SOCIAL HISTORY:  No history of tobacco or alcohol use     Allergies    Keflex (Diarrhea)  Norvasc (Unknown)    Intolerances        Height (cm): 152.4 ( @ 08:19)  Weight (kg): 45.4 ( @ 08:19)  BMI (kg/m2): 19.5 ( @ 08:19)    Vital Signs Last 24 Hrs  T(C): 36.7 (01 Sep 2020 04:54), Max: 36.7 (31 Aug 2020 20:50)  T(F): 98.1 (01 Sep 2020 04:54), Max: 98.1 (01 Sep 2020 04:54)  HR: 86 (01 Sep 2020 04:54) (76 - 86)  BP: 165/88 (01 Sep 2020 04:54) (139/75 - 179/83)  BP(mean): --  RR: 16 (01 Sep 2020 04:54) (16 - 17)  SpO2: 97% (01 Sep 2020 04:54) (94% - 97%)      On Neurological Examination:    Head: normocephalic Neck: supple no carotid bruits    Mental Status - Patient is lethargic vague confused gives her name and doesn't follow simple commands or regard examiner.    Cranial Nerves - PERRL, EOMI, VF unreliable  no facial paresis    Motor Exam : Moves all 4 limbs non focal paratonic    Sensory    Intact to light touch and pinprick    Reflexes:  symmetric  plantars withdrawal                                                             LABS:  CBC Full  -  ( 01 Sep 2020 06:00 )  WBC Count : 8.13 K/uL  RBC Count : 4.87 M/uL  Hemoglobin : 14.2 g/dL  Hematocrit : 44.7 %  Platelet Count - Automated : 150 K/uL  Mean Cell Volume : 91.8 fl  Mean Cell Hemoglobin : 29.2 pg  Mean Cell Hemoglobin Concentration : 31.8 gm/dL  Auto Neutrophil # : x  Auto Lymphocyte # : x  Auto Monocyte # : x  Auto Eosinophil # : x  Auto Basophil # : x  Auto Neutrophil % : x  Auto Lymphocyte % : x  Auto Monocyte % : x  Auto Eosinophil % : x  Auto Basophil % : x    Urinalysis Basic - ( 31 Aug 2020 14:25 )    Color: Yellow / Appearance: Clear / S.020 / pH: x  Gluc: x / Ketone: Negative  / Bili: Negative / Urobili: Negative   Blood: x / Protein: 100 / Nitrite: Negative   Leuk Esterase: Trace / RBC: 0-4 /HPF / WBC 0-2 /HPF   Sq Epi: x / Non Sq Epi: Neg.-Few / Bacteria: Few /HPF      -    140  |  102  |  24<H>  ----------------------------<  114<H>  3.0<L>   |  28  |  1.31<H>    Ca    10.0      01 Sep 2020 06:00  Mg     1.7     08-31    TPro  5.6<L>  /  Alb  3.0<L>  /  TBili  0.6  /  DBili  x   /  AST  23  /  ALT  19  /  AlkPhos  80  08-31    LIVER FUNCTIONS - ( 31 Aug 2020 06:22 )  Alb: 3.0 g/dL / Pro: 5.6 g/dL / ALK PHOS: 80 U/L / ALT: 19 U/L / AST: 23 U/L / GGT: x           Hemoglobin A1C:       PT/INR - ( 01 Sep 2020 06:00 )   PT: 23.6 sec;   INR: 2.02 ratio

## 2020-09-01 NOTE — CHART NOTE - NSCHARTNOTEFT_GEN_A_CORE
Called by RN to assess pt for agitation.  Pt is 89 y/o/f, with HX of Afib on coumadin, INR 2, seen and examined at bedside very agitated, confused, code grey was called and pt had pushed away PCA and taken tele off.  Labs noted with no significant acute findings, afebrile Temp 36.3, /80, HR 76 stable. Ativan 0.25mg IV x1 given with improvement in agitation, pt moved to nursing station for close monitoring placed on constant observation for safety.

## 2020-09-01 NOTE — CHART NOTE - NSCHARTNOTEFT_GEN_A_CORE
Reviewed prior progress notes, labs and imaging.    Discussed with Dr. Carroll    Primary Diagnosis: Lethargy/weakness 2/2 ACS, Possible UTI    PMHx of HTN, afib on Coumadin, +PPM      Plan:    #Weakness/Lethargy likely 2/2 ACS, NSTEMI  - Troponin peaked, this AM went down to 0.128  - Cardiology consult noted.   - Continue aspirin   - Continue warfarin  - Continue BB, ACEi, furosemide and aldactone   - Risk of plavix may outweigh benefit, per cards  - Echo reviewed- EF 25-30%, multiple L sided wall motion abnormalities  - Neurology consulted, CT head unremarkable, f/u EEG     #Afib  - Continue BB  - Dose Coumadin for INR 2-3  - PT/INR in am    Dispo:   dc planning when cards clears  - PT recommended home with family assistance

## 2020-09-02 NOTE — CHART NOTE - NSCHARTNOTEFT_GEN_A_CORE
Reviewed prior progress notes, labs and imaging.    Discussed with Dr. Carroll    Primary Diagnosis: Lethargy/weakness 2/2 ACS, Possible UTI    PMHx of HTN, afib on Coumadin, +PPM      Plan:    #Weakness/Lethargy likely 2/2 ACS, NSTEMI  - Troponin peaked, downtrended  - Cardiology consult noted.  - Continue aspirin   - Continue warfarin  - Continue BB, ACEi, furosemide and aldactone   - Risk of plavix may outweigh benefit, per cards  - Echo reviewed- EF 25-30%, multiple L sided wall motion abnormalities  - Neurology consulted, CT head unremarkable, f/u EEG     #Afib  - Continue BB  - Dose Coumadin for INR 2-3  - PT/INR in AM    #Hypokalemia  - Replete  - Repeat in AM    Dispo: Likely DC tomorrow 9/3   - PT recommended home with family assistance Reviewed prior progress notes, labs and imaging.    Discussed with Dr. Carroll    Primary Diagnosis: Lethargy/weakness 2/2 ACS    PMHx of HTN, afib on Coumadin, +PPM      Plan:    #Weakness/Lethargy likely 2/2 ACS, NSTEMI  - Troponin peaked, downtrended  - Cardiology consult noted.  - Continue aspirin   - Continue warfarin  - Continue BB, ACEi, furosemide and aldactone   - Risk of plavix may outweigh benefit, per cards  - Echo reviewed- EF 25-30%, multiple L sided wall motion abnormalities  - Neurology consulted, CT head unremarkable, f/u EEG     #Afib  - Continue BB  - Dose Coumadin for INR 2-3  - PT/INR in AM    #Hypokalemia  - Replete  - Repeat in AM    Dispo: Likely DC tomorrow 9/3   - PT recommended home with family assistance Reviewed prior progress notes, labs and imaging.    Discussed with Dr. Carroll    Primary Diagnosis: Lethargy/weakness 2/2 ACS    PMHx of HTN, afib on Coumadin, +PPM      Plan:    #Weakness/Lethargy likely 2/2 ACS, NSTEMI  - Troponin peaked, downtrended  - Cardiology consult noted.  - Continue aspirin   - Continue warfarin  - Continue BB, ACEi, furosemide and aldactone   - Risk of plavix may outweigh benefit, per cards  - Echo reviewed- EF 25-30%, multiple L sided wall motion abnormalities  - Neurology consulted, CT head unremarkable, EEG with no focal findings    #Afib  - Continue BB  - Dose Coumadin for INR 2-3  - PT/INR in AM    #Hypokalemia  - Replete  - Repeat in AM    Dispo: Likely DC tomorrow 9/3   - PT recommended home with family assistance  - Spoke with patient's daughter Florence to update on current status, advise of plan to DC home tomorrow. Florence requests that she be called at work tomorrow ([phone]) to confirm discharge. Florence likely will be able to come to pick patient up after work.

## 2020-09-02 NOTE — PROGRESS NOTE ADULT - ASSESSMENT
89 year old woman admitted with weakness and lethargy.  Subsequent workup demonstrated rise and fall in troponin consistent with NSTEMI.  No post MI complications noted since admission... ie no CHF or malignant arrhythmias   She has moderate LV dysfunction on echo.  Prior cardiac history of AF, s/p PPM on chronic anticoagulation     Plan  - continue conservative workup in this elderly frail woman  - continue ASA  - continue warfarin.. target INR 2-3  - continue ACE-I and diuretics  - monitor and correct electrolyte abnormalities  - she should followup with primary cardiologist Dr. Velez post discharge  - overall guarded prognosis    discussed with Medicine NP

## 2020-09-02 NOTE — PROGRESS NOTE ADULT - ASSESSMENT
Patient's mental status is much improved today.  Neuro exam non focal.  CT head negative and EEG for age is basically unremarkable.  No focality or seizure activity.    REC:  neuro f/u PRN, medical and cardiac f/u.

## 2020-09-02 NOTE — PROGRESS NOTE ADULT - PROBLEM SELECTOR PLAN 1
neurology note seen all cultures negative  PT revaluation home vs [facility] check EEG and ct head
physical therapy  neurology consult
physical therapy  medications adjusted
telemetry gentle hydration

## 2020-09-02 NOTE — PROGRESS NOTE ADULT - ASSESSMENT
urine and blood cultures negative  ....neurology note seen and appreciated....foe Ct head and EEG  correct potassium and keep inr 2-3

## 2020-09-03 NOTE — PROGRESS NOTE ADULT - REASON FOR ADMISSION
generalized weakness, dizziness, decreased oral intake

## 2020-09-03 NOTE — DISCHARGE NOTE PROVIDER - CARE PROVIDER_API CALL
Kiko Turner  GASTROENTEROLOGY  [address] [place] [address], [address]  [place], [place] [id number]  Phone: [phone]  Fax: [phone]  Follow Up Time:

## 2020-09-03 NOTE — PROGRESS NOTE ADULT - ASSESSMENT
89 yo F with Pmhx HTN, A fib on coumadin presents with lethargy and weakness secondary to NSTEMI.    *NSTEMI:  Troponins downtrended  Cardio following  Continue aspirin   Continue warfarin (goal INR 2-3)  Continue BB, ACEi, furosemide and aldactone   Risk of plavix may outweigh benefit, per cards  Echo reviewed- EF 25-30%, multiple L sided wall motion abnormalities    *Weakness:  secondary to ACS/NSTEMI  Neurology consulted, CT head unremarkable, EEG with no focal findings  improving  PT recommended home    #Afib  - Continue BB  - Dose Coumadin for INR 2-3  -INR 1.64 --take coumadin 10 mg tonight and repeat INR in 2-3 days with PMD    #Hypokalemia  - K 3.2--repleted  - Repeat in AM    Dispo: home with daughter today. Dr Carroll aware of discharge plan 89 yo F with Pmhx HTN, A fib on coumadin presents with lethargy and weakness secondary to NSTEMI.    *NSTEMI:  Troponins downtrended  Cardio following  Continue aspirin   Continue warfarin (goal INR 2-3)  Continue BB, ACEi, furosemide and aldactone   Risk of plavix may outweigh benefit, per cards  Echo reviewed- EF 25-30%, multiple L sided wall motion abnormalities    *Weakness:  secondary to ACS/NSTEMI  Neurology consulted, CT head unremarkable, EEG with no focal findings  improving  PT recommended home    #Afib  - Continue BB  - Dose Coumadin for INR 2-3  -INR 1.64 --take coumadin 7.5  mg tonight and repeat INR tomorrow     #Hypokalemia  - K 3.2--repleted  - Repeat in AM    Dispo: home with daughter today. Dr Carroll aware of discharge plan . Daughter refusing home care

## 2020-09-03 NOTE — PROGRESS NOTE ADULT - ATTENDING COMMENTS
Pt seen and examined at bedside. No acute events overnight  Pt stable for discharge home with daughter.   30mins spent on discharge instructions

## 2020-09-03 NOTE — DISCHARGE NOTE PROVIDER - NSDCFUSCHEDAPPT_GEN_ALL_CORE_FT
AZRA CHAHAL ; 10/07/2020 ; NPP Cardio [facility] AZRA CHAHAL ; 10/07/2020 ; NPP Cardio 70 [facility] AZRA CHAHAL ; 10/07/2020 ; NPP Cardio 70 [facility] AZRA CHAHAL ; 10/07/2020 ; NPP Cardio 70 [facility]

## 2020-09-03 NOTE — DISCHARGE NOTE PROVIDER - NSDCFUADDINST_GEN_ALL_CORE_FT
Take 10 mg coumadin tonight then 5 mg daily  Repeat INR in 2-3 days (9/5/2020) Take 10 mg coumadin tonight then 5 mg daily  Repeat INR tomorrow (9/4/2020) Take 7.5 mg coumadin tonight then 5 mg daily  Repeat INR tomorrow (9/4/2020)

## 2020-09-03 NOTE — PROGRESS NOTE ADULT - PROVIDER SPECIALTY LIST ADULT
Cardiology
Cardiology
Internal Medicine
Neurology
Hospitalist

## 2020-09-03 NOTE — DISCHARGE NOTE PROVIDER - HOSPITAL COURSE
Hospital Course    89 yo F with Pmhx HTN, A fib on coumadin presents with lethargy and weakness secondary to NSTEMI.    Cardio consulted. TTE with EF 25-30%, multiple L sided wall motion abnormalities. Troponins downtrended.  Cardio felt that the risk of plavix was more than the benefit. Continued ASA BB, ACEi, furosemide and aldactone     . INR at discharge 1.63. Should take 10 mg coumadin tonight and then 7.5 mg daily.  Should have repeat INR with PMD in 2-3 days        Source of Infection:    Antibiotic / Last Day:na            Discharging Provider:  ROSSI Adame    Contact Info: Cell [phone] - Please call with any questions or concerns.        Outpatient Provider:Dr Carroll        Signout given to SNF Provider:Discharged home        **RESULTS**    < from: CT Head No Cont (09.01.20 @ 12:00) >        Impression:    1. Unremarkable noncontrast CT scan of the brain.                < end of copied text >        < from: TTE Echo Complete w/o Contrast w/ Doppler (08.30.20 @ 08:55) >            Summary:     1. Left ventricular ejection fraction, by visual estimation, is 25 to 30%.     2. Moderately decreased global left ventricular systolic function.     3. Severely enlarged right atrium.     4. Multiple left ventricular regional wall motion abnormalities exist. See wall motion findings.     5. Spectral Doppler shows impaired relaxation pattern of left ventricular myocardial filling (Grade I diastolic dysfunction).     6. There is severe concentric left ventricular hypertrophy.     7. Biatrial enlargement consistent with restrictive physiology.     8. Moderately enlarged left atrium.     9. Mild thickening and calcification of the anterior and posterior mitral valveleaflets.    10. Moderate mitral valve regurgitation.    11. Moderate-severe tricuspid regurgitation.    12. Moderate aortic regurgitation.    13. Sclerotic aortic valve with normal opening.    14. Moderate pulmonic valve regurgitation.    15. Estimated pulmonary artery systolic pressure is 59.6 mmHg assuming a right atrial pressure of 10 mmHg, which is consistent with moderate pulmonary hypertension.    16. The main pulmonary artery is normal in size.    17. LA volume Index is 86.3 ml/m² ml/m2.        [id number] Rodolfo Pringle , Electronically signed on 8/30/2020 at 12:27:13 PM        < end of copied text > Hospital Course    89 yo F with Pmhx HTN, A fib on coumadin presents with lethargy and weakness secondary to NSTEMI.    Cardio consulted. TTE with EF 25-30%, multiple L sided wall motion abnormalities. Troponins downtrended.  Cardio felt that the risk of plavix was more than the benefit. Continued ASA BB, ACEi, furosemide and aldactone     . INR at discharge 1.63. Should take 7.5 mg coumadin tonight and then 5mg daily.  Should have repeat INR with PMD in 2-3 days        Source of Infection:    Antibiotic / Last Day:na            Discharging Provider:  ROSSI Adame    Contact Info: Cell [phone] - Please call with any questions or concerns.        Outpatient Provider:Dr Carroll        Signout given to SNF Provider:Discharged home        **RESULTS**    < from: CT Head No Cont (09.01.20 @ 12:00) >        Impression:    1. Unremarkable noncontrast CT scan of the brain.                < end of copied text >        < from: TTE Echo Complete w/o Contrast w/ Doppler (08.30.20 @ 08:55) >            Summary:     1. Left ventricular ejection fraction, by visual estimation, is 25 to 30%.     2. Moderately decreased global left ventricular systolic function.     3. Severely enlarged right atrium.     4. Multiple left ventricular regional wall motion abnormalities exist. See wall motion findings.     5. Spectral Doppler shows impaired relaxation pattern of left ventricular myocardial filling (Grade I diastolic dysfunction).     6. There is severe concentric left ventricular hypertrophy.     7. Biatrial enlargement consistent with restrictive physiology.     8. Moderately enlarged left atrium.     9. Mild thickening and calcification of the anterior and posterior mitral valveleaflets.    10. Moderate mitral valve regurgitation.    11. Moderate-severe tricuspid regurgitation.    12. Moderate aortic regurgitation.    13. Sclerotic aortic valve with normal opening.    14. Moderate pulmonic valve regurgitation.    15. Estimated pulmonary artery systolic pressure is 59.6 mmHg assuming a right atrial pressure of 10 mmHg, which is consistent with moderate pulmonary hypertension.    16. The main pulmonary artery is normal in size.    17. LA volume Index is 86.3 ml/m² ml/m2.        [id number] Rodolfo Pringle , Electronically signed on 8/30/2020 at 12:27:13 PM        < end of copied text > Hospital Course    89 yo F with Pmhx HTN, A fib on coumadin presents with lethargy and weakness secondary to NSTEMI.    Cardio consulted. TTE with EF 25-30%, multiple L sided wall motion abnormalities. Troponins downtrended.  Cardio felt that the risk of plavix was more than the benefit. Continued ASA BB, ACEi, furosemide and aldactone     . INR at discharge 1.63. Should take 7.5 mg coumadin tonight and then 5mg daily.  Should have repeat INR tomorrow 9/4        Source of Infection:    Antibiotic / Last Day:na            Discharging Provider:  ROSSI Adame    Contact Info: Cell [phone] - Please call with any questions or concerns.        Outpatient Provider:Dr Carroll        Signout given to SNF Provider:Discharged home        **RESULTS**    < from: CT Head No Cont (09.01.20 @ 12:00) >        Impression:    1. Unremarkable noncontrast CT scan of the brain.                < end of copied text >        < from: TTE Echo Complete w/o Contrast w/ Doppler (08.30.20 @ 08:55) >            Summary:     1. Left ventricular ejection fraction, by visual estimation, is 25 to 30%.     2. Moderately decreased global left ventricular systolic function.     3. Severely enlarged right atrium.     4. Multiple left ventricular regional wall motion abnormalities exist. See wall motion findings.     5. Spectral Doppler shows impaired relaxation pattern of left ventricular myocardial filling (Grade I diastolic dysfunction).     6. There is severe concentric left ventricular hypertrophy.     7. Biatrial enlargement consistent with restrictive physiology.     8. Moderately enlarged left atrium.     9. Mild thickening and calcification of the anterior and posterior mitral valveleaflets.    10. Moderate mitral valve regurgitation.    11. Moderate-severe tricuspid regurgitation.    12. Moderate aortic regurgitation.    13. Sclerotic aortic valve with normal opening.    14. Moderate pulmonic valve regurgitation.    15. Estimated pulmonary artery systolic pressure is 59.6 mmHg assuming a right atrial pressure of 10 mmHg, which is consistent with moderate pulmonary hypertension.    16. The main pulmonary artery is normal in size.    17. LA volume Index is 86.3 ml/m² ml/m2.        [id number] Rodolfo Pringle , Electronically signed on 8/30/2020 at 12:27:13 PM        < end of copied text >

## 2020-09-03 NOTE — DISCHARGE NOTE PROVIDER - NSDCMRMEDTOKEN_GEN_ALL_CORE_FT
aspirin 81 mg oral delayed release tablet: 1 tab(s) orally once a day  Azopt 1% ophthalmic suspension: 1 drop(s) in the left eye 2 times a day  Combigan 0.2%-0.5% ophthalmic solution: 1 drop(s) to each affected eye every 12 hours  Coumadin 10 mg oral tablet: 1 tab(s) orally once then 5 mg daily   HAVE INR checked in 2-3 days   dorzolamide 2% ophthalmic solution: 1 drop(s) to each affected eye   furosemide 40 mg oral tablet: 1 tab(s) orally 2 times a day  labetalol 100 mg oral tablet: 1 tab(s) orally every 12 hours  lisinopril 10 mg oral tablet: 1 tab(s) orally once a day -for htn  mesalamine 400 mg oral delayed release capsule: 2 cap(s) orally 2 times a day  montelukast 10 mg oral tablet: 1 tab(s) orally once a day (at bedtime)  Multiple Vitamins oral tablet: 1 tab(s) orally once a day  potassium chloride 10 mEq oral tablet, extended release: 1 tab(s) orally once a day  spironolactone 25 mg oral tablet: 1 tab(s) orally once a day aspirin 81 mg oral delayed release tablet: 1 tab(s) orally once a day  Azopt 1% ophthalmic suspension: 1 drop(s) in the left eye 2 times a day  Combigan 0.2%-0.5% ophthalmic solution: 1 drop(s) to each affected eye every 12 hours  Coumadin 2.5 mg oral tablet: 3 tab(s) orally once a day  for one day then 2 tabs daily  HAVE REPEAT INR 9/5/2020  dorzolamide 2% ophthalmic solution: 1 drop(s) to each affected eye   furosemide 40 mg oral tablet: 1 tab(s) orally 2 times a day  labetalol 100 mg oral tablet: 1 tab(s) orally every 12 hours  lisinopril 10 mg oral tablet: 1 tab(s) orally once a day -for htn  mesalamine 400 mg oral delayed release capsule: 2 cap(s) orally 2 times a day  montelukast 10 mg oral tablet: 1 tab(s) orally once a day (at bedtime)  Multiple Vitamins oral tablet: 1 tab(s) orally once a day  potassium chloride 10 mEq oral tablet, extended release: 1 tab(s) orally once a day  spironolactone 25 mg oral tablet: 1 tab(s) orally once a day

## 2020-09-03 NOTE — PROGRESS NOTE ADULT - SUBJECTIVE AND OBJECTIVE BOX
Patient is a 89y old  Female who presents with a chief complaint of generalized weakness, dizziness, decreased oral intake (02 Sep 2020 16:33). No acute issues overnight       Patient seen and examined at bedside.    ALLERGIES:  Keflex (Diarrhea)  Norvasc (Unknown)    MEDICATIONS  (STANDING):  aspirin enteric coated 81 milliGRAM(s) Oral daily  dorzolamide 2% Ophthalmic Solution 1 Drop(s) Left EYE <User Schedule>  furosemide    Tablet 40 milliGRAM(s) Oral two times a day  lisinopril 10 milliGRAM(s) Oral daily  mesalamine DR Capsule 400 milliGRAM(s) Oral two times a day  metoprolol succinate ER 50 milliGRAM(s) Oral daily  montelukast 10 milliGRAM(s) Oral at bedtime  potassium chloride    Tablet ER 40 milliEquivalent(s) Oral every 4 hours  potassium chloride    Tablet ER 20 milliEquivalent(s) Oral two times a day  spironolactone 25 milliGRAM(s) Oral daily  warfarin 10 milliGRAM(s) Oral once    MEDICATIONS  (PRN):    Vital Signs Last 24 Hrs  T(F): 98 (03 Sep 2020 08:07), Max: 98.2 (02 Sep 2020 15:51)  HR: 91 (03 Sep 2020 08:07) (80 - 91)  BP: 144/88 (03 Sep 2020 08:07) (141/64 - 151/88)  RR: 16 (03 Sep 2020 08:07) (16 - 16)  SpO2: 93% (03 Sep 2020 08:07) (93% - 99%)  I&O's Summary    02 Sep 2020 07:01  -  03 Sep 2020 07:00  --------------------------------------------------------  IN: 180 mL / OUT: 650 mL / NET: -470 mL      BMI (kg/m2): 19.5 (20 @ 07:51)  PHYSICAL EXAM:  General: NAD, A/O x 3, elderly  ENT: MMM  Neck: Supple, No JVD  Lungs: Clear to auscultation bilaterally  Cardio: RRR, S1/S2, + systolic murmur , no pitting edema bilaterally  Abdomen: Soft, Nontender, Nondistended; Bowel sounds present  Extremities: No calf tenderness, moves all extremities     LABS:                        14.2   8.13  )-----------( 150      ( 01 Sep 2020 06:00 )             44.7           138  |  98  |  18  ----------------------------<  135  3.2   |  27  |  1.10    Ca    10.5      03 Sep 2020 06:00  Mg     1.5            eGFR if Non African American: 43 mL/min/1.73M2 (20 @ 06:00)  eGFR if : 49 mL/min/1.73M2 (20 @ 06:00)    PT/INR - ( 03 Sep 2020 05:45 )   PT: 19.4 sec;   INR: 1.64 ratio              CARDIAC MARKERS ( 01 Sep 2020 06:00 )  .128 ng/mL / x     / x     / x     / x                            Urinalysis Basic - ( 31 Aug 2020 14:25 )    Color: Yellow / Appearance: Clear / S.020 / pH: x  Gluc: x / Ketone: Negative  / Bili: Negative / Urobili: Negative   Blood: x / Protein: 100 / Nitrite: Negative   Leuk Esterase: Trace / RBC: 0-4 /HPF / WBC 0-2 /HPF   Sq Epi: x / Non Sq Epi: Neg.-Few / Bacteria: Few /HPF        Culture - Urine (collected 30 Aug 2020 03:24)  Source: .Urine Clean Catch (Midstream)  Final Report (31 Aug 2020 08:19):    >=3 organisms. Probable collection contamination.    Culture - Blood (collected 30 Aug 2020 03:24)  Source: .Blood Blood-Peripheral  Preliminary Report (31 Aug 2020 09:01):    No growth to date.    Culture - Blood (collected 30 Aug 2020 03:24)  Source: .Blood Blood-Peripheral  Preliminary Report (31 Aug 2020 09:):    No growth to date.        RADIOLOGY & ADDITIONAL TESTS:    Care Discussed with Consultants/Other Providers:
Follow up for chf possible acs  SUBJ: patient awake and appears comfortable but disoriented to place and time  PMH  Pacemaker  Transient cerebral ischemia, unspecified transient cerebral ischemia type  HTN (hypertension), benign  Atrial fibrillation  Acid reflux disease      MEDICATIONS  (STANDING):  aspirin enteric coated 81 milliGRAM(s) Oral daily  dorzolamide 2% Ophthalmic Solution 1 Drop(s) Left EYE <User Schedule>  furosemide    Tablet 40 milliGRAM(s) Oral two times a day  lisinopril 10 milliGRAM(s) Oral daily  mesalamine DR Capsule 400 milliGRAM(s) Oral two times a day  metoprolol succinate ER 50 milliGRAM(s) Oral daily  montelukast 10 milliGRAM(s) Oral at bedtime  potassium chloride    Tablet ER 10 milliEquivalent(s) Oral daily  spironolactone 25 milliGRAM(s) Oral daily    MEDICATIONS  (PRN):        PHYSICAL EXAM:  Vital Signs Last 24 Hrs  T(C): 36.7 (31 Aug 2020 08:06), Max: 36.7 (30 Aug 2020 13:40)  T(F): 98 (31 Aug 2020 08:06), Max: 98 (30 Aug 2020 13:40)  HR: 70 (31 Aug 2020 08:06) (63 - 96)  BP: 126/57 (31 Aug 2020 08:06) (116/50 - 162/77)  BP(mean): --  RR: 16 (31 Aug 2020 08:06) (16 - 20)  SpO2: 92% (31 Aug 2020 08:06) (90% - 92%)    GENERAL: NAD, well-groomed, well-developed  HEAD:  Atraumatic, Normocephalic  EYES: EOMI, PERRLA, conjunctiva and sclera clear  ENT: Moist mucous membranes,  NECK: Supple, No JVD, no bruits  CHEST/LUNG: Clear to percussion bilaterally; No rales, rhonchi, wheezing, or rubs  HEART: irregularly irregular  ABDOMEN: Soft, Nontender, Nondistended; Bowel sounds present  EXTREMITIES:  2+ Peripheral Pulses, No clubbing, cyanosis, or edema  SKIN: No rashes or lesions  NERVOUS SYSTEM:  Alert & Oriented X3, Good concentration; Motor Strength 5/5 B/L upper and lower extremities; DTRs 2+ intact and symmetric      TELEMETRY:af occ paced    ECG:    LABS:                        13.3   6.37  )-----------( 128      ( 31 Aug 2020 06:22 )             42.4     08-31    139  |  103  |  22  ----------------------------<  95  3.7   |  26  |  1.21    Ca    9.9      31 Aug 2020 06:22  Mg     1.7     08-31    TPro  5.6<L>  /  Alb  3.0<L>  /  TBili  0.6  /  DBili  x   /  AST  23  /  ALT  19  /  AlkPhos  80  08-31    CARDIAC MARKERS ( 30 Aug 2020 15:25 )  .495 ng/mL / x     / x     / x     / x      CARDIAC MARKERS ( 30 Aug 2020 08:29 )  .299 ng/mL / x     / x     / x     / x      CARDIAC MARKERS ( 30 Aug 2020 03:27 )  .072 ng/mL / x     / x     / x     / x          PT/INR - ( 31 Aug 2020 06:22 )   PT: 24.2 sec;   INR: 2.07 ratio         PTT - ( 30 Aug 2020 03:24 )  PTT:32.4 sec    I&O's Summary    30 Aug 2020 07:01  -  31 Aug 2020 07:00  --------------------------------------------------------  IN: 500 mL / OUT: 1000 mL / NET: -500 mL      BNP    RADIOLOGY & ADDITIONAL STUDIES:    ECHO:significatn lv dysfunction
Neurology Progress Note    Subjective & Objective:  Much improved mental status today.  On exam alert and attentive oriented.  Speech intact.  Non focal exam.      < from: CT Head No Cont (09.01.20 @ 12:00) >    EXAM:  CT BRAIN      PROCEDURE DATE:  09/01/2020        INTERPRETATION:  HISTORY: Delirium    Comparison 06/25/17    Evaluation demonstrates no evidence of mass-effect or midline shift. No intraparenchymal mass lesions or hemorrhage is identified. There is central age typical  atrophy and chronic white matter degeneration.  There is no evidence of hydrocephalus. No extra-axial collections are noted.    The bone windows demonstrate no gross osseous abnormalities.    Impression:  1. Unremarkable noncontrast CT scan of the brain.      < end of copied text >      < from: EEG Extended Monitoring 41-60 Min (09.02.20 @ 09:00) >    EXAM:  EEG EXTENDED 41-60 MIN      PROCEDURE DATE:  09/02/2020        INTERPRETATION:  Background Pattern and Specific Features: The background is generally disorganized there is a large amount of electrode artifact with body movement obscuring portions of the recording there is bilateral posterior alpha activity mixed with beta activity and theta activity is occasionally seen bilaterally. Slowing increases in probable drowsiness diffusely. There are no focal findings or epileptiform discharges.    Stimulation: Photic stimulation was unremarkable hyperventilation was not performed    Impression: Mildly abnormal record on the basis of generalized background disorganization and diffuse slowing suggesting nonspecific diffuse cerebral dysfunction. Slowing in part is related to drowsiness. There is significant electrode artifact with body movement. There are no focal findings or epileptiform discharges.        < end of copied text >            MEDICATIONS  (STANDING):  aspirin enteric coated 81 milliGRAM(s) Oral daily  dorzolamide 2% Ophthalmic Solution 1 Drop(s) Left EYE <User Schedule>  furosemide    Tablet 40 milliGRAM(s) Oral two times a day  haloperidol     Tablet 1 milliGRAM(s) Oral once  lisinopril 10 milliGRAM(s) Oral daily  magnesium oxide 400 milliGRAM(s) Oral once  mesalamine DR Capsule 400 milliGRAM(s) Oral two times a day  metoprolol succinate ER 50 milliGRAM(s) Oral daily  montelukast 10 milliGRAM(s) Oral at bedtime  potassium chloride    Tablet ER 20 milliEquivalent(s) Oral two times a day  spironolactone 25 milliGRAM(s) Oral daily  warfarin 5 milliGRAM(s) Oral once    MEDICATIONS  (PRN):
Patient is a 89y old  Female who presents with a chief complaint of generalized weakness, dizziness, decreased oral intake (01 Sep 2020 09:02)      INTERVAL HPI/OVERNIGHT EVENTS:  confused      REVIEW OF SYSTEMS:  CONSTITUTIONAL: No fever, weight loss, or fatigue  EYES: No eye pain, visual disturbances, or discharge  ENMT:  No difficulty hearing, tinnitus, vertigo; No sinus or throat pain  NECK: No pain or stiffness  BREASTS: No pain, masses, or nipple discharge  RESPIRATORY: No cough, wheezing, chills or hemoptysis; No shortness of breath  CARDIOVASCULAR: No chest pain, palpitations, dizziness, or leg swelling  GASTROINTESTINAL: No abdominal or epigastric pain. No nausea, vomiting, or hematemesis; No diarrhea or constipation. No melena or hematochezia.  GENITOURINARY: No dysuria, frequency, hematuria, or incontinence  NEUROLOGICAL: No headaches, memory loss, loss of strength, numbness, or tremors  SKIN: No itching, burning, rashes, or lesions   LYMPH NODES: No enlarged glands  ENDOCRINE: No heat or cold intolerance; No hair loss  MUSCULOSKELETAL: No joint pain or swelling; No muscle, back, or extremity pain  PSYCHIATRIC: No depression, anxiety, mood swings, or difficulty sleeping  HEME/LYMPH: No easy bruising, or bleeding gums  ALLERY AND IMMUNOLOGIC: No hives or eczema  FAMILY HISTORY:  No pertinent family history in first degree relatives    T(C): 36.4 (09-02-20 @ 05:00), Max: 36.7 (09-01-20 @ 21:26)  HR: 96 (09-02-20 @ 05:00) (83 - 98)  BP: 161/85 (09-02-20 @ 05:00) (118/75 - 165/95)  RR: 16 (09-02-20 @ 05:00) (16 - 16)  SpO2: 96% (09-02-20 @ 05:00) (95% - 97%)  Wt(kg): --Vital Signs Last 24 Hrs  T(C): 36.4 (02 Sep 2020 05:00), Max: 36.7 (01 Sep 2020 21:26)  T(F): 97.6 (02 Sep 2020 05:00), Max: 98 (01 Sep 2020 21:26)  HR: 96 (02 Sep 2020 05:00) (83 - 98)  BP: 161/85 (02 Sep 2020 05:00) (118/75 - 165/95)  BP(mean): --  RR: 16 (02 Sep 2020 05:00) (16 - 16)  SpO2: 96% (02 Sep 2020 05:00) (95% - 97%)    T(F): 97.6 (09-02-20 @ 05:00), Max: 98.1 (09-01-20 @ 04:54)  HR: 96 (09-02-20 @ 05:00) (63 - 98)  BP: 161/85 (09-02-20 @ 05:00) (116/50 - 179/83)  RR: 16 (09-02-20 @ 05:00) (16 - 20)  SpO2: 96% (09-02-20 @ 05:00) (90% - 98%)    PHYSICAL EXAM:  GENERAL: NAD, well-groomed, well-developed  HEAD:  Atraumatic, Normocephalic  EYES: EOMI, PERRLA, conjunctiva and sclera clear  ENMT: No tonsillar erythema, exudates, or enlargement; Moist mucous membranes, Good dentition, No lesions  NECK: Supple, No JVD, Normal thyroid  NERVOUS SYSTEM:  Alert & Oriented X3, Good concentration; Motor Strength 5/5 B/L upper and lower extremities; DTRs 2+ intact and symmetric  CHEST/LUNG: Clear to percussion bilaterally; No rales, rhonchi, wheezing, or rubs  HEART: Regular rate and rhythm; No murmurs, rubs, or gallops  ABDOMEN: Soft, Nontender, Nondistended; Bowel sounds present  EXTREMITIES:  2+ Peripheral Pulses, No clubbing, cyanosis, or edema  LYMPH: No lymphadenopathy noted  SKIN: No rashes or lesions    Consultant(s) Notes Reviewed:  [x ] YES  [ ] NO  Care Discussed with Consultants/Other Providers [ x] YES  [ ] NO    LABS:  09-02    139  |  101  |  18  ----------------------------<  107<H>  3.2<L>   |  28  |  0.94    Ca    10.1      02 Sep 2020 06:00                            14.2   8.13  )-----------( 150      ( 01 Sep 2020 06:00 )             44.7         PT/INR - ( 02 Sep 2020 06:00 )   PT: 19.3 sec;   INR: 1.63 ratio             CARDIAC MARKERS ( 01 Sep 2020 06:00 )  .128 ng/mL / x     / x     / x     / x                       14.2   8.13  )-----------( 150      ( 09-01 @ 06:00 )             44.7                13.3   6.37  )-----------( 128      ( 08-31 @ 06:22 )             42.4                15.5   8.96  )-----------( 185      ( 08-30 @ 03:24 )             47.9               RADIOLOGY & ADDITIONAL TESTS:    Imaging Personally Reviewed:  [ ] YES  [ ] NO  aspirin enteric coated 81 milliGRAM(s) Oral daily  dorzolamide 2% Ophthalmic Solution 1 Drop(s) Left EYE <User Schedule>  furosemide    Tablet 40 milliGRAM(s) Oral two times a day  haloperidol    Injectable 0.5 milliGRAM(s) IV Push once  lisinopril 10 milliGRAM(s) Oral daily  mesalamine DR Capsule 400 milliGRAM(s) Oral two times a day  metoprolol succinate ER 50 milliGRAM(s) Oral daily  montelukast 10 milliGRAM(s) Oral at bedtime  potassium chloride    Tablet ER 20 milliEquivalent(s) Oral two times a day  spironolactone 25 milliGRAM(s) Oral daily      HEALTH ISSUES - PROBLEM Dx:  Pacemaker: Pacemaker  Atrial fibrillation: Atrial fibrillation  Dizziness: Dizziness  Weakness: Weakness
Patient is a 89y old  Female who presents with a chief complaint of generalized weakness, dizziness, decreased oral intake (30 Aug 2020 04:39)      INTERVAL HPI/OVERNIGHT EVENTS:      REVIEW OF SYSTEMS:  CONSTITUTIONAL: No fever, weight loss, or fatigue  EYES: No eye pain, visual disturbances, or discharge  ENMT:  No difficulty hearing, tinnitus, vertigo; No sinus or throat pain  NECK: No pain or stiffness  BREASTS: No pain, masses, or nipple discharge  RESPIRATORY: No cough, wheezing, chills or hemoptysis; No shortness of breath  CARDIOVASCULAR: No chest pain, palpitations, dizziness, or leg swelling  GASTROINTESTINAL: No abdominal or epigastric pain. No nausea, vomiting, or hematemesis; No diarrhea or constipation. No melena or hematochezia.  GENITOURINARY: No dysuria, frequency, hematuria, or incontinence  NEUROLOGICAL: No headaches, memory loss, loss of strength, numbness, or tremors  SKIN: No itching, burning, rashes, or lesions   LYMPH NODES: No enlarged glands  ENDOCRINE: No heat or cold intolerance; No hair loss  MUSCULOSKELETAL: No joint pain or swelling; No muscle, back, or extremity pain  PSYCHIATRIC: No depression, anxiety, mood swings, or difficulty sleeping  HEME/LYMPH: No easy bruising, or bleeding gums  ALLERY AND IMMUNOLOGIC: No hives or eczema  FAMILY HISTORY:  No pertinent family history in first degree relatives    T(C): 36.5 (08-30-20 @ 08:39), Max: 37.3 (08-30-20 @ 03:34)  HR: 78 (08-30-20 @ 08:39) (78 - 124)  BP: 147/83 (08-30-20 @ 08:39) (136/61 - 220/112)  RR: 20 (08-30-20 @ 08:39) (17 - 20)  SpO2: 98% (08-30-20 @ 08:39) (95% - 98%)  Wt(kg): --Vital Signs Last 24 Hrs  T(C): 36.5 (30 Aug 2020 08:39), Max: 37.3 (30 Aug 2020 03:34)  T(F): 97.7 (30 Aug 2020 08:39), Max: 99.2 (30 Aug 2020 03:34)  HR: 78 (30 Aug 2020 08:39) (78 - 124)  BP: 147/83 (30 Aug 2020 08:39) (136/61 - 220/112)  BP(mean): 80 (30 Aug 2020 06:11) (80 - 115)  RR: 20 (30 Aug 2020 08:39) (17 - 20)  SpO2: 98% (30 Aug 2020 08:39) (95% - 98%)    T(F): 97.7 (08-30-20 @ 08:39), Max: 99.2 (08-30-20 @ 03:34)  HR: 78 (08-30-20 @ 08:39) (78 - 124)  BP: 147/83 (08-30-20 @ 08:39) (136/61 - 220/112)  RR: 20 (08-30-20 @ 08:39) (17 - 20)  SpO2: 98% (08-30-20 @ 08:39) (95% - 98%)    PHYSICAL EXAM:  GENERAL: NAD, well-groomed, well-developed  HEAD:  Atraumatic, Normocephalic  EYES: EOMI, PERRLA, conjunctiva and sclera clear  ENMT: No tonsillar erythema, exudates, or enlargement; Moist mucous membranes, Good dentition, No lesions  NECK: Supple, No JVD, Normal thyroid  NERVOUS SYSTEM:  Alert & Oriented X3, Good concentration; Motor Strength 5/5 B/L upper and lower extremities; DTRs 2+ intact and symmetric  CHEST/LUNG: Clear to percussion bilaterally; No rales, rhonchi, wheezing, or rubs  HEART: Regular rate and rhythm; No murmurs, rubs, or gallops  ABDOMEN: Soft, Nontender, Nondistended; Bowel sounds present  EXTREMITIES:  2+ Peripheral Pulses, No clubbing, cyanosis, or edema  LYMPH: No lymphadenopathy noted  SKIN: No rashes or lesions    Consultant(s) Notes Reviewed:  [x ] YES  [ ] NO  Care Discussed with Consultants/Other Providers [ x] YES  [ ] NO    LABS:  08-30    134<L>  |  100  |  18  ----------------------------<  212<H>  4.8   |  22  |  1.26    Ca    10.9<H>      30 Aug 2020 03:24    TPro  6.6  /  Alb  3.7  /  TBili  1.2  /  DBili  x   /  AST  30  /  ALT  20  /  AlkPhos  107  08-30                          15.5   8.96  )-----------( 185      ( 30 Aug 2020 03:24 )             47.9         PT/INR - ( 30 Aug 2020 03:24 )   PT: 26.6 sec;   INR: 2.29 ratio         PTT - ( 30 Aug 2020 03:24 )  PTT:32.4 sec  LIVER FUNCTIONS - ( 30 Aug 2020 03:24 )  Alb: 3.7 g/dL / Pro: 6.6 g/dL / ALK PHOS: 107 U/L / ALT: 20 U/L / AST: 30 U/L / GGT: x           CARDIAC MARKERS ( 30 Aug 2020 08:29 )  .299 ng/mL / x     / x     / x     / x      CARDIAC MARKERS ( 30 Aug 2020 03:27 )  .072 ng/mL / x     / x     / x     / x                       15.5   8.96  )-----------( 185      ( 08-30 @ 03:24 )             47.9               RADIOLOGY & ADDITIONAL TESTS:    Imaging Personally Reviewed:  [ ] YES  [ ] NO  diltiazem    Tablet 30 milliGRAM(s) Oral every 8 hours  furosemide    Tablet 40 milliGRAM(s) Oral two times a day  labetalol 100 milliGRAM(s) Oral every 12 hours  mesalamine DR Capsule 400 milliGRAM(s) Oral two times a day  montelukast 10 milliGRAM(s) Oral at bedtime  nitrofurantoin monohydrate/macrocrystals (MACROBID) 100 milliGRAM(s) Oral two times a day with meals  potassium chloride    Tablet ER 10 milliEquivalent(s) Oral daily  warfarin 2 milliGRAM(s) Oral at bedtime      HEALTH ISSUES - PROBLEM Dx:
Patient is a 89y old  Female who presents with a chief complaint of generalized weakness, dizziness, decreased oral intake (31 Aug 2020 11:04)      INTERVAL HPI/OVERNIGHT EVENTS: confused      REVIEW OF SYSTEMS:  CONSTITUTIONAL: No fever, weight loss, or fatigue  EYES: No eye pain, visual disturbances, or discharge  ENMT:  No difficulty hearing, tinnitus, vertigo; No sinus or throat pain  NECK: No pain or stiffness  BREASTS: No pain, masses, or nipple discharge  RESPIRATORY: No cough, wheezing, chills or hemoptysis; No shortness of breath  CARDIOVASCULAR: No chest pain, palpitations, dizziness, or leg swelling  GASTROINTESTINAL: No abdominal or epigastric pain. No nausea, vomiting, or hematemesis; No diarrhea or constipation. No melena or hematochezia.  GENITOURINARY: No dysuria, frequency, hematuria, or incontinence  NEUROLOGICAL: No headaches, memory loss, loss of strength, numbness, or tremors  SKIN: No itching, burning, rashes, or lesions   LYMPH NODES: No enlarged glands  ENDOCRINE: No heat or cold intolerance; No hair loss  MUSCULOSKELETAL: No joint pain or swelling; No muscle, back, or extremity pain  PSYCHIATRIC: No depression, anxiety, mood swings, or difficulty sleeping  HEME/LYMPH: No easy bruising, or bleeding gums  ALLERY AND IMMUNOLOGIC: No hives or eczema  FAMILY HISTORY:  No pertinent family history in first degree relatives    T(C): 36.7 (09-01-20 @ 04:54), Max: 36.7 (08-31-20 @ 20:50)  HR: 86 (09-01-20 @ 04:54) (76 - 86)  BP: 165/88 (09-01-20 @ 04:54) (139/75 - 179/83)  RR: 16 (09-01-20 @ 04:54) (16 - 17)  SpO2: 97% (09-01-20 @ 04:54) (94% - 97%)  Wt(kg): --Vital Signs Last 24 Hrs  T(C): 36.7 (01 Sep 2020 04:54), Max: 36.7 (31 Aug 2020 20:50)  T(F): 98.1 (01 Sep 2020 04:54), Max: 98.1 (01 Sep 2020 04:54)  HR: 86 (01 Sep 2020 04:54) (76 - 86)  BP: 165/88 (01 Sep 2020 04:54) (139/75 - 179/83)  BP(mean): --  RR: 16 (01 Sep 2020 04:54) (16 - 17)  SpO2: 97% (01 Sep 2020 04:54) (94% - 97%)    T(F): 98.1 (09-01-20 @ 04:54), Max: 99.2 (08-30-20 @ 03:34)  HR: 86 (09-01-20 @ 04:54) (63 - 124)  BP: 165/88 (09-01-20 @ 04:54) (116/50 - 220/112)  RR: 16 (09-01-20 @ 04:54) (16 - 20)  SpO2: 97% (09-01-20 @ 04:54) (90% - 98%)    PHYSICAL EXAM:  GENERAL: NAD, well-groomed, well-developed  HEAD:  Atraumatic, Normocephalic  EYES: EOMI, PERRLA, conjunctiva and sclera clear  ENMT: No tonsillar erythema, exudates, or enlargement; Moist mucous membranes, Good dentition, No lesions  NECK: Supple, No JVD, Normal thyroid  NERVOUS SYSTEM:  Alert & Oriented X3, Good concentration; Motor Strength 5/5 B/L upper and lower extremities; DTRs 2+ intact and symmetric  CHEST/LUNG: Clear to percussion bilaterally; No rales, rhonchi, wheezing, or rubs  HEART: Regular rate and rhythm; No murmurs, rubs, or gallops  ABDOMEN: Soft, Nontender, Nondistended; Bowel sounds present  EXTREMITIES:  2+ Peripheral Pulses, No clubbing, cyanosis, or edema  LYMPH: No lymphadenopathy noted  SKIN: No rashes or lesions    Consultant(s) Notes Reviewed:  [x ] YES  [ ] NO  Care Discussed with Consultants/Other Providers [ x] YES  [ ] NO    LABS:  09-01    140  |  102  |  24<H>  ----------------------------<  114<H>  3.0<L>   |  28  |  1.31<H>    Ca    10.0      01 Sep 2020 06:00  Mg     1.7     08-31    TPro  5.6<L>  /  Alb  3.0<L>  /  TBili  0.6  /  DBili  x   /  AST  23  /  ALT  19  /  AlkPhos  80  08-31                          14.2   8.13  )-----------( 150      ( 01 Sep 2020 06:00 )             44.7       Culture - Urine (collected 08-30-20 @ 03:24)  Source: .Urine Clean Catch (Midstream)  Final Report (08-31-20 @ 08:19):    >=3 organisms. Probable collection contamination.    Culture - Blood (collected 08-30-20 @ 03:24)  Source: .Blood Blood-Peripheral  Preliminary Report (08-31-20 @ 09:01):    No growth to date.    Culture - Blood (collected 08-30-20 @ 03:24)  Source: .Blood Blood-Peripheral  Preliminary Report (08-31-20 @ 09:01):    No growth to date.        PT/INR - ( 01 Sep 2020 06:00 )   PT: 23.6 sec;   INR: 2.02 ratio           LIVER FUNCTIONS - ( 31 Aug 2020 06:22 )  Alb: 3.0 g/dL / Pro: 5.6 g/dL / ALK PHOS: 80 U/L / ALT: 19 U/L / AST: 23 U/L / GGT: x           CARDIAC MARKERS ( 01 Sep 2020 06:00 )  .128 ng/mL / x     / x     / x     / x      CARDIAC MARKERS ( 30 Aug 2020 15:25 )  .495 ng/mL / x     / x     / x     / x      CARDIAC MARKERS ( 30 Aug 2020 08:29 )  .299 ng/mL / x     / x     / x     / x                       14.2   8.13  )-----------( 150      ( 09-01 @ 06:00 )             44.7                13.3   6.37  )-----------( 128      ( 08-31 @ 06:22 )             42.4                15.5   8.96  )-----------( 185      ( 08-30 @ 03:24 )             47.9               RADIOLOGY & ADDITIONAL TESTS:    Imaging Personally Reviewed:  [ ] YES  [ ] NO  aspirin enteric coated 81 milliGRAM(s) Oral daily  dorzolamide 2% Ophthalmic Solution 1 Drop(s) Left EYE <User Schedule>  furosemide    Tablet 40 milliGRAM(s) Oral two times a day  haloperidol    Injectable 0.5 milliGRAM(s) IV Push once  lisinopril 10 milliGRAM(s) Oral daily  mesalamine DR Capsule 400 milliGRAM(s) Oral two times a day  metoprolol succinate ER 50 milliGRAM(s) Oral daily  montelukast 10 milliGRAM(s) Oral at bedtime  potassium chloride    Tablet ER 20 milliEquivalent(s) Oral every 2 hours  spironolactone 25 milliGRAM(s) Oral daily      HEALTH ISSUES - PROBLEM Dx:  Pacemaker: Pacemaker  Atrial fibrillation: Atrial fibrillation  Dizziness: Dizziness  Weakness: Weakness
SUBJ:  Patient is a 89y old  Female who presents with a chief complaint of generalized weakness, dizziness, decreased oral intake (02 Sep 2020 07:51)  patient seen and examined  case discussed with Dr. Pringle   she is in bed, comfortable... no chest pain or shortness of breath       PAST MEDICAL & SURGICAL HISTORY:  Pacemaker  Transient cerebral ischemia, unspecified transient cerebral ischemia type  HTN (hypertension), benign  Atrial fibrillation  Acid reflux disease  History of cataract surgery      MEDICATIONS  (STANDING):  aspirin enteric coated 81 milliGRAM(s) Oral daily  dorzolamide 2% Ophthalmic Solution 1 Drop(s) Left EYE <User Schedule>  furosemide    Tablet 40 milliGRAM(s) Oral two times a day  lisinopril 10 milliGRAM(s) Oral daily  magnesium oxide 400 milliGRAM(s) Oral once  mesalamine DR Capsule 400 milliGRAM(s) Oral two times a day  metoprolol succinate ER 50 milliGRAM(s) Oral daily  montelukast 10 milliGRAM(s) Oral at bedtime  potassium chloride    Tablet ER 20 milliEquivalent(s) Oral two times a day  spironolactone 25 milliGRAM(s) Oral daily  warfarin 5 milliGRAM(s) Oral once    MEDICATIONS  (PRN):          Vital Signs Last 24 Hrs  T(C): 36.4 (02 Sep 2020 10:35), Max: 36.7 (01 Sep 2020 21:26)  T(F): 97.5 (02 Sep 2020 10:35), Max: 98 (01 Sep 2020 21:26)  HR: 80 (02 Sep 2020 10:35) (80 - 98)  BP: 130/67 (02 Sep 2020 10:35) (118/75 - 165/95)  BP(mean): --  RR: 16 (02 Sep 2020 10:35) (16 - 16)  SpO2: 93% (02 Sep 2020 10:35) (93% - 97%)    REVIEW OF SYSTEMS:  CONSTITUTIONAL: fatigue and weak   RESPIRATORY: No cough, wheezing, chills or hemoptysis; No shortness of breath  CARDIOVASCULAR: No chest pain or chest pressure.  No shortness of breath or dyspnea on exertion.  No palpitations, syncope or near syncope.  No edema, no orthopnea.   NEUROLOGICAL: No headaches, memory loss, loss of strength, numbness, or tremors      PHYSICAL EXAM  Constitutional:  WDWN. No acute distress  HEENT: normocephalic, atraumatic.  PERRLA. EOMI  Neck : No JVD. no carotid bruits  Lungs:  decreased breath sounds bilaterally   Heart:  S1 and S2. No S3, S4. II/VI systolic murmur.  Abdomen:  soft, non tender.  Extremities: No clubbing, cyanoisis or edema  Nuerologic:  A+O x 3. No focal deficits  Skin:  no rashes        LABS:                        14.2   8.13  )-----------( 150      ( 01 Sep 2020 06:00 )             44.7     09-02    139  |  101  |  18  ----------------------------<  107<H>  3.2<L>   |  28  |  0.94    Ca    10.1      02 Sep 2020 06:00  Mg     1.5     09-02      CARDIAC MARKERS ( 01 Sep 2020 06:00 )  .128 ng/mL / x     / x     / x     / x          CARDIAC MARKERS ( 01 Sep 2020 06:00 )  .128 ng/mL / x     / x     / x     / x          I&O's Summary    01 Sep 2020 07:01  -  02 Sep 2020 07:00  --------------------------------------------------------  IN: 500 mL / OUT: 1500 mL / NET: -1000 mL    < from: 12 Lead ECG (08.30.20 @ 03:40) >  Atrial fibrillation  Rightward axis  Incomplete right bundle branch block  Voltage criteria for left ventricular hypertrophy  Anteroseptal infarct , age undetermined  ST and T wave abnormality, consider inferolateral ischemia  Abnormal ECG  When compared with ECG of 23-NOV-2018 13:32,  Atrial fibrillation has replaced Electronic ventricular pacemaker  Vent. rate has increased BY  34 BPM    < end of copied text >    < from: TTE Echo Complete w/o Contrast w/ Doppler (08.30.20 @ 08:55) >  1. Left ventricular ejection fraction, by visual estimation, is 25 to 30%.   2. Moderately decreased global left ventricular systolic function.   3. Severely enlarged right atrium.   4. Multiple left ventricular regional wall motion abnormalities exist. See wall motion findings.   5. Spectral Doppler shows impaired relaxation pattern of left ventricular myocardial filling (Grade I diastolic dysfunction).   6. There is severe concentric left ventricular hypertrophy.   7. Biatrial enlargement consistent with restrictive physiology.   8. Moderately enlarged left atrium.   9. Mild thickening and calcification of the anterior and posterior mitral valveleaflets.  10. Moderate mitral valve regurgitation.  11. Moderate-severe tricuspid regurgitation.  12. Moderate aortic regurgitation.  13. Sclerotic aortic valve with normal opening.  14. Moderate pulmonic valve regurgitation.  15. Estimated pulmonary artery systolic pressure is 59.6 mmHg assuming a right atrial pressure of 10 mmHg, which is consistent with moderate pulmonary hypertension.  16. The main pulmonary artery is normal in size.  17. LA volume Index is 86.3 ml/m² ml/m2.    < end of copied text >
Patient is a 89y old  Female who presents with a chief complaint of generalized weakness, dizziness, decreased oral intake (30 Aug 2020 12:57)      INTERVAL HPI/OVERNIGHT EVENTS:  resting       REVIEW OF SYSTEMS:  CONSTITUTIONAL: No fever, weight loss, or fatigue  EYES: No eye pain, visual disturbances, or discharge  ENMT:  No difficulty hearing, tinnitus, vertigo; No sinus or throat pain  NECK: No pain or stiffness  BREASTS: No pain, masses, or nipple discharge  RESPIRATORY: No cough, wheezing, chills or hemoptysis; No shortness of breath  CARDIOVASCULAR: No chest pain, palpitations, dizziness, or leg swelling  GASTROINTESTINAL: No abdominal or epigastric pain. No nausea, vomiting, or hematemesis; No diarrhea or constipation. No melena or hematochezia.  GENITOURINARY: No dysuria, frequency, hematuria, or incontinence  NEUROLOGICAL: No headaches, memory loss, loss of strength, numbness, or tremors  SKIN: No itching, burning, rashes, or lesions   LYMPH NODES: No enlarged glands  ENDOCRINE: No heat or cold intolerance; No hair loss  MUSCULOSKELETAL: No joint pain or swelling; No muscle, back, or extremity pain  PSYCHIATRIC: No depression, anxiety, mood swings, or difficulty sleeping  HEME/LYMPH: No easy bruising, or bleeding gums  ALLERY AND IMMUNOLOGIC: No hives or eczema  FAMILY HISTORY:  No pertinent family history in first degree relatives    T(C): 36.6 (08-31-20 @ 05:57), Max: 36.7 (08-30-20 @ 13:40)  HR: 96 (08-31-20 @ 05:57) (63 - 96)  BP: 162/77 (08-31-20 @ 05:57) (116/50 - 162/77)  RR: 18 (08-31-20 @ 05:57) (18 - 20)  SpO2: 90% (08-31-20 @ 05:57) (90% - 98%)  Wt(kg): --Vital Signs Last 24 Hrs  T(C): 36.6 (31 Aug 2020 05:57), Max: 36.7 (30 Aug 2020 13:40)  T(F): 97.8 (31 Aug 2020 05:57), Max: 98 (30 Aug 2020 13:40)  HR: 96 (31 Aug 2020 05:57) (63 - 96)  BP: 162/77 (31 Aug 2020 05:57) (116/50 - 162/77)  BP(mean): --  RR: 18 (31 Aug 2020 05:57) (18 - 20)  SpO2: 90% (31 Aug 2020 05:57) (90% - 98%)    T(F): 97.8 (08-31-20 @ 05:57), Max: 99.2 (08-30-20 @ 03:34)  HR: 96 (08-31-20 @ 05:57) (63 - 124)  BP: 162/77 (08-31-20 @ 05:57) (116/50 - 220/112)  RR: 18 (08-31-20 @ 05:57) (17 - 20)  SpO2: 90% (08-31-20 @ 05:57) (90% - 98%)    PHYSICAL EXAM:  GENERAL: NAD, well-groomed, well-developed  HEAD:  Atraumatic, Normocephalic  EYES: EOMI, PERRLA, conjunctiva and sclera clear  ENMT: No tonsillar erythema, exudates, or enlargement; Moist mucous membranes, Good dentition, No lesions  NECK: Supple, No JVD, Normal thyroid  NERVOUS SYSTEM:  Alert & Oriented X3, Good concentration; Motor Strength 5/5 B/L upper and lower extremities; DTRs 2+ intact and symmetric  CHEST/LUNG: Clear to percussion bilaterally; No rales, rhonchi, wheezing, or rubs  HEART: Regular rate and rhythm; No murmurs, rubs, or gallops  ABDOMEN: Soft, Nontender, Nondistended; Bowel sounds present  EXTREMITIES:  2+ Peripheral Pulses, No clubbing, cyanosis, or edema  LYMPH: No lymphadenopathy noted  SKIN: No rashes or lesions    Consultant(s) Notes Reviewed:  [x ] YES  [ ] NO  Care Discussed with Consultants/Other Providers [ x] YES  [ ] NO    LABS:  08-30    134<L>  |  100  |  18  ----------------------------<  212<H>  4.8   |  22  |  1.26    Ca    10.9<H>      30 Aug 2020 03:24    TPro  6.6  /  Alb  3.7  /  TBili  1.2  /  DBili  x   /  AST  30  /  ALT  20  /  AlkPhos  107  08-30                          13.3   6.37  )-----------( 128      ( 31 Aug 2020 06:22 )             42.4         PT/INR - ( 31 Aug 2020 06:22 )   PT: 24.2 sec;   INR: 2.07 ratio         PTT - ( 30 Aug 2020 03:24 )  PTT:32.4 sec  LIVER FUNCTIONS - ( 30 Aug 2020 03:24 )  Alb: 3.7 g/dL / Pro: 6.6 g/dL / ALK PHOS: 107 U/L / ALT: 20 U/L / AST: 30 U/L / GGT: x           CARDIAC MARKERS ( 30 Aug 2020 15:25 )  .495 ng/mL / x     / x     / x     / x      CARDIAC MARKERS ( 30 Aug 2020 08:29 )  .299 ng/mL / x     / x     / x     / x      CARDIAC MARKERS ( 30 Aug 2020 03:27 )  .072 ng/mL / x     / x     / x     / x                       13.3   6.37  )-----------( 128      ( 08-31 @ 06:22 )             42.4                15.5   8.96  )-----------( 185      ( 08-30 @ 03:24 )             47.9               RADIOLOGY & ADDITIONAL TESTS:    Imaging Personally Reviewed:  [ ] YES  [ ] NO  aspirin enteric coated 81 milliGRAM(s) Oral daily  dorzolamide 2% Ophthalmic Solution 1 Drop(s) Left EYE <User Schedule>  furosemide    Tablet 40 milliGRAM(s) Oral two times a day  mesalamine DR Capsule 400 milliGRAM(s) Oral two times a day  montelukast 10 milliGRAM(s) Oral at bedtime  nitrofurantoin monohydrate/macrocrystals (MACROBID) 100 milliGRAM(s) Oral two times a day with meals  potassium chloride    Tablet ER 10 milliEquivalent(s) Oral daily      HEALTH ISSUES - PROBLEM Dx:  Pacemaker: Pacemaker  Atrial fibrillation: Atrial fibrillation  Dizziness: Dizziness  Weakness: Weakness

## 2020-09-03 NOTE — DISCHARGE NOTE NURSING/CASE MANAGEMENT/SOCIAL WORK - PATIENT PORTAL LINK FT
You can access the FollowMyHealth Patient Portal offered by [facility] by registering at the following website: [email or web address]. By joining [facility]’s FollowMyHealth portal, you will also be able to view your health information using other applications (apps) compatible with our system.

## 2020-09-17 NOTE — ED PROVIDER NOTE - PHYSICAL EXAMINATION
Gen:  alert, awake, no acute distress  HEENT:  small R forehead contusion, airway clear, pupils equal and round  CV:  afib, nl S1, S2, no m/r/g  Pulm:  lungs CTA b/l  Abd: s/nt/nd, +BS  Ext:  moving all extremities  Neuro:  grossly intact, no focal deficits  Skin:  clear, dry, intact  Psych: dementia

## 2020-09-17 NOTE — ED PROVIDER NOTE - CRITICAL CARE PROVIDED
consultation with other physicians/conducted a detailed discussion of DNR status/direct patient care (not related to procedure)/consult w/ pt's family directly relating to pts condition/additional history taking/documentation/interpretation of diagnostic studies

## 2020-09-17 NOTE — ED PROVIDER NOTE - CARE PLAN
Principal Discharge DX:	Syncope and collapse   Principal Discharge DX:	Subarachnoid hemorrhage  Secondary Diagnosis:	Syncope and collapse

## 2020-09-17 NOTE — ED PROVIDER NOTE - CLINICAL SUMMARY MEDICAL DECISION MAKING FREE TEXT BOX
pt s/p fall, possibly 2/2 to syncope vs mechanical fall, pt does not recall events. pt s/p fall, possibly 2/2 to syncope vs mechanical fall, pt does not recall events, ct scan shows b/l SAH, pt requiring kcentra given elevated INR

## 2020-09-17 NOTE — ED PROVIDER NOTE - PROGRESS NOTE DETAILS
case d/w Dr. Melo e-icu, pt DNR/DNI, no surgical intervention required, will admit to ICU here for monitoring and reversal of INR

## 2020-09-17 NOTE — ED ADULT TRIAGE NOTE - CHIEF COMPLAINT QUOTE
BIB EMS from home for a fall. Patient offers no complaints, hematoma to right forehead, patient is on blood thinners.

## 2020-09-17 NOTE — ED PROVIDER NOTE - OBJECTIVE STATEMENT
daughter heard a sound and went over to pt and saw her on the floor, pt was incoherent for a about 1 min when found on the floor.  now pt has no complaints, denies any pain but does not call recall events around fall. daughter heard a sound and went over to pt and saw her on the floor, pt was incoherent for a about 1 min when found on the floor.  now pt has no complaints, denies any pain but does not recall events around fall.

## 2020-09-18 NOTE — CONSULT NOTE ADULT - SUBJECTIVE AND OBJECTIVE BOX
hx from chart and daughter by bedside.    89-year-old woman with history of hypertension, A. fib on Coumadin and aspirin (not sure why both) pacemaker with systolic CHF and recent NSTEMI who presents with unwitnessed fall ecchymosis to the right forehead.  She was not confused after the event. Patient was noted to have subarachnoid hemorrhage and scalp hematoma on CAT scan.  Patient is status post Kcentra and vitamin K for coagulopathy.  Repeat CAT scan this morning showed increased subarachnoid hemorrhage.  Daughter expressed no desire for invasive procedure including intervention for possible aneurysm for her subarachnoid hemorrhage.     ICU Vital Signs Last 24 Hrs  T(C): 37 (18 Sep 2020 04:20), Max: 37 (18 Sep 2020 04:20)  T(F): 98.6 (18 Sep 2020 04:20), Max: 98.6 (18 Sep 2020 04:20)  HR: 89 (18 Sep 2020 11:00) (73 - 108)  BP: 152/78 (18 Sep 2020 11:00) (107/78 - 185/80)  BP(mean): 90 (18 Sep 2020 10:00) (84 - 116)  ABP: --  ABP(mean): --  RR: 12 (18 Sep 2020 11:00) (11 - 20)  SpO2: 97% (18 Sep 2020 11:00) (97% - 99%)    Hard of hearing, Follows commands.  Not able to give history.  As per daughter she is back to baseline.  PERRL, EOMI, face symmetric, right forehead ecchymosis noted.  Tongue uvula palate midline  Moves all 4 extremities against gravity  Sensation intact to light touch.     hx from chart and daughter by bedside.    89-year-old woman with history of hypertension, A. fib on Coumadin and aspirin (NSTEMI)  pacemaker with systolic CHF and recent NSTEMI (seen by Dr. Cunningham on 9/2 for possible seizure activity. EEg was neg at the time) who presents with unwitnessed fall ecchymosis to the right forehead.  She was not confused after the event. Patient was noted to have subarachnoid hemorrhage and scalp hematoma on CAT scan.  Patient is status post Kcentra and vitamin K for coagulopathy.  Repeat CAT scan this morning showed increased subarachnoid hemorrhage.  Daughter expressed no desire for invasive procedure including intervention for possible aneurysm for her subarachnoid hemorrhage.     ICU Vital Signs Last 24 Hrs  T(C): 37 (18 Sep 2020 04:20), Max: 37 (18 Sep 2020 04:20)  T(F): 98.6 (18 Sep 2020 04:20), Max: 98.6 (18 Sep 2020 04:20)  HR: 89 (18 Sep 2020 11:00) (73 - 108)  BP: 152/78 (18 Sep 2020 11:00) (107/78 - 185/80)  BP(mean): 90 (18 Sep 2020 10:00) (84 - 116)  ABP: --  ABP(mean): --  RR: 12 (18 Sep 2020 11:00) (11 - 20)  SpO2: 97% (18 Sep 2020 11:00) (97% - 99%)    Hard of hearing, Follows commands.  Not able to give history.  As per daughter she is back to baseline.  PERRL, EOMI, face symmetric, right forehead ecchymosis noted.  Tongue uvula palate midline  Moves all 4 extremities against gravity  Sensation intact to light touch.    EXAM:  CT BRAIN      PROCEDURE DATE:  09/18/2020        INTERPRETATION:  Clinical Indication: Status post mechanical fall. 6 hour follow-up for subarachnoid hemorrhage.    Comparison: 9/17/2020    Technique: Noncontrast axial CT images of the head were acquired.    Findings: Bifrontal subarachnoid hemorrhage is noted without interval change. There is new subarachnoid hemorrhage around the right basal cyst in (2-13) there is small amount of subdural hemorrhage bilaterally new since prior (2-15). There is minimal intraventricular hemorrhage layering in both lateral ventricles (2-16).    Impression: New focus of subarachnoid hemorrhage. Interval development of intraventricular hemorrhage and subdural hemorrhage bilaterally as described.    89-year-old woman who had a second unwitnessed syncopal episode this month.  Previous episode was tied to a non-STEMI and Dr. Cunningham was consulted for the syncopal event.  EEG at the time was unremarkable.  Patient presents with another syncopal event.  Will recommend repeat head CT to evaluate the status of her subarachnoid hemorrhage which is likely traumatic.  Once the subarachnoid hemorrhage stabilizes will restart Coumadin for stroke prevention due to A. fib.  Patient's daughter is on board with the risk and benefits of restarting Coumadin.  As patient expresses no desire for invasive procedure including aneurysm intervention, will hold off on vessel imaging as because of the subarachnoid hemorrhage.  Will recommend checking EEG possible seizure activity.  Will recommend checking orthostatic blood pressure and cardiac monitoring as a cause of her syncope.              SUSAN MARIE M.D., ATTENDING RADIOLOGIST  This document has been electronically signed. Sep 18 2020  6:33AM         hx from chart and daughter by bedside.    89-year-old woman with history of hypertension, A. fib on Coumadin and aspirin (NSTEMI)  pacemaker with systolic CHF and recent NSTEMI (seen by Dr. Cunningham on 9/2 for possible seizure activity. EEg was neg at the time) who presents with unwitnessed fall ecchymosis to the right forehead.  She was not confused after the event. Patient was noted to have subarachnoid hemorrhage and scalp hematoma on CAT scan.  Patient is status post Kcentra and vitamin K for coagulopathy.  Repeat CAT scan this morning showed increased subarachnoid hemorrhage.  Daughter expressed no desire for invasive procedure including intervention for possible aneurysm for her subarachnoid hemorrhage.     ICU Vital Signs Last 24 Hrs  T(C): 37 (18 Sep 2020 04:20), Max: 37 (18 Sep 2020 04:20)  T(F): 98.6 (18 Sep 2020 04:20), Max: 98.6 (18 Sep 2020 04:20)  HR: 89 (18 Sep 2020 11:00) (73 - 108)  BP: 152/78 (18 Sep 2020 11:00) (107/78 - 185/80)  BP(mean): 90 (18 Sep 2020 10:00) (84 - 116)  ABP: --  ABP(mean): --  RR: 12 (18 Sep 2020 11:00) (11 - 20)  SpO2: 97% (18 Sep 2020 11:00) (97% - 99%)    Hard of hearing, Follows commands.  Not able to give history.  As per daughter she is back to baseline.  PERRL, EOMI, face symmetric, right forehead ecchymosis noted.  Tongue uvula palate midline  Moves all 4 extremities against gravity  Sensation intact to light touch.    EXAM:  CT BRAIN      PROCEDURE DATE:  09/18/2020        INTERPRETATION:  Clinical Indication: Status post mechanical fall. 6 hour follow-up for subarachnoid hemorrhage.    Comparison: 9/17/2020    Technique: Noncontrast axial CT images of the head were acquired.    Findings: Bifrontal subarachnoid hemorrhage is noted without interval change. There is new subarachnoid hemorrhage around the right basal cyst in (2-13) there is small amount of subdural hemorrhage bilaterally new since prior (2-15). There is minimal intraventricular hemorrhage layering in both lateral ventricles (2-16).    Impression: New focus of subarachnoid hemorrhage. Interval development of intraventricular hemorrhage and subdural hemorrhage bilaterally as described.  SUSAN MARIE M.D., ATTENDING RADIOLOGIST  This document has been electronically signed. Sep 18 2020  6:33AM          89-year-old woman who had a second unwitnessed syncopal episode this month.  Previous episode was tied to a non-STEMI and Dr. Cunningham was consulted for the syncopal event.  EEG at the time was unremarkable.  Patient presents with another syncopal event.  Will recommend repeat head CT to evaluate the status of her subarachnoid hemorrhage which is likely traumatic.  Once the subarachnoid hemorrhage stabilizes will restart Coumadin for stroke prevention due to A. fib.  Patient's daughter is on board with the risk and benefits of restarting Coumadin.  As patient expresses no desire for invasive procedure including aneurysm intervention, will hold off on vessel imaging as because of the subarachnoid hemorrhage.  Will recommend checking EEG possible seizure activity.  Will recommend checking orthostatic blood pressure and cardiac monitoring as a cause of her syncope. Agree with continuing keppra as started by ICU team due to cortical blood present. martha Joyner

## 2020-09-18 NOTE — ED ADULT NURSE NOTE - OBJECTIVE STATEMENT
Pt presents to the ED with daughter who states she heard the pt fall down in the house and found her on the floor. Pt states she does not know what happened. Denies any pain or LOC. Pt has hematoma to the right forehead. Denies cp/sob, fevers, n/v/d, dizziness.

## 2020-09-18 NOTE — ED ADULT NURSE NOTE - NSIMPLEMENTINTERV_GEN_ALL_ED
Implemented All Fall with Harm Risk Interventions:  [place] to call system. Call bell, personal items and telephone within reach. Instruct patient to call for assistance. Room bathroom lighting operational. Non-slip footwear when patient is off stretcher. Physically safe environment: no spills, clutter or unnecessary equipment. Stretcher in lowest position, wheels locked, appropriate side rails in place. Provide visual cue, wrist band, yellow gown, etc. Monitor gait and stability. Monitor for mental status changes and reorient to person, place, and time. Review medications for side effects contributing to fall risk. Reinforce activity limits and safety measures with patient and family. Provide visual clues: red socks.

## 2020-09-18 NOTE — CONSULT NOTE ADULT - SUBJECTIVE AND OBJECTIVE BOX
HPI:  89 y/o F w/ pmh htn, afib on coumadin and asa, +PPM, CHF (EF 25-30%), recent admission for an NSTEMI, now presents to [facility] s/p [facility] fall presented to ED and found to have b/l frontal SAH and +scalp hematoma. Pt also found to have subtherapeutic INR of 3.9, Hyperkalemia of 6.2, MORELIA, MICU consulted for admission for hyperkalemia, q1h neurochecks. Had c/o headache, Pt otherwise denied any other medical complains. Pt stated she cannot recall what happened. Daughter found  her mother on the floor. Pt denied any visual changes, dizziness, photo/phonophibia, neck pain, facial pain, sob, cp, palpitation abd pain, back pain, weakness/numbness in the ext.       PAST MEDICAL & SURGICAL HISTORY:  Pacemaker    Transient cerebral ischemia, unspecified transient cerebral ischemia type    HTN (hypertension), benign    Atrial fibrillation    Acid reflux disease    History of cataract surgery        SOCIAL HISTORY:    Admitted from:  home    Substance abuse history:              Tobacco hx:                  Alcohol hx:              Home Opioid hx:  Tenriism:                                    Preferred Language:    Surrogate/HCP/Daughter :   Florence Douglas          Phone#:  [phone]    FAMILY HISTORY:  No pertinent family history in first degree relatives      Baseline ADLs (prior to admission):    Allergies    Keflex (Diarrhea)  Norvasc (Unknown)    Intolerances      Present Symptoms:   Dyspnea:   Nausea/Vomiting: yes  Anxiety:  Depressed   Fatigue: yes  Loss of appetite: yes  Pain:                                location:          Review of Systems:  Unable to obtain due to poor mentation/lethargy     MEDICATIONS  (STANDING):  dorzolamide 2% Ophthalmic Solution 1 Drop(s) Left EYE three times a day  influenza   Vaccine 0.5 milliLiter(s) IntraMuscular once  labetalol 100 milliGRAM(s) Oral every 12 hours  labetalol Infusion 0.16 mG/Min (9.6 mL/Hr) IV Continuous <Continuous>  lactated ringers. 1000 milliLiter(s) (50 mL/Hr) IV Continuous <Continuous>  levETIRAcetam  IVPB 250 milliGRAM(s) IV Intermittent every 12 hours  montelukast 10 milliGRAM(s) Oral at bedtime  multivitamin 1 Tablet(s) Oral daily  spironolactone 25 milliGRAM(s) Oral daily    MEDICATIONS  (PRN):  ondansetron Injectable 4 milliGRAM(s) IV Push every 6 hours PRN Nausea and/or Vomiting      PHYSICAL EXAM:    Vital Signs Last 24 Hrs  T(C): 37 (18 Sep 2020 04:20), Max: 37 (18 Sep 2020 04:20)  T(F): 98.6 (18 Sep 2020 04:20), Max: 98.6 (18 Sep 2020 04:20)  HR: 89 (18 Sep 2020 11:00) (73 - 108)  BP: 152/78 (18 Sep 2020 11:00) (107/78 - 185/80)  BP(mean): 90 (18 Sep 2020 10:00) (84 - 116)  RR: 12 (18 Sep 2020 11:00) (11 - 20)  SpO2: 97% (18 Sep 2020 11:00) (97% - 99%)    General: alert  oriented x __1-2__ lethargic verbal 1-2 words   Karnofsky Performance Score/Palliative Performance Status Version2: 20    %  HEENT: n/c, + ecchymosis R forehead ,dry mouth    Lungs: breathing comfortable   CV: normal  rate   GI: normal, soft, n/t    : normal    Musculoskeletal: normal  w/weakness, trace  edema    Skin: normal, w/d   Neuro: alert w/ stimulation, mostly lethargic/drowsy, also [place]    Oral intake ability: minimal   Diet: as adriana     LABS:                        12.9   9.39  )-----------( 138      ( 18 Sep 2020 04:45 )             39.1     09-18    137  |  103  |  51<H>  ----------------------------<  78  4.8   |  27  |  1.81<H>    Ca    10.5      18 Sep 2020 04:00  Phos  2.7     09-18  Mg     2.1     09-18    TPro  6.1  /  Alb  3.4  /  TBili  0.6  /  DBili  x   /  AST  23  /  ALT  26  /  AlkPhos  94  09-18    Urinalysis Basic - ( 18 Sep 2020 10:52 )    Color: Yellow / Appearance: Clear / S.010 / pH: x  Gluc: x / Ketone: Negative  / Bili: Negative / Urobili: Negative   Blood: x / Protein: 15 / Nitrite: Negative   Leuk Esterase: Negative / RBC: 0-4 /HPF / WBC Negative /HPF   Sq Epi: x / Non Sq Epi: Neg.-Few / Bacteria: Negative /HPF        RADIOLOGY & ADDITIONAL STUDIES:< from: CT Head No Cont (20 @ 06:23) >  EXAM:  CT BRAIN      PROCEDURE DATE:  2020        INTERPRETATION:  Clinical Indication: Status post mechanical fall. 6 hour follow-up for subarachnoid hemorrhage.    Comparison: 2020    Technique: Noncontrast axial CT images of the head were acquired.    Findings: Bifrontal subarachnoid hemorrhage is noted without interval change. There is new subarachnoid hemorrhage around the right basal cyst in (2-13) there is small amount of subdural hemorrhage bilaterally new since prior (2-15).There is minimal intraventricular hemorrhage layering in both lateral ventricles (2-16).    Impression: New focus of subarachnoid hemorrhage. Interval development of intraventricular hemorrhage and subdural hemorrhage bilaterally as described.          ADVANCE DIRECTIVES: HCP, Living Will,  MOLST   DNR/DNI   Advanced Care Planning discussion total time spent:

## 2020-09-18 NOTE — H&P ADULT - ASSESSMENT
89 y/o F w/ pmh htn, afib on coumadin, +PPM, CHF (EF 25-30%), recent admission for an NSTEMI, now presents to [facility] s/p [facility] fall presented to ED and found to have b/l frontal SAH and +scalp hematoma. Pt also found to have subtherapeutic INR of 3.9, Hyperkalemia of 6.2, MORELIA, MICU consulted for admission for hyperkalemia, q1h neurochecks.      -Neuro: +b/l small SAH +scalp hematoma, start q1h neurochecks, repeat CT at 6am, maintain SBP <160    89 y/o F w/ pmh htn, afib on coumadin, +PPM, CHF (EF 25-30%), recent admission for an NSTEMI, now [facility] and found to have b/l frontal SAH and +scalp hematoma. Pt also found to have subtherapeutic INR of 3.9, Hyperkalemia of 6.2, MORELIA, MICU consulted for admission for hyperkalemia, q1h neurochecks.      -Neuro: +b/l small traumatic SAH +scalp hematoma, start q1h neurochecks, repeat CT at 6am, maintain SBP <160 if worsening finding are noted may need to d/w neurosurgery   -Cardiac: Htn bp ranging around 140-150, goals to keep SBP less than 160, maintain MAP >65 otherwise no acute issues at this time  -Resp: No acute issues, maintain o2 >90%  -GI: NPO diet for now otherwise no acute issues  -Renal: MORELIA with severe hyperkalemia, pt s/p hyperK cocktail, repeat CMP ordered/pending, will add mag and phos levels, Pre-renal azotemia pt given bolus of NS in the ED, will add gentle IVF LR at 50cc/hr for 2 days for now, Hyponatremia mild cont to monitor for now   -ID: No concern for infectious process at this time, wbc stable and pt afebril  -Endo: No acute issues, monitor FS q6h given NPO status  -Heme: Supratherputic INR at 3.9 now s/p VitK and Kcentra repeat improved to 1.28, will add DVT ppx w/ SCD  -Dispo: Pt is currently listed as FULL CODE, d/w GOC w/ daughter at bedside in the ED who had verbally reported should repeat CT finding becomes worse and surgery become necessary they would not likely not want surgical intervention given her advance age and multiple other RF making her a poor surgical candidate daughter is considering an DNR/DNI as she has stated her mother would not want to be placed on a ventilator, will have palliative care do further GOC discussion in the AM, all questions answered to daughter at bedside in the ED, case d/w eICU attending

## 2020-09-18 NOTE — H&P ADULT - NSHPLABSRESULTS_GEN_ALL_CORE
< from: CT Head No Cont (09.17.20 @ 23:59) >    EXAM:  CT BRAIN      PROCEDURE DATE:  09/17/2020        INTERPRETATION:  CLINICAL HISTORY:  Head trauma.    TECHNIQUE:  CT of the head without contrast.  Contiguous transaxial images of the head were acquired from the skull base to the vertex without the administration of iodinated contrast. Coronal and sagittal reformatted images are provided.    COMPARISON: CT head from 9/1/2020    FINDINGS:    There is a new tiny right frontal scalp hematoma. The underlying calvarium is intact. There is highattenuation material layering along both inferior frontal sulci compatible with small amount of acute subarachnoid hemorrhage. No parenchymal or intraventricular hemorrhage. There is no mass effect vasogenic edema or evidence for acute large vascularterritory infarct. There are stable mild to moderate chronic microvessel changes.    The ventricles, sulci and cisternal spaces are stable in size and configuration demonstrating mild ventriculomegaly on cerebral volume loss.  There is no midline shift or abnormal extra-axial fluid collection.    The calvarium is intact.  There are no osteoblastic or lytic calvarial or skull base lesions.  The paranasal sinuses and mastoid air cells are clear.    IMPRESSION:  New tiny right frontal scalp hematoma. Small amount of bilateral frontal subarachnoid hemorrhage. No midline shift.    I discussed the findings with Dr. Camejo at 12:23 AM on 9/18/2020 with read back verification.    < end of copied text >    CBC Full  -  ( 17 Sep 2020 23:45 )  WBC Count : 6.51 K/uL  RBC Count : 4.36 M/uL  Hemoglobin : 13.3 g/dL  Hematocrit : 40.6 %  Platelet Count - Automated : 147 K/uL  Mean Cell Volume : 93.1 fl  Mean Cell Hemoglobin : 30.5 pg  Mean Cell Hemoglobin Concentration : 32.8 gm/dL  Auto Neutrophil # : 4.34 K/uL  Auto Lymphocyte # : 1.29 K/uL  Auto Monocyte # : 0.65 K/uL  Auto Eosinophil # : 0.17 K/uL  Auto Basophil # : 0.04 K/uL  Auto Neutrophil % : 66.7 %  Auto Lymphocyte % : 19.8 %  Auto Monocyte % : 10.0 %  Auto Eosinophil % : 2.6 %  Auto Basophil % : 0.6 %    PT/INR - ( 17 Sep 2020 23:45 )   PT: 44.2 sec;   INR: 3.90 ratio         PTT - ( 17 Sep 2020 23:45 )  PTT:28.4 sec

## 2020-09-18 NOTE — H&P ADULT - NSHPPHYSICALEXAM_GEN_ALL_CORE
-General: Comfortable in bed in NAD speaking in full and clear sentences w/ daughter at bedside  -Neuro: AAOx2 (mild confusion at baseline), CN2-12 wnl, no slurred speech, no facial droop, NVI to all ext w/ good and even strength  -HEENT: +R. scalp hematoma with ttp, no facial/scalp tenderness, no c-spine tenderness, PERRL, EOMI  -Resp: CTA b/l  -Heart: +RRR  -Abd: BSx4, soft, nt/nd  -Ext: no edema, moving all ext   -Skin: warm/dry

## 2020-09-18 NOTE — GOALS OF CARE CONVERSATION - ADVANCED CARE PLANNING - CONVERSATION DETAILS
Met with pt and daughter Florence at bedside, this AM, pt was awake w/ stimuli but mostly drowsy. She did appear comfortable initially.   Spoke with daughter about pts history, her age, and why she is here, daughter also  lives with pt. Daughter says they are not interested in surgical interventions at this time. We then spoke about advanced directives, and daughter produced a HCP form and a living will form. She is pts hcp. I asked about what her mother would want or not want at this time in event of decline, daughter said she read her living will and based on that and previous discussions, says pt would not want cpr or intubation performed . We then reviewed a MOLST form , and completed it to reflect pts wishes. Daughter was thankful to have this talk and complete the molst.

## 2020-09-18 NOTE — CONSULT NOTE ADULT - ASSESSMENT
A/P 89 y/o F w/ pmh htn, afib on coumadin, +PPM, CHF (EF 25-30%), recent admission for an NSTEMI, now presents to [facility] s/p [facility] fall presented to ED and found to have b/l frontal SAH and +scalp hematoma. Pt also found to have subtherapeutic INR of 3.9, Hyperkalemia of 6.2, MORELIA, MICU consulted for admission . Pt currently in ccu  . A/P 89 y/o F w/ pmh htn, afib on coumadin, +PPM, CHF (EF 25-30%), recent admission for an NSTEMI, now presents to [facility] s/p mech fall presented to ED and found to have b/l frontal SAH and +scalp hematoma. Pt also found to have subtherapeutic INR of 3.9, Hyperkalemia of 6.2, MORELIA, MICU consulted for admission . Pt currently in ccu  .    Assessment  Syncope Fall with ICH  Coumadin coagulopathy s/p Vit K and kcentra  Platelet dysfunction with ASA use   MORELIA with normal baseline  Underlying HTN, afib on coumadin and ASA, PPM with Systolic CHF, recent NSTEMI     Plan  Neuro checks  Keep SBP < 160  Keppra for seizure ppx  CT head if symptomatic vs AM  Neuro eval called, family refusing  surgical intervention     Check US renal  renal eval called      Check UA, r/o UTI   diet as tolerated    Palliative : asked for GOC, case reviewed w/ Dr Joyner this AM, and chart reviewed.  Met with pt and daughter Florence at bedside, this AM, pt was awake w/ stimuli but mostly drowsy. She did appear comfortable initially.   Spoke with daughter about pts history, her age, and why she is here, daughter also  lives with pt. Daughter says they are not interested in surgical interventions at this time. We then spoke about advanced directives, and daughter produced a HCP form and a living will form. She is pts hcp. I asked about what her mother would want or not want at this time in event of decline, daughter said she read her living will and based on that and previous discussions, says pt would not want cpr or intubation performed . We then reviewed a MOLST form , and completed it to reflect pts wishes. Will cont to follow w/ ccu team cont supportive care.    Recs pending clinical course.

## 2020-09-18 NOTE — CONSULT NOTE ADULT - SUBJECTIVE AND OBJECTIVE BOX
NEPHROLOGY CONSULTATION    CHIEF COMPLAINT: s/p fall    HPI:  Pt is 89 yo F w/pmh htn, afib on coumadin and asa, +PPM, CHF (EF 25-30%), recent admission for an NSTEMI, now presents to [facility] s/p [facility] fall found to have b/l frontal SAH and + scalp hematoma. Pt also found to have subtherapeutic INR of 3.9, hyperkalemia of 6.2, MORELIA. Pt states she cannot recall what happened. Poor historian. No CP, SOB, N/V/D/C/F/C.    ROS:  as above    Allergies:  Keflex (Diarrhea)  Norvasc (Unknown)    PAST MEDICAL & SURGICAL HISTORY:  Pacemaker  Transient cerebral ischemia, unspecified transient cerebral ischemia type  HTN (hypertension), benign  Atrial fibrillation  Acid reflux disease  History of cataract surgery    SOCIAL HISTORY:  negative    FAMILY HISTORY:  No pertinent family history in first degree relatives    MEDICATIONS  (STANDING):  dorzolamide 2% Ophthalmic Solution 1 Drop(s) Left EYE three times a day  influenza   Vaccine 0.5 milliLiter(s) IntraMuscular once  labetalol Infusion 1.25 mG/Min (75 mL/Hr) IV Continuous <Continuous>  lactated ringers. 1000 milliLiter(s) (50 mL/Hr) IV Continuous <Continuous>  levETIRAcetam  IVPB 250 milliGRAM(s) IV Intermittent every 12 hours  montelukast 10 milliGRAM(s) Oral at bedtime  multivitamin 1 Tablet(s) Oral daily  spironolactone 25 milliGRAM(s) Oral daily    Vital Signs Last 24 Hrs  T(C): 37.1 (20 @ 14:00), Max: 37.1 (20 @ 14:00)  T(F): 98.8 (20 @ 14:00), Max: 98.8 (20 @ 14:00)  HR: 100 (20 @ 14:00) (73 - 123)  BP: 163/86 (20 @ 14:00) (107/78 - 204/99)  BP(mean): 104 (20 @ 14:00) (84 - 137)  RR: 19 (20 @ 14:00) (11 - 25)  SpO2: 98% (09-18-20 @ 14:00) (93% - 99%)    card s1s2  b/l air entry  soft, ND  no edema    LABS:                        12.9   9.39  )-----------( 138      ( 18 Sep 2020 04:45 )             39.1     18    137  |  103  |  51<H>  ----------------------------<  78  4.8   |  27  |  1.81<H>    Ca    10.5      18 Sep 2020 04:00  Phos  2.7       Mg     2.1         TPro  6.1  /  Alb  3.4  /  TBili  0.6  /  DBili  x   /  AST  23  /  ALT  26  /  AlkPhos  94      Urinalysis Basic - ( 18 Sep 2020 10:52 )    Color: Yellow / Appearance: Clear / S.010 / pH: x  Gluc: x / Ketone: Negative  / Bili: Negative / Urobili: Negative   Blood: x / Protein: 15 / Nitrite: Negative   Leuk Esterase: Negative / RBC: 0-4 /HPF / WBC Negative /HPF   Sq Epi: x / Non Sq Epi: Neg.-Few / Bacteria: Negative /HPF    LIVER FUNCTIONS - ( 18 Sep 2020 04:00 )  Alb: 3.4 g/dL / Pro: 6.1 g/dL / ALK PHOS: 94 U/L / ALT: 26 U/L / AST: 23 U/L / GGT: x           PT/INR - ( 18 Sep 2020 01:51 )   PT: 15.3 sec;   INR: 1.28 ratio       PTT - ( 17 Sep 2020 23:45 )  PTT:28.4 sec    A/P:    S/p fall, b/l SAH  Known severe CM, EF 25-30%, MR, TR, AR  Hemodynamic MORELIA (Cr 1.1 - 9/3/20)  NPO, gentle IVF  Avoid nephrotoxins  Will f/u BMP, UO  D/w family at bedside    [phone]

## 2020-09-18 NOTE — PROVIDER CONTACT NOTE (EICU) - RECOMMENDATIONS
Plan  1. Please give K-centra and Vit K 10mg IV x 1.  She was on ASA, but no need to give platelet transfusion.  2. Given her age and severe systolic heart failure, would not offer hemodialysis  3. Give a cocktail with calcium gluconate, insulin, D50  4. Hold diuretics  5. Gentle hydration of 500cc LR  6. GIven this is a traumatic bleed with high INR, extremely unlikely that this was from an aneurysm bleed.  No need to consult neurosurgery at this moment.  Repeat CT head at 6 hours, and at 24 hours  7. NPO  8. GIve IV labetalol for HTN, she is on metoprolol at home  9. Palliative care consult, pt is in her end-of-life.  Family may want home hospice vs. inpatient hospice.

## 2020-09-18 NOTE — H&P ADULT - HISTORY OF PRESENT ILLNESS
89 y/o F w/ pmh htn, afib on coumadin, +PPM, CHF (EF 25-30%), recent admission for an NSTEMI, now presents to [facility] s/p [facility] fall presented to ED and found to have b/l frontal SAH and +scalp hematoma. Pt also found to have subtherapeutic INR of 3.9, Hyperkalemia of 6.2, MORELIA, MICU consulted for admission for hyperkalemia, q1h neurochecks. During interview with patients and daughter at bedside pt reports having an headache worst around the R. frontal scalp hematoma. Pt otherwise denies any other medical complains. Pt states she cannot recall what happened. Daughter states she did wittiness the fall but heard a loud thump and found her mother on the floor. Pt denies any visual changes, dizziness, photo/phonophibia, neck pain, facial pain, sob, cp, palpitation abd pain, back pain, weakness/numbness in the ext. 89 y/o F w/ pmh htn, afib on coumadin and asa, +PPM, CHF (EF 25-30%), recent admission for an NSTEMI, now presents to [facility] s/p [facility] fall presented to ED and found to have b/l frontal SAH and +scalp hematoma. Pt also found to have subtherapeutic INR of 3.9, Hyperkalemia of 6.2, MORELIA, MICU consulted for admission for hyperkalemia, q1h neurochecks. During interview with patients and daughter at bedside pt reports having an headache worst around the R. frontal scalp hematoma. Pt otherwise denies any other medical complains. Pt states she cannot recall what happened. Daughter states she did wittiness the fall but heard a loud thump and found her mother on the floor. Pt denies any visual changes, dizziness, photo/phonophibia, neck pain, facial pain, sob, cp, palpitation abd pain, back pain, weakness/numbness in the ext.

## 2020-09-18 NOTE — PROVIDER CONTACT NOTE (EICU) - BACKGROUND
89F with a history of A fib on coumadin, recent NSTEMI discharged 2 weeks ago, with severe systolic heart failure EF 25%.  Also on ASA s/p fall from standing at home.    INR 3.9, Plt 147  K 6.3, Cr 2.04, Na 134, bicarb 28  New tiny right frontal scalp hematoma. Small amount of bilateral frontal subarachnoid hemorrhage. No midline shift.

## 2020-09-18 NOTE — PROVIDER CONTACT NOTE (EICU) - ASSESSMENT
Problem List  1. Traumatic SAH, from fall and supratherapetic INR  2. MORELIA with severe hyperkalemia  3. Recent NSTEMI  4. Goals of care  5. Metabolic alkalosis

## 2020-09-20 NOTE — CHART NOTE - NSCHARTNOTEFT_GEN_A_CORE
Reviewed prior progress notes, labs and imaging.    Discussed with Dr. Carroll    Primary Diagnosis: syncope fall with ICH, couamdin coagulopathy s/p vit k and kcentra, platelet dysfunction, morelia      Plan: neuro checks, keep sbp< 160, keppra for seizure ppx, repeat head ct, eeg, orthostatics        Anticipated Discharge: when cleared by neuro    89 female with pmh htn, afib on coumadin, ppm, chf (ef 25-30%), recent admission for nstemi now presents s/p mechanical fall found to have bilateral frontal SAH and scalp hematoma, also with supratherapeutic inr 3.9, hyperkalemia 6.2, morelia    # B/L small traumatic ICH  # Scalp hematoma  neuro following - repeat head ct today if stable restart coumadin for stroke prevention if ok with primary  pt does not want any invasive procedures including aneurysm intervention, hold of on vessel imaging because of SAH  EEG pending, check orthostatics, continue cardiac monitoring  continue keppra for two weeks as SAH can be cause of seizure during acute phase    # Supratherapeutic INR  inr today 1.38 s/p vit k and kcentra, now downgraded from icu  if repeat head ct stable restart coumadin if ok with primary    # Afib on coumadin  rate controlled on labetalol 100 BID  restart coumadin if repeat head ct stable and ok with primary  continue to monitor INR    # MORELIA  # Hyperkalemia  Nephro following -resolved, Cr appears at baseline 1.23 today  avoid nephrotoxins  continue to monitor    # HTN  keep sbp<160  continue labetalol, monitor closely    # Chronic Systolic CHF   # PPM  # Recent NSTEMI  trops negative, no signs of ACS  appears euvolemic  continue home meds

## 2020-09-21 NOTE — CONSULT NOTE ADULT - REASON FOR ADMISSION
SAH/hyperkalemia/supratherputic INR

## 2020-09-21 NOTE — CHART NOTE - NSCHARTNOTEFT_GEN_A_CORE
Reviewed prior progress notes, labs and imaging.    Discussed with Dr. Carroll    Primary Diagnosis: syncope fall with ich, coumadin coagulopathy s/p vit k and kcentra, platelet dysfunction, morelia      Plan: pmr evaluation, warm compresses to right hand, keppra for seizure ppx, blood pressure control        Anticipated Discharge: 24 hours      89 female with pmh htn, afib on coumadin, ppm, chf (ef 25-30%), recent admission for nstemi now presents s/p mechanical fall found to have bilateral frontal SAH and scalp hematoma, also with supratherapeutic inr 3.9, hyperkalemia 6.2, morelia    # B/L small traumatic ICH  # Scalp hematoma  neuro following - repeat head ct 9/20 reveals smll amount of acute subarachnoid hemorrhage in bifrontal lobes, small intraparenchymal hemorrhage and tiny bilateral whole hemispheric sdh essentially unchanged, follow up with neuro regarding when to restart coumadin.  pt does not want any invasive procedures including aneurysm intervention, hold of on vessel imaging because of SAH  follow up EEG, orthostatics negative, continue cardiac monitoring  continue keppra for two weeks as SAH can be cause of seizure during acute phase    # Supratherapeutic INR  last inr 1.38 s/p vit k and kcentra, now downgraded from icu  if repeat head ct stable restart coumadin if ok with neuro    # Afib on coumadin  rate controlled on labetalol 100 BID  restart coumadin if repeat head ct stable and ok with primary  continue to monitor INR    # MORELIA  # Hyperkalemia  Nephro following -resolved, Cr appears at baseline 0.89 today  avoid nephrotoxins  continue to monitor    # HTN  keep sbp<160  continue labetalol, monitor closely    # Chronic Systolic CHF   # PPM  # Recent NSTEMI  trops negative, no signs of ACS  appears euvolemic  continue home meds.

## 2020-09-21 NOTE — CONSULT NOTE ADULT - SUBJECTIVE AND OBJECTIVE BOX
Patient is a 89y old  Female who presents with a chief complaint of SAH/hyperkalemia/supratherputic INR (21 Sep 2020 07:27)      HPI:  89 y/o F w/ pmh htn, afib on coumadin and asa, +PPM, CHF (EF 25-30%), recent admission for an NSTEMI, now presents to [facility] s/p [facility] fall presented to ED and found to have b/l frontal SAH and +scalp hematoma. Pt also found to have subtherapeutic INR of 3.9, Hyperkalemia of 6.2, MORELIA, MICU consulted for admission for hyperkalemia, q1h neurochecks. During interview with patients and daughter at bedside pt reports having an headache worst around the R. frontal scalp hematoma. Pt otherwise denies any other medical complains. Pt states she cannot recall what happened. Daughter states she did wittiness the fall but heard a loud thump and found her mother on the floor. Pt denies any visual changes, dizziness, photo/phonophibia, neck pain, facial pain, sob, cp, palpitation abd pain, back pain, weakness/numbness in the ext.   (18 Sep 2020 01:32)  repeat head ct 9/20 reveals smll amount of acute subarachnoid hemorrhage in bifrontal lobes, small intraparenchymal hemorrhage and tiny bilateral whole hemispheric sdh essentially unchanged, follow up with neuro regarding when to restart coumadin.  pt does not want any invasive procedures including aneurysm intervention.  follow up EEG-> on Keppra for 2 weeks.     REVIEW OF SYSTEMS: No chest pain, shortness of breath, nausea, vomiting or diarhea.      PAST MEDICAL & SURGICAL HISTORY  Pacemaker    Transient cerebral ischemia, unspecified transient cerebral ischemia type    HTN (hypertension), benign    Atrial fibrillation    Acid reflux disease    History of cataract surgery        SOCIAL HISTORY  Smoking - Denied, EtOH - Denied, Drugs - Denied    FUNCTIONAL HISTORY:   Lives   Independent    CURRENT FUNCTIONAL STATUS:      FAMILY HISTORY   No pertinent family history in first degree relatives        RECENT LABS/IMAGING  CBC Full  -  ( 20 Sep 2020 06:00 )  WBC Count : 7.18 K/uL  RBC Count : 4.03 M/uL  Hemoglobin : 12.3 g/dL  Hematocrit : 36.9 %  Platelet Count - Automated : 158 K/uL  Mean Cell Volume : 91.6 fl  Mean Cell Hemoglobin : 30.5 pg  Mean Cell Hemoglobin Concentration : 33.3 gm/dL  Auto Neutrophil # : x  Auto Lymphocyte # : x  Auto Monocyte # : x  Auto Eosinophil # : x  Auto Basophil # : x  Auto Neutrophil % : x  Auto Lymphocyte % : x  Auto Monocyte % : x  Auto Eosinophil % : x  Auto Basophil % : x    09-21    136  |  104  |  24<H>  ----------------------------<  96  3.8   |  25  |  0.89    Ca    10.4      21 Sep 2020 07:00  Phos  2.1     09-20  Mg     1.9     09-20          VITALS  T(C): 36.4 (09-21-20 @ 09:16), Max: 36.9 (09-20-20 @ 21:47)  HR: 60 (09-21-20 @ 09:16) (60 - 79)  BP: 129/49 (09-21-20 @ 09:16) (99/53 - 162/70)  RR: 18 (09-21-20 @ 09:16) (16 - 23)  SpO2: 98% (09-21-20 @ 09:16) (98% - 100%)  Wt(kg): --    ALLERGIES  Keflex (Diarrhea)  Norvasc (Unknown)      MEDICATIONS   acetaminophen   Tablet .. 650 milliGRAM(s) Oral every 6 hours PRN  brinzolamide 1% Ophthalmic Suspension 1 Drop(s) Left EYE two times a day  dorzolamide 2% Ophthalmic Solution 1 Drop(s) Left EYE three times a day  influenza   Vaccine 0.5 milliLiter(s) IntraMuscular once  levETIRAcetam 250 milliGRAM(s) Oral every 12 hours  montelukast 10 milliGRAM(s) Oral at bedtime  multivitamin 1 Tablet(s) Oral daily  ondansetron Injectable 4 milliGRAM(s) IV Push every 6 hours PRN      ----------------------------------------------------------------------------------------  PHYSICAL EXAM  Constitutional - NAD, Comfortable  HEENT - NCAT, EOMI  Neck - Supple, No limited ROM  Chest - CTA bilaterally, No wheeze, No rhonchi, No crackles  Cardiovascular - RRR, S1S2, No murmurs  Abdomen - BS+, Soft, NTND  Extremities - No C/C/E, No calf tenderness   Neurologic Exam -                    Cognitive - Awake, Alert, AAO to self, place, date, year, situation     Communication - Fluent, No dysarthria, no aphasia     Cranial Nerves - CN 2-12 intact     Motor - No focal deficits                       Sensory - Intact to LT     Reflexes - DTR Intact, No primitive reflexive     Balance - WNL Static  Psychiatric - Mood stable, Affect WNL   Patient is a 89y old  Female who presents with a chief complaint of SAH/hyperkalemia/supratherputic INR (21 Sep 2020 07:27)      HPI:  89 y/o F w/ pmh htn, afib on coumadin and asa, +PPM, CHF (EF 25-30%), recent admission for an NSTEMI, now presents to [facility] s/p [facility] fall presented to ED and found to have b/l frontal SAH and +scalp hematoma. Pt also found to have subtherapeutic INR of 3.9, Hyperkalemia of 6.2, MORELIA, MICU consulted for admission for hyperkalemia, q1h neurochecks. During interview with patients and daughter at bedside pt reports having an headache worst around the R. frontal scalp hematoma. Pt otherwise denies any other medical complains. Pt states she cannot recall what happened. Daughter states she did wittiness the fall but heard a loud thump and found her mother on the floor. Pt denies any visual changes, dizziness, photo/phonophibia, neck pain, facial pain, sob, cp, palpitation abd pain, back pain, weakness/numbness in the ext.   (18 Sep 2020 01:32)  repeat head ct 9/20 reveals smll amount of acute subarachnoid hemorrhage in bifrontal lobes, small intraparenchymal hemorrhage and tiny bilateral whole hemispheric sdh essentially unchanged, follow up with neuro regarding when to restart coumadin.  pt does not want any invasive procedures including aneurysm intervention.  follow up EEG-> on Keppra for 2 weeks.     REVIEW OF SYSTEMS: No chest pain, shortness of breath, nausea, vomiting or diarhea.      PAST MEDICAL & SURGICAL HISTORY  Pacemaker    Transient cerebral ischemia, unspecified transient cerebral ischemia type    HTN (hypertension), benign    Atrial fibrillation    Acid reflux disease    History of cataract surgery        SOCIAL HISTORY  Smoking - Denied, EtOH - Denied, Drugs - Denied    FUNCTIONAL HISTORY:   Lives with daughter. reports she was walking on own prior to admission.            FAMILY HISTORY   No pertinent family history in first degree relatives        RECENT LABS/IMAGING  CBC Full  -  ( 20 Sep 2020 06:00 )  WBC Count : 7.18 K/uL  RBC Count : 4.03 M/uL  Hemoglobin : 12.3 g/dL  Hematocrit : 36.9 %  Platelet Count - Automated : 158 K/uL  Mean Cell Volume : 91.6 fl  Mean Cell Hemoglobin : 30.5 pg  Mean Cell Hemoglobin Concentration : 33.3 gm/dL  Auto Neutrophil # : x  Auto Lymphocyte # : x  Auto Monocyte # : x  Auto Eosinophil # : x  Auto Basophil # : x  Auto Neutrophil % : x  Auto Lymphocyte % : x  Auto Monocyte % : x  Auto Eosinophil % : x  Auto Basophil % : x    09-21    136  |  104  |  24<H>  ----------------------------<  96  3.8   |  25  |  0.89    Ca    10.4      21 Sep 2020 07:00  Phos  2.1     09-20  Mg     1.9     09-20          VITALS  T(C): 36.4 (09-21-20 @ 09:16), Max: 36.9 (09-20-20 @ 21:47)  HR: 60 (09-21-20 @ 09:16) (60 - 79)  BP: 129/49 (09-21-20 @ 09:16) (99/53 - 162/70)  RR: 18 (09-21-20 @ 09:16) (16 - 23)  SpO2: 98% (09-21-20 @ 09:16) (98% - 100%)  Wt(kg): --    ALLERGIES  Keflex (Diarrhea)  Norvasc (Unknown)      MEDICATIONS   acetaminophen   Tablet .. 650 milliGRAM(s) Oral every 6 hours PRN  brinzolamide 1% Ophthalmic Suspension 1 Drop(s) Left EYE two times a day  dorzolamide 2% Ophthalmic Solution 1 Drop(s) Left EYE three times a day  influenza   Vaccine 0.5 milliLiter(s) IntraMuscular once  levETIRAcetam 250 milliGRAM(s) Oral every 12 hours  montelukast 10 milliGRAM(s) Oral at bedtime  multivitamin 1 Tablet(s) Oral daily  ondansetron Injectable 4 milliGRAM(s) IV Push every 6 hours PRN      ----------------------------------------------------------------------------------------  PHYSICAL EXAM  Constitutional - NAD, Comfortable  HEENT - NCAT, EOMI  Neck - Supple, No limited ROM  Chest - CTA bilaterally, No wheeze, No rhonchi, No crackles  Cardiovascular - RRR, S1S2, No murmurs  Abdomen - BS+, Soft, NTND  Extremities - No C/C/E, No calf tenderness   Neurologic Exam -                    Cognitive - Awake, Alert, AAO to self, place, date, year, situation     Communication - Fluent, No dysarthria, no aphasia     Cranial Nerves - CN 2-12 intact     Motor - antigravity to upper extremities                        Sensory - Intact to LT     Reflexes - DTR Intact, No primitive reflexive     Balance - WNL Static  Psychiatric - Mood stable, Affect WNL

## 2020-09-21 NOTE — PHYSICAL THERAPY INITIAL EVALUATION ADULT - ADDITIONAL COMMENTS
pt lives in private house w/dtr, 3 evan w/1 rail, no stairs in house that pt uses. pt states she uses a rolling walker to ambulate at home, also has a commode and grab bars in br

## 2020-09-21 NOTE — CONSULT NOTE ADULT - ASSESSMENT
1. PT eval - bed mobility,transfers, gait and balance training  2. OT eval - assess ADL'S  3. SAH-as per neuro- may resume coumadin given that SAH improving.   4. Will await for PT/OT eval to determine dispo.

## 2020-09-22 NOTE — CHART NOTE - NSCHARTNOTEFT_GEN_A_CORE
Reviewed prior progress notes, labs and imaging.    To be discussed with Dr. Carroll    Primary Diagnosis: Syncope fall with ICH, coumadin coagulopathy s/p vit k and kcentra, platelet dysfunction,  MORELIA    89 female with PMH HTN, a-fib on coumadin, PPM, CHF (EF 25-30%), recent admission for NSTEMI now presenting s/p mechanical fall found to have bilateral frontal SAH and scalp hematoma, also with supratherapeutic inr 3.9, hyperkalemia 6.2, MORELIA.     #B/L small traumatic ICH  #Scalp hematoma  - Neuro following - repeat head CT 9/20 unchanged, cleared to resume warfarin as of yesterday - was not restarted, will f/u with Dr Carroll   - continue keppra for 2 weeks (started 9/18)    # HTN  - This morning , goal is to keep sbp<160  - Will give 5mg hydralazine IVP stat  - Home meds include labetatalol, spironolactone, lisinopril, furosemide -- currently none of these meds ordered  - Will restart labetalol, lisinopril for now      # Afib on coumadin  - Continue labetalol  - Resume coumadin??     # MORELIA  # Hyperkalemia  - resolved       # Chronic Systolic CHF   # PPM  # Recent NSTEMI  - trops negative, no signs of ACS  - appears euvolemic

## 2020-09-22 NOTE — PROGRESS NOTE ADULT - SUBJECTIVE AND OBJECTIVE BOX
Progress: pt more awake, alert, oriented, and conversing. Sitting up eating     Present Symptoms:   Dyspnea: no  Nausea/Vomiting:   Anxiety:    Depressed Mood:   Fatigue:   Loss of appetite: no  Pain:       no            location:   Review of Systems: [All others negative   MEDICATIONS  (STANDING):  brinzolamide 1% Ophthalmic Suspension 1 Drop(s) Left EYE two times a day  dorzolamide 2% Ophthalmic Solution 1 Drop(s) Left EYE three times a day  influenza   Vaccine 0.5 milliLiter(s) IntraMuscular once  levETIRAcetam 250 milliGRAM(s) Oral every 12 hours  montelukast 10 milliGRAM(s) Oral at bedtime  multivitamin 1 Tablet(s) Oral daily    MEDICATIONS  (PRN):  acetaminophen   Tablet .. 650 milliGRAM(s) Oral every 6 hours PRN Temp greater or equal to 38C (100.4F), Mild Pain (1 - 3)  ondansetron Injectable 4 milliGRAM(s) IV Push every 6 hours PRN Nausea and/or Vomiting      PHYSICAL EXAM:  Vital Signs Last 24 Hrs  T(C): 36.4 (21 Sep 2020 09:16), Max: 36.9 (20 Sep 2020 21:47)  T(F): 97.6 (21 Sep 2020 09:16), Max: 98.4 (20 Sep 2020 21:47)  HR: 60 (21 Sep 2020 09:16) (60 - 79)  BP: 129/49 (21 Sep 2020 09:16) (99/53 - 162/70)  BP(mean): --  RR: 18 (21 Sep 2020 09:16) (16 - 23)  SpO2: 98% (21 Sep 2020 09:16) (98% - 100%)  General: alert  oriented, conversing       HEENT: n/c, healing ecchymosis R forehead     Lungs: clear, dim to bases    CV: normal rate    GI: normal , soft , n/t    : normal    Musculoskeletal: weakened R hand    Skin: w/d, pale color     Neuro: alert oriented    Oral intake ability:  oral feeding  Diet: reg as adriana      LABS:                          12.3   7.18  )-----------( 158      ( 20 Sep 2020 06:00 )             36.9     09-21    136  |  104  |  24<H>  ----------------------------<  96  3.8   |  25  |  0.89    Ca    10.4      21 Sep 2020 07:00  Phos  2.1     09-20  Mg     1.9     09-20          RADIOLOGY & ADDITIONAL STUDIES:< from: CT Head No Cont (09.20.20 @ 08:47) >  EXAM:  CT BRAIN      PROCEDURE DATE:  09/20/2020        INTERPRETATION:  Exam Date: 9/20/2020 8:47 AM    CT head without IV contrast    CLINICAL INFORMATION: syncope    TECHNIQUE: Contiguous axial 5 mm sections were obtained through the head.    COMPARISON: CT head 9/18/2020    FINDINGS:  Small amount of acute subarachnoid hemorrhage in the bifrontal lobes, small intraparenchymal hemorrhage in the white matter posterior to the right occipital horn and tiny bilateral whole hemispheric subdural hematomas are essentially unchanged. The ventricles and sulci are prominent, compatible with age-related generalized cerebral volume loss.   There is no CT evidence for acute cerebral cortical infarct. There is periventricular and subcortical white matter hypoattenuation,  most compatible with chronic microvascular ischemic changes.   No mass effect is found in the brain.  There is no midline shift or herniation pattern.      The visualized portions of the paranasal sinuses and mastoid air cells are clear.    IMPRESSION:    Small amount of acute subarachnoid hemorrhage in the bifrontal lobes, small intraparenchymal hemorrhage in the white matter posterior to the right occipital horn and tiny bilateral whole hemispheric subdural hematomas are essentially unchanged.        < from: US Duplex Venous Upper Ext Ltd, Right (09.20.20 @ 20:44) >    EXAM:  US DPLX UPR EXT VEINS LTD RT      PROCEDURE DATE:  09/20/2020        INTERPRETATION:  CLINICAL INDICATION: Right upper extremity swelling.    TECHNIQUE: Grayscale, color Doppler and spectral Doppler ultrasound was utilized to evaluate the right upper extremity deep venous system.    COMPARISON: 4/5/2019.    FINDINGS: There is no thrombosis in the right internal jugular vein, subclavian vein, axillary vein or brachial vein.    Visualized right basilic vein is patent without thrombosis. The right cephalic vein was not well visualized, limiting evaluation.    IMPRESSION:    No deep vein thrombosis in the right upper extremity.  The right cephalic vein was not well visualized, limiting evaluation.          ADVANCE DIRECTIVES:  HCP  living will, MOLST DNR/DNI   Advanced Care Planning discussion total time spent:
89 yr-old woman with a-fib on warfarin, admitted 3 days ago with second syncopal episode this month. Dr. Howell's notes reviewed. Repeat CT head shows resolution of small traumatic SAH.    OE: Alert and oriented. Right frontal ecchymosis. No focal sensorimotor deficits.        
Addendum to H&P/ICU Consult note  - Patient seen and examined today    ICU CONSULT  Location of Patient : [facility] [id number] ( [facility])  Attending requesting Consult: ED MD  Chief Complaint : Fall  Reason For consult : ICH      Initial HPI on admission:  HPI:  89 year old female with h/o HTN, afib on coumadin and ASA, PPM with Systolic CHF, recent NSTEMI who p/w non wittnessed mechanical fall with head trauma.  found to have SAH and scalp hematoma on ct.   brought to ICU for closer monitoring  Received kcentra, vit k for coagulopathy.  Patient does not recall events of fall  possible underlying mild dementia could be present as DTR states patient is confused at times.    BRIEF HOSPITAL COURSE:   This am had repeat CT head where noted increasing SAH   d/w DTR (see below) no desire for invasive procedure.     patient feels well and appears to be in baseline  no gross focal or neurological deficit noted.      PAST MEDICAL & SURGICAL HISTORY:  Pacemaker    Transient cerebral ischemia, unspecified transient cerebral ischemia type    HTN (hypertension), benign    Atrial fibrillation    Acid reflux disease    History of cataract surgery      Allergies    Keflex (Diarrhea)  Norvasc (Unknown)    Intolerances      FAMILY HISTORY:  No pertinent family history in first degree relatives      Social history:       Smoking: never     Drinking: denies     Drug use: denies    Review of Systems: as stated above Limited     CONSTITUTIONAL: No fever, No chills, No fatigue   EYES: No eye pain, No visual disturbances,   NECK: No pain, No stiffness  RESPIRATORY: No Cough, No SOB, No Secretions  CARDIOVASCULAR: No chest pain, No palpitations, No dizziness, or No leg swelling  GASTROINTESTINAL: No abdominal or epigastric pain. No nausea, No vomiting, No hematemesis; No diarrhea,  GENITOURINARY: No dysuria, No frequency, No hematuria, No incontinence  NEUROLOGICAL: +headaches, No memory loss, No loss of strength, No numbness, No tremors      Medications:  MEDICATIONS  (STANDING):  dorzolamide 2% Ophthalmic Solution 1 Drop(s) Left EYE three times a day  influenza   Vaccine 0.5 milliLiter(s) IntraMuscular once  labetalol 100 milliGRAM(s) Oral every 12 hours  lactated ringers. 1000 milliLiter(s) (50 mL/Hr) IV Continuous <Continuous>  montelukast 10 milliGRAM(s) Oral at bedtime  multivitamin 1 Tablet(s) Oral daily  spironolactone 25 milliGRAM(s) Oral daily    MEDICATIONS  (PRN):      Home Medications:  Last Order Reconciliation Date: 09-18-20 (Admission Reconciliation)  aspirin 81 mg oral delayed release tablet: 1 tab(s) orally once a day (08-30-20)  Azopt 1% ophthalmic suspension: 1 drop(s) in the left eye 2 times a day (08-30-20)  Combigan 0.2%-0.5% ophthalmic solution: 1 drop(s) to each affected eye every 12 hours (08-30-20)  Coumadin 2.5 mg oral tablet: 3 tab(s) orally once a day  for one day then 2 tabs daily  HAVE REPEAT INR 9/5/2020 (09-03-20)  dorzolamide 2% ophthalmic solution: 1 drop(s) to each affected eye  (09-03-20)  furosemide 40 mg oral tablet: 1 tab(s) orally 2 times a day (08-30-20)  labetalol 100 mg oral tablet: 1 tab(s) orally every 12 hours (08-30-20)  lisinopril 10 mg oral tablet: 1 tab(s) orally once a day -for htn (09-03-20)  mesalamine 400 mg oral delayed release capsule: 2 cap(s) orally 2 times a day (08-30-20)  montelukast 10 mg oral tablet: 1 tab(s) orally once a day (at bedtime) (08-30-20)  Multiple Vitamins oral tablet: 1 tab(s) orally once a day (08-30-20)  potassium chloride 10 mEq oral tablet, extended release: 1 tab(s) orally once a day (08-30-20)  spironolactone 25 mg oral tablet: 1 tab(s) orally once a day (09-03-20)        LABS:                        12.9   9.39  )-----------( 138      ( 18 Sep 2020 04:45 )             39.1     09-18    137  |  103  |  51<H>  ----------------------------<  78  4.8   |  27  |  1.81<H>    Ca    10.5      18 Sep 2020 04:00  Phos  2.7     09-18  Mg     2.1     09-18    TPro  6.1  /  Alb  3.4  /  TBili  0.6  /  DBili  x   /  AST  23  /  ALT  26  /  AlkPhos  94  09-18      CARDIAC MARKERS ( 18 Sep 2020 04:45 )  .027 ng/mL / x     / x     / x     / x      CARDIAC MARKERS ( 17 Sep 2020 23:45 )  <.017 ng/mL / x     / x     / x     / x        Trend Bun/Cr  09-18-20 @ 04:00  BUN/CR -  51<H> / 1.81<H>  09-18-20 @ 00:30  BUN/CR -  59<H> / 2.04<H>  09-17-20 @ 23:45  BUN/CR -  57<H> / 1.94<H>  09-03-20 @ 06:00  BUN/CR -  18 / 1.10  09-02-20 @ 06:00  BUN/CR -  18 / 0.94  09-01-20 @ 06:00  BUN/CR -  24<H> / 1.31<H>  08-31-20 @ 06:22  BUN/CR -  22 / 1.21  08-30-20 @ 03:24  BUN/CR -  18 / 1.26  12-12-18 @ 06:50  BUN/CR -  40<H> / 1.06  12-11-18 @ 07:00  BUN/CR -  39<H> / 1.18  12-10-18 @ 07:00  BUN/CR -  34<H> / 1.24  12-08-18 @ 06:22  BUN/CR -  36<H> / 1.08      PT/INR - ( 18 Sep 2020 01:51 )   PT: 15.3 sec;   INR: 1.28 ratio         PTT - ( 17 Sep 2020 23:45 )  PTT:28.4 sec        RADIOLOGY  CXR:      CT:  < from: CT Head No Cont (09.01.20 @ 12:00) >  Impression:  1. Unremarkable noncontrast CT scan of the brain.    < end of copied text >    < from: CT Head No Cont (09.17.20 @ 23:59) >    IMPRESSION:  New tiny right frontal scalp hematoma. Small amount of bilateral frontal subarachnoid hemorrhage. No midline shift.      < end of copied text >    < from: CT Head No Cont (09.18.20 @ 06:23) >    Impression: New focus of subarachnoid hemorrhage. Interval development of intraventricular hemorrhage and subdural hemorrhage bilaterally as described.    < end of copied text >      ECHO:    < from: TTE Echo Complete w/o Contrast w/ Doppler (08.30.20 @ 08:55) >    Summary:   1. Left ventricular ejection fraction, by visual estimation, is 25 to 30%.   2. Moderately decreased global left ventricular systolic function.   3. Severely enlarged right atrium.   4. Multiple left ventricular regional wall motion abnormalities exist. See wall motion findings.   5. Spectral Doppler shows impaired relaxation pattern of left ventricular myocardial filling (Grade I diastolic dysfunction).   6. There is severe concentric left ventricular hypertrophy.   7. Biatrial enlargement consistent with restrictive physiology.   8. Moderately enlarged left atrium.   9. Mild thickening and calcification of the anterior and posterior mitral valveleaflets.  10. Moderate mitral valve regurgitation.  11. Moderate-severe tricuspid regurgitation.  12. Moderate aortic regurgitation.  13. Sclerotic aortic valve with normal opening.  14. Moderate pulmonic valve regurgitation.  15. Estimated pulmonary artery systolic pressure is 59.6 mmHg assuming a right atrial pressure of 10 mmHg, which is consistent with moderate pulmonary hypertension.  16. The main pulmonary artery is normal in size.  17. LA volume Index is 86.3 ml/m² ml/m2.    < end of copied text >    VITALS:  T(C): 37 (09-18-20 @ 04:20), Max: 37 (09-18-20 @ 04:20)  T(F): 98.6 (09-18-20 @ 04:20), Max: 98.6 (09-18-20 @ 04:20)  HR: 84 (09-18-20 @ 08:41) (73 - 99)  BP: 159/69 (09-18-20 @ 08:41) (107/78 - 185/80)  BP(mean): 96 (09-18-20 @ 08:41) (87 - 116)  ABP: --  ABP(mean): --  RR: 16 (09-18-20 @ 08:41) (11 - 20)  SpO2: 98% (09-18-20 @ 08:00) (97% - 99%)  CVP(mm Hg): --  CVP(cm H2O): --    Ins and Outs     09-17-20 @ 07:01  -  09-18-20 @ 07:00  --------------------------------------------------------  IN: 150 mL / OUT: 0 mL / NET: 150 mL        Height (cm): 157.5 (09-18-20 @ 07:43)  Weight (kg): 48.2 (09-18-20 @ 07:43)  BMI (kg/m2): 19.4 (09-18-20 @ 07:43)        I&O's Detail    17 Sep 2020 07:01  -  18 Sep 2020 07:00  --------------------------------------------------------  IN:    Lactated Ringers: 150 mL  Total IN: 150 mL    OUT:  Total OUT: 0 mL    Total NET: 150 mL  
Follow-up Critical Care Progress Note  Chief Complaint : Nontraumatic subarachnoid hemorrhage    Awake and alert. Not in distress. Denies any headache or chest pain.     Allergies :Keflex (Diarrhea)  Norvasc (Unknown)    PAST MEDICAL & SURGICAL HISTORY:  Pacemaker    Transient cerebral ischemia, unspecified transient cerebral ischemia type    HTN (hypertension), benign    Atrial fibrillation    Acid reflux disease    History of cataract surgery    Medications:  MEDICATIONS  (STANDING):  dorzolamide 2% Ophthalmic Solution 1 Drop(s) Left EYE three times a day  influenza   Vaccine 0.5 milliLiter(s) IntraMuscular once  labetalol 100 milliGRAM(s) Oral two times a day  labetalol Infusion 1.25 mG/Min (75 mL/Hr) IV Continuous <Continuous>  levETIRAcetam  IVPB 250 milliGRAM(s) IV Intermittent every 12 hours  magnesium sulfate  IVPB 1 Gram(s) IV Intermittent once  montelukast 10 milliGRAM(s) Oral at bedtime  multivitamin 1 Tablet(s) Oral daily  sodium chloride 0.9%. 1000 milliLiter(s) (50 mL/Hr) IV Continuous <Continuous>    MEDICATIONS  (PRN):  ondansetron Injectable 4 milliGRAM(s) IV Push every 6 hours PRN Nausea and/or Vomiting    LABS:                      12.0   8.28  )-----------( 164      ( 19 Sep 2020 05:00 )             37.2       136  |  103  |  28<H>  ----------------------------<  99  4.2   |  24  |  1.39<H>    Ca    9.9      19 Sep 2020 05:00  Phos  3.4       Mg     1.7         TPro  6.1  /  Alb  3.4  /  TBili  0.6  /  DBili  x   /  AST  23  /  ALT  26  /  AlkPhos  94  18    CARDIAC MARKERS ( 19 Sep 2020 05:00 )  .022 ng/mL / x     / 83 U/L / x     / x      CARDIAC MARKERS ( 18 Sep 2020 04:45 )  .027 ng/mL / x     / x     / x     / x      CARDIAC MARKERS ( 17 Sep 2020 23:45 )  <.017 ng/mL / x     / x     / x     / x        PT/INR - ( 18 Sep 2020 01:51 )   PT: 15.3 sec;   INR: 1.28 ratio       PTT - ( 17 Sep 2020 23:45 )  PTT:28.4 sec  Urinalysis Basic - ( 18 Sep 2020 10:52 )    Color: Yellow / Appearance: Clear / S.010 / pH: x  Gluc: x / Ketone: Negative  / Bili: Negative / Urobili: Negative   Blood: x / Protein: 15 / Nitrite: Negative   Leuk Esterase: Negative / RBC: 0-4 /HPF / WBC Negative /HPF   Sq Epi: x / Non Sq Epi: Neg.-Few / Bacteria: Negative /HPF    POCT Blood Glucose.: 145 mg/dL (18 Sep 2020 06:52)    VITALS:  T(C): 36.6 (20 @ 08:00), Max: 37.1 (20 @ 14:00)  T(F): 97.9 (20 @ 08:00), Max: 98.8 (20 @ 14:00)  HR: 71 (20:00) (69 - 123)  BP: 131/64 (20 @ 09:00) (96/56 - 204/99)  BP(mean): 84 (20 @ 09:00) (49 - 141)  ABP: --  ABP(mean): --  RR: 21 (20 @ 09:00) (12 - 25)  SpO2: 95% (20 @ 09:00) (91% - 98%)  CVP(mm Hg): --  CVP(cm H2O): --    Ins and Outs     20 @ 07:01  -  20 @ 07:00  --------------------------------------------------------  IN: 2606 mL / OUT: 950 mL / NET: 1656 mL    20 @ 07:01  -  20 @ 09:39  --------------------------------------------------------  IN: 110 mL / OUT: 0 mL / NET: 110 mL    Height (cm): 157.5 (20 @ 08:22)  Weight (kg): 48.2 (20 @ 08:22)  BMI (kg/m2): 19.4 (20 @ 08:22)    I&O's Detail    18 Sep 2020 07:01  -  19 Sep 2020 07:00  --------------------------------------------------------  IN:    IV PiggyBack: 100 mL    Labetalol: 660 mL    Labetalol: 135 mL    Labetalol: 165 mL    Labetalol: 30 mL    Lactated Ringers: 400 mL    Oral Fluid: 200 mL    Platelets - Single Donor: 216 mL    sodium chloride 0.9%: 700 mL  Total IN: 2606 mL    OUT:    Voided (mL): 950 mL  Total OUT: 950 mL    Total NET: 1656 mL    19 Sep 2020 07:01  -  19 Sep 2020 09:39  --------------------------------------------------------  IN:    Labetalol: 60 mL    sodium chloride 0.9%: 50 mL  Total IN: 110 mL    OUT:  Total OUT: 0 mL    Total NET: 110 mL          
Follow-up Critical Care Progress Note  Chief Complaint : Nontraumatic subarachnoid hemorrhage    Awake and alert. Not in distress. No focal deficits noted.     Allergies :Keflex (Diarrhea)  Norvasc (Unknown)      PAST MEDICAL & SURGICAL HISTORY:  Pacemaker    Transient cerebral ischemia, unspecified transient cerebral ischemia type    HTN (hypertension), benign    Atrial fibrillation    Acid reflux disease    History of cataract surgery        Medications:  MEDICATIONS  (STANDING):  brinzolamide 1% Ophthalmic Suspension 1 Drop(s) Left EYE two times a day  dorzolamide 2% Ophthalmic Solution 1 Drop(s) Left EYE three times a day  influenza   Vaccine 0.5 milliLiter(s) IntraMuscular once  labetalol 100 milliGRAM(s) Oral two times a day  levETIRAcetam 250 milliGRAM(s) Oral every 12 hours  montelukast 10 milliGRAM(s) Oral at bedtime  multivitamin 1 Tablet(s) Oral daily    MEDICATIONS  (PRN):  ondansetron Injectable 4 milliGRAM(s) IV Push every 6 hours PRN Nausea and/or Vomiting      LABS:                        12.3   7.18  )-----------( 158      ( 20 Sep 2020 06:00 )             36.9     09-20    135  |  104  |  21  ----------------------------<  104<H>  3.9   |  24  |  1.23    Ca    9.9      20 Sep 2020 06:00  Phos  2.1     09-20  Mg     1.9     09-20        CARDIAC MARKERS ( 19 Sep 2020 05:00 )  .022 ng/mL / x     / 83 U/L / x     / x        PT/INR - ( 20 Sep 2020 06:00 )   PT: 16.4 sec;   INR: 1.38 ratio      Urinalysis Basic - ( 18 Sep 2020 10:52 )    Color: Yellow / Appearance: Clear / S.010 / pH: x  Gluc: x / Ketone: Negative  / Bili: Negative / Urobili: Negative   Blood: x / Protein: 15 / Nitrite: Negative   Leuk Esterase: Negative / RBC: 0-4 /HPF / WBC Negative /HPF   Sq Epi: x / Non Sq Epi: Neg.-Few / Bacteria: Negative /HPF    VITALS:  T(C): 37.1 (20 @ 05:43), Max: 37.2 (20 @ 00:00)  T(F): 98.7 (20 @ 05:43), Max: 98.9 (20 @ 00:00)  HR: 77 (20 05:43) (69 - 102)  BP: 124/74 (20 05:43) (109/50 - 155/97)  BP(mean): 88 (20 @ 16:00) (76 - 113)  ABP: --  ABP(mean): --  RR: 16 (20 05:43) (13 - 17)  SpO2: 99% (20 05:43) (94% - 99%)  CVP(mm Hg): --  CVP(cm H2O): --    Ins and Outs     20 @ 07:01  -  20 @ 07:00  --------------------------------------------------------  IN: 1170 mL / OUT: 500 mL / NET: 670 mL        Height (cm): 157.5 (20 @ 08:34)  Weight (kg): 48.2 (20 @ 08:34)  BMI (kg/m2): 19.4 (20 @ 08:34)        I&O's Detail    19 Sep 2020 07:01  -  20 Sep 2020 07:00  --------------------------------------------------------  IN:    IV PiggyBack: 100 mL    Labetalol: 180 mL    Oral Fluid: 440 mL    sodium chloride 0.9%: 450 mL  Total IN: 1170 mL    OUT:    Voided (mL): 500 mL  Total OUT: 500 mL    Total NET: 670 mL          
Follow-up Pulmonary Progress Note  Chief Complaint : Nontraumatic subarachnoid hemorrhage      Confused  comfortable        Allergies :Keflex (Diarrhea)  Norvasc (Unknown)      PAST MEDICAL & SURGICAL HISTORY:  Pacemaker    Transient cerebral ischemia, unspecified transient cerebral ischemia type    HTN (hypertension), benign    Atrial fibrillation    Acid reflux disease    History of cataract surgery        Medications:  MEDICATIONS  (STANDING):  brinzolamide 1% Ophthalmic Suspension 1 Drop(s) Left EYE two times a day  dorzolamide 2% Ophthalmic Solution 1 Drop(s) Left EYE three times a day  influenza   Vaccine 0.5 milliLiter(s) IntraMuscular once  levETIRAcetam 250 milliGRAM(s) Oral every 12 hours  montelukast 10 milliGRAM(s) Oral at bedtime  multivitamin 1 Tablet(s) Oral daily    MEDICATIONS  (PRN):  acetaminophen   Tablet .. 650 milliGRAM(s) Oral every 6 hours PRN Temp greater or equal to 38C (100.4F), Mild Pain (1 - 3)  ondansetron Injectable 4 milliGRAM(s) IV Push every 6 hours PRN Nausea and/or Vomiting      LABS:                        12.3   7.18  )-----------( 158      ( 20 Sep 2020 06:00 )             36.9     09-21    136  |  104  |  24<H>  ----------------------------<  96  3.8   |  25  |  0.89    Ca    10.4      21 Sep 2020 07:00  Phos  2.1     09-20  Mg     1.9     09-20    < from: CT Head No Cont (09.20.20 @ 08:47) >  IMPRESSION:    Small amount of acute subarachnoid hemorrhage in the bifrontal lobes, small intraparenchymal hemorrhage in the white matter posterior to the right occipital horn and tiny bilateral whole hemispheric subdural hematomas are essentially unchanged.      < end of copied text >            PT/INR - ( 20 Sep 2020 06:00 )   PT: 16.4 sec;   INR: 1.38 ratio      VITALS:  T(C): 36.4 (09-21-20 @ 09:16), Max: 36.9 (09-20-20 @ 21:47)  T(F): 97.6 (09-21-20 @ 09:16), Max: 98.4 (09-20-20 @ 21:47)  HR: 60 (09-21-20 @ 09:16) (60 - 79)  BP: 129/49 (09-21-20 @ 09:16) (99/53 - 162/70)  BP(mean): --  ABP: --  ABP(mean): --  RR: 18 (09-21-20 @ 09:16) (16 - 23)  SpO2: 98% (09-21-20 @ 09:16) (98% - 100%)  CVP(mm Hg): --  CVP(cm H2O): --    Ins and Outs     09-20-20 @ 07:01  -  09-21-20 @ 07:00  --------------------------------------------------------  IN: 420 mL / OUT: 100 mL / NET: 320 mL    09-21-20 @ 07:01  -  09-21-20 @ 14:54  --------------------------------------------------------  IN: 120 mL / OUT: 0 mL / NET: 120 mL        Height (cm): 157.5 (09-21-20 @ 14:24)  Weight (kg): 48.2 (09-21-20 @ 14:24)  BMI (kg/m2): 19.4 (09-21-20 @ 14:24)        I&O's Detail    20 Sep 2020 07:01  -  21 Sep 2020 07:00  --------------------------------------------------------  IN:    Oral Fluid: 420 mL  Total IN: 420 mL    OUT:    Voided (mL): 100 mL  Total OUT: 100 mL    Total NET: 320 mL      21 Sep 2020 07:01  -  21 Sep 2020 14:54  --------------------------------------------------------  IN:    Oral Fluid: 120 mL  Total IN: 120 mL    OUT:  Total OUT: 0 mL    Total NET: 120 mL      
Patient is a 89y old  Female who presents with a chief complaint of SAH/hyperkalemia/supratherputic INR (18 Sep 2020 01:32)      INTERVAL HPI/OVERNIGHT EVENTS: alert without complaints1      REVIEW OF SYSTEMS:  CONSTITUTIONAL: No fever, weight loss, or fatigue  EYES: No eye pain, visual disturbances, or discharge  ENMT:  No difficulty hearing, tinnitus, vertigo; No sinus or throat pain  NECK: No pain or stiffness  BREASTS: No pain, masses, or nipple discharge  RESPIRATORY: No cough, wheezing, chills or hemoptysis; No shortness of breath  CARDIOVASCULAR: No chest pain, palpitations, dizziness, or leg swelling  GASTROINTESTINAL: No abdominal or epigastric pain. No nausea, vomiting, or hematemesis; No diarrhea or constipation. No melena or hematochezia.  GENITOURINARY: No dysuria, frequency, hematuria, or incontinence  NEUROLOGICAL: No headaches, memory loss, loss of strength, numbness, or tremors  SKIN: No itching, burning, rashes, or lesions   LYMPH NODES: No enlarged glands  ENDOCRINE: No heat or cold intolerance; No hair loss  MUSCULOSKELETAL: No joint pain or swelling; No muscle, back, or extremity pain  PSYCHIATRIC: No depression, anxiety, mood swings, or difficulty sleeping  HEME/LYMPH: No easy bruising, or bleeding gums  ALLERY AND IMMUNOLOGIC: No hives or eczema  FAMILY HISTORY:  No pertinent family history in first degree relatives      T(C): 37 (09-18-20 @ 04:20), Max: 37 (09-18-20 @ 04:20)  HR: 99 (09-18-20 @ 05:00) (75 - 99)  BP: 155/73 (09-18-20 @ 05:00) (144/84 - 185/80)  RR: 13 (09-18-20 @ 05:00) (13 - 18)  SpO2: 98% (09-18-20 @ 05:00) (97% - 99%)  Wt(kg): --Vital Signs Last 24 Hrs  T(C): 37 (18 Sep 2020 04:20), Max: 37 (18 Sep 2020 04:20)  T(F): 98.6 (18 Sep 2020 04:20), Max: 98.6 (18 Sep 2020 04:20)  HR: 99 (18 Sep 2020 05:00) (75 - 99)  BP: 155/73 (18 Sep 2020 05:00) (144/84 - 185/80)  BP(mean): 87 (18 Sep 2020 05:00) (87 - 116)  RR: 13 (18 Sep 2020 05:00) (13 - 18)  SpO2: 98% (18 Sep 2020 05:00) (97% - 99%)    T(F): 98.6 (09-18-20 @ 04:20), Max: 98.6 (09-18-20 @ 04:20)  HR: 99 (09-18-20 @ 05:00) (75 - 99)  BP: 155/73 (09-18-20 @ 05:00) (144/84 - 185/80)  RR: 13 (09-18-20 @ 05:00) (13 - 18)  SpO2: 98% (09-18-20 @ 05:00) (97% - 99%)    PHYSICAL EXAM:  GENERAL: NAD, well-groomed, well-developed  HEAD:  Atraumatic, Normocephalic  EYES: EOMI, PERRLA, conjunctiva and sclera clear  ENMT: No tonsillar erythema, exudates, or enlargement; Moist mucous membranes, Good dentition, No lesions  NECK: Supple, No JVD, Normal thyroid  NERVOUS SYSTEM:  Alert & Oriented X3, Good concentration; Motor Strength 5/5 B/L upper and lower extremities; DTRs 2+ intact and symmetric  CHEST/LUNG: Clear to percussion bilaterally; No rales, rhonchi, wheezing, or rubs  HEART: Regular rate and rhythm; No murmurs, rubs, or gallops  ABDOMEN: Soft, Nontender, Nondistended; Bowel sounds present  EXTREMITIES:  2+ Peripheral Pulses, No clubbing, cyanosis, or edema  LYMPH: No lymphadenopathy noted  SKIN: No rashes or lesions    Consultant(s) Notes Reviewed:  [x ] YES  [ ] NO  Care Discussed with Consultants/Other Providers [ x] YES  [ ] NO    LABS:  09-18    137  |  103  |  51<H>  ----------------------------<  78  4.8   |  27  |  1.81<H>    Ca    10.5      18 Sep 2020 04:00  Phos  2.7     09-18  Mg     2.1     09-18    TPro  6.1  /  Alb  3.4  /  TBili  0.6  /  DBili  x   /  AST  23  /  ALT  26  /  AlkPhos  94  09-18                          12.9   9.39  )-----------( 138      ( 18 Sep 2020 04:45 )             39.1         PT/INR - ( 18 Sep 2020 01:51 )   PT: 15.3 sec;   INR: 1.28 ratio         PTT - ( 17 Sep 2020 23:45 )  PTT:28.4 sec  LIVER FUNCTIONS - ( 18 Sep 2020 04:00 )  Alb: 3.4 g/dL / Pro: 6.1 g/dL / ALK PHOS: 94 U/L / ALT: 26 U/L / AST: 23 U/L / GGT: x           CARDIAC MARKERS ( 18 Sep 2020 04:45 )  .027 ng/mL / x     / x     / x     / x      CARDIAC MARKERS ( 17 Sep 2020 23:45 )  <.017 ng/mL / x     / x     / x     / x                       12.9   9.39  )-----------( 138      ( 09-18 @ 04:45 )             39.1                13.3   6.51  )-----------( 147      ( 09-17 @ 23:45 )             40.6                14.2   8.13  )-----------( 150      ( 09-01 @ 06:00 )             44.7                13.3   6.37  )-----------( 128      ( 08-31 @ 06:22 )             42.4                15.5   8.96  )-----------( 185      ( 08-30 @ 03:24 )             47.9               RADIOLOGY & ADDITIONAL TESTS:    Imaging Personally Reviewed:  [ ] YES  [ ] NO  influenza   Vaccine 0.5 milliLiter(s) IntraMuscular once  lactated ringers. 1000 milliLiter(s) IV Continuous <Continuous>      HEALTH ISSUES - PROBLEM Dx:        
Patient is a 89y old  Female who presents with a chief complaint of SAH/hyperkalemia/supratherputic INR (19 Sep 2020 06:47)      INTERVAL HPI/OVERNIGHT EVENTS:  alert       REVIEW OF SYSTEMS:  CONSTITUTIONAL: No fever, weight loss, or fatigue  EYES: No eye pain, visual disturbances, or discharge  ENMT:  No difficulty hearing, tinnitus, vertigo; No sinus or throat pain  NECK: No pain or stiffness  BREASTS: No pain, masses, or nipple discharge  RESPIRATORY: No cough, wheezing, chills or hemoptysis; No shortness of breath  CARDIOVASCULAR: No chest pain, palpitations, dizziness, or leg swelling  GASTROINTESTINAL: No abdominal or epigastric pain. No nausea, vomiting, or hematemesis; No diarrhea or constipation. No melena or hematochezia.  GENITOURINARY: No dysuria, frequency, hematuria, or incontinence  NEUROLOGICAL: No headaches, memory loss, loss of strength, numbness, or tremors  SKIN: No itching, burning, rashes, or lesions   LYMPH NODES: No enlarged glands  ENDOCRINE: No heat or cold intolerance; No hair loss  MUSCULOSKELETAL: No joint pain or swelling; No muscle, back, or extremity pain  PSYCHIATRIC: No depression, anxiety, mood swings, or difficulty sleeping  HEME/LYMPH: No easy bruising, or bleeding gums  ALLERY AND IMMUNOLOGIC: No hives or eczema  FAMILY HISTORY:  No pertinent family history in first degree relatives      T(C): 36.9 (09-19-20 @ 05:00), Max: 37.1 (09-18-20 @ 14:00)  HR: 77 (09-19-20 @ 06:00) (69 - 123)  BP: 119/46 (09-19-20 @ 06:00) (96/56 - 204/99)  RR: 16 (09-19-20 @ 06:00) (12 - 25)  SpO2: 91% (09-19-20 @ 06:00) (91% - 99%)  Wt(kg): --Vital Signs Last 24 Hrs  T(C): 36.9 (19 Sep 2020 05:00), Max: 37.1 (18 Sep 2020 14:00)  T(F): 98.5 (19 Sep 2020 05:00), Max: 98.8 (18 Sep 2020 14:00)  HR: 77 (19 Sep 2020 06:00) (69 - 123)  BP: 119/46 (19 Sep 2020 06:00) (96/56 - 204/99)  BP(mean): 65 (19 Sep 2020 06:00) (49 - 141)  RR: 16 (19 Sep 2020 06:00) (12 - 25)  SpO2: 91% (19 Sep 2020 06:00) (91% - 99%)    T(F): 98.5 (09-19-20 @ 05:00), Max: 98.8 (09-18-20 @ 14:00)  HR: 77 (09-19-20 @ 06:00) (69 - 123)  BP: 119/46 (09-19-20 @ 06:00) (96/56 - 204/99)  RR: 16 (09-19-20 @ 06:00) (11 - 25)  SpO2: 91% (09-19-20 @ 06:00) (91% - 99%)    PHYSICAL EXAM:  GENERAL: NAD, well-groomed, well-developed  HEAD:  Atraumatic, Normocephalic  EYES: EOMI, PERRLA, conjunctiva and sclera clear  ENMT: No tonsillar erythema, exudates, or enlargement; Moist mucous membranes, Good dentition, No lesions  NECK: Supple, No JVD, Normal thyroid  NERVOUS SYSTEM:  Alert & Oriented X3, Good concentration; Motor Strength 5/5 B/L upper and lower extremities; DTRs 2+ intact and symmetric  CHEST/LUNG: Clear to percussion bilaterally; No rales, rhonchi, wheezing, or rubs  HEART: Regular rate and rhythm; No murmurs, rubs, or gallops  ABDOMEN: Soft, Nontender, Nondistended; Bowel sounds present  EXTREMITIES:  2+ Peripheral Pulses, No clubbing, cyanosis, or edema  LYMPH: No lymphadenopathy noted  SKIN: No rashes or lesions    Consultant(s) Notes Reviewed:  [x ] YES  [ ] NO  Care Discussed with Consultants/Other Providers [ x] YES  [ ] NO    LABS:  09-19    136  |  103  |  28<H>  ----------------------------<  99  4.2   |  24  |  1.39<H>    Ca    9.9      19 Sep 2020 05:00  Phos  3.4     09-19  Mg     1.7     09-19    TPro  6.1  /  Alb  3.4  /  TBili  0.6  /  DBili  x   /  AST  23  /  ALT  26  /  AlkPhos  94  09-18                          12.0   8.28  )-----------( 164      ( 19 Sep 2020 05:00 )             37.2         PT/INR - ( 18 Sep 2020 01:51 )   PT: 15.3 sec;   INR: 1.28 ratio         PTT - ( 17 Sep 2020 23:45 )  PTT:28.4 sec  LIVER FUNCTIONS - ( 18 Sep 2020 04:00 )  Alb: 3.4 g/dL / Pro: 6.1 g/dL / ALK PHOS: 94 U/L / ALT: 26 U/L / AST: 23 U/L / GGT: x           CARDIAC MARKERS ( 19 Sep 2020 05:00 )  .022 ng/mL / x     / 83 U/L / x     / x      CARDIAC MARKERS ( 18 Sep 2020 04:45 )  .027 ng/mL / x     / x     / x     / x      CARDIAC MARKERS ( 17 Sep 2020 23:45 )  <.017 ng/mL / x     / x     / x     / x                       12.0   8.28  )-----------( 164      ( 09-19 @ 05:00 )             37.2                12.9   9.39  )-----------( 138      ( 09-18 @ 04:45 )             39.1                13.3   6.51  )-----------( 147      ( 09-17 @ 23:45 )             40.6                14.2   8.13  )-----------( 150      ( 09-01 @ 06:00 )             44.7                13.3   6.37  )-----------( 128      ( 08-31 @ 06:22 )             42.4                15.5   8.96  )-----------( 185      ( 08-30 @ 03:24 )             47.9               RADIOLOGY & ADDITIONAL TESTS:    Imaging Personally Reviewed:  [ ] YES  [ ] NO  dorzolamide 2% Ophthalmic Solution 1 Drop(s) Left EYE three times a day  influenza   Vaccine 0.5 milliLiter(s) IntraMuscular once  labetalol Infusion 1.25 mG/Min IV Continuous <Continuous>  levETIRAcetam  IVPB 250 milliGRAM(s) IV Intermittent every 12 hours  montelukast 10 milliGRAM(s) Oral at bedtime  multivitamin 1 Tablet(s) Oral daily  ondansetron Injectable 4 milliGRAM(s) IV Push every 6 hours PRN  sodium chloride 0.9%. 1000 milliLiter(s) IV Continuous <Continuous>      HEALTH ISSUES - PROBLEM Dx:  Atrial fibrillation  Atrial fibrillation    Syncope and collapse  Syncope and collapse    Subarachnoid hemorrhage  Subarachnoid hemorrhage          
Patient is a 89y old  Female who presents with a chief complaint of SAH/hyperkalemia/supratherputic INR (20 Sep 2020 18:06)      INTERVAL HPI/OVERNIGHT EVENTS:  alert      REVIEW OF SYSTEMS:  CONSTITUTIONAL: No fever, weight loss, or fatigue  EYES: No eye pain, visual disturbances, or discharge  ENMT:  No difficulty hearing, tinnitus, vertigo; No sinus or throat pain  NECK: No pain or stiffness  BREASTS: No pain, masses, or nipple discharge  RESPIRATORY: No cough, wheezing, chills or hemoptysis; No shortness of breath  CARDIOVASCULAR: No chest pain, palpitations, dizziness, or leg swelling  GASTROINTESTINAL: No abdominal or epigastric pain. No nausea, vomiting, or hematemesis; No diarrhea or constipation. No melena or hematochezia.  GENITOURINARY: No dysuria, frequency, hematuria, or incontinence  NEUROLOGICAL: No headaches, memory loss, loss of strength, numbness, or tremors  SKIN: No itching, burning, rashes, or lesions   LYMPH NODES: No enlarged glands  ENDOCRINE: No heat or cold intolerance; No hair loss  MUSCULOSKELETAL: No joint pain or swelling; No muscle, back, or extremity pain  PSYCHIATRIC: No depression, anxiety, mood swings, or difficulty sleeping  HEME/LYMPH: No easy bruising, or bleeding gums  ALLERY AND IMMUNOLOGIC: No hives or eczema  FAMILY HISTORY:  No pertinent family history in first degree relatives      T(C): 36.9 (09-21-20 @ 05:37), Max: 36.9 (09-20-20 @ 21:47)  HR: 74 (09-21-20 @ 05:37) (72 - 79)  BP: 162/70 (09-21-20 @ 05:37) (99/53 - 162/70)  RR: 18 (09-21-20 @ 05:37) (16 - 23)  SpO2: 100% (09-21-20 @ 05:37) (98% - 100%)  Wt(kg): --Vital Signs Last 24 Hrs  T(C): 36.9 (21 Sep 2020 05:37), Max: 36.9 (20 Sep 2020 21:47)  T(F): 98.4 (21 Sep 2020 05:37), Max: 98.4 (20 Sep 2020 21:47)  HR: 74 (21 Sep 2020 05:37) (72 - 79)  BP: 162/70 (21 Sep 2020 05:37) (99/53 - 162/70)  BP(mean): --  RR: 18 (21 Sep 2020 05:37) (16 - 23)  SpO2: 100% (21 Sep 2020 05:37) (98% - 100%)    T(F): 98.4 (09-21-20 @ 05:37), Max: 98.9 (09-20-20 @ 00:00)  HR: 74 (09-21-20 @ 05:37) (69 - 123)  BP: 162/70 (09-21-20 @ 05:37) (96/56 - 204/99)  RR: 18 (09-21-20 @ 05:37) (12 - 25)  SpO2: 100% (09-21-20 @ 05:37) (91% - 100%)    PHYSICAL EXAM:  GENERAL: NAD, well-groomed, well-developed  HEAD:  Atraumatic, Normocephalic  EYES: EOMI, PERRLA, conjunctiva and sclera clear  ENMT: No tonsillar erythema, exudates, or enlargement; Moist mucous membranes, Good dentition, No lesions  NECK: Supple, No JVD, Normal thyroid  NERVOUS SYSTEM:  Alert & Oriented X3, Good concentration; Motor Strength 5/5 B/L upper and lower extremities; DTRs 2+ intact and symmetric  CHEST/LUNG: Clear to percussion bilaterally; No rales, rhonchi, wheezing, or rubs  HEART: Regular rate and rhythm; No murmurs, rubs, or gallops  ABDOMEN: Soft, Nontender, Nondistended; Bowel sounds present  EXTREMITIES:  2+ Peripheral Pulses, No clubbing, cyanosis, or edema  LYMPH: No lymphadenopathy noted  SKIN: No rashes or lesions    Consultant(s) Notes Reviewed:  [x ] YES  [ ] NO  Care Discussed with Consultants/Other Providers [ x] YES  [ ] NO    LABS:  09-20    135  |  104  |  21  ----------------------------<  104<H>  3.9   |  24  |  1.23    Ca    9.9      20 Sep 2020 06:00  Phos  2.1     09-20  Mg     1.9     09-20                            12.3   7.18  )-----------( 158      ( 20 Sep 2020 06:00 )             36.9         PT/INR - ( 20 Sep 2020 06:00 )   PT: 16.4 sec;   INR: 1.38 ratio                              12.3   7.18  )-----------( 158      ( 09-20 @ 06:00 )             36.9                12.0   8.28  )-----------( 164      ( 09-19 @ 05:00 )             37.2                12.9   9.39  )-----------( 138      ( 09-18 @ 04:45 )             39.1                13.3   6.51  )-----------( 147      ( 09-17 @ 23:45 )             40.6                14.2   8.13  )-----------( 150      ( 09-01 @ 06:00 )             44.7                13.3   6.37  )-----------( 128      ( 08-31 @ 06:22 )             42.4                15.5   8.96  )-----------( 185      ( 08-30 @ 03:24 )             47.9               RADIOLOGY & ADDITIONAL TESTS:    Imaging Personally Reviewed:  [ ] YES  [ ] NO  acetaminophen   Tablet .. 650 milliGRAM(s) Oral every 6 hours PRN  brinzolamide 1% Ophthalmic Suspension 1 Drop(s) Left EYE two times a day  dorzolamide 2% Ophthalmic Solution 1 Drop(s) Left EYE three times a day  influenza   Vaccine 0.5 milliLiter(s) IntraMuscular once  labetalol 100 milliGRAM(s) Oral two times a day  levETIRAcetam 250 milliGRAM(s) Oral every 12 hours  montelukast 10 milliGRAM(s) Oral at bedtime  multivitamin 1 Tablet(s) Oral daily  ondansetron Injectable 4 milliGRAM(s) IV Push every 6 hours PRN      HEALTH ISSUES - PROBLEM Dx:  MORELIA (acute kidney injury)  MORELIA (acute kidney injury)    Atrial fibrillation  Atrial fibrillation    Syncope and collapse  Syncope and collapse    Subarachnoid hemorrhage  Subarachnoid hemorrhage          
Patient is a 89y old  Female who presents with a chief complaint of SAH/hyperkalemia/supratherputic INR (22 Sep 2020 17:42)      INTERVAL HPI/OVERNIGHT EVENTS:  alert      REVIEW OF SYSTEMS:  CONSTITUTIONAL: No fever, weight loss, or fatigue  EYES: No eye pain, visual disturbances, or discharge  ENMT:  No difficulty hearing, tinnitus, vertigo; No sinus or throat pain  NECK: No pain or stiffness  BREASTS: No pain, masses, or nipple discharge  RESPIRATORY: No cough, wheezing, chills or hemoptysis; No shortness of breath  CARDIOVASCULAR: No chest pain, palpitations, dizziness, or leg swelling  GASTROINTESTINAL: No abdominal or epigastric pain. No nausea, vomiting, or hematemesis; No diarrhea or constipation. No melena or hematochezia.  GENITOURINARY: No dysuria, frequency, hematuria, or incontinence  NEUROLOGICAL: No headaches, memory loss, loss of strength, numbness, or tremors  SKIN: No itching, burning, rashes, or lesions   LYMPH NODES: No enlarged glands  ENDOCRINE: No heat or cold intolerance; No hair loss  MUSCULOSKELETAL: No joint pain or swelling; No muscle, back, or extremity pain  PSYCHIATRIC: No depression, anxiety, mood swings, or difficulty sleeping  HEME/LYMPH: No easy bruising, or bleeding gums  ALLERY AND IMMUNOLOGIC: No hives or eczema  FAMILY HISTORY:  No pertinent family history in first degree relatives      T(C): 36.8 (09-22-20 @ 16:37), Max: 37.3 (09-22-20 @ 00:08)  HR: 96 (09-22-20 @ 16:37) (83 - 96)  BP: 150/64 (09-22-20 @ 16:37) (150/64 - 200/86)  RR: 22 (09-22-20 @ 16:37) (16 - 22)  SpO2: 99% (09-22-20 @ 16:37) (97% - 99%)  Wt(kg): --Vital Signs Last 24 Hrs  T(C): 36.8 (22 Sep 2020 16:37), Max: 37.3 (22 Sep 2020 00:08)  T(F): 98.3 (22 Sep 2020 16:37), Max: 99.1 (22 Sep 2020 00:08)  HR: 96 (22 Sep 2020 16:37) (83 - 96)  BP: 150/64 (22 Sep 2020 16:37) (150/64 - 200/86)  BP(mean): --  RR: 22 (22 Sep 2020 16:37) (16 - 22)  SpO2: 99% (22 Sep 2020 16:37) (97% - 99%)    T(F): 98.3 (09-22-20 @ 16:37), Max: 99.1 (09-22-20 @ 00:08)  HR: 96 (09-22-20 @ 16:37) (60 - 96)  BP: 150/64 (09-22-20 @ 16:37) (99/53 - 200/86)  RR: 22 (09-22-20 @ 16:37) (16 - 23)  SpO2: 99% (09-22-20 @ 16:37) (95% - 100%)    PHYSICAL EXAM:  GENERAL: NAD, well-groomed, well-developed  HEAD:  Atraumatic, Normocephalic  EYES: EOMI, PERRLA, conjunctiva and sclera clear  ENMT: No tonsillar erythema, exudates, or enlargement; Moist mucous membranes, Good dentition, No lesions  NECK: Supple, No JVD, Normal thyroid  NERVOUS SYSTEM:  Alert & Oriented X3, Good concentration; Motor Strength 5/5 B/L upper and lower extremities; DTRs 2+ intact and symmetric  CHEST/LUNG: Clear to percussion bilaterally; No rales, rhonchi, wheezing, or rubs  HEART: Regular rate and rhythm; No murmurs, rubs, or gallops  ABDOMEN: Soft, Nontender, Nondistended; Bowel sounds present  EXTREMITIES:  2+ Peripheral Pulses, No clubbing, cyanosis, or edema  LYMPH: No lymphadenopathy noted  SKIN: No rashes or lesions    Consultant(s) Notes Reviewed:  [x ] YES  [ ] NO  Care Discussed with Consultants/Other Providers [ x] YES  [ ] NO    LABS:  09-21    136  |  104  |  24<H>  ----------------------------<  96  3.8   |  25  |  0.89    Ca    10.4      21 Sep 2020 07:00            PT/INR - ( 22 Sep 2020 15:40 )   PT: 14.4 sec;   INR: 1.20 ratio                              12.3   7.18  )-----------( 158      ( 09-20 @ 06:00 )             36.9                12.0   8.28  )-----------( 164      ( 09-19 @ 05:00 )             37.2                12.9   9.39  )-----------( 138      ( 09-18 @ 04:45 )             39.1                13.3   6.51  )-----------( 147      ( 09-17 @ 23:45 )             40.6                14.2   8.13  )-----------( 150      ( 09-01 @ 06:00 )             44.7                13.3   6.37  )-----------( 128      ( 08-31 @ 06:22 )             42.4                15.5   8.96  )-----------( 185      ( 08-30 @ 03:24 )             47.9               RADIOLOGY & ADDITIONAL TESTS:    Imaging Personally Reviewed:  [ ] YES  [ ] NO  acetaminophen   Tablet .. 650 milliGRAM(s) Oral every 6 hours PRN  brinzolamide 1% Ophthalmic Suspension 1 Drop(s) Left EYE two times a day  dorzolamide 2% Ophthalmic Solution 1 Drop(s) Left EYE three times a day  influenza   Vaccine 0.5 milliLiter(s) IntraMuscular once  labetalol 100 milliGRAM(s) Oral two times a day  levETIRAcetam 250 milliGRAM(s) Oral every 12 hours  lisinopril 10 milliGRAM(s) Oral daily  montelukast 10 milliGRAM(s) Oral at bedtime  multivitamin 1 Tablet(s) Oral daily  ondansetron Injectable 4 milliGRAM(s) IV Push every 6 hours PRN      HEALTH ISSUES - PROBLEM Dx:  Subarachnoid hemorrhage following injury, no loss of consciousness, subsequent encounter    MORELIA (acute kidney injury)    Atrial fibrillation    Syncope and collapse    Subarachnoid hemorrhage          
Resting    Vital Signs Last 24 Hrs  T(C): 36.4 (09-21-20 @ 09:16), Max: 36.9 (09-20-20 @ 21:47)  T(F): 97.6 (09-21-20 @ 09:16), Max: 98.4 (09-20-20 @ 21:47)  HR: 60 (09-21-20 @ 09:16) (60 - 79)  BP: 129/49 (09-21-20 @ 09:16) (99/53 - 162/70)  RR: 18 (09-21-20 @ 09:16) (16 - 23)  SpO2: 98% (09-21-20 @ 09:16) (98% - 100%)    card s1s2  b/l air entry  soft, ND  no edema                                         12.3   7.18  )-----------( 158      ( 20 Sep 2020 06:00 )             36.9     21 Sep 2020 07:00    136    |  104    |  24     ----------------------------<  96     3.8     |  25     |  0.89     Ca    10.4       21 Sep 2020 07:00  Phos  2.1       20 Sep 2020 06:00  Mg     1.9       20 Sep 2020 06:00    PT/INR - ( 20 Sep 2020 06:00 )   PT: 16.4 sec;   INR: 1.38 ratio      acetaminophen   Tablet .. 650 milliGRAM(s) Oral every 6 hours PRN  brinzolamide 1% Ophthalmic Suspension 1 Drop(s) Left EYE two times a day  dorzolamide 2% Ophthalmic Solution 1 Drop(s) Left EYE three times a day  influenza   Vaccine 0.5 milliLiter(s) IntraMuscular once  levETIRAcetam 250 milliGRAM(s) Oral every 12 hours  montelukast 10 milliGRAM(s) Oral at bedtime  multivitamin 1 Tablet(s) Oral daily  ondansetron Injectable 4 milliGRAM(s) IV Push every 6 hours PRN    A/P:    S/p fall, b/l SAH  Known CM, EF 25-30%, MR, TR, AR  S/p hemodynamic MORELIA   Resolved  UA w/pr 15 otherwise bland  SONO w/o hydro, b/l small kidneys  Off IVF  Avoid nephrotoxins  F/u BMP, UO    [phone]            
Resting    Vital Signs Last 24 Hrs  T(C): 36.6 (20 @ 17:52), Max: 36.9 (20 @ 05:00)  T(F): 97.9 (20 @ 17:52), Max: 98.5 (20 @ 05:00)  HR: 73 (20 @ 17:52) (69 - 102)  BP: 109/50 (20 @ 17:52) (96/56 - 155/97)  BP(mean): 88 (20 @ 16:00) (49 - 141)  RR: 13 (20 @ 17:52) (13 - 23)  SpO2: 94% (20 @ 17:52) (91% - 97%)    card s1s2  b/l air entry  soft, ND  no edema                        12.0   8.28  )-----------( 164      ( 19 Sep 2020 05:00 )             37.2     19 Sep 2020 05:00    136    |  103    |  28     ----------------------------<  99     4.2     |  24     |  1.39     Ca    9.9        19 Sep 2020 05:00  Phos  3.4       19 Sep 2020 05:00  Mg     1.7       19 Sep 2020 05:00    TPro  6.1    /  Alb  3.4    /  TBili  0.6    /  DBili  x      /  AST  23     /  ALT  26     /  AlkPhos  94     18 Sep 2020 04:00    LIVER FUNCTIONS - ( 18 Sep 2020 04:00 )  Alb: 3.4 g/dL / Pro: 6.1 g/dL / ALK PHOS: 94 U/L / ALT: 26 U/L / AST: 23 U/L / GGT: x           PT/INR - ( 18 Sep 2020 01:51 )   PT: 15.3 sec;   INR: 1.28 ratio       PTT - ( 17 Sep 2020 23:45 )  PTT:28.4 sec  CAPILLARY BLOOD GLUCOSE    CARDIAC MARKERS ( 19 Sep 2020 05:00 )  .022 ng/mL / x     / 83 U/L / x     / x      CARDIAC MARKERS ( 18 Sep 2020 04:45 )  .027 ng/mL / x     / x     / x     / x      CARDIAC MARKERS ( 17 Sep 2020 23:45 )  <.017 ng/mL / x     / x     / x     / x        Urinalysis Basic - ( 18 Sep 2020 10:52 )    Color: Yellow / Appearance: Clear / S.010 / pH: x  Gluc: x / Ketone: Negative  / Bili: Negative / Urobili: Negative   Blood: x / Protein: 15 / Nitrite: Negative   Leuk Esterase: Negative / RBC: 0-4 /HPF / WBC Negative /HPF   Sq Epi: x / Non Sq Epi: Neg.-Few / Bacteria: Negative /HPF    brinzolamide 1% Ophthalmic Suspension 1 Drop(s) Left EYE two times a day  dorzolamide 2% Ophthalmic Solution 1 Drop(s) Left EYE three times a day  influenza   Vaccine 0.5 milliLiter(s) IntraMuscular once  labetalol 100 milliGRAM(s) Oral two times a day  levETIRAcetam 250 milliGRAM(s) Oral every 12 hours  montelukast 10 milliGRAM(s) Oral at bedtime  multivitamin 1 Tablet(s) Oral daily  ondansetron Injectable 4 milliGRAM(s) IV Push every 6 hours PRN    A/P:    S/p fall, b/l SAH  Known CM, EF 25-30%, MR, TR, AR  Hemodynamic MORELIA (Cr 1.1 - 9/3/20)  Improving  UA w/pr 15 otherwise bland  SONO w/o hydro, b/l small kidneys  Off IVF  Avoid nephrotoxins  F/u BMP, UO  Prognosis is guarded    [phone]            
Resting    Vital Signs Last 24 Hrs  T(C): 36.7 (09-20-20 @ 18:00), Max: 37.2 (09-20-20 @ 00:00)  T(F): 98 (09-20-20 @ 18:00), Max: 98.9 (09-20-20 @ 00:00)  HR: 77 (09-20-20 @ 18:00) (72 - 91)  BP: 99/53 (09-20-20 @ 18:00) (99/53 - 151/62)  RR: 16 (09-20-20 @ 18:00) (16 - 17)  SpO2: 99% (09-20-20 @ 18:00) (95% - 99%)    card s1s2  b/l air entry  soft, ND  no edema                                12.3   7.18  )-----------( 158      ( 20 Sep 2020 06:00 )             36.9     20 Sep 2020 06:00    135    |  104    |  21     ----------------------------<  104    3.9     |  24     |  1.23     Ca    9.9        20 Sep 2020 06:00  Phos  2.1       20 Sep 2020 06:00  Mg     1.9       20 Sep 2020 06:00    PT/INR - ( 20 Sep 2020 06:00 )   PT: 16.4 sec;   INR: 1.38 ratio      acetaminophen   Tablet .. 650 milliGRAM(s) Oral every 6 hours PRN  brinzolamide 1% Ophthalmic Suspension 1 Drop(s) Left EYE two times a day  dorzolamide 2% Ophthalmic Solution 1 Drop(s) Left EYE three times a day  influenza   Vaccine 0.5 milliLiter(s) IntraMuscular once  labetalol 100 milliGRAM(s) Oral two times a day  levETIRAcetam 250 milliGRAM(s) Oral every 12 hours  montelukast 10 milliGRAM(s) Oral at bedtime  multivitamin 1 Tablet(s) Oral daily  ondansetron Injectable 4 milliGRAM(s) IV Push every 6 hours PRN    A/P:    S/p fall, b/l SAH  Known CM, EF 25-30%, MR, TR, AR  Hemodynamic MORELIA (Cr 1.1 - 9/3/20)  Improving  UA w/pr 15 otherwise bland  SONO w/o hydro, b/l small kidneys  Off IVF  Avoid nephrotoxins  F/u BMP, UO    [phone]            
Resting    Vital Signs Last 24 Hrs  T(C): 36.8 (09-22-20 @ 16:37), Max: 37.3 (09-22-20 @ 00:08)  T(F): 98.3 (09-22-20 @ 16:37), Max: 99.1 (09-22-20 @ 00:08)  HR: 96 (09-22-20 @ 16:37) (83 - 96)  BP: 150/64 (09-22-20 @ 16:37) (150/64 - 200/86)  RR: 22 (09-22-20 @ 16:37) (16 - 22)  SpO2: 99% (09-22-20 @ 16:37) (97% - 99%)    card s1s2  b/l air entry  soft, ND  no edema                              21 Sep 2020 07:00    136    |  104    |  24     ----------------------------<  96     3.8     |  25     |  0.89     Ca    10.4       21 Sep 2020 07:00    acetaminophen   Tablet .. 650 milliGRAM(s) Oral every 6 hours PRN  brinzolamide 1% Ophthalmic Suspension 1 Drop(s) Left EYE two times a day  dorzolamide 2% Ophthalmic Solution 1 Drop(s) Left EYE three times a day  influenza   Vaccine 0.5 milliLiter(s) IntraMuscular once  labetalol 100 milliGRAM(s) Oral two times a day  levETIRAcetam 250 milliGRAM(s) Oral every 12 hours  lisinopril 10 milliGRAM(s) Oral daily  montelukast 10 milliGRAM(s) Oral at bedtime  multivitamin 1 Tablet(s) Oral daily  ondansetron Injectable 4 milliGRAM(s) IV Push every 6 hours PRN    A/P:    S/p fall, b/l SAH  Known CM, EF 25-30%, MR, TR, AR  S/p hemodynamic MORELIA   Resolved  UA w/pr 15 otherwise bland  SONO w/o hydro, b/l small kidneys  Off IVF  Avoid nephrotoxins  HTN, meds adjusted  F/u BMP, BP on ACE, labetalol    [phone]            
s: no acute events  Vital Signs Last 24 Hrs  T(C): 37 (18 Sep 2020 19:00), Max: 37.1 (18 Sep 2020 14:00)  T(F): 98.6 (18 Sep 2020 19:00), Max: 98.8 (18 Sep 2020 14:00)  HR: 78 (19 Sep 2020 05:00) (69 - 123)  BP: 126/85 (19 Sep 2020 05:00) (96/56 - 204/99)  BP(mean): 99 (19 Sep 2020 05:00) (49 - 141)  RR: 19 (19 Sep 2020 05:00) (11 - 25)  SpO2: 96% (19 Sep 2020 05:00) (91% - 99%)    Alert and oriented to self, not history.  language intact.  Speech fluent  PERRL, EOMI, face symmetric, tongue uvula and palate were midline  Motor: Moves all 4 extremities against gravity  Sensation intact to light touch    89-year-old woman who had a second unwitnessed syncopal episode earlier this month.  Previous episode was tied to a non-STEMI and Dr. Cunningham was consulted for the syncopal event.  EEG at the time was unremarkable.  Patient presents with another syncopal event. Please check HCT w/o contrast today. If stable can restart coumadin for stroke prevention due to afib, if medically cleared. Her subarachnoid hemorrhage is likely traumatic.  Patient's daughter is aware of the risk and benefits of restarting Coumadin.  As patient expresses no desire for invasive procedure including aneurysm intervention, will hold off on vessel imaging as because of the subarachnoid hemorrhage.  EEG pending.Will recommend checking orthostatic blood pressure and cardiac monitoring as a cause of her syncope. Continue keppra for 2 weeks as sah can be cause of seizure during this acute phase.
s: no acute events. HCT not done for 9/19    Vital Signs Last 24 Hrs  T(C): 37.1 (20 Sep 2020 05:43), Max: 37.2 (20 Sep 2020 00:00)  T(F): 98.7 (20 Sep 2020 05:43), Max: 98.9 (20 Sep 2020 00:00)  HR: 77 (20 Sep 2020 05:43) (69 - 102)  BP: 124/74 (20 Sep 2020 05:43) (109/50 - 155/97)  BP(mean): 88 (19 Sep 2020 16:00) (76 - 136)  RR: 16 (20 Sep 2020 05:43) (13 - 23)  SpO2: 99% (20 Sep 2020 05:43) (91% - 99%)    awake, alert. hard of hearing. follows commands  PERRL, eomi, face sym  moves all 4 ext against gravity  sens intact lt    89-year-old woman who had a second unwitnessed syncopal episode earlier this month.  Previous episode was tied to a non-STEMI and Dr. Cunningham was consulted for the syncopal event.  EEG at the time was unremarkable.  Patient presents with another syncopal event. Please check HCT w/o contrast TODAY. If stable can restart coumadin for stroke prevention due to afib, if medically cleared. Her subarachnoid hemorrhage is likely traumatic.  Patient's daughter is aware of the risk and benefits of restarting Coumadin during initial consultation with me.  As patient expresses no desire for invasive procedure including aneurysm intervention, will hold off on vessel imaging as because of the subarachnoid hemorrhage.  EEG pending.Will recommend checking orthostatic blood pressure and cardiac monitoring as a cause of her syncope. Continue keppra for 2 weeks as sah can be cause of seizure during this acute phase. Signout will be given to Dr. Neil.  
Patient is a 89y old  Female who presents with a chief complaint of SAH/hyperkalemia/supratherputic INR (20 Sep 2020 07:16)      INTERVAL HPI/OVERNIGHT EVENTS: resting      REVIEW OF SYSTEMS:  CONSTITUTIONAL: No fever, weight loss, or fatigue  EYES: No eye pain, visual disturbances, or discharge  ENMT:  No difficulty hearing, tinnitus, vertigo; No sinus or throat pain  NECK: No pain or stiffness  BREASTS: No pain, masses, or nipple discharge  RESPIRATORY: No cough, wheezing, chills or hemoptysis; No shortness of breath  CARDIOVASCULAR: No chest pain, palpitations, dizziness, or leg swelling  GASTROINTESTINAL: No abdominal or epigastric pain. No nausea, vomiting, or hematemesis; No diarrhea or constipation. No melena or hematochezia.  GENITOURINARY: No dysuria, frequency, hematuria, or incontinence  NEUROLOGICAL: No headaches, memory loss, loss of strength, numbness, or tremors  SKIN: No itching, burning, rashes, or lesions   LYMPH NODES: No enlarged glands  ENDOCRINE: No heat or cold intolerance; No hair loss  MUSCULOSKELETAL: No joint pain or swelling; No muscle, back, or extremity pain  PSYCHIATRIC: No depression, anxiety, mood swings, or difficulty sleeping  HEME/LYMPH: No easy bruising, or bleeding gums  ALLERY AND IMMUNOLOGIC: No hives or eczema  FAMILY HISTORY:  No pertinent family history in first degree relatives      T(C): 37.1 (09-20-20 @ 05:43), Max: 37.2 (09-20-20 @ 00:00)  HR: 77 (09-20-20 @ 05:43) (69 - 102)  BP: 124/74 (09-20-20 @ 05:43) (109/50 - 155/97)  RR: 16 (09-20-20 @ 05:43) (13 - 21)  SpO2: 99% (09-20-20 @ 05:43) (91% - 99%)  Wt(kg): --Vital Signs Last 24 Hrs  T(C): 37.1 (20 Sep 2020 05:43), Max: 37.2 (20 Sep 2020 00:00)  T(F): 98.7 (20 Sep 2020 05:43), Max: 98.9 (20 Sep 2020 00:00)  HR: 77 (20 Sep 2020 05:43) (69 - 102)  BP: 124/74 (20 Sep 2020 05:43) (109/50 - 155/97)  BP(mean): 88 (19 Sep 2020 16:00) (76 - 136)  RR: 16 (20 Sep 2020 05:43) (13 - 21)  SpO2: 99% (20 Sep 2020 05:43) (91% - 99%)    T(F): 98.7 (09-20-20 @ 05:43), Max: 98.9 (09-20-20 @ 00:00)  HR: 77 (09-20-20 @ 05:43) (69 - 123)  BP: 124/74 (09-20-20 @ 05:43) (96/56 - 204/99)  RR: 16 (09-20-20 @ 05:43) (11 - 25)  SpO2: 99% (09-20-20 @ 05:43) (91% - 99%)    PHYSICAL EXAM:  GENERAL: NAD, well-groomed, well-developed  HEAD:  Atraumatic, Normocephalic  EYES: EOMI, PERRLA, conjunctiva and sclera clear  ENMT: No tonsillar erythema, exudates, or enlargement; Moist mucous membranes, Good dentition, No lesions  NECK: Supple, No JVD, Normal thyroid  NERVOUS SYSTEM:  Alert & Oriented X3, Good concentration; Motor Strength 5/5 B/L upper and lower extremities; DTRs 2+ intact and symmetric  CHEST/LUNG: Clear to percussion bilaterally; No rales, rhonchi, wheezing, or rubs  HEART: Regular rate and rhythm; No murmurs, rubs, or gallops  ABDOMEN: Soft, Nontender, Nondistended; Bowel sounds present  EXTREMITIES:  2+ Peripheral Pulses, No clubbing, cyanosis, or edema  LYMPH: No lymphadenopathy noted  SKIN: No rashes or lesions    Consultant(s) Notes Reviewed:  [x ] YES  [ ] NO  Care Discussed with Consultants/Other Providers [ x] YES  [ ] NO    LABS:  09-20    135  |  104  |  21  ----------------------------<  104<H>  3.9   |  24  |  1.23    Ca    9.9      20 Sep 2020 06:00  Phos  2.1     09-20  Mg     1.9     09-20                            12.3   7.18  )-----------( 158      ( 20 Sep 2020 06:00 )             36.9         PT/INR - ( 20 Sep 2020 06:00 )   PT: 16.4 sec;   INR: 1.38 ratio             CARDIAC MARKERS ( 19 Sep 2020 05:00 )  .022 ng/mL / x     / 83 U/L / x     / x                       12.3   7.18  )-----------( 158      ( 09-20 @ 06:00 )             36.9                12.0   8.28  )-----------( 164      ( 09-19 @ 05:00 )             37.2                12.9   9.39  )-----------( 138      ( 09-18 @ 04:45 )             39.1                13.3   6.51  )-----------( 147      ( 09-17 @ 23:45 )             40.6                14.2   8.13  )-----------( 150      ( 09-01 @ 06:00 )             44.7                13.3   6.37  )-----------( 128      ( 08-31 @ 06:22 )             42.4                15.5   8.96  )-----------( 185      ( 08-30 @ 03:24 )             47.9               RADIOLOGY & ADDITIONAL TESTS:    Imaging Personally Reviewed:  [ ] YES  [ ] NO  brinzolamide 1% Ophthalmic Suspension 1 Drop(s) Left EYE two times a day  dorzolamide 2% Ophthalmic Solution 1 Drop(s) Left EYE three times a day  influenza   Vaccine 0.5 milliLiter(s) IntraMuscular once  labetalol 100 milliGRAM(s) Oral two times a day  levETIRAcetam 250 milliGRAM(s) Oral every 12 hours  montelukast 10 milliGRAM(s) Oral at bedtime  multivitamin 1 Tablet(s) Oral daily  ondansetron Injectable 4 milliGRAM(s) IV Push every 6 hours PRN      HEALTH ISSUES - PROBLEM Dx:  MORELIA (acute kidney injury)  MORELIA (acute kidney injury)    Atrial fibrillation  Atrial fibrillation    Syncope and collapse  Syncope and collapse    Subarachnoid hemorrhage  Subarachnoid hemorrhage

## 2020-09-22 NOTE — PROGRESS NOTE ADULT - PROBLEM SELECTOR PROBLEM 2
Subarachnoid hemorrhage following injury, no loss of consciousness, subsequent encounter
Atrial fibrillation
Subarachnoid hemorrhage
Syncope and collapse
Syncope and collapse
Atrial fibrillation

## 2020-09-22 NOTE — DISCHARGE NOTE PROVIDER - NSDCCPCAREPLAN_GEN_ALL_CORE_FT
PRINCIPAL DISCHARGE DIAGNOSIS  Diagnosis: Subarachnoid hemorrhage  Assessment and Plan of Treatment: - Continue taking keppra for 2 weeks (it was started on 9/18)  - Continue taking medications to control your blood pressure. The goal is to keep your systolic blood pressure below 160.  - Follow up with the medical team at acute rehab.      SECONDARY DISCHARGE DIAGNOSES  Diagnosis: Syncope and collapse  Assessment and Plan of Treatment:

## 2020-09-22 NOTE — H&P ADULT - NSHPPHYSICALEXAM_GEN_ALL_CORE
Gen - NAD, Comfortable  HEENT - NCAT, EOMI, MMM  Neck - Supple, No limited ROM  Pulm - CTAB, No wheeze, No rhonchi, No crackles  Cardiovascular - RRR, S1S2, No murmurs  Abdomen - Soft, NT/ND, +BS  Extremities - No C/C/E, No calf tenderness  Neuro-     Cognitive - AAOx3     Communication - Fluent, No dysarthria     Attention: Intact      Judgement: Good evidence of judgement     Memory: Recall 3 objects immediate and 3 min later         Cranial Nerves - CN 2-12 intact     Motor -                     LEFT    UE - ShAB 5/5, EF 5/5, EE 5/5, WE 5/5,  5/5                    RIGHT UE - ShAB 5/5, EF 5/5, EE 5/5, WE 5/5,  5/5                    LEFT    LE - HF 5/5, KE 5/5, DF 5/5, PF 5/5                    RIGHT LE - HF 5/5, KE 5/5, DF 5/5, PF 5/5        Sensory - Intact to LT     Reflexes - DTR Intact, No primitive reflexive     Coordination - FTN intact     Tone - normal  Psychiatric - Mood stable, Affect WNL  Skin:  all skin intact    Wounds: None Present Gen - NAD, Comfortable  HEENT - NCAT, EOMI, MMM, [place]  Neck - Supple,  Pulm - CTAB, No wheeze, No rhonchi, No crackles  Cardiovascular - RRR, S1S2, No murmurs  Abdomen - Soft, NT/ND, +BS  Extremities - No C/C/E, No calf tenderness  Neuro-     Cognitive - AAOx2  --knows she is in hospital but not name     Communication - Fluent, No dysarthria     Attention: Able to complete Days of week backward,  Unable to complete serial 7's     Judgement: Impaired     Memory: Recall 0/3 after 3 min -- 3/3 with cat. cues      Cranial Nerves - CN 2-12 intact except hearing     Motor -                     LEFT    UE - limited due to pain-- ShAB 3-/5, EF 4/5, EE 4/5, WE 2/5,  2/5                    RIGHT UE - ShAB 5/5, EF 5/5, EE 5/5, WE 5/5,  5/5                    LEFT    LE - HF 5/5, KE 5/5, DF 5/5, PF 5/5                    RIGHT LE - HF 4/5, KE 5/5, DF 5/5, PF 5/5        Sensory - Intact to LT bilat    Proprioception impaired on right LE     Reflexes - symmetric bilat     Coordination - FTN & HTS impaired on right, intact on left     Tone - normal  Psychiatric - Mood stable, Affect WNL  Skin:  all skin intact    Wounds: None Present

## 2020-09-22 NOTE — OCCUPATIONAL THERAPY INITIAL EVALUATION ADULT - ADDITIONAL COMMENTS
Pt lives in a private home with daughter +3 LUIS to enter with 1 rail. Pt lives on main floor +tub with grab bars and tub bench. Pt ambulates with RW. PTA obtained from pt.

## 2020-09-22 NOTE — DIETITIAN INITIAL EVALUATION ADULT. - OTHER INFO
89 y/o F w/ pmh htn, afib on coumadin, +PPM, CHF (EF 25-30%), recent admission for an NSTEMI, now presents to [facility] s/p [facility] fall presented to ED and found to have b/l frontal SAH and +scalp hematoma. Patient confused however able to converse regarding diet history , Patient reports a good appetite usually , noted with varied po 25-65% as per  flow sheet , No recent BM noted since the (9/18) Skin ecchymotic, (+1) edema (R) wrist . Patient noted with acute severe protein calorie malnutrition due to  loss of ~8% body weight x 1 month & loss of body fat & muscle wasting observed . (temporal , clavicle , scapular, orbital )

## 2020-09-22 NOTE — OCCUPATIONAL THERAPY INITIAL EVALUATION ADULT - PLANNED THERAPY INTERVENTIONS, OT EVAL
transfer training/ADL retraining/fine motor coordination training/parent/caregiver training.../fall prevention; edema management/strengthening/balance training/ROM

## 2020-09-22 NOTE — OCCUPATIONAL THERAPY INITIAL EVALUATION ADULT - RANGE OF MOTION EXAMINATION, UPPER EXTREMITY
Left UE Active ROM was WFL (within functional limits)/Right UE Active ROM was WFL (within functional limits)/except distal portion of right UE (forearm, wrist, hand, digits)

## 2020-09-22 NOTE — CHART NOTE - TREATMENT: THE FOLLOWING DIET HAS BEEN RECOMMENDED
Diet, DASH/TLC:   Sodium & Cholesterol Restricted  Supplement Feeding Modality:  Oral  Ensure Enlive Cans or Servings Per Day:  1       Frequency:  Three Times a day (09-19-20 @ 16:46) [Active]

## 2020-09-22 NOTE — CHART NOTE - NSCHARTNOTEFT_GEN_A_CORE
Called by RN to order coumadin dose for tonight, pt discharge was held today due to elevated BP, notes reviewed PMD note stated to restart coumadin for afib starting tonight no order was placed. Pt has been admitted for SDH and full AC has been cleared by neuro per neurologist note from yesterday.  Coumadin 5 mg x1 ordered will follow with INR and dose based on INR daily.

## 2020-09-22 NOTE — H&P ADULT - HISTORY OF PRESENT ILLNESS
89 y/o F w/ pmh htn, afib on coumadin and asa, +PPM, CHF (EF 25-30%), recent admission for an NSTEMI, presented to [facility] on 9/18 s/p mech fall and found to have b/l frontal SAH and +scalp hematoma. Pt also found to have subtherapeutic INR of 3.9, Hyperkalemia of 6.2, MORELIA, MICU consulted for admission for hyperkalemia, q1h neurochecks. During interview with patients and daughter at bedside pt reported having a headache worst around the R. frontal scalp hematoma. Pt otherwise denied any other medical complains. Pt stated she cannot recall what happened. Daughter stated she did witness the fall but heard a loud thump and found her mother on the floor. Pt denied any visual changes, dizziness, photo/phonophibia, neck pain, facial pain, sob, cp, palpitation abd pain, back pain, weakness/numbness in the ext.      Repeat head ct 9/20 revealed small amount of acute subarachnoid hemorrhage in bifrontal lobes, small intraparenchymal hemorrhage and tiny bilateral whole hemispheric sdh essentially unchanged. Pt does not want any invasive procedures including aneurysm intervention. As per neuro, warfarin was cleared to be restarted. EEG done on 9/21 without any seizure activity and was better compared to one done on 9/18. Patient was medically cleared for discharge to acute rehab today 9/22.    89 y/o F w/ pmh htn, afib on coumadin and asa, +PPM, CHF (EF 25-30%), recent admission for an NSTEMI, presented to [facility] on 9/18 s/p mech fall and found to have b/l frontal SAH and +scalp hematoma. Pt also found to have subtherapeutic INR of 3.9, Hyperkalemia of 6.2, MORELIA, MICU consulted for admission for hyperkalemia, q1h neurochecks. During interview with patients and daughter at bedside pt reported having a headache worst around the R. frontal scalp hematoma. Pt otherwise denied any other medical complains. Pt stated she cannot recall what happened. Daughter stated she did witness the fall but heard a loud thump and found her mother on the floor. Pt denied any visual changes, dizziness, photo/phonophibia, neck pain, facial pain, sob, cp, palpitation abd pain, back pain, weakness/numbness in the ext.      Repeat head ct 9/20 revealed small amount of acute subarachnoid hemorrhage in bifrontal lobes, small intraparenchymal hemorrhage and tiny bilateral whole hemispheric sdh essentially unchanged. Pt does not want any invasive procedures including aneurysm intervention. As per neuro, warfarin was cleared to be restarted. EEG done on 9/21 without any seizure activity and was better compared to one done on 9/18. Patient was medically cleared for discharge to acute rehab today 9/22.      89 y/o F w/ pmh htn, afib on coumadin and asa, +PPM, CHF (EF 25-30%), recent admission for an NSTEMI, presented to [facility] on 9/18 s/p mech fall and found to have b/l frontal SAH and +scalp hematoma. Pt also found to have subtherapeutic INR of 3.9, Hyperkalemia of 6.2, MORELIA, MICU consulted for admission for hyperkalemia, q1h neurochecks. During interview with patients and daughter at bedside pt reported having a headache worst around the R. frontal scalp hematoma. Pt otherwise denied any other medical complains. Pt stated she cannot recall what happened. Daughter stated she did witness the fall but heard a loud thump and found her mother on the floor. Pt denied any visual changes, dizziness, photo/phonophibia, neck pain, facial pain, sob, cp, palpitation abd pain, back pain, weakness/numbness in the ext.      In ICU patient was started on labetalol infusion for BP control and keppra for seizure ppx. Aspirin and coumadin were held, she was given vitamin K and Kcentra. Patient improved and was transferred to floor. She was transitioned to labetalol PO with good BP control until it was discontinued on 9/21. Today 9/22 patient hypertensive, treated with IV hydralazine, labetalol was restarted as well as home med lisinopril. Pt asymptomatic. Goal is to have SBP less than 160.     Repeat head ct 9/20 revealed small amount of acute subarachnoid hemorrhage in bifrontal lobes, small intraparenchymal hemorrhage and tiny bilateral whole hemispheric sdh essentially unchanged. Pt does not want any invasive procedures including aneurysm intervention. As per neuro, warfarin was cleared to be restarted. EEG done on 9/21 without any seizure activity and was better compared to one done on 9/18. Patient was medically cleared for discharge to acute rehab today 9/22.      89 y/o F w/ pmh htn, afib on coumadin and asa, +PPM, CHF (EF 25-30%), recent admission for an NSTEMI, presented to [facility] on 9/17 s/p mech fall and found to have b/l frontal SAH and +scalp hematoma. Pt also found to have supratherapeutic INR of 3.9, Hyperkalemia of 6.2, MORELIA, MICU consulted for admission for hyperkalemia, q1h neurochecks. During interview with patients and daughter at bedside pt reported having a headache worst around the R. frontal scalp hematoma. Pt otherwise denied any other medical complains. Pt stated she cannot recall what happened. Daughter stated she did not witness the fall but heard a loud thump and found her mother on the floor. Pt denied any visual changes, dizziness, photo/phonophibia, neck pain, facial pain, sob, cp, palpitation abd pain, back pain, weakness/numbness in the ext.      In ICU patient was started on labetalol infusion for BP control and keppra for seizure ppx. Aspirin and coumadin were held, she was given vitamin K and Kcentra. Patient improved and was transferred to floor. She was transitioned to labetalol PO with good BP control until it was discontinued on 9/21.     Repeat head ct 9/20 revealed small amount of acute subarachnoid hemorrhage in bifrontal lobes, small intraparenchymal hemorrhage and tiny bilateral whole hemispheric sdh essentially unchanged. Pt does not want any invasive procedures including aneurysm intervention. As per neuro, warfarin was cleared to be restarted. EEG done on 9/21 without any seizure activity and was better compared to one done on 9/18.   On 9/22 patient hypertensive, treated with IV hydralazine, labetalol was restarted as well as home med lisinopril. Pt asymptomatic. Goal is to have SBP less than 160.  Patient's discharge to rehab was held 9/22 due to hypertension but was medically cleared for discharge to acute rehab today 9/23.      89 y/o F w/ pmh htn, afib on coumadin and asa, +PPM, CHF (EF 25-30%), recent admission for an NSTEMI, presented to [facility] on 9/17 s/p mech fall and found to have b/l frontal SAH and +scalp hematoma. Pt also found to have supratherapeutic INR of 3.9, Hyperkalemia of 6.2, MORELIA, MICU consulted for admission for hyperkalemia, q1h neurochecks. During interview with patients and daughter at bedside pt reported having a headache worst around the R. frontal scalp hematoma. Pt otherwise denied any other medical complains. Pt stated she cannot recall what happened. Daughter stated she did not witness the fall but heard a loud thump and found her mother on the floor. Pt denied any visual changes, dizziness, photo/phonophibia, neck pain, facial pain, sob, cp, palpitation abd pain, back pain, weakness/numbness in the ext.      In ICU patient was started on labetalol infusion for BP control and keppra for seizure ppx. Aspirin and coumadin were held, she was given vitamin K and Kcentra. Patient improved and was transferred to floor. She was transitioned to labetalol PO with good BP control until it was discontinued on 9/21.     Repeat head ct 9/20 revealed small amount of acute subarachnoid hemorrhage in bifrontal lobes, small intraparenchymal hemorrhage and tiny bilateral whole hemispheric sdh essentially unchanged. Pt does not want any invasive procedures including aneurysm intervention. As per neuro, warfarin was cleared to be restarted. EEG done on 9/21 without any seizure activity and was better compared to one done on 9/18.   On 9/22 patient hypertensive, treated with IV hydralazine, labetalol was restarted as well as home med lisinopril. Pt asymptomatic. Goal is to have SBP less than 160.  Patient's discharge to rehab was held 9/22 due to hypertension but was medically cleared for discharge to acute rehab  9/23.

## 2020-09-22 NOTE — DISCHARGE NOTE PROVIDER - CARE PROVIDER_API CALL
Kiko Turner  GASTROENTEROLOGY  [address] [place] [address], [address]  [place], [place] [id number]  Phone: [phone]  Fax: [phone]  Follow Up Time:     Melva Howell  NEUROLOGY  [address] [place] [address], [address]  [place] [address]  Phone: [phone]  Fax: [phone]  Follow Up Time:

## 2020-09-22 NOTE — PROGRESS NOTE ADULT - PROBLEM SELECTOR PLAN 2
Good resolution of minor traumatic hemorrhage. Can restart warfarin. D/w ROSSI Su.
hold warfarin
medically stable follow blood pressure
stable continue present care
telemetry
if repeat ct head stable consider restarting warfarin

## 2020-09-22 NOTE — PROGRESS NOTE ADULT - REASON FOR ADMISSION
SAH/hyperkalemia/supratherputic INR

## 2020-09-22 NOTE — DISCHARGE NOTE PROVIDER - CARE PROVIDERS DIRECT ADDRESSES
[email or web address]

## 2020-09-22 NOTE — PROGRESS NOTE ADULT - PROVIDER SPECIALTY LIST ADULT
Critical Care
Internal Medicine
Nephrology
Neurology
Palliative Care
Pulmonology
Internal Medicine

## 2020-09-22 NOTE — H&P ADULT - NSHPSOCIALHISTORY_GEN_ALL_CORE
Smoking - Denied  EtOH - Denied   Drugs - Denied     As per PT note: Pt lives in private house w/dtr, 3 evan w/1 rail, no stairs in house that pt uses. pt states she uses a rolling walker to ambulate at home, also has a commode and grab bars in br.    CURRENT FUNCTIONAL STATUS  Bed Mobility: Min A   Transfers: Min A   Gait: Min A, 25feet RW   ADL: Mod A UBD, Max A LBD, Smoking - Denied  EtOH - Denied   Drugs - Denied     As per PT note: Pt lives in private house w/dtr--Daughter lives upstairs, 3 evan w/1 rail, no stairs in house that pt uses.; was using a rolling walker to ambulate at home, also has a commode and grab bars in br.  Mod. Independent with ADLs,  Daughter assisted with IADLs  Former homemaker and later  for [employer]  Volunteered at [employer] for many years    CURRENT FUNCTIONAL STATUS  Bed Mobility: Min A   Transfers: Min A   Gait: Min A, 25feet RW   ADL: Mod A UBD, Max A LBD,

## 2020-09-22 NOTE — DISCHARGE NOTE PROVIDER - NSDCMRMEDTOKEN_GEN_ALL_CORE_FT
aspirin 81 mg oral delayed release tablet: 1 tab(s) orally once a day  Azopt 1% ophthalmic suspension: 1 drop(s) in the left eye 2 times a day  Combigan 0.2%-0.5% ophthalmic solution: 1 drop(s) to each affected eye every 12 hours  Coumadin 2.5 mg oral tablet: 3 tab(s) orally once a day  for one day then 2 tabs daily  HAVE REPEAT INR 9/5/2020  dorzolamide 2% ophthalmic solution: 1 drop(s) to each affected eye   furosemide 40 mg oral tablet: 1 tab(s) orally 2 times a day  labetalol 100 mg oral tablet: 1 tab(s) orally every 12 hours  lisinopril 10 mg oral tablet: 1 tab(s) orally once a day -for htn  mesalamine 400 mg oral delayed release capsule: 2 cap(s) orally 2 times a day  montelukast 10 mg oral tablet: 1 tab(s) orally once a day (at bedtime)  Multiple Vitamins oral tablet: 1 tab(s) orally once a day  potassium chloride 10 mEq oral tablet, extended release: 1 tab(s) orally once a day  spironolactone 25 mg oral tablet: 1 tab(s) orally once a day   brinzolamide 1% ophthalmic suspension: 1 drop(s) to each affected eye 2 times a day  Coumadin 2.5 mg oral tablet: 2 tab(s) orally once a day  dorzolamide 2% ophthalmic solution: 1 drop(s) to each affected eye 3 times a day  labetalol 100 mg oral tablet: 1 tab(s) orally 2 times a day  levETIRAcetam 250 mg oral tablet: 1 tab(s) orally every 12 hours  lisinopril 10 mg oral tablet: 1 tab(s) orally once a day -for htn  montelukast 10 mg oral tablet: 1 tab(s) orally once a day (at bedtime)  Multiple Vitamins oral tablet: 1 tab(s) orally once a day

## 2020-09-22 NOTE — DISCHARGE NOTE PROVIDER - HOSPITAL COURSE
Hospital Course  89y Female with PMH HTN, a-fib on coumadin and aspirin, +PPM, CHF (EF 25-30%), recent admission for an NSTEMI, now presenting to [facility] s/p mechanical fall. She was found to have bilateral frontal SAH and +scalp hematoma. Pt also found to have subtherapeutic INR of 3.9, Hyperkalemia of 6.2, MORELIA, MICU consulted for admission for hyperkalemia, q1h neurochecks. During interview with patients and daughter at bedside pt reports having an headache worst around the R. frontal scalp hematoma. Pt otherwise denies any other medical complains. Pt states she cannot recall what happened. Daughter states she did wittiness the fall but heard a loud thump and found her mother on the floor. Pt denies any visual changes, dizziness, photo/phonophibia, neck pain, facial pain, sob, cp, palpitation abd pain, back pain, weakness/numbness in the ext.   (18 Sep 2020 01:32)  repeat head ct 9/20 reveals smll amount of acute subarachnoid hemorrhage in bifrontal lobes, small intraparenchymal hemorrhage and tiny bilateral whole hemispheric sdh essentially unchanged, follow up with neuro regarding when to restart coumadin.  pt does not want any invasive procedures including aneurysm intervention.  follow up EEG-> on Keppra for 2 weeks.     Source of Infection:  Antibiotic / Last Day:    Palliative Care / Advanced Care Planning  Code Status:  Patient/Family agreeable to Hospice/Palliative (Y/N)?  Summary of Goals of Care Conversation:    Discharging Provider:  Shelly Cross NP  Contact Info: [phone] - Please call with any questions or concerns.    Outpatient Provider:    Signout given to  SNF Provider:       Hospital Course  89y Female with PMH HTN, a-fib on coumadin and aspirin, +PPM, CHF (EF 25-30%), recent admission for an NSTEMI, presented on 9/18 s/p mechanical fall. She was found to have bilateral frontal SAH and +scalp hematoma, supratherapeutic INR of 3.9, hyperkalemia of 6.2, and MORELIA. She was admitted to ICU.   In ICU patient was started on labetalol infusion for BP control and keppra for seizure ppx. Aspirin and coumadin were held, she was given vitamin K and Kcentra.   Patient improved and was transferred to floor. She was transitioned to labetalol PO with good BP control until it was ?discontinued on 9/21. Today 9/22 patient hypertensive, treated with IV hydralazine, labetalol was restarted as well as home med lisinopril. Pt asymptomatic. Goal is to have SBP less than 160.   Mental status remained stable. Repeat head CT done on 9/20 revealed small amount of acute subarachnoid hemorrhage in bifrontal lobes, small intraparenchymal hemorrhage and tiny bilateral whole hemispheric SHD-- essentially unchanged from admission.   Pt was cleared to resume coumadin on 9/21. Will resume coumadin tonight, 9/22. INR from 9/22 pending.   Plan to continue keppra for 2 weeks.   Pt accepted to inpatient acute rehab. To be transferred.    Home meds held on discharge: mesalamine, spironolactone, aspirin, furosemide, potassium supplement  New meds started on discharge: Keppra     Palliative Care / Advanced Care Planning  Code Status: DNR/I  Patient/Family agreeable to Hospice/Palliative (Y/N)? YES  Summary of Goals of Care Conversation: DNR/I. No artificial nutrition; Antibiotic trial; IV fluid trial    Discharging Provider:  Shelly Cross NP  Contact Info: [phone] - Please call with any questions or concerns.    Outpatient Provider: Dr. Carroll       Hospital Course  89y Female with PMH HTN, a-fib on coumadin and aspirin, +PPM, CHF (EF 25-30%), recent admission for an NSTEMI, presented on 9/18 s/p mechanical fall. She was found to have bilateral frontal SAH and +scalp hematoma, supratherapeutic INR of 3.9, hyperkalemia of 6.2, and MORELIA. She was admitted to ICU.   In ICU patient was started on labetalol infusion for BP control and keppra for seizure ppx. Aspirin and coumadin were held, she was given vitamin K and Kcentra.   Patient improved and was transferred to floor. She was transitioned to labetalol PO with good BP control until it was ?discontinued on 9/21. Today 9/22 patient hypertensive, treated with IV hydralazine, labetalol was restarted as well as home med lisinopril. Pt asymptomatic. Goal is to have SBP less than 160.   Mental status remained stable. Repeat head CT done on 9/20 revealed small amount of acute subarachnoid hemorrhage in bifrontal lobes, small intraparenchymal hemorrhage and tiny bilateral whole hemispheric SHD-- essentially unchanged from admission.   Cleared to resume coumadin by neuro. Coumadin restarted on 9/22.   Plan to continue keppra for 2 weeks.   Pt accepted to inpatient acute rehab. To be transferred.    Home meds held on discharge: mesalamine, spironolactone, aspirin, furosemide, potassium supplement  New meds started on discharge: Keppra     Palliative Care / Advanced Care Planning  Code Status: DNR/I  Patient/Family agreeable to Hospice/Palliative (Y/N)? YES  Summary of Goals of Care Conversation: DNR/I. No artificial nutrition; Antibiotic trial; IV fluid trial    Discharging Provider:  Shelly Cross NP  Contact Info: [phone] - Please call with any questions or concerns.    Outpatient Provider: Dr. Carroll

## 2020-09-22 NOTE — PROGRESS NOTE ADULT - ASSESSMENT
A/P 89 yr-old woman with a-fib on warfarin, admitted 3 days ago with second syncopal episode this month. Repeat CT head shows resolution of small traumatic SAH. More awake . alert today .    # B/L small traumatic ICH  # Scalp hematoma  neuro following - repeat head ct 9/20 reveals smll amount of acute subarachnoid hemorrhage in bifrontal lobes, small intraparenchymal hemorrhage and tiny bilateral whole hemispheric sdh essentially unchanged, follow up with neuro regarding when to restart coumadin.  pt does not want any invasive procedures including aneurysm intervention, hold of on vessel imaging because of SAH  follow up EEG, orthostatics negative, continue cardiac monitoring  continue keppra for two weeks as SAH can be cause of seizure during acute phase    # Supratherapeutic INR  last inr 1.38 s/p vit k and kcentra, now downgraded from icu  if repeat head ct stable restart coumadin if ok with neuro    # Afib on coumadin  rate controlled on labetalol 100 BID  restart coumadin if repeat head ct stable and ok with primary  continue to monitor INR    Palliative : as f/u , pt more awake, alert today, conversing. Comfortable, sitting up eating and pleasant,  , Only c/o R hand swelling stiffness.   Doppler was negative.  [facility] established  this admission and molst completed.   Pt improving and likely will  go to  [facility] vs [facility], pt/ot evals. pending.
Physical Examination:  GENERAL:               Alert, Oriented x 3 with some confusion, No acute distress    HEENT:                   No JVD, Moist MM, Right forehead hematoma   PULM:                     Bilateral air entry, No Rales, No Rhonchi, No Wheezing  CVS:                         S1, S2,  + Murmur irregular  ABD:                        Soft, nondistended, nontender, normoactive bowel sounds   EXT:                         No edema, nontender  NEURO:                  Alert, oriented, interactive, nonfocal, follows commands  PSYC:                      Calm, + Insight.        Assessment  Syncope Fall with ICH  Coumadin coagulopathy s/p Vit K and kcentra  Platelet dysfunction with ASA use   MORELIA with normal baseline  Underlying HTN, afib on coumadin and ASA, PPM with Systolic CHF, recent NSTEMI     Plan  Neuro checks  Keep SBP < 160  Cont. Oral labetalol  Keppra for seizure ppx  CT as above  Neuro eval appreciated  AC as per neurology recs    Sedation & Analgesia:	on hold  Diet/Nutrition:		Oral diet  GI PPx:			PPI    Activity:		advance as tolerated                 Head of Bed:               35-45  Glycemic Control:        N/a    Dispo: Cont. care on telemetry, no active ccm issues reconsult as needed
Physical Examination:  GENERAL:               Alert, Oriented x 3 with some confusion, No acute distress    HEENT:                   No JVD, Moist MM, Right forehead hematoma   PULM:                     Bilateral air entry, No Rales, No Rhonchi, No Wheezing  CVS:                         S1, S2,  + Murmur irregular  ABD:                        Soft, nondistended, nontender, normoactive bowel sounds   EXT:                         No edema, nontender  NEURO:                  Alert, oriented, interactive, nonfocal, follows commands  PSYC:                      Calm, + Insight.        Assessment  Syncope Fall with ICH  Coumadin coagulopathy s/p Vit K and kcentra  Platelet dysfunction with ASA use   MORELIA with normal baseline  Underlying HTN, afib on coumadin and ASA, PPM with Systolic CHF, recent NSTEMI     Plan  Neuro checks  Keep SBP < 160  Cont. Oral labetalol  Keppra for seizure ppx  Repeat CT head noted today  Neuro eval appreciated  AC as per neurology recs    Sedation & Analgesia:	on hold  Diet/Nutrition:		Oral diet  GI PPx:			PPI    Activity:		advance as tolerated                 Head of Bed:               35-45  Glycemic Control:        N/a    Dispo: Cont. care on telemetry    Goals of Care: DNR/DNI code status  
Physical Examination:  GENERAL:               Alert, Oriented x 3 with some confusion, No acute distress    HEENT:                   No JVD, Moist MM, Right forehead hematoma   PULM:                     Bilateral air entry, No Rales, No Rhonchi, No Wheezing  CVS:                         S1, S2,  + Murmur irregular  ABD:                        Soft, nondistended, nontender, normoactive bowel sounds   EXT:                         No edema, nontender  NEURO:                  Alert, oriented, interactive, nonfocal, follows commands  PSYC:                      Calm, + Insight.        Assessment  Syncope Fall with ICH  Coumadin coagulopathy s/p Vit K and kcentra  Platelet dysfunction with ASA use   MORELIA with normal baseline  Underlying HTN, afib on coumadin and ASA, PPM with Systolic CHF, recent NSTEMI     Plan  Neuro checks  Keep SBP < 160  Start oral labetalol and transition off labetalol infusion  Keppra for seizure ppx  Swallow evaluation    CT head if symptomatic vs AM  Neuro eval appreciated  Passed bedside dysphagia screen, start oral diet    PMD:		Dr Carroll		                   Notified(Date): 9/18  Family Updated:  Florence Douglas	[phone]            Date:  9/19    Sedation & Analgesia:	on hold  Diet/Nutrition:		advance as tolerated  GI PPx:			PPI    DVT Ppx:		Venodynes  	RISK                                                          Points  	[ ] Previous VTE                                           	3  	[ ] Thrombophilia                                        	2  	[ ] Lower limb paralysis                              	2   	[ ] Current Cancer                                       	2   	[ ] Immobilization > 24 hrs                        	1  	[x ] ICU/CCU stay > 24 hours                       	1  	[x ] Age > 60                                                   	1  		Total:[ 2]      Activity:		advance as tolerated                 Head of Bed:               35-45  Glycemic Control:        N/a    Lines: PIV  CENTRAL LINE: 	[ ] YES [ ] NO	                    LOCATION:   	                       DATE INSERTED:   	                    REMOVE:  [ ] YES [ ] NO    A-LINE:  	                [ ] YES [ ] NO                      LOCATION:   	                       DATE INSERTED: 		            REMOVE:  [ ] YES [ ] NO    GANDHI: 		        [ ] YES [ ] NO  		                                       DATE INSERTED:		            REMOVE:  [ ] YES [ ] NO      Restraints were deemed necessary to prevent removal of life-sustaining devices [  ] YES   [    x]  NO    Disposition: ICU care until off labetalol infusion    Goals of Care: DNR/DNI code status  
Physical Examination:  GENERAL:               Alert, Oriented x 3 with some confusion, No acute distress.    HEENT:                   No JVD, Moist MM, Right forehead hematoma,   PULM:                     Bilateral air entry, Clear to auscultation bilaterally, no significant sputum production, No Rales, No Rhonchi, No Wheezing  CVS:                         S1, S2,  + Murmur irregular  ABD:                        Soft, nondistended, nontender, normoactive bowel sounds,   EXT:                         No edema, nontender  NEURO:                  Alert, oriented, interactive, nonfocal, follows commands  PSYC:                      Calm, + Insight.        Assessment  Syncope Fall with ICH  Coumadin coagulopathy s/p Vit K and kcentra  Platelet dysfunction with ASA use   MORELIA with normal baseline  Underlying HTN, afib on coumadin and ASA, PPM with Systolic CHF, recent NSTEMI     Plan  Neuro checks  Keep SBP < 160  Keppra for seizure ppx    CT head if symptomatic vs AM  Neuro eval called    Check US renal  renal eval called      Check UA, r/o UTI   diet as tolerated          PMD:		Dr Carroll		                   Notified(Date): 9/18  Family Updated:  Florence Douglas		                                 Date:  9/18  - Consent for Transfusion obtained  - After discussion with family no surgical intervention wanted      Sedation & Analgesia:	on hold  Diet/Nutrition:		advance as tolerated  GI PPx:			PPI    DVT Ppx:		Venodynes  	RISK                                                          Points  	[ ] Previous VTE                                           	3  	[ ] Thrombophilia                                        	2  	[ ] Lower limb paralysis                              	2   	[ ] Current Cancer                                       	2   	[ ] Immobilization > 24 hrs                        	1  	[x ] ICU/CCU stay > 24 hours                       	1  	[x ] Age > 60                                                   	1  		Total:[ 2]      Activity:		advance as tolerated                 Head of Bed:               35-45  Glycemic Control:        N/a    Lines: PIV  CENTRAL LINE: 	[ ] YES [ ] NO	                    LOCATION:   	                       DATE INSERTED:   	                    REMOVE:  [ ] YES [ ] NO    A-LINE:  	                [ ] YES [ ] NO                      LOCATION:   	                       DATE INSERTED: 		            REMOVE:  [ ] YES [ ] NO    GANDHI: 		        [ ] YES [ ] NO  		                                       DATE INSERTED:		            REMOVE:  [ ] YES [ ] NO      Restraints were deemed necessary to prevent removal of life-sustaining devices [  ] YES   [    x]  NO    Disposition: ICU care    Goals of Care: At this time full code  but palliative care eval for GOC - suspect will lean towards DNR/I   DTR is bedside at this time
Repeat CT head unchanged.....BIU consult .... hold warfarin
Syncope. No evidence of orthostasis.
chart reviewed and patient examined ....ct scans reviewed somewhat worse ..no shift  noted ...inr initially 3.9 and now brought down to 1.28 ...ICU management will discuss with intensivist
events noted if stable in am to discharge to acute rehab
repeat ct head unchanged she is clinically stable  renal function improved with gentle hydration renalonsult seen
neuro note seen to repeat ct haead  ....ask tramatic brain unit to consult

## 2020-09-22 NOTE — PROGRESS NOTE ADULT - NUTRITIONAL ASSESSMENT
This patient has been assessed with a concern for Malnutrition and has been determined to have a diagnosis/diagnoses of Severe protein-calorie malnutrition.    This patient is being managed with:   Diet DASH/TLC-  Sodium & Cholesterol Restricted  Supplement Feeding Modality:  Oral  Ensure Enlive Cans or Servings Per Day:  1       Frequency:  Three Times a day  Entered: Sep 19 2020  4:46PM

## 2020-09-22 NOTE — OCCUPATIONAL THERAPY INITIAL EVALUATION ADULT - GENERAL OBSERVATIONS, REHAB EVAL
Orders Received, chart reviewed, contents noted, RN (Zachariah) conferred. Pt received lyging supine +primafit, +tele monitors, +Left PICC, +right distal edema. Pt tolerated OT evaluation. Pt presented with hypertension (195/92) - covering RN made aware and cleared to continue with evaluation - no c/o of symptoms from pt. Pt left lying supine, all lines intact, NAD, bed alarm activated, and call cedeno within reach. RN Somy made aware after break about blood pressure.

## 2020-09-22 NOTE — PROGRESS NOTE ADULT - PROBLEM SELECTOR PROBLEM 1
Subarachnoid hemorrhage
Syncope and collapse
Syncope and collapse
Subarachnoid hemorrhage

## 2020-09-22 NOTE — PROGRESS NOTE ADULT - PROBLEM SELECTOR PLAN 1
ICU  management will discuss with intensivist
cleared to restart warfarin
neurology follow up
stable
repeat ct head  ask tramatic brain unit to consult

## 2020-09-22 NOTE — H&P ADULT - NSHPLABSRESULTS_GEN_ALL_CORE
RECENT LABS/IMAGING    09-21    136  |  104  |  24<H>  ----------------------------<  96  3.8   |  25  |  0.89    Ca    10.4      21 Sep 2020 07:00      < from: CT Head No Cont (09.20.20 @ 08:47) >    IMPRESSION:    Small amount of acute subarachnoid hemorrhage in the bifrontal lobes, small intraparenchymal hemorrhage in the white matter posterior to the right occipital horn and tiny bilateral whole hemispheric subdural hematomas are essentially unchanged.    < end of copied text >      < from: EEG Extended Monitoring 41-60 Min (09.21.20 @ 13:51) >    Impression: Abnormal record on the basis of mild generalized background disorganization and diffuse slowing. The recording suggests nonspecific diffuse cerebral dysfunction. There are no focal findings or epileptiform discharges. The record is improved when compared to the prior study of 9/18/2020.    < end of copied text > RECENT LABS/IMAGING    09-21    136  |  104  |  24<H>  ----------------------------<  96  3.8   |  25  |  0.89    Ca    10.4      21 Sep 2020 07:00      < from: CT Head No Cont (09.20.20 @ 08:47) >    IMPRESSION:    Small amount of acute subarachnoid hemorrhage in the bifrontal lobes, small intraparenchymal hemorrhage in the white matter posterior to the right occipital horn and tiny bilateral whole hemispheric subdural hematomas are essentially unchanged.    < end of copied text >      < from: EEG Extended Monitoring 41-60 Min (09.21.20 @ 13:51) >    Impression: Abnormal record on the basis of mild generalized background disorganization and diffuse slowing. The recording suggests nonspecific diffuse cerebral dysfunction. There are no focal findings or epileptiform discharges. The record is improved when compared to the prior study of 9/18/2020.    < end of copied text >    < from: TTE Echo Complete w/o Contrast w/ Doppler (08.30.20 @ 08:55) >      Summary:   1. Left ventricular ejection fraction, by visual estimation, is 25 to 30%.   2. Moderately decreased global left ventricular systolic function.   3. Severely enlarged right atrium.   4. Multiple left ventricular regional wall motion abnormalities exist. See wall motion findings.   5. Spectral Doppler shows impaired relaxation pattern of left ventricular myocardial filling (Grade I diastolic dysfunction).   6. There is severe concentric left ventricular hypertrophy.   7. Biatrial enlargement consistent with restrictive physiology.   8. Moderately enlarged left atrium.   9. Mild thickening and calcification of the anterior and posterior mitral valveleaflets.  10. Moderate mitral valve regurgitation.  11. Moderate-severe tricuspid regurgitation.  12. Moderate aortic regurgitation.  13. Sclerotic aortic valve with normal opening.  14. Moderate pulmonic valve regurgitation.  15. Estimated pulmonary artery systolic pressure is 59.6 mmHg assuming a right atrial pressure of 10 mmHg, which is consistent with moderate pulmonary hypertension.  16. The main pulmonary artery is normal in size.  17. LA volume Index is 86.3 ml/m² ml/m2.    < end of copied text > RECENT LABS/IMAGING    09-21    136  |  104  |  24<H>  ----------------------------<  96  3.8   |  25  |  0.89    Ca    10.4      21 Sep 2020 07:00      < from: CT Head No Cont (09.20.20 @ 08:47) >    IMPRESSION:    Small amount of acute subarachnoid hemorrhage in the bifrontal lobes, small intraparenchymal hemorrhage in the white matter posterior to the right occipital horn and tiny bilateral whole hemispheric subdural hematomas are essentially unchanged.    < end of copied text >      < from: EEG Extended Monitoring 41-60 Min (09.21.20 @ 13:51) >    Impression: Abnormal record on the basis of mild generalized background disorganization and diffuse slowing. The recording suggests nonspecific diffuse cerebral dysfunction. There are no focal findings or epileptiform discharges. The record is improved when compared to the prior study of 9/18/2020.    < end of copied text >    < from: TTE Echo Complete w/o Contrast w/ Doppler (08.30.20 @ 08:55) >      Summary:   1. Left ventricular ejection fraction, by visual estimation, is 25 to 30%.   2. Moderately decreased global left ventricular systolic function.   3. Severely enlarged right atrium.   4. Multiple left ventricular regional wall motion abnormalities exist. See wall motion findings.   5. Spectral Doppler shows impaired relaxation pattern of left ventricular myocardial filling (Grade I diastolic dysfunction).   6. There is severe concentric left ventricular hypertrophy.   7. Biatrial enlargement consistent with restrictive physiology.   8. Moderately enlarged left atrium.   9. Mild thickening and calcification of the anterior and posterior mitral valveleaflets.  10. Moderate mitral valve regurgitation.  11. Moderate-severe tricuspid regurgitation.  12. Moderate aortic regurgitation.  13. Sclerotic aortic valve with normal opening.  14. Moderate pulmonic valve regurgitation.  15. Estimated pulmonary artery systolic pressure is 59.6 mmHg assuming a right atrial pressure of 10 mmHg, which is consistent with moderate pulmonary hypertension.  16. The main pulmonary artery is normal in size.  17. LA volume Index is 86.3 ml/m² ml/m2.    < end of copied text >    XAM:  WRIST RIGHT (MINIMUM 3 VIEWS)      PROCEDURE DATE:  09/22/2020        INTERPRETATION:  Right wrist. Patient has local pain after a fall. 3 views.    Arterial calcifications are noted.    There is fairly advanced degeneration in the wrist.    No bone destruction or fracture is evident.    IMPRESSION: Advanced degeneration.

## 2020-09-22 NOTE — H&P ADULT - ASSESSMENT
ASSESSMENT/PLAN  AZRA CHAHAL is a 89yo Female w/ pmh htn, afib on coumadin and asa, +PPM, CHF (EF 25-30%), recent admission for an NSTEMI, presented to [facility] on 9/18 s/p mech fall and found to have b/l frontal SAH and +scalp hematoma. Repeat CT head stable and was resumed on warfarin. Now admitted for multidisciplinary rehab program.       #SAH  -On Ct head 9/20: Small amount of acute subarachnoid hemorrhage in the bifrontal lobes, stable   -No invasive procedures as per family and patient   -EEG negative for seizures   -Continue keppra   -Gait Instability, ADL impairments and Functional impairments: start Comprehensive Rehab Program of PT/OT/SLP    #Afib  -Cleared to resume warfarin as per neuro recs   -INR daily     #CHF  -TTE from 8/30 with EF 25-30%   -Daily weights     #HTN  -Monitor BP   -Not on meds     #Pain Mgmt   - Tylenol PRN     #GI/Bowel Mgmt   - Continent c/w Senna     #/Bladder Mgmt   - Continent, PVR    #FEN   - Diet - Regular + Thins  [CCHO, DASH/TLC]    - Dysphagia  SLP - evaluation and treatment    #Precautions / PROPHYLAXIS:   - Falls, Cardiac, Seizure   - ortho: Weight bearing status: WBAT   - Lungs: Aspiration   - Pressure injury/Skin: OOB to Chair, PT/OT    - DVT: Coumadin         MEDICAL PROGNOSIS: GOOD                                   REHAB POTENTIAL: GOOD  ESTIMATED DISPOSITION: HOME                             ELOS: 10-14 Days   EXPECTED THERAPY:     P.T. 1hr/day       O.T. 1hr/day      S.L.P. 1hr/day      EXP FREQUENCY: 5 days per 7 day period     PRESCREEN COMPARISON: I have reviewed the prescreen information and I have found no relevant changes between the preadmission screening and my post admission evaluation     RATIONALE FOR INPATIENT ADMISSION - Patient demonstrates the following: (check all that apply)  [X] Medically appropriate for rehabilitation admission  [X] Has attainable rehab goals with an appropriate initial discharge plan  [X] Has rehabilitation potential (expected to make a significant improvement within a reasonable period of time)   [X] Requires close medical managment by a rehab physician, rehab nursing care, Hospitalist and comprehensive interdisciplinary team (including PT, OT, & or SLP, Prosthetics and Orthotics)     ASSESSMENT/PLAN  AZRA CHAHAL is a 89yo Female w/ pmh htn, afib on coumadin and asa, +PPM, CHF (EF 25-30%), recent admission for an NSTEMI, presented to [facility] on 9/18 s/p mech fall and found to have b/l frontal SAH and +scalp hematoma. Repeat CT head stable and was resumed on warfarin. Now admitted for multidisciplinary rehab program.       #SAH  -On Ct head 9/20: Small amount of acute subarachnoid hemorrhage in the bifrontal lobes, stable   -No invasive procedures as per family and patient, patient is DNR/DNI   -EEG negative for seizures   -Continue keppra   -Gait Instability, ADL impairments and Functional impairments: start Comprehensive Rehab Program of PT/OT/SLP    #Afib  -Cleared to resume warfarin as per neuro recs   -INR daily     #CHF  -TTE from 8/30 with EF 25-30%   -Daily weights     #HTN  -Monitor BP   -Continue lisinopril and labetalol     #Pain Mgmt   - Tylenol PRN     #GI/Bowel Mgmt   - Continent c/w Senna PRN     #/Bladder Mgmt   - Continent, PVR    #FEN   - Diet - Regular + Thins  [CCHO, DASH/TLC]    - Dysphagia  SLP - evaluation and treatment    #Precautions / PROPHYLAXIS:   - Falls, Cardiac, Seizure   - ortho: Weight bearing status: WBAT   - Lungs: Aspiration   - Pressure injury/Skin: OOB to Chair, PT/OT    - DVT: Coumadin   -Code: DNR/DNI       MEDICAL PROGNOSIS: GOOD                                   REHAB POTENTIAL: GOOD  ESTIMATED DISPOSITION: HOME                             ELOS: 10-14 Days   EXPECTED THERAPY:     P.T. 1hr/day       O.T. 1hr/day      S.L.P. 1hr/day      EXP FREQUENCY: 5 days per 7 day period     PRESCREEN COMPARISON: I have reviewed the prescreen information and I have found no relevant changes between the preadmission screening and my post admission evaluation     RATIONALE FOR INPATIENT ADMISSION - Patient demonstrates the following: (check all that apply)  [X] Medically appropriate for rehabilitation admission  [X] Has attainable rehab goals with an appropriate initial discharge plan  [X] Has rehabilitation potential (expected to make a significant improvement within a reasonable period of time)   [X] Requires close medical managment by a rehab physician, rehab nursing care, Hospitalist and comprehensive interdisciplinary team (including PT, OT, & or SLP, Prosthetics and Orthotics)     ASSESSMENT/PLAN  AZRA CHAHAL is a 89yo Female w/ pmh htn, afib on coumadin and asa, +PPM, CHF (EF 25-30%), recent admission for an NSTEMI, presented to [facility] on 9/18 s/p mech fall and found to have b/l frontal SAH and +scalp hematoma. Repeat CT head stable and was resumed on warfarin. Now admitted for multidisciplinary rehab program.       #SAH  -On Ct head 9/20: Small amount of acute subarachnoid hemorrhage in the bifrontal lobes, stable   -No invasive procedures as per family and patient, patient is DNR/DNI   -EEG negative for seizures   -Continue keppra (continue for 2 weeks)   -Gait Instability, ADL impairments and Functional impairments: start Comprehensive Rehab Program of PT/OT/SLP    #Afib  -Cleared to resume warfarin as per neuro recs   -INR daily   -Coumadin 2.5mg     #CHF  -TTE from 8/30 with EF 25-30%   -Daily weights     #HTN  -Monitor BP   -Continue lisinopril and labetalol     #Pain Mgmt   - Tylenol PRN     #GI/Bowel Mgmt   - Continent c/w Senna PRN     #/Bladder Mgmt   - Continent, PVR    #FEN   - Diet - Regular + Thins  [CCHO, DASH/TLC]    - Dysphagia  SLP - evaluation and treatment    #Precautions / PROPHYLAXIS:   - Falls, Cardiac, Seizure   - ortho: Weight bearing status: WBAT   - Lungs: Aspiration   - Pressure injury/Skin: OOB to Chair, PT/OT    - DVT: Coumadin   -Code: DNR/DNI       MEDICAL PROGNOSIS: GOOD                                   REHAB POTENTIAL: GOOD  ESTIMATED DISPOSITION: HOME                             ELOS: 10-14 Days   EXPECTED THERAPY:     P.T. 1hr/day       O.T. 1hr/day      S.L.P. 1hr/day      EXP FREQUENCY: 5 days per 7 day period     PRESCREEN COMPARISON: I have reviewed the prescreen information and I have found no relevant changes between the preadmission screening and my post admission evaluation     RATIONALE FOR INPATIENT ADMISSION - Patient demonstrates the following: (check all that apply)  [X] Medically appropriate for rehabilitation admission  [X] Has attainable rehab goals with an appropriate initial discharge plan  [X] Has rehabilitation potential (expected to make a significant improvement within a reasonable period of time)   [X] Requires close medical managment by a rehab physician, rehab nursing care, Hospitalist and comprehensive interdisciplinary team (including PT, OT, & or SLP, Prosthetics and Orthotics)     ASSESSMENT/PLAN  AZRA CHAHAL is a 89yo Female w/ pmh htn, afib on coumadin and asa, +PPM, CHF (EF 25-30%), recent admission for an NSTEMI, presented to [facility] on 9/18 s/p mech fall and found to have b/l frontal SAH and +scalp hematoma. Repeat CT head stable and was resumed on warfarin. Now admitted for multidisciplinary rehab program.       #SAH  -On Ct head 9/20: Small amount of acute subarachnoid hemorrhage in the bifrontal lobes, stable   -No invasive procedures as per family and patient, patient is DNR/DNI   -EEG negative for seizures   -Continue keppra (continue for 2 weeks)   -Gait Instability, ADL impairments and Functional impairments: start Comprehensive Rehab Program of PT/OT/SLP    #Afib  -Cleared to resume warfarin as per neuro recs   -INR daily   -Coumadin 2.5mg     #CHF  -TTE from 8/30 with EF 25-30%   -Daily weights     #HTN  -Monitor BP, SBP goal <160   -Continue lisinopril and labetalol     #MORELIA (resolved)  - SONO w/o hydro, b/l small kidneys  -nephro was following   -Monitor BMP     #Pain Mgmt   - Tylenol PRN     #GI/Bowel Mgmt   - Continent c/w Senna PRN     #/Bladder Mgmt   - Continent, PVR    #FEN   - Diet - Regular + Thins  [CCHO, DASH/TLC]    - Dysphagia  SLP - evaluation and treatment    #Precautions / PROPHYLAXIS:   - Falls, Cardiac, Seizure   - ortho: Weight bearing status: WBAT   - Lungs: Aspiration   - Pressure injury/Skin: OOB to Chair, PT/OT    - DVT: Coumadin   -Code: DNR/DNI       MEDICAL PROGNOSIS: GOOD                                   REHAB POTENTIAL: GOOD  ESTIMATED DISPOSITION: HOME                             ELOS: 10-14 Days   EXPECTED THERAPY:     P.T. 1hr/day       O.T. 1hr/day      S.L.P. 1hr/day      EXP FREQUENCY: 5 days per 7 day period     PRESCREEN COMPARISON: I have reviewed the prescreen information and I have found no relevant changes between the preadmission screening and my post admission evaluation     RATIONALE FOR INPATIENT ADMISSION - Patient demonstrates the following: (check all that apply)  [X] Medically appropriate for rehabilitation admission  [X] Has attainable rehab goals with an appropriate initial discharge plan  [X] Has rehabilitation potential (expected to make a significant improvement within a reasonable period of time)   [X] Requires close medical managment by a rehab physician, rehab nursing care, Hospitalist and comprehensive interdisciplinary team (including PT, OT, & or SLP, Prosthetics and Orthotics)

## 2020-09-22 NOTE — H&P ADULT - NSICDXFAMILYHX_GEN_ALL_CORE_FT
FAMILY HISTORY:  No pertinent family history in first degree relatives     FAMILY HISTORY:  FH: diabetes mellitus

## 2020-09-22 NOTE — H&P ADULT - NSHPREVIEWOFSYSTEMS_GEN_ALL_CORE
REVIEW OF SYSTEMS  Constitutional: No fever, No Chills, No fatigue  HEENT: No eye pain, No visual disturbances, No difficulty hearing  Pulm: No cough,  No shortness of breath  Cardio: No chest pain, No palpitations  GI:  No abdominal pain, No nausea, No vomiting, No diarrhea, No constipation  : No dysuria, No frequency, No hematuria  Neuro: No headaches, No memory loss, No loss of strength, No numbness, No tremors  Skin: No itching, No rashes, No lesions   Endo: No temperature intolerance  MSK: No joint pain, No joint swelling, No muscle pain, No Neck or back pain  Psych:  No depression, No anxiety REVIEW OF SYSTEMS  Constitutional: No fever, No Chills, No fatigue  HEENT: No eye pain, No visual disturbances, +Hard of hearing--has hearing aides at home  Pulm: No cough,  No shortness of breath  Cardio: No chest pain, No palpitations  GI:  No abdominal pain, No nausea, No vomiting, No diarrhea, No constipation  : No dysuria, No frequency, No hematuria  Neuro: No headaches, + memory loss, + loss of strength, No numbness, No tremors  Skin: No itching, No rashes, No lesions   Endo: No temperature intolerance  MSK: + joint pain, +right wrist joint swelling, + muscle pain, No Neck or back pain  Psych:  No depression, No anxiety

## 2020-09-23 NOTE — H&P ADULT - NSHPLABSRESULTS_GEN_ALL_CORE
RECENT LABS/IMAGING    09-21    136  |  104  |  24<H>  ----------------------------<  96  3.8   |  25  |  0.89    Ca    10.4      21 Sep 2020 07:00      < from: CT Head No Cont (09.20.20 @ 08:47) >    IMPRESSION:    Small amount of acute subarachnoid hemorrhage in the bifrontal lobes, small intraparenchymal hemorrhage in the white matter posterior to the right occipital horn and tiny bilateral whole hemispheric subdural hematomas are essentially unchanged.    < end of copied text >      < from: EEG Extended Monitoring 41-60 Min (09.21.20 @ 13:51) >    Impression: Abnormal record on the basis of mild generalized background disorganization and diffuse slowing. The recording suggests nonspecific diffuse cerebral dysfunction. There are no focal findings or epileptiform discharges. The record is improved when compared to the prior study of 9/18/2020.    < end of copied text >    < from: TTE Echo Complete w/o Contrast w/ Doppler (08.30.20 @ 08:55) >      Summary:   1. Left ventricular ejection fraction, by visual estimation, is 25 to 30%.   2. Moderately decreased global left ventricular systolic function.   3. Severely enlarged right atrium.   4. Multiple left ventricular regional wall motion abnormalities exist. See wall motion findings.   5. Spectral Doppler shows impaired relaxation pattern of left ventricular myocardial filling (Grade I diastolic dysfunction).   6. There is severe concentric left ventricular hypertrophy.   7. Biatrial enlargement consistent with restrictive physiology.   8. Moderately enlarged left atrium.   9. Mild thickening and calcification of the anterior and posterior mitral valveleaflets.  10. Moderate mitral valve regurgitation.  11. Moderate-severe tricuspid regurgitation.  12. Moderate aortic regurgitation.  13. Sclerotic aortic valve with normal opening.  14. Moderate pulmonic valve regurgitation.  15. Estimated pulmonary artery systolic pressure is 59.6 mmHg assuming a right atrial pressure of 10 mmHg, which is consistent with moderate pulmonary hypertension.  16. The main pulmonary artery is normal in size.  17. LA volume Index is 86.3 ml/m² ml/m2.    < end of copied text >    XAM:  WRIST RIGHT (MINIMUM 3 VIEWS)      PROCEDURE DATE:  09/22/2020        INTERPRETATION:  Right wrist. Patient has local pain after a fall. 3 views.    Arterial calcifications are noted.    There is fairly advanced degeneration in the wrist.    No bone destruction or fracture is evident.    IMPRESSION: Advanced degeneration.

## 2020-09-23 NOTE — PROGRESS NOTE ADULT - SUBJECTIVE AND OBJECTIVE BOX
Patient is a 89y old  Female who presents with a chief complaint of SAH  SAH  02.22 Traumatic, Close Injury (23 Sep 2020 15:56)      INTERVAL HPI/OVERNIGHT EVENTS: alert      REVIEW OF SYSTEMS:  CONSTITUTIONAL: No fever, weight loss, or fatigue  EYES: No eye pain, visual disturbances, or discharge  ENMT:  No difficulty hearing, tinnitus, vertigo; No sinus or throat pain  NECK: No pain or stiffness  BREASTS: No pain, masses, or nipple discharge  RESPIRATORY: No cough, wheezing, chills or hemoptysis; No shortness of breath  CARDIOVASCULAR: No chest pain, palpitations, dizziness, or leg swelling  GASTROINTESTINAL: No abdominal or epigastric pain. No nausea, vomiting, or hematemesis; No diarrhea or constipation. No melena or hematochezia.  GENITOURINARY: No dysuria, frequency, hematuria, or incontinence  NEUROLOGICAL: No headaches, memory loss, loss of strength, numbness, or tremors  SKIN: No itching, burning, rashes, or lesions   LYMPH NODES: No enlarged glands  ENDOCRINE: No heat or cold intolerance; No hair loss  MUSCULOSKELETAL: No joint pain or swelling; No muscle, back, or extremity pain  PSYCHIATRIC: No depression, anxiety, mood swings, or difficulty sleeping  HEME/LYMPH: No easy bruising, or bleeding gums  ALLERY AND IMMUNOLOGIC: No hives or eczema  FAMILY HISTORY:  FH: diabetes mellitus      T(C): 36.6 (09-23-20 @ 12:17), Max: 37 (09-23-20 @ 05:47)  HR: 84 (09-23-20 @ 18:53) (83 - 107)  BP: 107/53 (09-23-20 @ 18:53) (107/53 - 154/69)  RR: 18 (09-23-20 @ 12:17) (18 - 19)  SpO2: 96% (09-23-20 @ 18:53) (95% - 99%)  Wt(kg): --Vital Signs Last 24 Hrs  T(C): 36.6 (23 Sep 2020 12:17), Max: 37 (23 Sep 2020 05:47)  T(F): 97.9 (23 Sep 2020 12:17), Max: 98.6 (23 Sep 2020 05:47)  HR: 84 (23 Sep 2020 18:53) (83 - 107)  BP: 107/53 (23 Sep 2020 18:53) (107/53 - 154/69)  BP(mean): --  RR: 18 (23 Sep 2020 12:17) (18 - 19)  SpO2: 96% (23 Sep 2020 18:53) (95% - 99%)    T(F): 97.9 (09-23-20 @ 12:17), Max: 99.1 (09-22-20 @ 00:08)  HR: 84 (09-23-20 @ 18:53) (60 - 107)  BP: 107/53 (09-23-20 @ 18:53) (107/53 - 200/86)  RR: 18 (09-23-20 @ 12:17) (16 - 23)  SpO2: 96% (09-23-20 @ 18:53) (95% - 100%)    PHYSICAL EXAM:  GENERAL: NAD, well-groomed, well-developed  HEAD:  Atraumatic, Normocephalic  EYES: EOMI, PERRLA, conjunctiva and sclera clear  ENMT: No tonsillar erythema, exudates, or enlargement; Moist mucous membranes, Good dentition, No lesions  NECK: Supple, No JVD, Normal thyroid  NERVOUS SYSTEM:  Alert & Oriented X3, Good concentration; Motor Strength 5/5 B/L upper and lower extremities; DTRs 2+ intact and symmetric  CHEST/LUNG: Clear to percussion bilaterally; No rales, rhonchi, wheezing, or rubs  HEART: Regular rate and rhythm; No murmurs, rubs, or gallops  ABDOMEN: Soft, Nontender, Nondistended; Bowel sounds present  EXTREMITIES:  2+ Peripheral Pulses, No clubbing, cyanosis, or edema  LYMPH: No lymphadenopathy noted  SKIN: No rashes or lesions    Consultant(s) Notes Reviewed:  [x ] YES  [ ] NO  Care Discussed with Consultants/Other Providers [ x] YES  [ ] NO    LABS:  09-23    135  |  102  |  28<H>  ----------------------------<  106<H>  4.0   |  25  |  0.95    Ca    10.6<H>      23 Sep 2020 06:10            PT/INR - ( 23 Sep 2020 07:50 )   PT: 14.4 sec;   INR: 1.20 ratio                              12.3   7.18  )-----------( 158      ( 09-20 @ 06:00 )             36.9                12.0   8.28  )-----------( 164      ( 09-19 @ 05:00 )             37.2                12.9   9.39  )-----------( 138      ( 09-18 @ 04:45 )             39.1                13.3   6.51  )-----------( 147      ( 09-17 @ 23:45 )             40.6                14.2   8.13  )-----------( 150      ( 09-01 @ 06:00 )             44.7                13.3   6.37  )-----------( 128      ( 08-31 @ 06:22 )             42.4                15.5   8.96  )-----------( 185      ( 08-30 @ 03:24 )             47.9               RADIOLOGY & ADDITIONAL TESTS:    Imaging Personally Reviewed:  [ ] YES  [ ] NO  acetaminophen   Tablet .. 650 milliGRAM(s) Oral every 6 hours PRN  dorzolamide 2% Ophthalmic Solution 1 Drop(s) Left EYE three times a day  labetalol 100 milliGRAM(s) Oral two times a day  levETIRAcetam 250 milliGRAM(s) Oral every 12 hours  lisinopril 10 milliGRAM(s) Oral daily  montelukast 10 milliGRAM(s) Oral at bedtime  multivitamin 1 Tablet(s) Oral daily  senna 2 Tablet(s) Oral at bedtime PRN  warfarin 2.5 milliGRAM(s) Oral at bedtime      HEALTH ISSUES - PROBLEM Dx:

## 2020-09-23 NOTE — PROGRESS NOTE ADULT - ASSESSMENT
chart reviewed and patient examined  s/p head trama with subarachnoid hemorrhage ......chronic afib admitted elevated INR.....cleared by neurology to restart warfarin  ...right hand tender and swollem c/w cellulitis at IV site agree with po antibiotic

## 2020-09-23 NOTE — H&P ADULT - NSICDXPASTMEDICALHX_GEN_ALL_CORE_FT
PAST MEDICAL HISTORY:  Acid reflux disease     Atrial fibrillation     HTN (hypertension), benign     Pacemaker     Transient cerebral ischemia, unspecified transient cerebral ischemia type     
PAST MEDICAL HISTORY:  Acid reflux disease     Atrial fibrillation     HTN (hypertension), benign     Pacemaker     Transient cerebral ischemia, unspecified transient cerebral ischemia type

## 2020-09-23 NOTE — H&P ADULT - NSHPPHYSICALEXAM_GEN_ALL_CORE
Gen - NAD, Comfortable  HEENT - NCAT, EOMI, MMM, [place]  Neck - Supple,  Pulm - CTAB, No wheeze, No rhonchi, No crackles  Cardiovascular - RRR, S1S2, No murmurs  Abdomen - Soft, NT/ND, +BS  Extremities - No C/C/E, No calf tenderness  Neuro-     Cognitive - AAOx2  --knows she is in hospital but not name     Communication - Fluent, No dysarthria     Attention: Able to complete Days of week backward,  Unable to complete serial 7's     Judgement: Impaired     Memory: Recall 0/3 after 3 min -- 3/3 with cat. cues      Cranial Nerves - CN 2-12 intact except hearing     Motor -                     LEFT    UE - limited due to pain-- ShAB 3-/5, EF 4/5, EE 4/5, WE 2/5,  2/5                    RIGHT UE - ShAB 5/5, EF 5/5, EE 5/5, WE 5/5,  5/5                    LEFT    LE - HF 5/5, KE 5/5, DF 5/5, PF 5/5                    RIGHT LE - HF 4/5, KE 5/5, DF 5/5, PF 5/5        Sensory - Intact to LT bilat    Proprioception impaired on right LE     Reflexes - symmetric bilat     Coordination - FTN & HTS impaired on right, intact on left     Tone - normal  Psychiatric - Mood stable, Affect WNL  Skin:  all skin intact    Wounds: None Present Gen - NAD, Comfortable  HEENT - NCAT, EOMI, MMM, [place]  Neck - Supple,  Pulm - CTAB, No wheeze, No rhonchi, No crackles  Cardiovascular - RRR, S1S2, No murmurs  Abdomen - Soft, NT/ND, +BS  Extremities - No C/C/E, No calf tenderness  Neuro-     Cognitive - AAOx2  --knows she is in hospital but not name     Communication - Fluent, No dysarthria     Attention: Able to complete Days of week backward,  Unable to complete serial 7's     Judgement: Impaired     Memory: Recall 0/3 after 3 min -- 3/3 with cat. cues      Cranial Nerves - CN 2-12 intact except hearing     Motor -                     LEFT    UE - limited due to pain-- ShAB 3-/5, EF 4/5, EE 4/5, WE 2/5,  2/5                    RIGHT UE - ShAB 5/5, EF 5/5, EE 5/5, WE 5/5,  5/5                    LEFT    LE - HF 5/5, KE 5/5, DF 5/5, PF 5/5                    RIGHT LE - HF 4/5, KE 5/5, DF 5/5, PF 5/5        Sensory - Intact to LT bilat    Proprioception impaired on right LE     Reflexes - symmetric bilat     Coordination - FTN & HTS impaired on right, intact on left     Tone - normal    MSK: right wrist, and index and middle PIP swelling and tenderness.  Pain with ROM        Right shoulder AROM limited due to pain to 130deg,  PROM WNL with some pain at end range.  +simpson    Psychiatric - Mood stable, Affect WNL  Skin:  all skin intact    Wounds: None Present

## 2020-09-23 NOTE — H&P ADULT - ATTENDING COMMENTS
Pt. seen and examined on admission.  Agree with documentation above  with amendments made as appropriate. Patient medically stable. Appropriate for acute rehabilitation.

## 2020-09-23 NOTE — H&P ADULT - NSHPREVIEWOFSYSTEMS_GEN_ALL_CORE
REVIEW OF SYSTEMS  Constitutional: No fever, No Chills, No fatigue  HEENT: No eye pain, No visual disturbances, +Hard of hearing--has hearing aides at home  Pulm: No cough,  No shortness of breath  Cardio: No chest pain, No palpitations  GI:  No abdominal pain, No nausea, No vomiting, No diarrhea, No constipation  : No dysuria, No frequency, No hematuria  Neuro: No headaches, + memory loss, + loss of strength, No numbness, No tremors  Skin: No itching, No rashes, No lesions   Endo: No temperature intolerance  MSK: + joint pain, +right wrist joint swelling, + muscle pain, No Neck or back pain  Psych:  No depression, No anxiety

## 2020-09-23 NOTE — H&P ADULT - ASSESSMENT
ASSESSMENT/PLAN  AZRA CHAHAL is a 89yo Female w/ pmh htn, afib on coumadin and asa, +PPM, CHF (EF 25-30%), recent admission for an NSTEMI, presented to [facility] on 9/18 s/p mech fall and found to have b/l frontal SAH and +scalp hematoma. Repeat CT head stable and was resumed on warfarin. Now admitted for multidisciplinary rehab program.       #SAH  -On Ct head 9/20: Small amount of acute subarachnoid hemorrhage in the bifrontal lobes, stable   -No invasive procedures as per family and patient, patient is DNR/DNI   -EEG negative for seizures   -Continue keppra (continue for 2 weeks)   -Gait Instability, ADL impairments and Functional impairments: start Comprehensive Rehab Program of PT/OT/SLP    #Afib  -Cleared to resume warfarin as per neuro recs   -INR daily   -Coumadin 2.5mg     #CHF  -TTE from 8/30 with EF 25-30%   -Daily weights     #HTN  -Monitor BP, SBP goal <160   -Continue lisinopril and labetalol     #MORELIA (resolved)  - SONO w/o hydro, b/l small kidneys  -nephro was following   -Monitor BMP     #Pain Mgmt   - Tylenol PRN     #GI/Bowel Mgmt   - Continent c/w Senna PRN     #/Bladder Mgmt   - Continent, PVR    #FEN   - Diet - Regular + Thins  [CCHO, DASH/TLC]    - Dysphagia  SLP - evaluation and treatment    #Precautions / PROPHYLAXIS:   - Falls, Cardiac, Seizure   - ortho: Weight bearing status: WBAT   - Lungs: Aspiration   - Pressure injury/Skin: OOB to Chair, PT/OT    - DVT: Coumadin   -Code: DNR/DNI       MEDICAL PROGNOSIS: GOOD                                   REHAB POTENTIAL: GOOD  ESTIMATED DISPOSITION: HOME                             ELOS: 10-14 Days   EXPECTED THERAPY:     P.T. 1hr/day       O.T. 1hr/day      S.L.P. 1hr/day      EXP FREQUENCY: 5 days per 7 day period     PRESCREEN COMPARISON: I have reviewed the prescreen information and I have found no relevant changes between the preadmission screening and my post admission evaluation     RATIONALE FOR INPATIENT ADMISSION - Patient demonstrates the following: (check all that apply)  [X] Medically appropriate for rehabilitation admission  [X] Has attainable rehab goals with an appropriate initial discharge plan  [X] Has rehabilitation potential (expected to make a significant improvement within a reasonable period of time)   [X] Requires close medical managment by a rehab physician, rehab nursing care, Hospitalist and comprehensive interdisciplinary team (including PT, OT, & or SLP, Prosthetics and Orthotics) ASSESSMENT/PLAN  AZRA CHAHAL is a 89yo Female w/ pmh htn, afib on coumadin and asa, +PPM, CHF (EF 25-30%), recent admission for an NSTEMI, presented to [facility] on 9/18 s/p mech fall and found to have b/l frontal SAH and +scalp hematoma. Repeat CT head stable and was resumed on warfarin. Now admitted for multidisciplinary rehab program.       #SAH  -On Ct head 9/20: Small amount of acute subarachnoid hemorrhage in the bifrontal lobes, stable   -No invasive procedures as per family and patient, patient is DNR/DNI   -EEG negative for seizures   -7 day keppra seizure ppx-- will be completed after today  -Gait Instability, ADL impairments and Functional impairments: start Comprehensive Rehab Program of PT/OT/SLP    #Afib  -Cleared to resume warfarin as per neuro recs   -INR daily   -Coumadin 2.5mg     Right wrist Sprain  --xray neg for fracture  --ICE and tylenol PRN.      right shoulder pain  --likely RTC tendinitis  --Pain control PRN    Sleep  --melatonin PRN    #CHF  -TTE from 8/30 with EF 25-30%   -Daily weights     #HTN  -Monitor BP, SBP goal <160   -Continue lisinopril and labetalol     #MORELIA (resolved)  - SONO w/o hydro, b/l small kidneys  -nephro was following   -Monitor BMP     #Pain Mgmt   - Tylenol PRN     #GI/Bowel Mgmt   - Continent c/w Senna PRN     #/Bladder Mgmt   - Continent, PVR    #FEN   - Diet - Regular + Thins  [CCHO, DASH/TLC]    - Dysphagia  SLP - evaluation and treatment    #Precautions / PROPHYLAXIS:   - Falls, Cardiac, Seizure   - ortho: Weight bearing status: WBAT   - Lungs: Aspiration   - Pressure injury/Skin: OOB to Chair, PT/OT    - DVT: Coumadin   -Code: DNR/DNI       MEDICAL PROGNOSIS: GOOD                                   REHAB POTENTIAL: GOOD  ESTIMATED DISPOSITION: HOME                             ELOS: 10-14 Days   EXPECTED THERAPY:     P.T. 1hr/day       O.T. 1hr/day      S.L.P. 1hr/day      EXP FREQUENCY: 5 days per 7 day period     PRESCREEN COMPARISON: I have reviewed the prescreen information and I have found no relevant changes between the preadmission screening and my post admission evaluation     RATIONALE FOR INPATIENT ADMISSION - Patient demonstrates the following: (check all that apply)  [X] Medically appropriate for rehabilitation admission  [X] Has attainable rehab goals with an appropriate initial discharge plan  [X] Has rehabilitation potential (expected to make a significant improvement within a reasonable period of time)   [X] Requires close medical managment by a rehab physician, rehab nursing care, Hospitalist and comprehensive interdisciplinary team (including PT, OT, & or SLP, Prosthetics and Orthotics) ASSESSMENT/PLAN  AZRA CHAHAL is a 89yo Female w/ pmh htn, afib on coumadin and asa, +PPM, CHF (EF 25-30%), recent admission for an NSTEMI, [facility] s/p mech fall and found to have b/l frontal SAH and +scalp hematoma. Repeat CT head stable and was resumed on warfarin. Now admitted for multidisciplinary rehab program.       #SAH  -On Ct head 9/20: Small amount of acute subarachnoid hemorrhage in the bifrontal lobes, stable   -No invasive procedures as per family and patient, patient is DNR/DNI   -EEG negative for seizures   -7 day keppra seizure ppx-- will be completed after today  -Gait Instability, ADL impairments and Functional impairments: start Comprehensive Rehab Program of PT/OT/SLP    #Afib  -Cleared to resume warfarin as per neuro recs   -INR daily   -Coumadin 2.5mg tonight  --Pt. received 5mg last night and was on 2mg at home as per ED recs.     Right wrist Sprain  --xray neg for fracture  --ICE and tylenol PRN.      right shoulder pain  --likely RTC tendinitis  --Pain control PRN    Sleep  --melatonin PRN    #CHF  -TTE from 8/30 with EF 25-30%   -Daily weights     #HTN  -Monitor BP, SBP goal <160   -Continue lisinopril and labetalol     #MORELIA (resolved)  - SONO w/o hydro, b/l small kidneys  -nephro was following   -Monitor BMP     #Pain Mgmt   - Tylenol PRN     #GI/Bowel Mgmt   - Continent c/w Senna PRN     #/Bladder Mgmt   - Continent, PVR    #FEN   - Diet - Regular + Thins  [CCHO, DASH/TLC]    - Dysphagia  SLP - evaluation and treatment    #Precautions / PROPHYLAXIS:   - Falls, Cardiac, Seizure   - ortho: Weight bearing status: WBAT   - Lungs: Aspiration   - Pressure injury/Skin: OOB to Chair, PT/OT    - DVT: Coumadin   -Code: DNR/DNI       MEDICAL PROGNOSIS: GOOD                                   REHAB POTENTIAL: GOOD  ESTIMATED DISPOSITION: HOME                             ELOS: 10-14 Days   EXPECTED THERAPY:     P.T. 1hr/day       O.T. 1hr/day      S.L.P. 1hr/day      EXP FREQUENCY: 5 days per 7 day period     PRESCREEN COMPARISON: I have reviewed the prescreen information and I have found no relevant changes between the preadmission screening and my post admission evaluation     RATIONALE FOR INPATIENT ADMISSION - Patient demonstrates the following: (check all that apply)  [X] Medically appropriate for rehabilitation admission  [X] Has attainable rehab goals with an appropriate initial discharge plan  [X] Has rehabilitation potential (expected to make a significant improvement within a reasonable period of time)   [X] Requires close medical managment by a rehab physician, rehab nursing care, Hospitalist and comprehensive interdisciplinary team (including PT, OT, & or SLP, Prosthetics and Orthotics)

## 2020-09-23 NOTE — H&P ADULT - NSICDXPASTSURGICALHX_GEN_ALL_CORE_FT
PAST SURGICAL HISTORY:  History of cataract surgery     
PAST SURGICAL HISTORY:  History of cataract surgery

## 2020-09-23 NOTE — CHART NOTE - NSCHARTNOTEFT_GEN_A_CORE
Reviewed prior progress notes, labs and imaging.    To be discussed with Dr. Carroll    Primary Diagnosis: Syncope fall with ICH, coumadin coagulopathy s/p vit k and kcentra, platelet dysfunction,  MORELIA    89 female with PMH HTN, a-fib on coumadin, PPM, CHF (EF 25-30%), recent admission for NSTEMI now presenting s/p mechanical fall found to have bilateral frontal SAH and scalp hematoma, also with supratherapeutic inr 3.9, hyperkalemia 6.2, MORELIA.     #B/L small traumatic ICH  #Scalp hematoma  - Neuro following - repeat head CT 9/20 unchanged, cleared to resume warfarin   - continue keppra for 2 weeks (started 9/18)    # HTN  - BPs improved  - Goal SBP less than 160  - Home meds include labetatalol, spironolactone, lisinopril, furosemide  - Will restart labetalol, lisinopril     # Afib on coumadin  - Continue labetalol  - Continue coumadin with daily INR    # MORELIA  # Hyperkalemia  - resolved     # Chronic Systolic CHF   # PPM  # Recent NSTEMI  - trops negative, no signs of ACS  - appears euvolemic    Dispo: DC to acute rehab today

## 2020-09-23 NOTE — CHART NOTE - NSCHARTNOTESELECT_GEN_ALL_CORE
Malnutrition Notification
Event Note
Off Service Note

## 2020-09-23 NOTE — H&P ADULT - HISTORY OF PRESENT ILLNESS
89 y/o F w/ pmh htn, afib on coumadin and asa, +PPM, CHF (EF 25-30%), recent admission for an NSTEMI, presented to [facility] on 9/17 s/p mech fall and found to have b/l frontal SAH and +scalp hematoma. Pt also found to have supratherapeutic INR of 3.9, Hyperkalemia of 6.2, MORELIA, MICU consulted for admission for hyperkalemia, q1h neurochecks. During interview with patients and daughter at bedside pt reported having a headache worst around the R. frontal scalp hematoma. Pt otherwise denied any other medical complains. Pt stated she cannot recall what happened. Daughter stated she did not witness the fall but heard a loud thump and found her mother on the floor. Pt denied any visual changes, dizziness, photo/phonophibia, neck pain, facial pain, sob, cp, palpitation abd pain, back pain, weakness/numbness in the ext.      In ICU patient was started on labetalol infusion for BP control and keppra for seizure ppx. Aspirin and coumadin were held, she was given vitamin K and Kcentra. Patient improved and was transferred to floor. She was transitioned to labetalol PO with good BP control until it was discontinued on 9/21.     Repeat head ct 9/20 revealed small amount of acute subarachnoid hemorrhage in bifrontal lobes, small intraparenchymal hemorrhage and tiny bilateral whole hemispheric sdh essentially unchanged. Pt does not want any invasive procedures including aneurysm intervention. As per neuro, warfarin was cleared to be restarted. EEG done on 9/21 without any seizure activity and was better compared to one done on 9/18.     On 9/22 patient hypertensive, treated with IV hydralazine, labetalol was restarted as well as home med lisinopril. Pt asymptomatic. Goal is to have SBP less than 160.  Patient's discharge to rehab was held 9/22 due to hypertension but was medically cleared for discharge to acute rehab 9/23.    89 y/o F w/ pmh htn, afib on coumadin and asa, +PPM, CHF (EF 25-30%), recent admission for an NSTEMI, presented to [facility] on 9/17 s/p mech fall and found to have b/l frontal SAH and +scalp hematoma. Pt also found to have supratherapeutic INR of 3.9, Hyperkalemia of 6.2, MORELIA, MICU consulted for admission for hyperkalemia, q1h neurochecks. During interview with patients and daughter at bedside pt reported having a headache worst around the R. frontal scalp hematoma. Pt otherwise denied any other medical complains. Pt stated she cannot recall what happened. Daughter stated she did not witness the fall but heard a loud thump and found her mother on the floor. Pt denied any visual changes, dizziness, photo/phonophibia, neck pain, facial pain, sob, cp, palpitation abd pain, back pain, weakness/numbness in the ext.  Pt. also injured her right arm during the fall.     In ICU patient was started on labetalol infusion for BP control and keppra for seizure ppx. Aspirin and coumadin were held, she was given vitamin K and Kcentra. Patient improved and was transferred to floor. She was transitioned to labetalol PO with good BP control until it was discontinued on 9/21.     Repeat head ct 9/20 revealed small amount of acute subarachnoid hemorrhage in bifrontal lobes, small intraparenchymal hemorrhage and tiny bilateral whole hemispheric sdh essentially unchanged. Pt does not want any invasive procedures including aneurysm intervention. As per neuro, warfarin was cleared to be restarted. EEG done on 9/21 without any seizure activity and was better compared to one done on 9/18.     On 9/22 patient hypertensive, treated with IV hydralazine, labetalol was restarted as well as home med lisinopril. Pt asymptomatic. Goal is to have SBP less than 160.  Patient's discharge to rehab was held 9/22 due to hypertension but was medically cleared for discharge to acute rehab 9/23.

## 2020-09-23 NOTE — H&P ADULT - NSHPSOCIALHISTORY_GEN_ALL_CORE
Smoking - Denied  EtOH - Denied   Drugs - Denied     As per PT note: Pt lives in private house w/dtr--Daughter lives upstairs, 3 evan w/1 rail, no stairs in house that pt uses.; was using a rolling walker to ambulate at home, also has a commode and grab bars in br.  Mod. Independent with ADLs,  Daughter assisted with IADLs  Former homemaker and later  for [employer]  Volunteered at [employer] for many years    CURRENT FUNCTIONAL STATUS  Bed Mobility: Min A   Transfers: Min A   Gait: Min A, 25feet RW   ADL: Mod A UBD, Max A LBD,

## 2020-09-24 NOTE — PROGRESS NOTE ADULT - SUBJECTIVE AND OBJECTIVE BOX
Patient is a 89y old  Female who presents with a chief complaint of SAH  SAH  02.22 Traumatic, Close Injury (23 Sep 2020 19:04)      INTERVAL HPI/OVERNIGHT EVENTS:  resting no complaints      REVIEW OF SYSTEMS:  CONSTITUTIONAL: No fever, weight loss, or fatigue  EYES: No eye pain, visual disturbances, or discharge  ENMT:  No difficulty hearing, tinnitus, vertigo; No sinus or throat pain  NECK: No pain or stiffness  BREASTS: No pain, masses, or nipple discharge  RESPIRATORY: No cough, wheezing, chills or hemoptysis; No shortness of breath  CARDIOVASCULAR: No chest pain, palpitations, dizziness, or leg swelling  GASTROINTESTINAL: No abdominal or epigastric pain. No nausea, vomiting, or hematemesis; No diarrhea or constipation. No melena or hematochezia.  GENITOURINARY: No dysuria, frequency, hematuria, or incontinence  NEUROLOGICAL: No headaches, memory loss, loss of strength, numbness, or tremors  SKIN: No itching, burning, rashes, or lesions   LYMPH NODES: No enlarged glands  ENDOCRINE: No heat or cold intolerance; No hair loss  MUSCULOSKELETAL: No joint pain or swelling; No muscle, back, or extremity pain  PSYCHIATRIC: No depression, anxiety, mood swings, or difficulty sleeping  HEME/LYMPH: No easy bruising, or bleeding gums  ALLERY AND IMMUNOLOGIC: No hives or eczema  FAMILY HISTORY:  FH: diabetes mellitus      T(C): 36.9 (09-24-20 @ 07:50), Max: 36.9 (09-24-20 @ 07:50)  HR: 90 (09-24-20 @ 07:50) (84 - 107)  BP: 183/74 (09-24-20 @ 07:50) (107/53 - 183/74)  RR: 14 (09-24-20 @ 07:50) (14 - 18)  SpO2: 98% (09-24-20 @ 07:50) (95% - 98%)  Wt(kg): --Vital Signs Last 24 Hrs  T(C): 36.9 (24 Sep 2020 07:50), Max: 36.9 (24 Sep 2020 07:50)  T(F): 98.5 (24 Sep 2020 07:50), Max: 98.5 (24 Sep 2020 07:50)  HR: 90 (24 Sep 2020 07:50) (84 - 107)  BP: 183/74 (24 Sep 2020 07:50) (107/53 - 183/74)  BP(mean): --  RR: 14 (24 Sep 2020 07:50) (14 - 18)  SpO2: 98% (24 Sep 2020 07:50) (95% - 98%)    T(F): 98.5 (09-24-20 @ 07:50), Max: 99.1 (09-22-20 @ 00:08)  HR: 90 (09-24-20 @ 07:50) (60 - 107)  BP: 183/74 (09-24-20 @ 07:50) (107/53 - 200/86)  RR: 14 (09-24-20 @ 07:50) (14 - 22)  SpO2: 98% (09-24-20 @ 07:50) (95% - 100%)    PHYSICAL EXAM:  GENERAL: NAD, well-groomed, well-developed  HEAD:  Atraumatic, Normocephalic  EYES: EOMI, PERRLA, conjunctiva and sclera clear  ENMT: No tonsillar erythema, exudates, or enlargement; Moist mucous membranes, Good dentition, No lesions  NECK: Supple, No JVD, Normal thyroid  NERVOUS SYSTEM:  Alert & Oriented X3, Good concentration; Motor Strength 5/5 B/L upper and lower extremities; DTRs 2+ intact and symmetric  CHEST/LUNG: Clear to percussion bilaterally; No rales, rhonchi, wheezing, or rubs  HEART: Regular rate and rhythm; No murmurs, rubs, or gallops  ABDOMEN: Soft, Nontender, Nondistended; Bowel sounds present  EXTREMITIES:  2+ Peripheral Pulses, No clubbing, cyanosis, or edema  LYMPH: No lymphadenopathy noted  SKIN: No rashes or lesions    Consultant(s) Notes Reviewed:  [x ] YES  [ ] NO  Care Discussed with Consultants/Other Providers [ x] YES  [ ] NO    LABS:  09-24    135  |  102  |  29<H>  ----------------------------<  96  3.8   |  26  |  0.82    Ca    11.0<H>      24 Sep 2020 05:30    TPro  5.8<L>  /  Alb  2.4<L>  /  TBili  0.8  /  DBili  x   /  AST  13  /  ALT  16  /  AlkPhos  77  09-24                          11.6   7.61  )-----------( 155      ( 24 Sep 2020 05:30 )             35.8         PT/INR - ( 24 Sep 2020 05:30 )   PT: 15.5 sec;   INR: 1.30 ratio           LIVER FUNCTIONS - ( 24 Sep 2020 05:30 )  Alb: 2.4 g/dL / Pro: 5.8 g/dL / ALK PHOS: 77 U/L / ALT: 16 U/L / AST: 13 U/L / GGT: x                            11.6   7.61  )-----------( 155      ( 09-24 @ 05:30 )             35.8                12.3   7.18  )-----------( 158      ( 09-20 @ 06:00 )             36.9                12.0   8.28  )-----------( 164      ( 09-19 @ 05:00 )             37.2                12.9   9.39  )-----------( 138      ( 09-18 @ 04:45 )             39.1                13.3   6.51  )-----------( 147      ( 09-17 @ 23:45 )             40.6                14.2   8.13  )-----------( 150      ( 09-01 @ 06:00 )             44.7                13.3   6.37  )-----------( 128      ( 08-31 @ 06:22 )             42.4                15.5   8.96  )-----------( 185      ( 08-30 @ 03:24 )             47.9               RADIOLOGY & ADDITIONAL TESTS:    Imaging Personally Reviewed:  [ ] YES  [ ] NO  acetaminophen   Tablet .. 650 milliGRAM(s) Oral every 6 hours PRN  dorzolamide 2% Ophthalmic Solution 1 Drop(s) Left EYE three times a day  labetalol 100 milliGRAM(s) Oral two times a day  levETIRAcetam 250 milliGRAM(s) Oral every 12 hours  lisinopril 10 milliGRAM(s) Oral daily  montelukast 10 milliGRAM(s) Oral at bedtime  multivitamin 1 Tablet(s) Oral daily  senna 2 Tablet(s) Oral at bedtime PRN      HEALTH ISSUES - PROBLEM Dx:  Hand swelling  Hand swelling    HTN (hypertension), benign  HTN (hypertension), benign    Atrial fibrillation  Atrial fibrillation    Subarachnoid bleed  Subarachnoid bleed

## 2020-09-24 NOTE — PROGRESS NOTE ADULT - SUBJECTIVE AND OBJECTIVE BOX
Resting    Vital Signs Last 24 Hrs  T(C): 36.9 (09-24-20 @ 07:50), Max: 36.9 (09-24-20 @ 07:50)  T(F): 98.5 (09-24-20 @ 07:50), Max: 98.5 (09-24-20 @ 07:50)  HR: 90 (09-24-20 @ 07:50) (84 - 90)  BP: 183/74 (09-24-20 @ 07:50) (107/53 - 183/74)  RR: 14 (09-24-20 @ 07:50) (14 - 18)  SpO2: 98% (09-24-20 @ 07:50) (95% - 98%)    card s1s2  b/l air entry  soft, ND  no edema                                                          11.6   7.61  )-----------( 155      ( 24 Sep 2020 05:30 )             35.8     24 Sep 2020 05:30    135    |  102    |  29     ----------------------------<  96     3.8     |  26     |  0.82     Ca    11.0       24 Sep 2020 05:30    TPro  5.8    /  Alb  2.4    /  TBili  0.8    /  DBili  x      /  AST  13     /  ALT  16     /  AlkPhos  77     24 Sep 2020 05:30    LIVER FUNCTIONS - ( 24 Sep 2020 05:30 )  Alb: 2.4 g/dL / Pro: 5.8 g/dL / ALK PHOS: 77 U/L / ALT: 16 U/L / AST: 13 U/L / GGT: x           PT/INR - ( 24 Sep 2020 05:30 )   PT: 15.5 sec;   INR: 1.30 ratio      acetaminophen   Tablet .. 650 milliGRAM(s) Oral every 6 hours PRN  dorzolamide 2% Ophthalmic Solution 1 Drop(s) Left EYE three times a day  labetalol 100 milliGRAM(s) Oral two times a day  lisinopril 10 milliGRAM(s) Oral daily  montelukast 10 milliGRAM(s) Oral at bedtime  multivitamin 1 Tablet(s) Oral daily  senna 2 Tablet(s) Oral at bedtime PRN  warfarin 2.5 milliGRAM(s) Oral at bedtime    A/P:    S/p fall, b/l SAH  Known CM, EF 25-30%, MR, TR, AR  S/p hemodynamic MORELIA   Resolved  UA w/pr 15 otherwise bland  SONO w/o hydro, b/l small kidneys  Off IVF  Avoid nephrotoxins  HTN, meds adjusted  F/u BMP, BP on ACE, labetalol    [phone]

## 2020-09-24 NOTE — PROGRESS NOTE ADULT - ASSESSMENT
ASSESSMENT/PLAN  AZRA CHAHAL is a 89yo Female w/ pmh htn, afib on coumadin and asa, +PPM, CHF (EF 25-30%), recent admission for an NSTEMI, presented to [facility] on 9/18 s/p mech fall and found to have b/l frontal SAH and +scalp hematoma. Repeat CT head stable and was resumed on warfarin. Now admitted for multidisciplinary rehab program.     #SAH  -On Ct head 9/20: Small amount of acute subarachnoid hemorrhage in the bifrontal lobes, stable   -No invasive procedures as per family and patient, patient is DNR/DNI   -EEG negative for seizures   -7 day keppra seizure ppx-- last day 9/24   -Gait Instability, ADL impairments and Functional impairments: start Comprehensive Rehab Program of PT/OT/SLP    #Afib  -Cleared to resume warfarin as per neuro recs   -INR daily -- INR 1.3  9/24   -Coumadin 2.5mg tonight    Right wrist Sprain  --xray neg for fracture  --ICE and tylenol PRN.      right shoulder pain  --likely RTC tendinitis  --R shoulder xray 9/24 -- FU   --Pain control PRN    Right elbow pain   -Likely medial epicondylitis   -R elbow xray 9/24-- FU     Sleep  --melatonin PRN    #CHF  -TTE from 8/30 with EF 25-30%   -Daily weights     #HTN  -Monitor BP, SBP goal <160   -Continue lisinopril and labetalol     #MORELIA (resolved)  - SONO w/o hydro, b/l small kidneys  -nephro was following   -Monitor BMP     #Pain Mgmt   - Tylenol PRN     #GI/Bowel Mgmt   - Continent c/w Senna PRN     #/Bladder Mgmt   - Continent, PVR    #FEN   - Diet - Regular + Thins  [CCHO, DASH/TLC]      #Precautions / PROPHYLAXIS:   - Falls, Cardiac, Seizure   - ortho: Weight bearing status: WBAT   - Lungs: Aspiration   - Pressure injury/Skin: OOB to Chair, PT/OT    - DVT: Coumadin   -Code: DNR/DNI ASSESSMENT/PLAN  AZRA CHAHAL is a 89yo Female w/ pmh htn, afib on coumadin and asa, +PPM, CHF (EF 25-30%), recent admission for an NSTEMI, presented to [facility] on 9/18 s/p mech fall and found to have b/l frontal SAH and +scalp hematoma. Repeat CT head stable and was resumed on warfarin. Now admitted for multidisciplinary rehab program.     #SAH  -On Ct head 9/20: Small amount of acute subarachnoid hemorrhage in the bifrontal lobes, stable   -No invasive procedures as per family and patient, patient is DNR/DNI   -EEG negative for seizures   -14 day keppra seizure ppx-- last day 10/2   -Gait Instability, ADL impairments and Functional impairments: start Comprehensive Rehab Program of PT/OT/SLP    #Afib  -Cleared to resume warfarin as per neuro recs   -INR daily -- INR 1.3  9/24   -Coumadin 2.5mg tonight    Right wrist Sprain  --xray neg for fracture  --ICE and tylenol PRN.      right shoulder pain  --likely RTC tendinitis  --R shoulder xray 9/24 -- FU   --Pain control PRN    Right elbow pain   -Likely medial epicondylitis   -R elbow xray 9/24-- FU     Sleep  --melatonin PRN    #CHF  -TTE from 8/30 with EF 25-30%   -Daily weights     #HTN  -Monitor BP, SBP goal <160   -Continue lisinopril and labetalol     #MORELIA (resolved)  - SONO w/o hydro, b/l small kidneys  -nephro was following   -Monitor BMP     #Pain Mgmt   - Tylenol PRN     #GI/Bowel Mgmt   - Continent c/w Senna PRN     #/Bladder Mgmt   - Continent, PVR    #FEN   - Diet - Regular + Thins  [CCHO, DASH/TLC]      #Precautions / PROPHYLAXIS:   - Falls, Cardiac, Seizure   - ortho: Weight bearing status: WBAT   - Lungs: Aspiration   - Pressure injury/Skin: OOB to Chair, PT/OT    - DVT: Coumadin   -Code: DNR/DNI ASSESSMENT/PLAN  AZRA CHAHAL is a 89yo Female w/ pmh htn, afib on coumadin and asa, +PPM, CHF (EF 25-30%), recent admission for an NSTEMI, presented to [facility] on 9/18 s/p mech fall and found to have b/l frontal SAH and +scalp hematoma. Repeat CT head stable and was resumed on warfarin. Now admitted for multidisciplinary rehab program.     #SAH  -On Ct head 9/20: Small amount of acute subarachnoid hemorrhage in the bifrontal lobes, stable   -No invasive procedures as per family and patient, patient is DNR/DNI   -EEG negative for seizures   --Pt. has not had any seizure activity and Completed 7 day keppra seizure ppx  guideline from AAN--stopped keppra  -Gait Instability, ADL impairments and Functional impairments: start Comprehensive Rehab Program of PT/OT/SLP    #Afib  -Cleared to resume warfarin as per neuro recs   -INR daily -- INR 1.3  9/24   -Coumadin 2.5mg tonight    Right wrist Sprain  --xray neg for fracture  --ICE and tylenol PRN.      right shoulder pain  --likely RTC tendinitis  --R shoulder xray 9/24 -- FU   --Pain control PRN    Right elbow pain   -Likely medial epicondylitis   -R elbow xray 9/24-- FU     Sleep  --melatonin PRN    #CHF  -TTE from 8/30 with EF 25-30%   -Daily weights     #HTN  -Monitor BP, SBP goal <160   -Continue lisinopril and labetalol     #MORELIA (resolved)  - SONO w/o hydro, b/l small kidneys  -nephro was following   -Monitor BMP     #Pain Mgmt   - Tylenol PRN     #GI/Bowel Mgmt   - Continent c/w Senna PRN     #/Bladder Mgmt   - Continent, PVR    #FEN   - Diet - Regular + Thins  [CCHO, DASH/TLC]      #Precautions / PROPHYLAXIS:   - Falls, Cardiac, Seizure   - ortho: Weight bearing status: WBAT   - Lungs: Aspiration   - Pressure injury/Skin: OOB to Chair, PT/OT    - DVT: Coumadin   -Code: DNR/DNI

## 2020-09-24 NOTE — DIETITIAN INITIAL EVALUATION ADULT. - PERTINENT MEDS FT
MEDICATIONS  (STANDING):  dorzolamide 2% Ophthalmic Solution 1 Drop(s) Left EYE three times a day  labetalol 100 milliGRAM(s) Oral two times a day  lisinopril 10 milliGRAM(s) Oral daily  montelukast 10 milliGRAM(s) Oral at bedtime  multivitamin 1 Tablet(s) Oral daily  warfarin 2.5 milliGRAM(s) Oral at bedtime    MEDICATIONS  (PRN):  acetaminophen   Tablet .. 650 milliGRAM(s) Oral every 6 hours PRN Temp greater or equal to 38C (100.4F), Mild Pain (1 - 3)  senna 2 Tablet(s) Oral at bedtime PRN Constipation

## 2020-09-24 NOTE — CHART NOTE - TREATMENT: THE FOLLOWING DIET HAS BEEN RECOMMENDED
Diet, DASH/TLC:   Sodium & Cholesterol Restricted  Supplement Feeding Modality:  Oral  Ensure Enlive Cans or Servings Per Day:  1       Frequency:  Three Times a day (09-22-20 @ 14:25) [Active]

## 2020-09-24 NOTE — PROGRESS NOTE ADULT - ATTENDING COMMENTS
Pt. seen with resident.  Agree with documentation above as per resident with amendments made as appropriate. Patient medically stable. Making progress towards rehab goals.     Pt. has not had any seizure activity and Completed 7 day keppra seizure ppx  as per AAN and ACRM guidelines--stopped keppra    labs and VS reviewed   BP labile but will cont. to monitor on current BP meds.     right shoulder and elbow pain-- xrays ordered.

## 2020-09-24 NOTE — DIETITIAN INITIAL EVALUATION ADULT. - OTHER INFO
89 y/o F w/ pmh htn, afib on coumadin, +PPM, CHF (EF 25-30%), recent admission for an NSTEMI, now admitted to rehab. Patient with fair appetite; usually , noted with varied po 25-75% as per  flow sheet LBM 9/22. +1 edema to right hand. # per patient and weight since rehab 112#.     Patient noted with acute severe protein calorie malnutrition due to poor intakes and fat & muscle wasting observed to temporal, clavicle, scapular, and orbital regions.

## 2020-09-24 NOTE — PROGRESS NOTE ADULT - ASSESSMENT
admitted after fall with head trama with high INR and sah....neurolgy has cleared restarting warfarin .....admit to Brain Injury Unit

## 2020-09-24 NOTE — PROGRESS NOTE ADULT - SUBJECTIVE AND OBJECTIVE BOX
HPI:  89 y/o F w/ pmh htn, afib on coumadin and asa, +PPM, CHF (EF 25-30%), recent admission for an NSTEMI, [facility] to have b/l [facility] SAH and +scalp hematoma. Pt also found to have supratherapeutic INR of 3.9, Hyperkalemia of 6.2, MORELIA, MICU consulted for admission for hyperkalemia, q1h neurochecks. During interview with patients and daughter at bedside pt reported having a headache worst around the R. frontal scalp hematoma. Pt otherwise denied any other medical complains. Pt stated she cannot recall what happened. Daughter stated she did not witness the fall but heard a loud thump and found her mother on the floor. Pt denied any visual changes, dizziness, photo/phonophibia, neck pain, facial pain, sob, cp, palpitation abd pain, back pain, weakness/numbness in the ext.  Pt. also injured her right arm during the fall.     In ICU patient was started on labetalol infusion for BP control and keppra for seizure ppx. Aspirin and coumadin were held, she was given vitamin K and Kcentra. Patient improved and was transferred to floor. She was transitioned to labetalol PO with good BP control until it was discontinued on 9/21.     Repeat head ct 9/20 revealed small amount of acute subarachnoid hemorrhage in bifrontal lobes, small intraparenchymal hemorrhage and tiny bilateral whole hemispheric sdh essentially unchanged. Pt does not want any invasive procedures including aneurysm intervention. As per neuro, warfarin was cleared to be restarted. EEG done on 9/21 without any seizure activity and was better compared to one done on 9/18.     On 9/22 patient hypertensive, treated with IV hydralazine, labetalol was restarted as well as home med lisinopril. Pt asymptomatic. Goal is to have SBP less than 160.  Patient's discharge to rehab was held 9/22 due to hypertension but was medically cleared for discharge to acute rehab 9/23.    (23 Sep 2020 15:56)      PAST MEDICAL & SURGICAL HISTORY:  Pacemaker    Transient cerebral ischemia, unspecified transient cerebral ischemia type    HTN (hypertension), benign    Atrial fibrillation    Acid reflux disease    History of cataract surgery        Subjective: Patient seen and evaluated this morning. Patient endorsed to being able to sleep well overnight. Endorsing to right shoulder pain and right elbow pain. No acute medical issues noted overnight.     REVIEW OF SYMPTOMS  +right shoulder and elbow pain, wrist pain, hearing difficulty, memory loss       VITALS  Vital Signs Last 24 Hrs  T(C): 36.9 (24 Sep 2020 07:50), Max: 36.9 (24 Sep 2020 07:50)  T(F): 98.5 (24 Sep 2020 07:50), Max: 98.5 (24 Sep 2020 07:50)  HR: 90 (24 Sep 2020 07:50) (84 - 107)  BP: 183/74 (24 Sep 2020 07:50) (107/53 - 183/74)  BP(mean): --  RR: 14 (24 Sep 2020 07:50) (14 - 18)  SpO2: 98% (24 Sep 2020 07:50) (95% - 98%)      PHYSICAL EXAM  Physical Exam: Gen - NAD, Comfortable  HEENT - NCAT, EOMI, MMM, [place]  Neck - Supple,  Pulm - CTAB, No wheeze, No rhonchi, No crackles  Cardiovascular - RRR, S1S2, No murmurs  Abdomen - Soft, NT/ND, +BS  Extremities - No C/C/E, No calf tenderness  Neuro-     Cognitive - AAOx2  --knows she is in hospital but not name     Communication - Fluent, No dysarthria, hypophonic      Cranial Nerves - CN 2-12 intact except hearing     Motor -                     LEFT    UE - limited due to pain-- ShAB 3-/5, EF 4/5, EE 4/5, WE 2/5,  2/5                    RIGHT UE - ShAB 5/5, EF 5/5, EE 5/5, WE 5/5,  5/5                    LEFT    LE - HF 5/5, KE 5/5, DF 5/5, PF 5/5                    RIGHT LE - HF 4/5, KE 5/5, DF 5/5, PF 5/5        Sensory - Intact to LT bilat     Proprioception impaired on right LE     Coordination - FTN & HTS impaired on right, intact on left     Tone - normal  MSK: right wrist, and index and middle PIP swelling and tenderness.  Pain with ROM. Pain on palpation to the right shoulder and right medical epicondyle.         Right shoulder +simpson  Psychiatric - Mood stable, Affect WNL  Skin:  all skin intact    Wounds: None Present        RECENT LABS                        11.6   7.61  )-----------( 155      ( 24 Sep 2020 05:30 )             35.8     09-24    135  |  102  |  29<H>  ----------------------------<  96  3.8   |  26  |  0.82    Ca    11.0<H>      24 Sep 2020 05:30    TPro  5.8<L>  /  Alb  2.4<L>  /  TBili  0.8  /  DBili  x   /  AST  13  /  ALT  16  /  AlkPhos  77  09-24    PT/INR - ( 24 Sep 2020 05:30 )   PT: 15.5 sec;   INR: 1.30 ratio             RADIOLOGY/OTHER RESULTS      MEDICATIONS  (STANDING):  dorzolamide 2% Ophthalmic Solution 1 Drop(s) Left EYE three times a day  labetalol 100 milliGRAM(s) Oral two times a day  levETIRAcetam 250 milliGRAM(s) Oral every 12 hours  lisinopril 10 milliGRAM(s) Oral daily  montelukast 10 milliGRAM(s) Oral at bedtime  multivitamin 1 Tablet(s) Oral daily  warfarin 2.5 milliGRAM(s) Oral at bedtime    MEDICATIONS  (PRN):  acetaminophen   Tablet .. 650 milliGRAM(s) Oral every 6 hours PRN Temp greater or equal to 38C (100.4F), Mild Pain (1 - 3)  senna 2 Tablet(s) Oral at bedtime PRN Constipation         HPI:  89 y/o F w/ pmh htn, afib on coumadin and asa, +PPM, CHF (EF 25-30%), recent admission for an NSTEMI, [facility] found to have b/l frontal SAH and +scalp hematoma. Pt also found to have supratherapeutic INR of 3.9, Hyperkalemia of 6.2, MORELIA, MICU consulted for admission for hyperkalemia, q1h neurochecks. During interview with patients and daughter at bedside pt reported having a headache worst around the R. frontal scalp hematoma. Pt otherwise denied any other medical complains. Pt stated she cannot recall what happened. Daughter stated she did not witness the fall but heard a loud thump and found her mother on the floor. Pt denied any visual changes, dizziness, photo/phonophibia, neck pain, facial pain, sob, cp, palpitation abd pain, back pain, weakness/numbness in the ext.  Pt. also injured her right arm during the fall.     In ICU patient was started on labetalol infusion for BP control and keppra for seizure ppx. Aspirin and coumadin were held, she was given vitamin K and Kcentra. Patient improved and was transferred to floor. She was transitioned to labetalol PO with good BP control until it was discontinued on 9/21.     Repeat head ct 9/20 revealed small amount of acute subarachnoid hemorrhage in bifrontal lobes, small intraparenchymal hemorrhage and tiny bilateral whole hemispheric sdh essentially unchanged. Pt does not want any invasive procedures including aneurysm intervention. As per neuro, warfarin was cleared to be restarted. EEG done on 9/21 without any seizure activity and was better compared to one done on 9/18.     On 9/22 patient hypertensive, treated with IV hydralazine, labetalol was restarted as well as home med lisinopril. Pt asymptomatic. Goal is to have SBP less than 160.  Patient's discharge to rehab was held 9/22 due to hypertension but was medically cleared for discharge to acute rehab 9/23.    (23 Sep 2020 15:56)      PAST MEDICAL & SURGICAL HISTORY:  Pacemaker    Transient cerebral ischemia, unspecified transient cerebral ischemia type    HTN (hypertension), benign    Atrial fibrillation    Acid reflux disease    History of cataract surgery        Subjective: Patient seen and evaluated this morning. Patient endorsed to being able to sleep well overnight. Endorsing  right shoulder pain and right elbow pain. No acute medical issues noted overnight.     REVIEW OF SYMPTOMS  +right shoulder and elbow pain, wrist pain, hearing difficulty, memory loss       VITALS  Vital Signs Last 24 Hrs  T(C): 36.9 (24 Sep 2020 07:50), Max: 36.9 (24 Sep 2020 07:50)  T(F): 98.5 (24 Sep 2020 07:50), Max: 98.5 (24 Sep 2020 07:50)  HR: 90 (24 Sep 2020 07:50) (84 - 107)  BP: 183/74 (24 Sep 2020 07:50) (107/53 - 183/74)  BP(mean): --  RR: 14 (24 Sep 2020 07:50) (14 - 18)  SpO2: 98% (24 Sep 2020 07:50) (95% - 98%)      PHYSICAL EXAM  Physical Exam: Gen - NAD, Comfortable  HEENT - NCAT, EOMI, MMM, Solomon  Neck - Supple,  Pulm - CTAB, No wheeze, No rhonchi, No crackles  Cardiovascular - RRR, S1S2, No murmurs  Abdomen - Soft, NT/ND, +BS  Extremities - No C/C/E, No calf tenderness  Neuro-     Cognitive - AAOx2  --knows she is in hospital but not name     Communication - Fluent, No dysarthria, hypophonic      Cranial Nerves - CN 2-12 intact except hearing     Motor -                     LEFT    UE - limited due to pain-- ShAB 3-/5, EF 4/5, EE 4/5, WE 2/5,  2/5                    RIGHT UE - ShAB 5/5, EF 5/5, EE 5/5, WE 5/5,  5/5                    LEFT    LE - HF 5/5, KE 5/5, DF 5/5, PF 5/5                    RIGHT LE - HF 4/5, KE 5/5, DF 5/5, PF 5/5        Sensory - Intact to LT bilat     Proprioception impaired on right LE     Coordination - FTN & HTS impaired on right, intact on left     Tone - normal  MSK: right wrist, and index and middle PIP swelling and tenderness.  Pain with ROM. Pain on palpation to the right shoulder and right medial epicondyle.         Right shoulder +simpson  Psychiatric - Mood stable, Affect WNL  Skin:  all skin intact    Wounds: None Present        RECENT LABS                        11.6   7.61  )-----------( 155      ( 24 Sep 2020 05:30 )             35.8     09-24    135  |  102  |  29<H>  ----------------------------<  96  3.8   |  26  |  0.82    Ca    11.0<H>      24 Sep 2020 05:30    TPro  5.8<L>  /  Alb  2.4<L>  /  TBili  0.8  /  DBili  x   /  AST  13  /  ALT  16  /  AlkPhos  77  09-24    PT/INR - ( 24 Sep 2020 05:30 )   PT: 15.5 sec;   INR: 1.30 ratio             RADIOLOGY/OTHER RESULTS      MEDICATIONS  (STANDING):  dorzolamide 2% Ophthalmic Solution 1 Drop(s) Left EYE three times a day  labetalol 100 milliGRAM(s) Oral two times a day  levETIRAcetam 250 milliGRAM(s) Oral every 12 hours  lisinopril 10 milliGRAM(s) Oral daily  montelukast 10 milliGRAM(s) Oral at bedtime  multivitamin 1 Tablet(s) Oral daily  warfarin 2.5 milliGRAM(s) Oral at bedtime    MEDICATIONS  (PRN):  acetaminophen   Tablet .. 650 milliGRAM(s) Oral every 6 hours PRN Temp greater or equal to 38C (100.4F), Mild Pain (1 - 3)  senna 2 Tablet(s) Oral at bedtime PRN Constipation

## 2020-09-24 NOTE — CHART NOTE - NSCHARTNOTEFT_GEN_A_CORE
Upon Nutritional Assessment by the Registered Dietitian your patient was determined to meet criteria / has evidence of the following diagnosis/diagnoses:          [ ]  Mild Protein Calorie Malnutrition        [ ]  Moderate Protein Calorie Malnutrition        [x] Severe Protein Calorie Malnutrition        [ ] Unspecified Protein Calorie Malnutrition        [ ] Underweight / BMI <19        [ ] Morbid Obesity / BMI > 40      Findings as based on:  [x] Comprehensive nutrition assessment   [ ] Nutrition Focused Physical Exam  [x] Other: observation of muscle mass and body fat wasting.   Severe protein-calorie malnutrition related to inadequate oral intakes and moderate depletion of muscle mass and body fat.       Nutrition Plan/Recommendations:      1. Continue diet as ordered. 2. Ensure Enlive TID with meals.    PROVIDER Section:     By signing this assessment you are acknowledging and agree with the diagnosis/diagnoses assigned by the Registered Dietitian    Comments:

## 2020-09-25 NOTE — PROGRESS NOTE ADULT - ATTENDING COMMENTS
Pt. seen with resident.  Agree with documentation above as per resident with amendments made as appropriate. Patient medically stable. Making progress towards rehab goals.    -BP somewhat labile  -- Will monitor orthostatics-- adjust labetalol if definitively +

## 2020-09-25 NOTE — PROGRESS NOTE ADULT - ASSESSMENT
ASSESSMENT/PLAN  AZRA CHAHAL is a 89yo Female w/ pmh htn, afib on coumadin and asa, +PPM, CHF (EF 25-30%), recent admission for an NSTEMI, presented to [facility] on 9/18 s/p mech fall and found to have b/l frontal SAH and +scalp hematoma. Repeat CT head stable and was resumed on warfarin. Now admitted for multidisciplinary rehab program.     #SAH  -On Ct head 9/20: Small amount of acute subarachnoid hemorrhage in the bifrontal lobes, stable   -No invasive procedures as per family and patient, patient is DNR/DNI   -EEG negative for seizures   --Pt. has not had any seizure activity and Completed 7 day keppra seizure ppx  guideline from [facility]--stopped keppra on 9/24   -Gait Instability, ADL impairments and Functional impairments: start Comprehensive Rehab Program of PT/OT/SLP    #Afib  -Cleared to resume warfarin as per neuro recs   -INR daily -- INR 1.48  9/25   -Coumadin 2.5mg tonight    Right wrist Sprain  --xray neg for fracture  --ICE and tylenol PRN.      right shoulder pain  --likely RTC tendinitis  --R shoulder xray 9/24 -- done FU results   --Pain control PRN    Right elbow pain   -Likely medial epicondylitis   -R elbow xray 9/24-- no acute fractures noted     Sleep  --melatonin PRN    #CHF  -TTE from 8/30 with EF 25-30%   -Daily weights     #HTN  -Monitor BP, SBP goal <160   -Continue lisinopril and labetalol     #MORELIA (resolved)  - SONO w/o hydro, b/l small kidneys  -nephro following   -Monitor BMP     #Pain Mgmt   - Tylenol PRN     #GI/Bowel Mgmt   - Continent c/w Senna PRN     #/Bladder Mgmt   - Continent, PVR    #FEN   - Diet - Regular + Thins  [CCHO, DASH/TLC]      #Precautions / PROPHYLAXIS:   - Falls, Cardiac, Seizure   - ortho: Weight bearing status: WBAT   - Lungs: Aspiration   - Pressure injury/Skin: OOB to Chair, PT/OT    - DVT: Coumadin   -Code: DNR/DNI ASSESSMENT/PLAN  AZRA CHAHAL is a 89yo Female w/ pmh htn, afib on coumadin and asa, +PPM, CHF (EF 25-30%), recent admission for an NSTEMI, presented to [facility] on 9/18 s/p mech fall and found to have b/l frontal SAH and +scalp hematoma. Repeat CT head stable and was resumed on warfarin. Now admitted for multidisciplinary rehab program.     #SAH  -On Ct head 9/20: Small amount of acute subarachnoid hemorrhage in the bifrontal lobes, stable   -No invasive procedures as per family and patient, patient is DNR/DNI   -EEG negative for seizures   --Pt. has not had any seizure activity and Completed 7 day keppra seizure ppx  guideline from [facility]--stopped keppra on 9/24   -Gait Instability, ADL impairments and Functional impairments: start Comprehensive Rehab Program of PT/OT/SLP    #Afib  -Cleared to resume warfarin as per neuro recs   -INR daily -- INR 1.48  9/25   -Coumadin 2.5mg tonight    Right wrist Sprain  --xray neg for fracture  --ICE and tylenol PRN.      right shoulder pain  --likely RTC tendinitis  --R shoulder xray 9/24 --No fractures, or dislocation, degenerative changes noted   --Pain control PRN    Right elbow pain   -Likely medial epicondylitis   -R elbow xray 9/24-- no acute fractures noted     Sleep  --melatonin PRN    #CHF  -TTE from 8/30 with EF 25-30%   -Daily weights     #HTN  -Monitor BP, SBP goal <160   -Continue lisinopril and labetalol     #MORELIA (resolved)  - SONO w/o hydro, b/l small kidneys  -nephro following   -Monitor BMP     #Pain Mgmt   - Tylenol PRN     #GI/Bowel Mgmt   - Continent c/w Senna PRN     #/Bladder Mgmt   - Continent, PVR    #FEN   - Diet - Regular + Thins  [CCHO, DASH/TLC]      #Precautions / PROPHYLAXIS:   - Falls, Cardiac, Seizure   - ortho: Weight bearing status: WBAT   - Lungs: Aspiration   - Pressure injury/Skin: OOB to Chair, PT/OT    - DVT: Coumadin   -Code: DNR/DNI ASSESSMENT/PLAN  AZRA CHAHAL is a 89yo Female w/ pmh htn, afib on coumadin and asa, +PPM, CHF (EF 25-30%), recent admission for an NSTEMI, presented to [facility] on 9/18 s/p mech fall and found to have b/l frontal SAH and +scalp hematoma. Repeat CT head stable and was resumed on warfarin. Now admitted for multidisciplinary rehab program.     #SAH  -On Ct head 9/20: Small amount of acute subarachnoid hemorrhage in the bifrontal lobes, stable   -No invasive procedures as per family and patient, patient is DNR/DNI   -EEG negative for seizures   --Pt. has not had any seizure activity and Completed 7 day keppra seizure ppx  guideline from [facility]--stopped keppra on 9/24   -Gait Instability, ADL impairments and Functional impairments: start Comprehensive Rehab Program of PT/OT/SLP    #Afib  -Cleared to resume warfarin as per neuro recs   -INR daily -- INR 1.48  9/25   --INR goal 2-3  -Coumadin 2.5mg tonight    Right wrist Sprain  --xray neg for fracture  --ICE and tylenol PRN.      right shoulder pain  --likely RTC tendinitis  --R shoulder xray 9/24 --No fractures, or dislocation, degenerative changes noted   --Pain control PRN    Right elbow pain   -Likely medial epicondylitis   -R elbow xray 9/24-- no acute fractures noted     Sleep  --melatonin PRN    #CHF  -TTE from 8/30 with EF 25-30%   -Daily weights     #HTN  -Monitor BP, SBP goal <160   -Continue lisinopril and labetalol   -BP somewhat labile  -- Will monitor orthostatics-- adjust labetalol if definitively +    #MORELIA (resolved)  - SONO w/o hydro, b/l small kidneys  -nephro following   -Monitor BMP     #Pain Mgmt   - Tylenol PRN     #GI/Bowel Mgmt   - Continent c/w Senna PRN     #/Bladder Mgmt   - Continent, PVR    #FEN   - Diet - Regular + Thins  [CCHO, DASH/TLC]      #Precautions / PROPHYLAXIS:   - Falls, Cardiac, Seizure   - Lungs: Aspiration   - Pressure injury/Skin: OOB to Chair, PT/OT    - DVT: Coumadin   -Code: DNR/DNI

## 2020-09-25 NOTE — PROGRESS NOTE ADULT - SUBJECTIVE AND OBJECTIVE BOX
HPI:  89 y/o F w/ pmh htn, afib on coumadin and asa, +PPM, CHF (EF 25-30%), recent admission for an NSTEMI, presented to [facility] on 9/17 s/p mech fall and found to have b/l frontal SAH and +scalp hematoma. Pt also found to have supratherapeutic INR of 3.9, Hyperkalemia of 6.2, MORELIA, MICU consulted for admission for hyperkalemia, q1h neurochecks. During interview with patients and daughter at bedside pt reported having a headache worst around the R. frontal scalp hematoma. Pt otherwise denied any other medical complains. Pt stated she cannot recall what happened. Daughter stated she did not witness the fall but heard a loud thump and found her mother on the floor. Pt denied any visual changes, dizziness, photo/phonophibia, neck pain, facial pain, sob, cp, palpitation abd pain, back pain, weakness/numbness in the ext.  Pt. also injured her right arm during the fall.     In ICU patient was started on labetalol infusion for BP control and keppra for seizure ppx. Aspirin and coumadin were held, she was given vitamin K and Kcentra. Patient improved and was transferred to floor. She was transitioned to labetalol PO with good BP control until it was discontinued on 9/21.     Repeat head ct 9/20 revealed small amount of acute subarachnoid hemorrhage in bifrontal lobes, small intraparenchymal hemorrhage and tiny bilateral whole hemispheric sdh essentially unchanged. Pt does not want any invasive procedures including aneurysm intervention. As per neuro, warfarin was cleared to be restarted. EEG done on 9/21 without any seizure activity and was better compared to one done on 9/18.     On 9/22 patient hypertensive, treated with IV hydralazine, labetalol was restarted as well as home med lisinopril. Pt asymptomatic. Goal is to have SBP less than 160.  Patient's discharge to rehab was held 9/22 due to hypertension but was medically cleared for discharge to acute rehab 9/23.    (23 Sep 2020 15:56)      PAST MEDICAL & SURGICAL HISTORY:  Pacemaker    Transient cerebral ischemia, unspecified transient cerebral ischemia type    HTN (hypertension), benign    Atrial fibrillation    Acid reflux disease    History of cataract surgery      Subjective: Patient seen and evaluated this morning. Patient noted some improvement in regards to her right wrist pain today. No acute medical issues noted overnight. Noted to be orthostatic this morning according to nursing staff with SBP while upright in 100's and 90's while standing up in therapy. Without any symptoms.     REVIEW OF SYMPTOMS  +right shoulder and elbow pain, wrist pain, hearing difficulty, memory loss     Vital Signs Last 24 Hrs  T(C): 36.9 (25 Sep 2020 07:20), Max: 37 (24 Sep 2020 20:36)  T(F): 98.5 (25 Sep 2020 07:20), Max: 98.6 (24 Sep 2020 20:36)  HR: 89 (25 Sep 2020 07:20) (85 - 96)  BP: 146/71 (25 Sep 2020 07:20) (105/44 - 175/84)  BP(mean): --  RR: 14 (25 Sep 2020 07:20) (14 - 15)  SpO2: 95% (25 Sep 2020 07:20) (95% - 96%)    PHYSICAL EXAM  Physical Exam: Gen - NAD, Comfortable  HEENT - NCAT, EOMI, MMM, [place]  Neck - Supple,  Pulm - CTAB, No wheeze, No rhonchi, No crackles  Cardiovascular - RRR, S1S2, No murmurs  Abdomen - Soft, NT/ND, +BS  Extremities - No C/C/E, No calf tenderness  Neuro-     Cognitive - AAOx2 --knows she is in hospital but not name, unaware of the year -- thinks it is 1920      Communication - Fluent, No dysarthria, hypophonic      Cranial Nerves - CN 2-12 intact except hearing     Motor -                     LEFT    UE - limited due to pain-- WE 2/5,  2/5                    RIGHT UE - ShAB 5/5, EF 5/5, EE 5/5, WE 5/5,  5/5                    LEFT    LE - HF 5/5, KE 5/5, DF 5/5, PF 5/5                    RIGHT LE - HF 4/5, KE 5/5, DF 5/5, PF 5/5        Sensory - Intact to LT bilat     Tone - normal  MSK: right wrist, and index and middle PIP swelling and tenderness.  Pain with ROM. Pain on palpation to the right shoulder and right medial epicondyle.         Right shoulder +simpson  Psychiatric - Mood stable, Affect WNL  Skin:  all skin intact    Wounds: None Present      RECENT LABS/IMAGING                        11.6   7.61  )-----------( 155      ( 24 Sep 2020 05:30 )             35.8     09-25    134<L>  |  102  |  33<H>  ----------------------------<  102<H>  3.9   |  24  |  0.94    Ca    10.8<H>      25 Sep 2020 06:00    TPro  5.8<L>  /  Alb  2.4<L>  /  TBili  0.8  /  DBili  x   /  AST  13  /  ALT  16  /  AlkPhos  77  09-24    PT/INR - ( 25 Sep 2020 06:00 )   PT: 17.6 sec;   INR: 1.48 ratio           RADIOLOGY/OTHER RESULTS    MEDICATIONS  (STANDING):  brimonidine 0.2%/ timolol 0.5% Ophthalmic Solution 1 Drop(s) Left EYE two times a day  cinacalcet 30 milliGRAM(s) Oral once  dorzolamide 2% Ophthalmic Solution 1 Drop(s) Left EYE three times a day  labetalol 100 milliGRAM(s) Oral every 6 hours  lisinopril 10 milliGRAM(s) Oral daily  montelukast 10 milliGRAM(s) Oral at bedtime  multivitamin 1 Tablet(s) Oral daily  warfarin 2.5 milliGRAM(s) Oral at bedtime    MEDICATIONS  (PRN):  acetaminophen   Tablet .. 650 milliGRAM(s) Oral every 6 hours PRN Temp greater or equal to 38C (100.4F), Mild Pain (1 - 3)  senna 2 Tablet(s) Oral at bedtime PRN Constipation         HPI:  89 y/o F w/ pmh htn, afib on coumadin and asa, +PPM, CHF (EF 25-30%), recent admission for an NSTEMI, presented to [facility] on 9/17 s/p mech fall and found to have b/l frontal SAH and +scalp hematoma. Pt also found to have supratherapeutic INR of 3.9, Hyperkalemia of 6.2, MORELIA, MICU consulted for admission for hyperkalemia, q1h neurochecks. During interview with patients and daughter at bedside pt reported having a headache worst around the R. frontal scalp hematoma. Pt otherwise denied any other medical complains. Pt stated she cannot recall what happened. Daughter stated she did not witness the fall but heard a loud thump and found her mother on the floor. Pt denied any visual changes, dizziness, photo/phonophibia, neck pain, facial pain, sob, cp, palpitation abd pain, back pain, weakness/numbness in the ext.  Pt. also injured her right arm during the fall.     In ICU patient was started on labetalol infusion for BP control and keppra for seizure ppx. Aspirin and coumadin were held, she was given vitamin K and Kcentra. Patient improved and was transferred to floor. She was transitioned to labetalol PO with good BP control until it was discontinued on 9/21.     Repeat head ct 9/20 revealed small amount of acute subarachnoid hemorrhage in bifrontal lobes, small intraparenchymal hemorrhage and tiny bilateral whole hemispheric sdh essentially unchanged. Pt does not want any invasive procedures including aneurysm intervention. As per neuro, warfarin was cleared to be restarted. EEG done on 9/21 without any seizure activity and was better compared to one done on 9/18.     On 9/22 patient hypertensive, treated with IV hydralazine, labetalol was restarted as well as home med lisinopril. Pt asymptomatic. Goal is to have SBP less than 160.  Patient's discharge to rehab was held 9/22 due to hypertension but was medically cleared for discharge to acute rehab 9/23.    (23 Sep 2020 15:56)      PAST MEDICAL & SURGICAL HISTORY:  Pacemaker    Transient cerebral ischemia, unspecified transient cerebral ischemia type    HTN (hypertension), benign    Atrial fibrillation    Acid reflux disease    History of cataract surgery      Subjective: Patient seen and evaluated this morning. Patient noted some improvement in regards to her right wrist pain today. No acute medical issues noted overnight. Noted to be orthostatic this morning according to nursing staff with SBP while upright in 100's and 90's while standing up in therapy. Without any symptoms.     REVIEW OF SYMPTOMS  +right shoulder and elbow pain, wrist pain, hearing difficulty, memory loss     Vital Signs Last 24 Hrs  T(C): 36.9 (25 Sep 2020 07:20), Max: 37 (24 Sep 2020 20:36)  T(F): 98.5 (25 Sep 2020 07:20), Max: 98.6 (24 Sep 2020 20:36)  HR: 89 (25 Sep 2020 07:20) (85 - 96)  BP: 146/71 (25 Sep 2020 07:20) (105/44 - 175/84)  BP(mean): --  RR: 14 (25 Sep 2020 07:20) (14 - 15)  SpO2: 95% (25 Sep 2020 07:20) (95% - 96%)    PHYSICAL EXAM  Physical Exam: Gen - NAD, Comfortable  HEENT - NCAT, EOMI, MMM, [place]  Neck - Supple,  Pulm - CTAB, No wheeze, No rhonchi, No crackles  Cardiovascular - RRR, S1S2, No murmurs  Abdomen - Soft, NT/ND, +BS  Extremities - No C/C/E, No calf tenderness  Neuro-     Cognitive - AAOx2 --knows she is in hospital but not name, unaware of the year -- thinks it is 1920      Communication - Fluent, No dysarthria, hypophonic      Cranial Nerves - CN 2-12 intact except hearing     Motor -                     LEFT    UE - limited due to pain-- WE 2/5,  2/5                    RIGHT UE - ShAB 5/5, EF 5/5, EE 5/5, WE 5/5,  5/5                    LEFT    LE - HF 5/5, KE 5/5, DF 5/5, PF 5/5                    RIGHT LE - HF 4/5, KE 5/5, DF 5/5, PF 5/5        Sensory - Intact to LT bilat     Tone - normal  MSK: right wrist, and index and middle PIP swelling and tenderness.  Pain with ROM. Pain on palpation to the right shoulder and right medial epicondyle.         Right shoulder +simpson  Psychiatric - Mood stable, Affect WNL  Skin:  all skin intact    Wounds: None Present      RECENT LABS/IMAGING                        11.6   7.61  )-----------( 155      ( 24 Sep 2020 05:30 )             35.8     09-25    134<L>  |  102  |  33<H>  ----------------------------<  102<H>  3.9   |  24  |  0.94    Ca    10.8<H>      25 Sep 2020 06:00    TPro  5.8<L>  /  Alb  2.4<L>  /  TBili  0.8  /  DBili  x   /  AST  13  /  ALT  16  /  AlkPhos  77  09-24    PT/INR - ( 25 Sep 2020 06:00 )   PT: 17.6 sec;   INR: 1.48 ratio           RADIOLOGY/OTHER RESULTS  < from: Xray Shoulder 2 Views, Right (09.25.20 @ 08:10) >    INTERPRETATION:  Shoulder pain.    3 views right shoulder.  No fracture dislocation. Senile demineralization. Mild degenerative change acromio clavicular joint. Degenerative cystic change humeral head. Evidence of calcific bursitis/peritendinitis.    < end of copied text >        < from: Xray Elbow AP + Lateral + Oblique, Right (09.24.20 @ 14:51) >    Impression: No osseous abnormality. Nonspecific soft tissue swelling olecranon region.    < end of copied text >      MEDICATIONS  (STANDING):  brimonidine 0.2%/ timolol 0.5% Ophthalmic Solution 1 Drop(s) Left EYE two times a day  cinacalcet 30 milliGRAM(s) Oral once  dorzolamide 2% Ophthalmic Solution 1 Drop(s) Left EYE three times a day  labetalol 100 milliGRAM(s) Oral every 6 hours  lisinopril 10 milliGRAM(s) Oral daily  montelukast 10 milliGRAM(s) Oral at bedtime  multivitamin 1 Tablet(s) Oral daily  warfarin 2.5 milliGRAM(s) Oral at bedtime    MEDICATIONS  (PRN):  acetaminophen   Tablet .. 650 milliGRAM(s) Oral every 6 hours PRN Temp greater or equal to 38C (100.4F), Mild Pain (1 - 3)  senna 2 Tablet(s) Oral at bedtime PRN Constipation

## 2020-09-25 NOTE — PROGRESS NOTE ADULT - SUBJECTIVE AND OBJECTIVE BOX
Patient is a 89y old  Female who presents with a chief complaint of SAH  SAH  02.22 Traumatic, Close Injury (25 Sep 2020 16:19)      INTERVAL HPI/OVERNIGHT EVENTS: alert      REVIEW OF SYSTEMS:  CONSTITUTIONAL: No fever, weight loss, or fatigue  EYES: No eye pain, visual disturbances, or discharge  ENMT:  No difficulty hearing, tinnitus, vertigo; No sinus or throat pain  NECK: No pain or stiffness  BREASTS: No pain, masses, or nipple discharge  RESPIRATORY: No cough, wheezing, chills or hemoptysis; No shortness of breath  CARDIOVASCULAR: No chest pain, palpitations, dizziness, or leg swelling  GASTROINTESTINAL: No abdominal or epigastric pain. No nausea, vomiting, or hematemesis; No diarrhea or constipation. No melena or hematochezia.  GENITOURINARY: No dysuria, frequency, hematuria, or incontinence  NEUROLOGICAL: No headaches, memory loss, loss of strength, numbness, or tremors  SKIN: No itching, burning, rashes, or lesions   LYMPH NODES: No enlarged glands  ENDOCRINE: No heat or cold intolerance; No hair loss  MUSCULOSKELETAL: No joint pain or swelling; No muscle, back, or extremity pain  PSYCHIATRIC: No depression, anxiety, mood swings, or difficulty sleeping  HEME/LYMPH: No easy bruising, or bleeding gums  ALLERY AND IMMUNOLOGIC: No hives or eczema  FAMILY HISTORY:  FH: diabetes mellitus      T(C): 36.9 (09-25-20 @ 07:20), Max: 37 (09-24-20 @ 20:36)  HR: 89 (09-25-20 @ 07:20) (85 - 96)  BP: 146/71 (09-25-20 @ 07:20) (105/44 - 175/84)  RR: 14 (09-25-20 @ 07:20) (14 - 15)  SpO2: 95% (09-25-20 @ 07:20) (95% - 96%)  Wt(kg): --Vital Signs Last 24 Hrs  T(C): 36.9 (25 Sep 2020 07:20), Max: 37 (24 Sep 2020 20:36)  T(F): 98.5 (25 Sep 2020 07:20), Max: 98.6 (24 Sep 2020 20:36)  HR: 89 (25 Sep 2020 07:20) (85 - 96)  BP: 146/71 (25 Sep 2020 07:20) (105/44 - 175/84)  BP(mean): --  RR: 14 (25 Sep 2020 07:20) (14 - 15)  SpO2: 95% (25 Sep 2020 07:20) (95% - 96%)    T(F): 98.5 (09-25-20 @ 07:20), Max: 98.6 (09-23-20 @ 05:47)  HR: 89 (09-25-20 @ 07:20) (83 - 107)  BP: 146/71 (09-25-20 @ 07:20) (105/44 - 183/74)  RR: 14 (09-25-20 @ 07:20) (14 - 19)  SpO2: 95% (09-25-20 @ 07:20) (95% - 99%)    PHYSICAL EXAM:  GENERAL: NAD, well-groomed, well-developed  HEAD:  Atraumatic, Normocephalic  EYES: EOMI, PERRLA, conjunctiva and sclera clear  ENMT: No tonsillar erythema, exudates, or enlargement; Moist mucous membranes, Good dentition, No lesions  NECK: Supple, No JVD, Normal thyroid  NERVOUS SYSTEM:  Alert & Oriented X3, Good concentration; Motor Strength 5/5 B/L upper and lower extremities; DTRs 2+ intact and symmetric  CHEST/LUNG: Clear to percussion bilaterally; No rales, rhonchi, wheezing, or rubs  HEART: Regular rate and rhythm; No murmurs, rubs, or gallops  ABDOMEN: Soft, Nontender, Nondistended; Bowel sounds present  EXTREMITIES:  2+ Peripheral Pulses, No clubbing, cyanosis, or edema  LYMPH: No lymphadenopathy noted  SKIN: No rashes or lesions    Consultant(s) Notes Reviewed:  [x ] YES  [ ] NO  Care Discussed with Consultants/Other Providers [ x] YES  [ ] NO    LABS:  09-25    134<L>  |  102  |  33<H>  ----------------------------<  102<H>  3.9   |  24  |  0.94    Ca    10.8<H>      25 Sep 2020 06:00    TPro  5.8<L>  /  Alb  2.4<L>  /  TBili  0.8  /  DBili  x   /  AST  13  /  ALT  16  /  AlkPhos  77  09-24                          11.6   7.61  )-----------( 155      ( 24 Sep 2020 05:30 )             35.8         PT/INR - ( 25 Sep 2020 06:00 )   PT: 17.6 sec;   INR: 1.48 ratio           LIVER FUNCTIONS - ( 24 Sep 2020 05:30 )  Alb: 2.4 g/dL / Pro: 5.8 g/dL / ALK PHOS: 77 U/L / ALT: 16 U/L / AST: 13 U/L / GGT: x                            11.6   7.61  )-----------( 155      ( 09-24 @ 05:30 )             35.8                12.3   7.18  )-----------( 158      ( 09-20 @ 06:00 )             36.9                12.0   8.28  )-----------( 164      ( 09-19 @ 05:00 )             37.2                12.9   9.39  )-----------( 138      ( 09-18 @ 04:45 )             39.1                13.3   6.51  )-----------( 147      ( 09-17 @ 23:45 )             40.6                14.2   8.13  )-----------( 150      ( 09-01 @ 06:00 )             44.7                13.3   6.37  )-----------( 128      ( 08-31 @ 06:22 )             42.4                15.5   8.96  )-----------( 185      ( 08-30 @ 03:24 )             47.9               RADIOLOGY & ADDITIONAL TESTS:    Imaging Personally Reviewed:  [ ] YES  [ ] NO  acetaminophen   Tablet .. 650 milliGRAM(s) Oral every 6 hours PRN  brimonidine 0.2%/ timolol 0.5% Ophthalmic Solution 1 Drop(s) Left EYE two times a day  dorzolamide 2% Ophthalmic Solution 1 Drop(s) Left EYE three times a day  labetalol 100 milliGRAM(s) Oral every 6 hours  lisinopril 10 milliGRAM(s) Oral daily  montelukast 10 milliGRAM(s) Oral at bedtime  multivitamin 1 Tablet(s) Oral daily  senna 2 Tablet(s) Oral at bedtime PRN  warfarin 2.5 milliGRAM(s) Oral at bedtime      HEALTH ISSUES - PROBLEM Dx:  Hand swelling  Hand swelling    HTN (hypertension), benign  HTN (hypertension), benign    Atrial fibrillation  Atrial fibrillation    Subarachnoid bleed  Subarachnoid bleed

## 2020-09-25 NOTE — PROGRESS NOTE ADULT - SUBJECTIVE AND OBJECTIVE BOX
Resting    Vital Signs Last 24 Hrs  T(C): 36.9 (09-25-20 @ 07:20), Max: 37 (09-24-20 @ 20:36)  T(F): 98.5 (09-25-20 @ 07:20), Max: 98.6 (09-24-20 @ 20:36)  HR: 89 (09-25-20 @ 07:20) (85 - 96)  BP: 146/71 (09-25-20 @ 07:20) (105/44 - 175/84)  RR: 14 (09-25-20 @ 07:20) (14 - 15)  SpO2: 95% (09-25-20 @ 07:20) (95% - 96%)    card s1s2  b/l air entry  soft, ND  no edema                                                                   11.6   7.61  )-----------( 155      ( 24 Sep 2020 05:30 )             35.8     25 Sep 2020 06:00    134    |  102    |  33     ----------------------------<  102    3.9     |  24     |  0.94     Ca    10.8       25 Sep 2020 06:00    TPro  5.8    /  Alb  2.4    /  TBili  0.8    /  DBili  x      /  AST  13     /  ALT  16     /  AlkPhos  77     24 Sep 2020 05:30    LIVER FUNCTIONS - ( 24 Sep 2020 05:30 )  Alb: 2.4 g/dL / Pro: 5.8 g/dL / ALK PHOS: 77 U/L / ALT: 16 U/L / AST: 13 U/L / GGT: x           PT/INR - ( 25 Sep 2020 06:00 )   PT: 17.6 sec;   INR: 1.48 ratio      acetaminophen   Tablet .. 650 milliGRAM(s) Oral every 6 hours PRN  brimonidine 0.2%/ timolol 0.5% Ophthalmic Solution 1 Drop(s) Left EYE two times a day  cinacalcet 30 milliGRAM(s) Oral once  dorzolamide 2% Ophthalmic Solution 1 Drop(s) Left EYE three times a day  labetalol 100 milliGRAM(s) Oral every 6 hours  lisinopril 10 milliGRAM(s) Oral daily  montelukast 10 milliGRAM(s) Oral at bedtime  multivitamin 1 Tablet(s) Oral daily  senna 2 Tablet(s) Oral at bedtime PRN  warfarin 2.5 milliGRAM(s) Oral at bedtime    A/P:    S/p fall, b/l SAH  Known CM, EF 25-30%, MR, TR, AR  S/p hemodynamic MORELIA   Resolved  UA w/pr 15 otherwise bland  SONO w/o hydro, b/l small kidneys  Off IVF  Hypercalcemia, hyperpara   Started on Sensipar  Avoid nephrotoxins  F/u BMP, BP    [phone]

## 2020-09-26 NOTE — PROGRESS NOTE ADULT - SUBJECTIVE AND OBJECTIVE BOX
Patient is a 89y old  Female who presents with a chief complaint of SAH    02.22 Traumatic, Close Injury (25 Sep 2020 18:40)      INTERVAL HPI/OVERNIGHT EVENTS:  fatigued      REVIEW OF SYSTEMS:  CONSTITUTIONAL: No fever, weight loss, or fatigue  EYES: No eye pain, visual disturbances, or discharge  ENMT:  No difficulty hearing, tinnitus, vertigo; No sinus or throat pain  NECK: No pain or stiffness  BREASTS: No pain, masses, or nipple discharge  RESPIRATORY: No cough, wheezing, chills or hemoptysis; No shortness of breath  CARDIOVASCULAR: No chest pain, palpitations, dizziness, or leg swelling  GASTROINTESTINAL: No abdominal or epigastric pain. No nausea, vomiting, or hematemesis; No diarrhea or constipation. No melena or hematochezia.  GENITOURINARY: No dysuria, frequency, hematuria, or incontinence  NEUROLOGICAL: No headaches, memory loss, loss of strength, numbness, or tremors  SKIN: No itching, burning, rashes, or lesions   LYMPH NODES: No enlarged glands  ENDOCRINE: No heat or cold intolerance; No hair loss  MUSCULOSKELETAL: No joint pain or swelling; No muscle, back, or extremity pain  PSYCHIATRIC: No depression, anxiety, mood swings, or difficulty sleeping  HEME/LYMPH: No easy bruising, or bleeding gums  ALLERY AND IMMUNOLOGIC: No hives or eczema  FAMILY HISTORY:  FH: diabetes mellitus      T(C): 36.7 (09-26-20 @ 07:40), Max: 36.7 (09-25-20 @ 20:07)  HR: 75 (09-26-20 @ 07:40) (75 - 80)  BP: 119/72 (09-26-20 @ 07:40) (119/72 - 153/88)  RR: 94 (09-26-20 @ 07:40) (15 - 94)  SpO2: 95% (09-25-20 @ 20:07) (95% - 95%)  Wt(kg): --Vital Signs Last 24 Hrs  T(C): 36.7 (26 Sep 2020 07:40), Max: 36.7 (25 Sep 2020 20:07)  T(F): 98 (26 Sep 2020 07:40), Max: 98.1 (25 Sep 2020 20:07)  HR: 75 (26 Sep 2020 07:40) (75 - 80)  BP: 119/72 (26 Sep 2020 07:40) (119/72 - 153/88)  BP(mean): --  RR: 94 (26 Sep 2020 07:40) (15 - 94)  SpO2: 95% (25 Sep 2020 20:07) (95% - 95%)    T(F): 98 (09-26-20 @ 07:40), Max: 98.6 (09-24-20 @ 20:36)  HR: 75 (09-26-20 @ 07:40) (75 - 107)  BP: 119/72 (09-26-20 @ 07:40) (105/44 - 183/74)  RR: 94 (09-26-20 @ 07:40) (14 - 94)  SpO2: 95% (09-25-20 @ 20:07) (95% - 98%)    PHYSICAL EXAM:  GENERAL: NAD, well-groomed, well-developed  HEAD:  Atraumatic, Normocephalic  EYES: EOMI, PERRLA, conjunctiva and sclera clear  ENMT: No tonsillar erythema, exudates, or enlargement; Moist mucous membranes, Good dentition, No lesions  NECK: Supple, No JVD, Normal thyroid  NERVOUS SYSTEM:  Alert & Oriented X3, Good concentration; Motor Strength 5/5 B/L upper and lower extremities; DTRs 2+ intact and symmetric  CHEST/LUNG: Clear to percussion bilaterally; No rales, rhonchi, wheezing, or rubs  HEART: Regular rate and rhythm; No murmurs, rubs, or gallops  ABDOMEN: Soft, Nontender, Nondistended; Bowel sounds present  EXTREMITIES:  2+ Peripheral Pulses, No clubbing, cyanosis, or edema  LYMPH: No lymphadenopathy noted  SKIN: No rashes or lesions    Consultant(s) Notes Reviewed:  [x ] YES  [ ] NO  Care Discussed with Consultants/Other Providers [ x] YES  [ ] NO    LABS:  09-25    134<L>  |  102  |  33<H>  ----------------------------<  102<H>  3.9   |  24  |  0.94    Ca    10.8<H>      25 Sep 2020 06:00            PT/INR - ( 26 Sep 2020 05:52 )   PT: 18.0 sec;   INR: 1.52 ratio                              11.6   7.61  )-----------( 155      ( 09-24 @ 05:30 )             35.8                12.3   7.18  )-----------( 158      ( 09-20 @ 06:00 )             36.9                12.0   8.28  )-----------( 164      ( 09-19 @ 05:00 )             37.2                12.9   9.39  )-----------( 138      ( 09-18 @ 04:45 )             39.1                13.3   6.51  )-----------( 147      ( 09-17 @ 23:45 )             40.6                14.2   8.13  )-----------( 150      ( 09-01 @ 06:00 )             44.7                13.3   6.37  )-----------( 128      ( 08-31 @ 06:22 )             42.4                15.5   8.96  )-----------( 185      ( 08-30 @ 03:24 )             47.9               RADIOLOGY & ADDITIONAL TESTS:    Imaging Personally Reviewed:  [ ] YES  [ ] NO  acetaminophen   Tablet .. 650 milliGRAM(s) Oral every 6 hours PRN  brimonidine 0.2%/ timolol 0.5% Ophthalmic Solution 1 Drop(s) Left EYE two times a day  dorzolamide 2% Ophthalmic Solution 1 Drop(s) Left EYE three times a day  labetalol 100 milliGRAM(s) Oral every 6 hours  lisinopril 10 milliGRAM(s) Oral daily  montelukast 10 milliGRAM(s) Oral at bedtime  multivitamin 1 Tablet(s) Oral daily  senna 2 Tablet(s) Oral at bedtime PRN      HEALTH ISSUES - PROBLEM Dx:  Hand swelling  Hand swelling    HTN (hypertension), benign  HTN (hypertension), benign    Atrial fibrillation  Atrial fibrillation    Subarachnoid bleed  Subarachnoid bleed

## 2020-09-27 NOTE — PROGRESS NOTE ADULT - SUBJECTIVE AND OBJECTIVE BOX
Cc: Gait dysfunction    Subjective: Patient reports bm today. Denies new complaint, on coumadin denies signs of bleeding.   Pain controlled, no chest pain, no N/V, no Fevers/Chills. No other new ROS  Has been tolerating rehabilitation program.    acetaminophen   Tablet .. 650 milliGRAM(s) Oral every 6 hours PRN  brimonidine 0.2%/ timolol 0.5% Ophthalmic Solution 1 Drop(s) Left EYE two times a day  dorzolamide 2% Ophthalmic Solution 1 Drop(s) Left EYE three times a day  labetalol 100 milliGRAM(s) Oral every 6 hours  lisinopril 10 milliGRAM(s) Oral daily  montelukast 10 milliGRAM(s) Oral at bedtime  multivitamin 1 Tablet(s) Oral daily  senna 2 Tablet(s) Oral at bedtime PRN  warfarin 3 milliGRAM(s) Oral once      T(C): 36.9 (09-27-20 @ 08:16), Max: 36.9 (09-27-20 @ 08:16)  HR: 81 (09-27-20 @ 08:16) (66 - 83)  BP: 144/68 (09-27-20 @ 08:16) (107/51 - 146/57)  RR: 15 (09-27-20 @ 08:16) (15 - 15)  SpO2: 95% (09-27-20 @ 08:16) (95% - 96%)    In NAD, sitting in wheelchair  HEENT- EOMI  Heart-  S1S2  Lungs- no respiratory distress  Abd- + BS, NT  Ext- No calf pain  Neuro- Exam unchanged      Imp: Patient with diagnosis of SAH admitted for comprehensive acute rehabilitation.    Plan:  - Continue PT/OT/SLP  - DVT prophylaxis - on coumadin for afib  - Skin- Turn q2h, check skin daily  - Continue current medications; patient medically stable.   - Patient is stable to continue current rehabilitation program.    Cc: Gait dysfunction    Subjective: Patient reports bm today. Denies new complaint, on coumadin denies signs of bleeding. INR 1.64 today.  Reports balance improving  Pain controlled, no chest pain, no N/V, no Fevers/Chills. No other new ROS  Has been tolerating rehabilitation program.    acetaminophen   Tablet .. 650 milliGRAM(s) Oral every 6 hours PRN  brimonidine 0.2%/ timolol 0.5% Ophthalmic Solution 1 Drop(s) Left EYE two times a day  dorzolamide 2% Ophthalmic Solution 1 Drop(s) Left EYE three times a day  labetalol 100 milliGRAM(s) Oral every 6 hours  lisinopril 10 milliGRAM(s) Oral daily  montelukast 10 milliGRAM(s) Oral at bedtime  multivitamin 1 Tablet(s) Oral daily  senna 2 Tablet(s) Oral at bedtime PRN  warfarin 3 milliGRAM(s) Oral once      T(C): 36.9 (09-27-20 @ 08:16), Max: 36.9 (09-27-20 @ 08:16)  HR: 81 (09-27-20 @ 08:16) (66 - 83)  BP: 144/68 (09-27-20 @ 08:16) (107/51 - 146/57)  RR: 15 (09-27-20 @ 08:16) (15 - 15)  SpO2: 95% (09-27-20 @ 08:16) (95% - 96%)    In NAD, sitting in wheelchair  HEENT- EOMI  Heart-  S1S2  Lungs- no respiratory distress  Abd- + BS, NT  Ext- No calf pain  Neuro- oriented x 3  Strength 5/5 in b/l UE  5/5 in RLE, 4+/5 L hip flexion, otherwise 5/5 LLE  sensation intact      Imp: Patient with diagnosis of SAH admitted for comprehensive acute rehabilitation.    Plan:  -coumadin 3mg today  - Continue PT/OT/SLP  - DVT prophylaxis - on coumadin for afib  - Skin- Turn q2h, check skin daily  - Continue current medications; patient medically stable.   - Patient is stable to continue current rehabilitation program.    Cc: Gait dysfunction    Subjective: Patient reports bm today. Denies new complaint, on coumadin denies signs of bleeding. INR 1.64 today.  Reports balance improving  Pain controlled, no chest pain, no N/V, no Fevers/Chills. No other new ROS  Has been tolerating rehabilitation program.    acetaminophen   Tablet .. 650 milliGRAM(s) Oral every 6 hours PRN  brimonidine 0.2%/ timolol 0.5% Ophthalmic Solution 1 Drop(s) Left EYE two times a day  dorzolamide 2% Ophthalmic Solution 1 Drop(s) Left EYE three times a day  labetalol 100 milliGRAM(s) Oral every 6 hours  lisinopril 10 milliGRAM(s) Oral daily  montelukast 10 milliGRAM(s) Oral at bedtime  multivitamin 1 Tablet(s) Oral daily  senna 2 Tablet(s) Oral at bedtime PRN  warfarin 3 milliGRAM(s) Oral once      T(C): 36.9 (09-27-20 @ 08:16), Max: 36.9 (09-27-20 @ 08:16)  HR: 81 (09-27-20 @ 08:16) (66 - 83)  BP: 144/68 (09-27-20 @ 08:16) (107/51 - 146/57)  RR: 15 (09-27-20 @ 08:16) (15 - 15)  SpO2: 95% (09-27-20 @ 08:16) (95% - 96%)    In NAD, sitting in wheelchair  HEENT- EOMI  Heart-  S1S2  Lungs- no respiratory distress  Abd- + BS, NT  Ext- No calf pain  Neuro- oriented x 3  Strength 5/5 in b/l UE  5/5 in RLE, 4+/5 L hip flexion, otherwise 5/5 LLE  sensation intact      Imp: 75 yo F Patient with diagnosis of CVA admitted for comprehensive acute rehabilitation.    Plan:  constipation resolved, balance improving  -coumadin 3mg today  - Continue PT/OT/SLP  - DVT prophylaxis - on coumadin for afib  - Skin- Turn q2h, check skin daily  - Continue current medications; patient medically stable.   - Patient is stable to continue current rehabilitation program.    Cc: Gait dysfunction    HPI: Patient with no new medical issues today.  Reports R elbow pain which improves with tylenol.   Reports feeling tired today.  Pain controlled, no chest pain, no N/V, no Fevers/Chills. No other new ROS  Has been tolerating rehabilitation program.    acetaminophen   Tablet .. 650 milliGRAM(s) Oral every 6 hours PRN  brimonidine 0.2%/ timolol 0.5% Ophthalmic Solution 1 Drop(s) Left EYE two times a day  dorzolamide 2% Ophthalmic Solution 1 Drop(s) Left EYE three times a day  labetalol 100 milliGRAM(s) Oral every 6 hours  lisinopril 10 milliGRAM(s) Oral daily  montelukast 10 milliGRAM(s) Oral at bedtime  multivitamin 1 Tablet(s) Oral daily  senna 2 Tablet(s) Oral at bedtime PRN  warfarin 3 milliGRAM(s) Oral once    INR 1.64 today    T(C): 36.9 (09-27-20 @ 08:16), Max: 36.9 (09-27-20 @ 08:16)  HR: 81 (09-27-20 @ 08:16) (79 - 83)  BP: 144/68 (09-27-20 @ 08:16) (108/56 - 146/57)  RR: 15 (09-27-20 @ 08:16) (15 - 15)  SpO2: 95% (09-27-20 @ 08:16) (95% - 96%)    In NAD  HEENT- EOMI  Heart- RRR, S1S2  Lungs- no respiratory distress  Abd- + BS, NT  Ext- No calf pain, mild R elbow pain  Neuro- Exam unchanged      Imp: 89 yo F with diagnosis of SAH admitted for comprehensive acute rehabilitation.    Plan:  - Continue therapies  - DVT prophylaxis- on coumadin  - Skin- Turn q2h, check skin daily  - Continue current medications; patient medically stable.   - Patient is stable to continue current rehabilitation program.

## 2020-09-27 NOTE — PROGRESS NOTE ADULT - SUBJECTIVE AND OBJECTIVE BOX
Patient is a 89y old  Female who presents with a chief complaint of SAH  SAH  02.22 Traumatic, Close Injury (26 Sep 2020 09:12)      INTERVAL HPI/OVERNIGHT EVENTS: alert and in good spirits      REVIEW OF SYSTEMS:  CONSTITUTIONAL: No fever, weight loss, or fatigue  EYES: No eye pain, visual disturbances, or discharge  ENMT:  No difficulty hearing, tinnitus, vertigo; No sinus or throat pain  NECK: No pain or stiffness  BREASTS: No pain, masses, or nipple discharge  RESPIRATORY: No cough, wheezing, chills or hemoptysis; No shortness of breath  CARDIOVASCULAR: No chest pain, palpitations, dizziness, or leg swelling  GASTROINTESTINAL: No abdominal or epigastric pain. No nausea, vomiting, or hematemesis; No diarrhea or constipation. No melena or hematochezia.  GENITOURINARY: No dysuria, frequency, hematuria, or incontinence  NEUROLOGICAL: No headaches, memory loss, loss of strength, numbness, or tremors  SKIN: No itching, burning, rashes, or lesions   LYMPH NODES: No enlarged glands  ENDOCRINE: No heat or cold intolerance; No hair loss  MUSCULOSKELETAL: No joint pain or swelling; No muscle, back, or extremity pain  PSYCHIATRIC: No depression, anxiety, mood swings, or difficulty sleeping  HEME/LYMPH: No easy bruising, or bleeding gums  ALLERY AND IMMUNOLOGIC: No hives or eczema  FAMILY HISTORY:  FH: diabetes mellitus      T(C): 36.9 (09-27-20 @ 08:16), Max: 36.9 (09-27-20 @ 08:16)  HR: 81 (09-27-20 @ 08:16) (66 - 83)  BP: 144/68 (09-27-20 @ 08:16) (107/51 - 146/57)  RR: 15 (09-27-20 @ 08:16) (15 - 15)  SpO2: 95% (09-27-20 @ 08:16) (95% - 96%)  Wt(kg): --Vital Signs Last 24 Hrs  T(C): 36.9 (27 Sep 2020 08:16), Max: 36.9 (27 Sep 2020 08:16)  T(F): 98.5 (27 Sep 2020 08:16), Max: 98.5 (27 Sep 2020 08:16)  HR: 81 (27 Sep 2020 08:16) (66 - 83)  BP: 144/68 (27 Sep 2020 08:16) (107/51 - 146/57)  BP(mean): --  RR: 15 (27 Sep 2020 08:16) (15 - 15)  SpO2: 95% (27 Sep 2020 08:16) (95% - 96%)    T(F): 98.5 (09-27-20 @ 08:16), Max: 98.6 (09-24-20 @ 20:36)  HR: 81 (09-27-20 @ 08:16) (66 - 96)  BP: 144/68 (09-27-20 @ 08:16) (105/44 - 175/84)  RR: 15 (09-27-20 @ 08:16) (14 - 94)  SpO2: 95% (09-27-20 @ 08:16) (95% - 96%)    PHYSICAL EXAM:  GENERAL: NAD, well-groomed, well-developed  HEAD:  Atraumatic, Normocephalic  EYES: EOMI, PERRLA, conjunctiva and sclera clear  ENMT: No tonsillar erythema, exudates, or enlargement; Moist mucous membranes, Good dentition, No lesions  NECK: Supple, No JVD, Normal thyroid  NERVOUS SYSTEM:  Alert & Oriented X3, Good concentration; Motor Strength 5/5 B/L upper and lower extremities; DTRs 2+ intact and symmetric  CHEST/LUNG: Clear to percussion bilaterally; No rales, rhonchi, wheezing, or rubs  HEART: Regular rate and rhythm; No murmurs, rubs, or gallops  ABDOMEN: Soft, Nontender, Nondistended; Bowel sounds present  EXTREMITIES:  2+ Peripheral Pulses, No clubbing, cyanosis, or edema  LYMPH: No lymphadenopathy noted  SKIN: No rashes or lesions    Consultant(s) Notes Reviewed:  [x ] YES  [ ] NO  Care Discussed with Consultants/Other Providers [ x] YES  [ ] NO    LABS:              PT/INR - ( 27 Sep 2020 06:10 )   PT: 19.4 sec;   INR: 1.64 ratio                              11.6   7.61  )-----------( 155      ( 09-24 @ 05:30 )             35.8                12.3   7.18  )-----------( 158      ( 09-20 @ 06:00 )             36.9                12.0   8.28  )-----------( 164      ( 09-19 @ 05:00 )             37.2                12.9   9.39  )-----------( 138      ( 09-18 @ 04:45 )             39.1                13.3   6.51  )-----------( 147      ( 09-17 @ 23:45 )             40.6                14.2   8.13  )-----------( 150      ( 09-01 @ 06:00 )             44.7                13.3   6.37  )-----------( 128      ( 08-31 @ 06:22 )             42.4                15.5   8.96  )-----------( 185      ( 08-30 @ 03:24 )             47.9               RADIOLOGY & ADDITIONAL TESTS:    Imaging Personally Reviewed:  [ ] YES  [ ] NO  acetaminophen   Tablet .. 650 milliGRAM(s) Oral every 6 hours PRN  brimonidine 0.2%/ timolol 0.5% Ophthalmic Solution 1 Drop(s) Left EYE two times a day  dorzolamide 2% Ophthalmic Solution 1 Drop(s) Left EYE three times a day  labetalol 100 milliGRAM(s) Oral every 6 hours  lisinopril 10 milliGRAM(s) Oral daily  montelukast 10 milliGRAM(s) Oral at bedtime  multivitamin 1 Tablet(s) Oral daily  senna 2 Tablet(s) Oral at bedtime PRN  warfarin 3 milliGRAM(s) Oral once      HEALTH ISSUES - PROBLEM Dx:  Hand swelling  Hand swelling    HTN (hypertension), benign  HTN (hypertension), benign    Atrial fibrillation  Atrial fibrillation    Subarachnoid bleed  Subarachnoid bleed

## 2020-09-28 NOTE — PROGRESS NOTE ADULT - ASSESSMENT
ASSESSMENT/PLAN  AZRA CHAHAL is a 89yo Female w/ pmh htn, afib on coumadin and asa, +PPM, CHF (EF 25-30%), recent admission for an NSTEMI, presented to [facility] on 9/18 s/p mech fall and found to have b/l frontal SAH and +scalp hematoma. Repeat CT head stable and was resumed on warfarin. Now admitted for multidisciplinary rehab program.     #SAH  -On Ct head 9/20: Small amount of acute subarachnoid hemorrhage in the bifrontal lobes, stable   -No invasive procedures as per family and patient, patient is DNR/DNI   -EEG negative for seizures   --Pt. has not had any seizure activity and Completed 7 day keppra seizure ppx  guideline from [facility]--stopped keppra on 9/24   -Gait Instability, ADL impairments and Functional impairments: start Comprehensive Rehab Program of PT/OT/SLP    #Afib  -Cleared to resume warfarin as per neuro recs   -INR daily -- INR subtherapeutic but improving  --INR goal 2-3  -Coumadin 3mg tonight    Right wrist Sprain  --xray neg for fracture  --ICE and tylenol PRN.      right shoulder pain  --likely RTC tendinitis  --R shoulder xray 9/24 --No fractures, or dislocation, degenerative changes noted   --Pain control PRN    Right elbow pain   -Likely medial epicondylitis   -R elbow xray 9/24-- no acute fractures noted     Sleep  --melatonin PRN    #CHF  -TTE from 8/30 with EF 25-30%   -Daily weights     #HTN  -Monitor BP, SBP goal <160   -Continue lisinopril and labetalol   -BP somewhat labile  -- Will monitor orthostatics-- adjust labetalol if definitively +    #MORELIA (resolved)  - SONO w/o hydro, b/l small kidneys  -nephro following   -Monitor BMP     #Pain Mgmt   - Tylenol PRN     #GI/Bowel Mgmt   - Continent c/w Senna PRN     #/Bladder Mgmt   - Continent, PVR    #FEN   - Diet - Regular + Thins  [CCHO, DASH/TLC]      #Precautions / PROPHYLAXIS:   - Falls, Cardiac, Seizure   - Lungs: Aspiration   - Pressure injury/Skin: OOB to Chair, PT/OT    - DVT: Coumadin   -Code: DNR/DNI ASSESSMENT/PLAN  AZRA CHAHAL is a 89yo Female w/ pmh htn, afib on coumadin and asa, +PPM, CHF (EF 25-30%), recent admission for an NSTEMI, presented to [facility] on 9/18 s/p mech fall and found to have b/l frontal SAH and +scalp hematoma. Repeat CT head stable and was resumed on warfarin. Now admitted for multidisciplinary rehab program.     #SAH  -On Ct head 9/20: Small amount of acute subarachnoid hemorrhage in the bifrontal lobes, stable   -No invasive procedures as per family and patient, patient is DNR/DNI   -EEG negative for seizures   --Pt. has not had any seizure activity and Completed 7 day keppra seizure ppx  guideline from [facility]--stopped keppra on 9/24   -Gait Instability, ADL impairments and Functional impairments: start Comprehensive Rehab Program of PT/OT/SLP    #Afib  -Cleared to resume warfarin as per neuro recs   -INR daily -- INR subtherapeutic but improving  --INR goal 2-3  -Coumadin 3mg tonight    Right wrist Sprain--Resolving  --xray neg for fracture  --ICE and tylenol PRN.      right shoulder pain--resolving  --likely RTC tendinitis  --R shoulder xray 9/24 --No fractures, or dislocation, degenerative changes noted   --Pain control PRN    Right elbow pain --resolving  -Likely medial epicondylitis   -R elbow xray 9/24-- no acute fractures noted     Sleep  --melatonin PRN    #CHF  -TTE from 8/30 with EF 25-30%   -Daily weights     #HTN  -Monitor BP, SBP goal <160   -Continue lisinopril and labetalol   -BP controlled  -- Will d/c orthostatics--     #MORELIA (resolved)  - SONO w/o hydro, b/l small kidneys  -nephro following   -Monitor BMP     #Pain Mgmt   - Tylenol PRN     #GI/Bowel Mgmt   - Continent c/w Senna PRN     #/Bladder Mgmt   - Continent, voiding well    #FEN   - Diet - Regular + Thins  [CCHO, DASH/TLC]      #Precautions / PROPHYLAXIS:   - Falls, Cardiac, Seizure   - Lungs: Aspiration   - Pressure injury/Skin: OOB to Chair, PT/OT    - DVT: Coumadin   -Code: DNR/DNI

## 2020-09-28 NOTE — PROGRESS NOTE ADULT - SUBJECTIVE AND OBJECTIVE BOX
HPI  Pt is a 89yo F admitted to  acute rehab s/p SAH   Pt was seen and examined at bedside. No overnight complaints. Hemodynamically stable.     Vital Signs Last 24 Hrs  T(C): 36.6 (28 Sep 2020 07:27), Max: 37.1 (27 Sep 2020 20:27)  T(F): 97.8 (28 Sep 2020 07:27), Max: 98.7 (27 Sep 2020 20:27)  HR: 84 (28 Sep 2020 07:27) (84 - 87)  BP: 123/60 (28 Sep 2020 07:27) (120/72 - 123/61)  BP(mean): --  RR: 15 (28 Sep 2020 07:27) (15 - 16)  SpO2: 96% (28 Sep 2020 07:27) (95% - 96%)    I&O's Summary    27 Sep 2020 07:01  -  28 Sep 2020 07:00  --------------------------------------------------------  IN: 840 mL / OUT: 0 mL / NET: 840 mL        CAPILLARY BLOOD GLUCOSE          PHYSICAL EXAM:    Constitutional: NAD, awake and alert, well-developed  HEENT: PERR, EOMI, Normal Hearing, MMM  Neck: Soft and supple, No LAD, No JVD  Respiratory: Breath sounds are clear bilaterally,    Cardiovascular: S1 and S2,    Gastrointestinal: Bowel Sounds present, soft, nontender, nondistended, no guarding, no rebound  Extremities: No peripheral edema, right shoulder and wrist pain   Vascular: 2+ peripheral pulses  Neurological: A/O x 2  Skin: No rashes    MEDICATIONS:  MEDICATIONS  (STANDING):  brimonidine 0.2%/ timolol 0.5% Ophthalmic Solution 1 Drop(s) Left EYE two times a day  dorzolamide 2% Ophthalmic Solution 1 Drop(s) Left EYE three times a day  labetalol 100 milliGRAM(s) Oral every 6 hours  lisinopril 10 milliGRAM(s) Oral daily  montelukast 10 milliGRAM(s) Oral at bedtime  multivitamin 1 Tablet(s) Oral daily  warfarin 3 milliGRAM(s) Oral once      LABS: All Labs Reviewed:                        10.7   5.13  )-----------( 183      ( 28 Sep 2020 07:33 )             32.4     09-28    136  |  103  |  38<H>  ----------------------------<  94  4.0   |  28  |  0.96    Ca    10.5      28 Sep 2020 07:33    TPro  5.4<L>  /  Alb  2.2<L>  /  TBili  0.3  /  DBili  x   /  AST  19  /  ALT  19  /  AlkPhos  88  09-28    PT/INR - ( 28 Sep 2020 07:33 )   PT: 20.9 sec;   INR: 1.78 ratio               Blood Culture:     RADIOLOGY/EKG:    DVT PPX:    ADVANCED DIRECTIVE:    DISPOSITION:

## 2020-09-28 NOTE — PROGRESS NOTE ADULT - SUBJECTIVE AND OBJECTIVE BOX
Patient is a 89y old  Female who presents with a chief complaint of SAH  SAH  02.22 Traumatic, Close Injury (28 Sep 2020 11:52)      INTERVAL HPI/OVERNIGHT EVENTS: alert      REVIEW OF SYSTEMS:  CONSTITUTIONAL: No fever, weight loss, or fatigue  EYES: No eye pain, visual disturbances, or discharge  ENMT:  No difficulty hearing, tinnitus, vertigo; No sinus or throat pain  NECK: No pain or stiffness  BREASTS: No pain, masses, or nipple discharge  RESPIRATORY: No cough, wheezing, chills or hemoptysis; No shortness of breath  CARDIOVASCULAR: No chest pain, palpitations, dizziness, or leg swelling  GASTROINTESTINAL: No abdominal or epigastric pain. No nausea, vomiting, or hematemesis; No diarrhea or constipation. No melena or hematochezia.  GENITOURINARY: No dysuria, frequency, hematuria, or incontinence  NEUROLOGICAL: No headaches, memory loss, loss of strength, numbness, or tremors  SKIN: No itching, burning, rashes, or lesions   LYMPH NODES: No enlarged glands  ENDOCRINE: No heat or cold intolerance; No hair loss  MUSCULOSKELETAL: No joint pain or swelling; No muscle, back, or extremity pain  PSYCHIATRIC: No depression, anxiety, mood swings, or difficulty sleeping  HEME/LYMPH: No easy bruising, or bleeding gums  ALLERY AND IMMUNOLOGIC: No hives or eczema  FAMILY HISTORY:  FH: diabetes mellitus      T(C): 36.9 (09-28-20 @ 19:32), Max: 36.9 (09-28-20 @ 19:32)  HR: 79 (09-28-20 @ 19:32) (65 - 84)  BP: 127/54 (09-28-20 @ 19:32) (123/60 - 166/73)  RR: 15 (09-28-20 @ 19:32) (15 - 15)  SpO2: 96% (09-28-20 @ 19:32) (96% - 96%)  Wt(kg): --Vital Signs Last 24 Hrs  T(C): 36.9 (28 Sep 2020 19:32), Max: 36.9 (28 Sep 2020 19:32)  T(F): 98.5 (28 Sep 2020 19:32), Max: 98.5 (28 Sep 2020 19:32)  HR: 79 (28 Sep 2020 19:32) (65 - 84)  BP: 127/54 (28 Sep 2020 19:32) (123/60 - 166/73)  BP(mean): --  RR: 15 (28 Sep 2020 19:32) (15 - 15)  SpO2: 96% (28 Sep 2020 19:32) (96% - 96%)    T(F): 98.5 (09-28-20 @ 19:32), Max: 98.7 (09-27-20 @ 20:27)  HR: 79 (09-28-20 @ 19:32) (65 - 87)  BP: 127/54 (09-28-20 @ 19:32) (107/51 - 166/73)  RR: 15 (09-28-20 @ 19:32) (15 - 94)  SpO2: 96% (09-28-20 @ 19:32) (95% - 96%)    PHYSICAL EXAM:  GENERAL: NAD, well-groomed, well-developed  HEAD:  Atraumatic, Normocephalic  EYES: EOMI, PERRLA, conjunctiva and sclera clear  ENMT: No tonsillar erythema, exudates, or enlargement; Moist mucous membranes, Good dentition, No lesions  NECK: Supple, No JVD, Normal thyroid  NERVOUS SYSTEM:  Alert & Oriented X3, Good concentration; Motor Strength 5/5 B/L upper and lower extremities; DTRs 2+ intact and symmetric  CHEST/LUNG: Clear to percussion bilaterally; No rales, rhonchi, wheezing, or rubs  HEART: Regular rate and rhythm; No murmurs, rubs, or gallops  ABDOMEN: Soft, Nontender, Nondistended; Bowel sounds present  EXTREMITIES:  2+ Peripheral Pulses, No clubbing, cyanosis, or edema  LYMPH: No lymphadenopathy noted  SKIN: No rashes or lesions    Consultant(s) Notes Reviewed:  [x ] YES  [ ] NO  Care Discussed with Consultants/Other Providers [ x] YES  [ ] NO    LABS:  09-28    136  |  103  |  38<H>  ----------------------------<  94  4.0   |  28  |  0.96    Ca    10.5      28 Sep 2020 07:33    TPro  5.4<L>  /  Alb  2.2<L>  /  TBili  0.3  /  DBili  x   /  AST  19  /  ALT  19  /  AlkPhos  88  09-28                          10.7   5.13  )-----------( 183      ( 28 Sep 2020 07:33 )             32.4         PT/INR - ( 28 Sep 2020 07:33 )   PT: 20.9 sec;   INR: 1.78 ratio           LIVER FUNCTIONS - ( 28 Sep 2020 07:33 )  Alb: 2.2 g/dL / Pro: 5.4 g/dL / ALK PHOS: 88 U/L / ALT: 19 U/L / AST: 19 U/L / GGT: x                            10.7   5.13  )-----------( 183      ( 09-28 @ 07:33 )             32.4                11.6   7.61  )-----------( 155      ( 09-24 @ 05:30 )             35.8                12.3   7.18  )-----------( 158      ( 09-20 @ 06:00 )             36.9                12.0   8.28  )-----------( 164      ( 09-19 @ 05:00 )             37.2                12.9   9.39  )-----------( 138      ( 09-18 @ 04:45 )             39.1                13.3   6.51  )-----------( 147      ( 09-17 @ 23:45 )             40.6                14.2   8.13  )-----------( 150      ( 09-01 @ 06:00 )             44.7                13.3   6.37  )-----------( 128      ( 08-31 @ 06:22 )             42.4                15.5   8.96  )-----------( 185      ( 08-30 @ 03:24 )             47.9               RADIOLOGY & ADDITIONAL TESTS:    Imaging Personally Reviewed:  [ ] YES  [ ] NO  acetaminophen   Tablet .. 650 milliGRAM(s) Oral every 6 hours PRN  brimonidine 0.2%/ timolol 0.5% Ophthalmic Solution 1 Drop(s) Left EYE two times a day  cinacalcet 30 milliGRAM(s) Oral daily  dorzolamide 2% Ophthalmic Solution 1 Drop(s) Left EYE three times a day  labetalol 100 milliGRAM(s) Oral every 6 hours  lisinopril 10 milliGRAM(s) Oral daily  montelukast 10 milliGRAM(s) Oral at bedtime  multivitamin 1 Tablet(s) Oral daily  senna 2 Tablet(s) Oral at bedtime PRN  warfarin 3 milliGRAM(s) Oral once      HEALTH ISSUES - PROBLEM Dx:  Hand swelling  Hand swelling    HTN (hypertension), benign  HTN (hypertension), benign    Atrial fibrillation  Atrial fibrillation    Subarachnoid bleed  Subarachnoid bleed

## 2020-09-28 NOTE — PROGRESS NOTE ADULT - SUBJECTIVE AND OBJECTIVE BOX
HPI:  89 y/o F w/ pmh htn, afib on coumadin and asa, +PPM, CHF (EF 25-30%), recent admission for an NSTEMI, presented to [facility] on 9/17 s/p mech fall and found to have b/l frontal SAH and +scalp hematoma. Pt also found to have supratherapeutic INR of 3.9, Hyperkalemia of 6.2, MORELIA, MICU consulted for admission for hyperkalemia, q1h neurochecks. During interview with patients and daughter at bedside pt reported having a headache worst around the R. frontal scalp hematoma. Pt otherwise denied any other medical complains. Pt stated she cannot recall what happened. Daughter stated she did not witness the fall but heard a loud thump and found her mother on the floor. Pt denied any visual changes, dizziness, photo/phonophibia, neck pain, facial pain, sob, cp, palpitation abd pain, back pain, weakness/numbness in the ext.  Pt. also injured her right arm during the fall.     In ICU patient was started on labetalol infusion for BP control and keppra for seizure ppx. Aspirin and coumadin were held, she was given vitamin K and Kcentra. Patient improved and was transferred to floor. She was transitioned to labetalol PO with good BP control until it was discontinued on 9/21.     Repeat head ct 9/20 revealed small amount of acute subarachnoid hemorrhage in bifrontal lobes, small intraparenchymal hemorrhage and tiny bilateral whole hemispheric sdh essentially unchanged. Pt does not want any invasive procedures including aneurysm intervention. As per neuro, warfarin was cleared to be restarted. EEG done on 9/21 without any seizure activity and was better compared to one done on 9/18.     On 9/22 patient hypertensive, treated with IV hydralazine, labetalol was restarted as well as home med lisinopril. Pt asymptomatic. Goal is to have SBP less than 160.  Patient's discharge to rehab was held 9/22 due to hypertension but was medically cleared for discharge to acute rehab 9/23.    (23 Sep 2020 15:56)      PAST MEDICAL & SURGICAL HISTORY:  Pacemaker    Transient cerebral ischemia, unspecified transient cerebral ischemia type    HTN (hypertension), benign    Atrial fibrillation    Acid reflux disease    History of cataract surgery        Subjective:  Eating 25-50%,  orthostatics neg. sleeping at night as per nursing notes      VITALS  Vital Signs Last 24 Hrs  T(C): 36.6 (28 Sep 2020 07:27), Max: 37.1 (27 Sep 2020 20:27)  T(F): 97.8 (28 Sep 2020 07:27), Max: 98.7 (27 Sep 2020 20:27)  HR: 84 (28 Sep 2020 07:27) (84 - 87)  BP: 123/60 (28 Sep 2020 07:27) (120/72 - 123/61)  BP(mean): --  RR: 15 (28 Sep 2020 07:27) (15 - 16)  SpO2: 96% (28 Sep 2020 07:27) (95% - 96%)    REVIEW OF SYMPTOMS    RECENT LABS                        10.7   5.13  )-----------( 183      ( 28 Sep 2020 07:33 )             32.4     09-28    136  |  103  |  38<H>  ----------------------------<  94  4.0   |  28  |  0.96    Ca    10.5      28 Sep 2020 07:33    TPro  5.4<L>  /  Alb  2.2<L>  /  TBili  0.3  /  DBili  x   /  AST  19  /  ALT  19  /  AlkPhos  88  09-28    PT/INR - ( 28 Sep 2020 07:33 )   PT: 20.9 sec;   INR: 1.78 ratio                 RADIOLOGY/OTHER RESULTS      MEDICATIONS  (STANDING):  brimonidine 0.2%/ timolol 0.5% Ophthalmic Solution 1 Drop(s) Left EYE two times a day  dorzolamide 2% Ophthalmic Solution 1 Drop(s) Left EYE three times a day  labetalol 100 milliGRAM(s) Oral every 6 hours  lisinopril 10 milliGRAM(s) Oral daily  montelukast 10 milliGRAM(s) Oral at bedtime  multivitamin 1 Tablet(s) Oral daily  warfarin 3 milliGRAM(s) Oral once    MEDICATIONS  (PRN):  acetaminophen   Tablet .. 650 milliGRAM(s) Oral every 6 hours PRN Temp greater or equal to 38C (100.4F), Mild Pain (1 - 3)  senna 2 Tablet(s) Oral at bedtime PRN Constipation         HPI:  89 y/o F w/ pmh htn, afib on coumadin and asa, +PPM, CHF (EF 25-30%), recent admission for an NSTEMI, presented to [facility] on 9/17 s/p mech fall and found to have b/l frontal SAH and +scalp hematoma. Pt also found to have supratherapeutic INR of 3.9, Hyperkalemia of 6.2, MORELIA, MICU consulted for admission for hyperkalemia, q1h neurochecks. During interview with patients and daughter at bedside pt reported having a headache worst around the R. frontal scalp hematoma. Pt otherwise denied any other medical complains. Pt stated she cannot recall what happened. Daughter stated she did not witness the fall but heard a loud thump and found her mother on the floor. Pt denied any visual changes, dizziness, photo/phonophibia, neck pain, facial pain, sob, cp, palpitation abd pain, back pain, weakness/numbness in the ext.  Pt. also injured her right arm during the fall.     In ICU patient was started on labetalol infusion for BP control and keppra for seizure ppx. Aspirin and coumadin were held, she was given vitamin K and Kcentra. Patient improved and was transferred to floor. She was transitioned to labetalol PO with good BP control until it was discontinued on 9/21.     Repeat head ct 9/20 revealed small amount of acute subarachnoid hemorrhage in bifrontal lobes, small intraparenchymal hemorrhage and tiny bilateral whole hemispheric sdh essentially unchanged. Pt does not want any invasive procedures including aneurysm intervention. As per neuro, warfarin was cleared to be restarted. EEG done on 9/21 without any seizure activity and was better compared to one done on 9/18.     On 9/22 patient hypertensive, treated with IV hydralazine, labetalol was restarted as well as home med lisinopril. Pt asymptomatic. Goal is to have SBP less than 160.  Patient's discharge to rehab was held 9/22 due to hypertension but was medically cleared for discharge to acute rehab 9/23.    (23 Sep 2020 15:56)      PAST MEDICAL & SURGICAL HISTORY:  Pacemaker    Transient cerebral ischemia, unspecified transient cerebral ischemia type    HTN (hypertension), benign    Atrial fibrillation    Acid reflux disease    History of cataract surgery        Subjective:  Eating 25-50%,  orthostatics neg. sleeping at night as per nursing notes.  No new complaints.  Pain in right UE better today      VITALS  Vital Signs Last 24 Hrs  T(C): 36.6 (28 Sep 2020 07:27), Max: 37.1 (27 Sep 2020 20:27)  T(F): 97.8 (28 Sep 2020 07:27), Max: 98.7 (27 Sep 2020 20:27)  HR: 84 (28 Sep 2020 07:27) (84 - 87)  BP: 123/60 (28 Sep 2020 07:27) (120/72 - 123/61)  BP(mean): --  RR: 15 (28 Sep 2020 07:27) (15 - 16)  SpO2: 96% (28 Sep 2020 07:27) (95% - 96%)    REVIEW OF SYMPTOMS  +right shoulder and elbow pain, wrist pain, hearing difficulty, memory loss       PHYSICAL EXAM  Physical Exam: Gen - NAD, Comfortable  HEENT - NCAT, EOMI, MMM, [place]  Neck - Supple,  Pulm - CTAB, No wheeze, No rhonchi, No crackles  Cardiovascular - RRR, S1S2, No murmurs  Abdomen - Soft, NT/ND, +BS  Extremities - No C/C/E, No calf tenderness  Neuro-     Cognitive - AAOx2-3 --knows she is in hospital but not name, knows year but not month/date     Communication - Fluent, No dysarthria, hypophonic      Cranial Nerves - CN 2-12 intact except hearing     Motor -                     LEFT    UE - limited due to pain-- WE 2/5,  2/5                    RIGHT UE - ShAB 5/5, EF 5/5, EE 5/5, WE 5/5,  5/5                    LEFT    LE - HF 5/5, KE 5/5, DF 5/5, PF 5/5                    RIGHT LE - HF 4/5, KE 5/5, DF 5/5, PF 5/5        Sensory - Intact to LT bilat     Tone - normal  MSK: right wrist, and index and middle PIP swelling and tenderness significantly improved--minimal today.   right shoulder and right elbow ROM normal --non-painful         Psychiatric - Mood stable, Affect WNL  RECENT LABS                        10.7   5.13  )-----------( 183      ( 28 Sep 2020 07:33 )             32.4     09-28    136  |  103  |  38<H>  ----------------------------<  94  4.0   |  28  |  0.96    Ca    10.5      28 Sep 2020 07:33    TPro  5.4<L>  /  Alb  2.2<L>  /  TBili  0.3  /  DBili  x   /  AST  19  /  ALT  19  /  AlkPhos  88  09-28    PT/INR - ( 28 Sep 2020 07:33 )   PT: 20.9 sec;   INR: 1.78 ratio                 RADIOLOGY/OTHER RESULTS      MEDICATIONS  (STANDING):  brimonidine 0.2%/ timolol 0.5% Ophthalmic Solution 1 Drop(s) Left EYE two times a day  dorzolamide 2% Ophthalmic Solution 1 Drop(s) Left EYE three times a day  labetalol 100 milliGRAM(s) Oral every 6 hours  lisinopril 10 milliGRAM(s) Oral daily  montelukast 10 milliGRAM(s) Oral at bedtime  multivitamin 1 Tablet(s) Oral daily  warfarin 3 milliGRAM(s) Oral once    MEDICATIONS  (PRN):  acetaminophen   Tablet .. 650 milliGRAM(s) Oral every 6 hours PRN Temp greater or equal to 38C (100.4F), Mild Pain (1 - 3)  senna 2 Tablet(s) Oral at bedtime PRN Constipation

## 2020-09-28 NOTE — PROGRESS NOTE ADULT - SUBJECTIVE AND OBJECTIVE BOX
Resting    Vital Signs Last 24 Hrs  T(C): 36.6 (09-28-20 @ 07:27), Max: 37.1 (09-27-20 @ 20:27)  T(F): 97.8 (09-28-20 @ 07:27), Max: 98.7 (09-27-20 @ 20:27)  HR: 84 (09-28-20 @ 07:27) (84 - 87)  BP: 123/60 (09-28-20 @ 07:27) (120/72 - 123/61)  RR: 15 (09-28-20 @ 07:27) (15 - 16)  SpO2: 96% (09-28-20 @ 07:27) (95% - 96%)    card s1s2  b/l air entry  soft, ND  no edema                                                                            10.7   5.13  )-----------( 183      ( 28 Sep 2020 07:33 )             32.4     28 Sep 2020 07:33    136    |  103    |  38     ----------------------------<  94     4.0     |  28     |  0.96     Ca    10.5       28 Sep 2020 07:33    TPro  5.4    /  Alb  2.2    /  TBili  0.3    /  DBili  x      /  AST  19     /  ALT  19     /  AlkPhos  88     28 Sep 2020 07:33    LIVER FUNCTIONS - ( 28 Sep 2020 07:33 )  Alb: 2.2 g/dL / Pro: 5.4 g/dL / ALK PHOS: 88 U/L / ALT: 19 U/L / AST: 19 U/L / GGT: x           PT/INR - ( 28 Sep 2020 07:33 )   PT: 20.9 sec;   INR: 1.78 ratio      acetaminophen   Tablet .. 650 milliGRAM(s) Oral every 6 hours PRN  brimonidine 0.2%/ timolol 0.5% Ophthalmic Solution 1 Drop(s) Left EYE two times a day  dorzolamide 2% Ophthalmic Solution 1 Drop(s) Left EYE three times a day  labetalol 100 milliGRAM(s) Oral every 6 hours  lisinopril 10 milliGRAM(s) Oral daily  montelukast 10 milliGRAM(s) Oral at bedtime  multivitamin 1 Tablet(s) Oral daily  senna 2 Tablet(s) Oral at bedtime PRN  warfarin 3 milliGRAM(s) Oral once    A/P:    S/p fall, b/l SAH  Known CM, EF 25-30%, MR, TR, AR  S/p hemodynamic MORELIA   Resolved  UA w/pr 15 otherwise bland  SONO w/o hydro, b/l small kidneys  Hypercalcemia, hyperpara   Continue Sensipar  Avoid nephrotoxins  F/u BMP, BP    [phone]

## 2020-09-28 NOTE — PROGRESS NOTE ADULT - ASSESSMENT
Pt is a 89yo F admitted to  acute rehab s/p SAH    *SAH   Cont acute rehab   PT/OT/SLP   s/p Keppra 9/24     *Afib   Coumadin goal 2-3   INR 1.78     *HTN   bp controlled  cont Labetalol     *right shoulder and elbow pain   xray for both part showed no fx  pain management     *HFrEF  EF 25-30%   daily wt     *constipation   cont bowel regimen     *DVT ppx   Coumadin

## 2020-09-29 NOTE — PROGRESS NOTE ADULT - SUBJECTIVE AND OBJECTIVE BOX
Resting    Vital Signs Last 24 Hrs  T(C): 36.8 (09-29-20 @ 08:28), Max: 36.9 (09-28-20 @ 19:32)  T(F): 98.2 (09-29-20 @ 08:28), Max: 98.5 (09-28-20 @ 19:32)  HR: 75 (09-29-20 @ 12:28) (75 - 90)  BP: 98/59 (09-29-20 @ 12:28) (98/59 - 166/73)  RR: 15 (09-29-20 @ 08:28) (15 - 15)  SpO2: 95% (09-29-20 @ 08:28) (95% - 96%)    card s1s2  b/l air entry  soft, ND  no edema                                                                                     10.7   5.13  )-----------( 183      ( 28 Sep 2020 07:33 )             32.4     28 Sep 2020 07:33    136    |  103    |  38     ----------------------------<  94     4.0     |  28     |  0.96     Ca    10.5       28 Sep 2020 07:33    TPro  5.4    /  Alb  2.2    /  TBili  0.3    /  DBili  x      /  AST  19     /  ALT  19     /  AlkPhos  88     28 Sep 2020 07:33    LIVER FUNCTIONS - ( 28 Sep 2020 07:33 )  Alb: 2.2 g/dL / Pro: 5.4 g/dL / ALK PHOS: 88 U/L / ALT: 19 U/L / AST: 19 U/L / GGT: x           PT/INR - ( 29 Sep 2020 05:30 )   PT: 20.1 sec;   INR: 1.70 ratio      acetaminophen   Tablet .. 650 milliGRAM(s) Oral every 6 hours PRN  brimonidine 0.2%/ timolol 0.5% Ophthalmic Solution 1 Drop(s) Left EYE two times a day  cinacalcet 30 milliGRAM(s) Oral daily  donepezil 5 milliGRAM(s) Oral at bedtime  dorzolamide 2% Ophthalmic Solution 1 Drop(s) Left EYE three times a day  labetalol 100 milliGRAM(s) Oral every 6 hours  lisinopril 10 milliGRAM(s) Oral daily  montelukast 10 milliGRAM(s) Oral at bedtime  multivitamin 1 Tablet(s) Oral daily  senna 2 Tablet(s) Oral at bedtime PRN  warfarin 3 milliGRAM(s) Oral once    A/P:    S/p fall, b/l SAH  Known CM, EF 25-30%, MR, TR, AR  S/p hemodynamic MORELIA   Resolved  UA w/pr 15 otherwise bland  SONO w/o hydro, b/l small kidneys  Hypercalcemia, hyperpara   Continue Sensipar  Avoid nephrotoxins  F/u BMP, BP    [phone]

## 2020-09-29 NOTE — PROGRESS NOTE ADULT - SUBJECTIVE AND OBJECTIVE BOX
HPI:  89 y/o F w/ pmh htn, afib on coumadin and asa, +PPM, CHF (EF 25-30%), recent admission for an NSTEMI, presented to [facility] on 9/17 s/p mech fall and found to have b/l frontal SAH and +scalp hematoma. Pt also found to have supratherapeutic INR of 3.9, Hyperkalemia of 6.2, MORELIA, MICU consulted for admission for hyperkalemia, q1h neurochecks. During interview with patients and daughter at bedside pt reported having a headache worst around the R. frontal scalp hematoma. Pt otherwise denied any other medical complains. Pt stated she cannot recall what happened. Daughter stated she did not witness the fall but heard a loud thump and found her mother on the floor. Pt denied any visual changes, dizziness, photo/phonophibia, neck pain, facial pain, sob, cp, palpitation abd pain, back pain, weakness/numbness in the ext.  Pt. also injured her right arm during the fall.     In ICU patient was started on labetalol infusion for BP control and keppra for seizure ppx. Aspirin and coumadin were held, she was given vitamin K and Kcentra. Patient improved and was transferred to floor. She was transitioned to labetalol PO with good BP control until it was discontinued on 9/21.     Repeat head ct 9/20 revealed small amount of acute subarachnoid hemorrhage in bifrontal lobes, small intraparenchymal hemorrhage and tiny bilateral whole hemispheric sdh essentially unchanged. Pt does not want any invasive procedures including aneurysm intervention. As per neuro, warfarin was cleared to be restarted. EEG done on 9/21 without any seizure activity and was better compared to one done on 9/18.     On 9/22 patient hypertensive, treated with IV hydralazine, labetalol was restarted as well as home med lisinopril. Pt asymptomatic. Goal is to have SBP less than 160.  Patient's discharge to rehab was held 9/22 due to hypertension but was medically cleared for discharge to acute rehab 9/23.    (23 Sep 2020 15:56)      PAST MEDICAL & SURGICAL HISTORY:  Pacemaker    Transient cerebral ischemia, unspecified transient cerebral ischemia type    HTN (hypertension), benign    Atrial fibrillation    Acid reflux disease    History of cataract surgery      Subjective: Patient seen and evaluated this morning. No acute medical issues noted overnight       REVIEW OF SYMPTOMS  +right shoulder and elbow pain, wrist pain, hearing difficulty, memory loss       Vital Signs Last 24 Hrs  T(C): 36.8 (29 Sep 2020 08:28), Max: 36.9 (28 Sep 2020 19:32)  T(F): 98.2 (29 Sep 2020 08:28), Max: 98.5 (28 Sep 2020 19:32)  HR: 75 (29 Sep 2020 12:28) (75 - 90)  BP: 98/59 (29 Sep 2020 12:28) (98/59 - 166/73)  BP(mean): --  RR: 15 (29 Sep 2020 08:28) (15 - 15)  SpO2: 95% (29 Sep 2020 08:28) (95% - 96%)      PHYSICAL EXAM  Physical Exam: Gen - NAD, Comfortable  HEENT - NCAT, EOMI  Neck - Supple  Pulm - CTAB, No wheeze, No rhonchi, No crackles  Cardiovascular - RRR, S1S2, No murmurs  Abdomen - Soft, NT/ND, +BS  Extremities - No C/C/E, No calf tenderness  Neuro-     Cognitive - AAOx2-3 --knows she is in hospital but not name, knows year but not month/date     Communication - Fluent, No dysarthria, hypophonic      Cranial Nerves - CN 2-12 intact except hearing     Motor -                     LEFT    UE - limited due to pain-- WE 2/5,  2/5                    RIGHT UE - ShAB 5/5, EF 5/5, EE 5/5, WE 5/5,  5/5                    LEFT    LE - HF 5/5, KE 5/5, DF 5/5, PF 5/5                    RIGHT LE - HF 4/5, KE 5/5, DF 5/5, PF 5/5        Sensory - Intact to LT bilat     Tone - normal  MSK: right wrist, and index and middle PIP swelling and tenderness significantly improved--minimal today.   Right shoulder and right elbow ROM normal --non-painful   Psychiatric - Mood stable, Affect WNL      RECENT LABS/IMAGING                        10.7   5.13  )-----------( 183      ( 28 Sep 2020 07:33 )             32.4     09-28    136  |  103  |  38<H>  ----------------------------<  94  4.0   |  28  |  0.96    Ca    10.5      28 Sep 2020 07:33    TPro  5.4<L>  /  Alb  2.2<L>  /  TBili  0.3  /  DBili  x   /  AST  19  /  ALT  19  /  AlkPhos  88  09-28    PT/INR - ( 29 Sep 2020 05:30 )   PT: 20.1 sec;   INR: 1.70 ratio           RADIOLOGY/OTHER RESULTS        MEDICATIONS  (STANDING):  brimonidine 0.2%/ timolol 0.5% Ophthalmic Solution 1 Drop(s) Left EYE two times a day  cinacalcet 30 milliGRAM(s) Oral daily  donepezil 5 milliGRAM(s) Oral at bedtime  dorzolamide 2% Ophthalmic Solution 1 Drop(s) Left EYE three times a day  labetalol 100 milliGRAM(s) Oral every 6 hours  lisinopril 10 milliGRAM(s) Oral daily  montelukast 10 milliGRAM(s) Oral at bedtime  multivitamin 1 Tablet(s) Oral daily  warfarin 3 milliGRAM(s) Oral once    MEDICATIONS  (PRN):  acetaminophen   Tablet .. 650 milliGRAM(s) Oral every 6 hours PRN Temp greater or equal to 38C (100.4F), Mild Pain (1 - 3)  senna 2 Tablet(s) Oral at bedtime PRN Constipation

## 2020-09-29 NOTE — PROGRESS NOTE ADULT - ASSESSMENT
ASSESSMENT/PLAN  AZRA CHAHAL is a 89yo Female w/ pmh htn, afib on coumadin and asa, +PPM, CHF (EF 25-30%), recent admission for an NSTEMI, presented to [facility] on 9/18 s/p mech fall and found to have b/l frontal SAH and +scalp hematoma. Repeat CT head stable and was resumed on warfarin. Now admitted for multidisciplinary rehab program.     #SAH  -On Ct head 9/20: Small amount of acute subarachnoid hemorrhage in the bifrontal lobes, stable   -No invasive procedures as per family and patient, patient is DNR/DNI   -EEG negative for seizures   --Pt. has not had any seizure activity and Completed 7 day keppra seizure ppx  guideline from [facility]--stopped keppra on 9/24   -Gait Instability, ADL impairments and Functional impairments: start Comprehensive Rehab Program of PT/OT/SLP    #Memory/cognition   -Started aricept 5mg 9/29     #Afib  -Cleared to resume warfarin as per neuro recs   -INR daily -- INR 1.7    9/29   -INR goal 2-3  -Coumadin 3mg tonight    Right wrist Sprain--Resolving  --xray neg for fracture  --ICE and tylenol PRN.      right shoulder pain--resolving  --likely RTC tendinitis  --R shoulder xray 9/24 --No fractures, or dislocation, degenerative changes noted   --Pain control PRN    Right elbow pain --resolving  -Likely medial epicondylitis   -R elbow xray 9/24-- no acute fractures noted     Sleep  --melatonin PRN    #CHF  -TTE from 8/30 with EF 25-30%   -Daily weights     #HTN  -Monitor BP, SBP goal <160   -Continue lisinopril and labetalol   -BP on the lower side this afternoon (98/59)     #MORELIA (resolved)  - SONO w/o hydro, b/l small kidneys  -nephro following   -Monitor BMP     #Pain Mgmt   - Tylenol PRN     #GI/Bowel Mgmt   - Continent c/w Senna PRN     #/Bladder Mgmt   - Continent, voiding well    #FEN   - Diet - Regular + Thins  [CCHO, DASH/TLC]      #Precautions / PROPHYLAXIS:   - Falls, Cardiac, Seizure   - Lungs: Aspiration   - Pressure injury/Skin: OOB to Chair, PT/OT    - DVT: Coumadin   -Code: DNR/DNI ASSESSMENT/PLAN  AZRA CHAHAL is a 89yo Female w/ pmh htn, afib on coumadin and asa, +PPM, CHF (EF 25-30%), recent admission for an NSTEMI, presented to [facility] on 9/18 s/p mech fall and found to have b/l frontal SAH and +scalp hematoma. Repeat CT head stable and was resumed on warfarin. Now admitted for multidisciplinary rehab program.     #SAH  -On Ct head 9/20: Small amount of acute subarachnoid hemorrhage in the bifrontal lobes, stable   -No invasive procedures as per family and patient, patient is DNR/DNI   -EEG negative for seizures   --Pt. has not had any seizure activity and Completed 7 day keppra seizure ppx  guideline from [facility]--stopped keppra on 9/24   -Gait Instability, ADL impairments and Functional impairments: start Comprehensive Rehab Program of PT/OT/SLP    #Memory/cognition   -Started aricept 5mg 9/29     #Afib  -Cleared to resume warfarin as per neuro recs   -INR daily -- INR 1.7    9/29   -INR goal 2-3  -increase Coumadin 3.5 mg tonight    Right wrist Sprain--Resolving  --xray neg for fracture  --ICE and tylenol PRN.      right shoulder pain--resolving  --likely RTC tendinitis  --R shoulder xray 9/24 --No fractures, or dislocation, degenerative changes noted   --Pain control PRN    Right elbow pain --resolving  -Likely medial epicondylitis   -R elbow xray 9/24-- no acute fractures noted     Sleep  --melatonin PRN    #CHF  -TTE from 8/30 with EF 25-30%   -Daily weights     #HTN  -Monitor BP, SBP goal <160   -Continue lisinopril and labetalol   -BP on the lower side this afternoon (98/59)   --labetalol decreased by nephro    #MORELIA (resolved)  - SONO w/o hydro, b/l small kidneys  -nephro following   -Monitor BMP     #Pain Mgmt   - Tylenol PRN     #GI/Bowel Mgmt   - Continent c/w Senna PRN     #/Bladder Mgmt   - Continent, voiding well    #FEN   - Diet - Regular + Thins  [CCHO, DASH/TLC]      #Precautions / PROPHYLAXIS:   - Falls, Cardiac, Seizure   - Lungs: Aspiration   - Pressure injury/Skin: OOB to Chair, PT/OT    - DVT: Coumadin   -Code: DNR/DNI \    Team Meeting 9/29--  Nursing: Continent bowel, incontinent bladder,   OT: Sup. eating; Mod A grooming, UBD.  Max A LBD, tub transfer;  goal min A to S; Poor Carryover  PT: Amb. 100ft Min A, Mod A transf;  4 steps min-mod A.   Speech: Poor Memory, Poor Carryover, impaired attention and problem solving.   D/c home 10/7

## 2020-09-29 NOTE — PROGRESS NOTE ADULT - ATTENDING COMMENTS
Pt. seen with resident.  Agree with documentation above as per resident with amendments made as appropriate. Patient medically stable. Making progress towards rehab goals.     Trial Aricept for cognitive deficits Pt. seen with resident.  Agree with documentation above as per resident with amendments made as appropriate. Patient medically stable. Making progress towards rehab goals.     Trial Aricept for cognitive deficits    INR still subtherapeutic-- Increase coumadin to 3.5mg

## 2020-09-29 NOTE — PROGRESS NOTE ADULT - SUBJECTIVE AND OBJECTIVE BOX
Patient is a 89y old  Female who presents with a chief complaint of SAH  SAH  02.22 Traumatic, Close Injury (29 Sep 2020 13:43)      INTERVAL HPI/OVERNIGHT EVENTS: alert      REVIEW OF SYSTEMS:  CONSTITUTIONAL: No fever, weight loss, or fatigue  EYES: No eye pain, visual disturbances, or discharge  ENMT:  No difficulty hearing, tinnitus, vertigo; No sinus or throat pain  NECK: No pain or stiffness  BREASTS: No pain, masses, or nipple discharge  RESPIRATORY: No cough, wheezing, chills or hemoptysis; No shortness of breath  CARDIOVASCULAR: No chest pain, palpitations, dizziness, or leg swelling  GASTROINTESTINAL: No abdominal or epigastric pain. No nausea, vomiting, or hematemesis; No diarrhea or constipation. No melena or hematochezia.  GENITOURINARY: No dysuria, frequency, hematuria, or incontinence  NEUROLOGICAL: No headaches, memory loss, loss of strength, numbness, or tremors  SKIN: No itching, burning, rashes, or lesions   LYMPH NODES: No enlarged glands  ENDOCRINE: No heat or cold intolerance; No hair loss  MUSCULOSKELETAL: No joint pain or swelling; No muscle, back, or extremity pain  PSYCHIATRIC: No depression, anxiety, mood swings, or difficulty sleeping  HEME/LYMPH: No easy bruising, or bleeding gums  ALLERY AND IMMUNOLOGIC: No hives or eczema  FAMILY HISTORY:  FH: diabetes mellitus      T(C): 36.8 (09-29-20 @ 08:28), Max: 36.8 (09-29-20 @ 08:28)  HR: 75 (09-29-20 @ 12:28) (75 - 90)  BP: 98/59 (09-29-20 @ 12:28) (98/59 - 135/61)  RR: 15 (09-29-20 @ 08:28) (15 - 15)  SpO2: 95% (09-29-20 @ 08:28) (95% - 95%)  Wt(kg): --Vital Signs Last 24 Hrs  T(C): 36.8 (29 Sep 2020 08:28), Max: 36.8 (29 Sep 2020 08:28)  T(F): 98.2 (29 Sep 2020 08:28), Max: 98.2 (29 Sep 2020 08:28)  HR: 75 (29 Sep 2020 12:28) (75 - 90)  BP: 98/59 (29 Sep 2020 12:28) (98/59 - 135/61)  BP(mean): --  RR: 15 (29 Sep 2020 08:28) (15 - 15)  SpO2: 95% (29 Sep 2020 08:28) (95% - 95%)    T(F): 98.2 (09-29-20 @ 08:28), Max: 98.7 (09-27-20 @ 20:27)  HR: 75 (09-29-20 @ 12:28) (65 - 90)  BP: 98/59 (09-29-20 @ 12:28) (98/59 - 166/73)  RR: 15 (09-29-20 @ 08:28) (15 - 16)  SpO2: 95% (09-29-20 @ 08:28) (95% - 96%)    PHYSICAL EXAM:  GENERAL: NAD, well-groomed, well-developed  HEAD:  Atraumatic, Normocephalic  EYES: EOMI, PERRLA, conjunctiva and sclera clear  ENMT: No tonsillar erythema, exudates, or enlargement; Moist mucous membranes, Good dentition, No lesions  NECK: Supple, No JVD, Normal thyroid  NERVOUS SYSTEM:  Alert & Oriented X3, Good concentration; Motor Strength 5/5 B/L upper and lower extremities; DTRs 2+ intact and symmetric  CHEST/LUNG: Clear to percussion bilaterally; No rales, rhonchi, wheezing, or rubs  HEART: Regular rate and rhythm; No murmurs, rubs, or gallops  ABDOMEN: Soft, Nontender, Nondistended; Bowel sounds present  EXTREMITIES:  2+ Peripheral Pulses, No clubbing, cyanosis, or edema  LYMPH: No lymphadenopathy noted  SKIN: No rashes or lesions    Consultant(s) Notes Reviewed:  [x ] YES  [ ] NO  Care Discussed with Consultants/Other Providers [ x] YES  [ ] NO    LABS:  09-28    136  |  103  |  38<H>  ----------------------------<  94  4.0   |  28  |  0.96    Ca    10.5      28 Sep 2020 07:33    TPro  5.4<L>  /  Alb  2.2<L>  /  TBili  0.3  /  DBili  x   /  AST  19  /  ALT  19  /  AlkPhos  88  09-28                          10.7   5.13  )-----------( 183      ( 28 Sep 2020 07:33 )             32.4         PT/INR - ( 29 Sep 2020 05:30 )   PT: 20.1 sec;   INR: 1.70 ratio           LIVER FUNCTIONS - ( 28 Sep 2020 07:33 )  Alb: 2.2 g/dL / Pro: 5.4 g/dL / ALK PHOS: 88 U/L / ALT: 19 U/L / AST: 19 U/L / GGT: x                            10.7   5.13  )-----------( 183      ( 09-28 @ 07:33 )             32.4                11.6   7.61  )-----------( 155      ( 09-24 @ 05:30 )             35.8                12.3   7.18  )-----------( 158      ( 09-20 @ 06:00 )             36.9                12.0   8.28  )-----------( 164      ( 09-19 @ 05:00 )             37.2                12.9   9.39  )-----------( 138      ( 09-18 @ 04:45 )             39.1                13.3   6.51  )-----------( 147      ( 09-17 @ 23:45 )             40.6                14.2   8.13  )-----------( 150      ( 09-01 @ 06:00 )             44.7                13.3   6.37  )-----------( 128      ( 08-31 @ 06:22 )             42.4               RADIOLOGY & ADDITIONAL TESTS:    Imaging Personally Reviewed:  [ ] YES  [ ] NO  acetaminophen   Tablet .. 650 milliGRAM(s) Oral every 6 hours PRN  brimonidine 0.2%/ timolol 0.5% Ophthalmic Solution 1 Drop(s) Left EYE two times a day  cinacalcet 30 milliGRAM(s) Oral daily  donepezil 5 milliGRAM(s) Oral at bedtime  dorzolamide 2% Ophthalmic Solution 1 Drop(s) Left EYE three times a day  labetalol 100 milliGRAM(s) Oral every 8 hours  lisinopril 10 milliGRAM(s) Oral daily  montelukast 10 milliGRAM(s) Oral at bedtime  multivitamin 1 Tablet(s) Oral daily  senna 2 Tablet(s) Oral at bedtime PRN  warfarin 3.5 milliGRAM(s) Oral once      HEALTH ISSUES - PROBLEM Dx:  Hand swelling  Hand swelling    HTN (hypertension), benign  HTN (hypertension), benign    Atrial fibrillation  Atrial fibrillation    Subarachnoid bleed  Subarachnoid bleed

## 2020-09-30 NOTE — PROGRESS NOTE ADULT - SUBJECTIVE AND OBJECTIVE BOX
HPI:  89 y/o F w/ pmh htn, afib on coumadin and asa, +PPM, CHF (EF 25-30%), recent admission for an NSTEMI, presented to [facility] on 9/17 s/p mech fall and found to have b/l frontal SAH and +scalp hematoma. Pt also found to have supratherapeutic INR of 3.9, Hyperkalemia of 6.2, MORELIA, MICU consulted for admission for hyperkalemia, q1h neurochecks. During interview with patients and daughter at bedside pt reported having a headache worst around the R. frontal scalp hematoma. Pt otherwise denied any other medical complains. Pt stated she cannot recall what happened. Daughter stated she did not witness the fall but heard a loud thump and found her mother on the floor. Pt denied any visual changes, dizziness, photo/phonophibia, neck pain, facial pain, sob, cp, palpitation abd pain, back pain, weakness/numbness in the ext.  Pt. also injured her right arm during the fall.     In ICU patient was started on labetalol infusion for BP control and keppra for seizure ppx. Aspirin and coumadin were held, she was given vitamin K and Kcentra. Patient improved and was transferred to floor. She was transitioned to labetalol PO with good BP control until it was discontinued on 9/21.     Repeat head ct 9/20 revealed small amount of acute subarachnoid hemorrhage in bifrontal lobes, small intraparenchymal hemorrhage and tiny bilateral whole hemispheric sdh essentially unchanged. Pt does not want any invasive procedures including aneurysm intervention. As per neuro, warfarin was cleared to be restarted. EEG done on 9/21 without any seizure activity and was better compared to one done on 9/18.     On 9/22 patient hypertensive, treated with IV hydralazine, labetalol was restarted as well as home med lisinopril. Pt asymptomatic. Goal is to have SBP less than 160.  Patient's discharge to rehab was held 9/22 due to hypertension but was medically cleared for discharge to acute rehab 9/23.    (23 Sep 2020 15:56)      PAST MEDICAL & SURGICAL HISTORY:  Pacemaker    Transient cerebral ischemia, unspecified transient cerebral ischemia type    HTN (hypertension), benign    Atrial fibrillation    Acid reflux disease    History of cataract surgery        Subjective:  No new complaints or overnight issues.       VITALS  Vital Signs Last 24 Hrs  T(C): 36.8 (30 Sep 2020 13:41), Max: 36.8 (29 Sep 2020 21:30)  T(F): 98.2 (30 Sep 2020 13:41), Max: 98.2 (29 Sep 2020 21:30)  HR: 76 (30 Sep 2020 13:41) (72 - 83)  BP: 139/90 (30 Sep 2020 13:41) (127/73 - 178/62)  BP(mean): --  RR: 15 (30 Sep 2020 12:42) (15 - 15)  SpO2: 96% (30 Sep 2020 12:42) (95% - 99%)    REVIEW OF SYMPTOMS  +right shoulder and elbow pain, wrist pain, hearing difficulty, memory loss       PHYSICAL EXAM  Physical Exam: Gen - NAD, Comfortable  HEENT - NCAT, EOMI  Neck - Supple  Pulm - CTAB, No wheeze, No rhonchi, No crackles  Cardiovascular - RRR, S1S2, No murmurs  Abdomen - Soft, NT/ND, +BS  Extremities - No C/C/E, No calf tenderness  Neuro-     Cognitive - AAOx2-3 --knows she is in hospital but not name, knows year but not month/date     Communication - Fluent, No dysarthria, hypophonic      Cranial Nerves - CN 2-12 intact except hearing     Motor -                     LEFT    UE - 4/5                    RIGHT UE - ShAB 5/5, EF 5/5, EE 5/5, WE 5/5,  5/5                    LEFT    LE - HF 5/5, KE 5/5, DF 5/5, PF 5/5                    RIGHT LE - HF 4/5, KE 5/5, DF 5/5, PF 5/5        Sensory - Intact to LT bilat     Tone - normal  MSK: right wrist, and index and middle PIP swelling and tenderness resolving.   Right shoulder and right elbow ROM normal --non-painful   Psychiatric - Mood stable, Affect WNL      RECENT LABS          PT/INR - ( 30 Sep 2020 05:10 )   PT: 22.5 sec;   INR: 1.92 ratio                 RADIOLOGY/OTHER RESULTS      MEDICATIONS  (STANDING):  brimonidine 0.2%/ timolol 0.5% Ophthalmic Solution 1 Drop(s) Left EYE two times a day  cinacalcet 30 milliGRAM(s) Oral daily  donepezil 5 milliGRAM(s) Oral at bedtime  dorzolamide 2% Ophthalmic Solution 1 Drop(s) Left EYE three times a day  labetalol 100 milliGRAM(s) Oral every 8 hours  lisinopril 10 milliGRAM(s) Oral daily  montelukast 10 milliGRAM(s) Oral at bedtime  multivitamin 1 Tablet(s) Oral daily    MEDICATIONS  (PRN):  acetaminophen   Tablet .. 650 milliGRAM(s) Oral every 6 hours PRN Temp greater or equal to 38C (100.4F), Mild Pain (1 - 3)  senna 2 Tablet(s) Oral at bedtime PRN Constipation

## 2020-09-30 NOTE — PROGRESS NOTE ADULT - SUBJECTIVE AND OBJECTIVE BOX
Patient is a 89y old  Female who presents with a chief complaint of SAH  SAH  02.22 Traumatic, Close Injury (29 Sep 2020 20:38)      INTERVAL HPI/OVERNIGHT EVENTS: alert  bp elevated this am      REVIEW OF SYSTEMS:  CONSTITUTIONAL: No fever, weight loss, or fatigue  EYES: No eye pain, visual disturbances, or discharge  ENMT:  No difficulty hearing, tinnitus, vertigo; No sinus or throat pain  NECK: No pain or stiffness  BREASTS: No pain, masses, or nipple discharge  RESPIRATORY: No cough, wheezing, chills or hemoptysis; No shortness of breath  CARDIOVASCULAR: No chest pain, palpitations, dizziness, or leg swelling  GASTROINTESTINAL: No abdominal or epigastric pain. No nausea, vomiting, or hematemesis; No diarrhea or constipation. No melena or hematochezia.  GENITOURINARY: No dysuria, frequency, hematuria, or incontinence  NEUROLOGICAL: No headaches, memory loss, loss of strength, numbness, or tremors  SKIN: No itching, burning, rashes, or lesions   LYMPH NODES: No enlarged glands  ENDOCRINE: No heat or cold intolerance; No hair loss  MUSCULOSKELETAL: No joint pain or swelling; No muscle, back, or extremity pain  PSYCHIATRIC: No depression, anxiety, mood swings, or difficulty sleeping  HEME/LYMPH: No easy bruising, or bleeding gums  ALLERY AND IMMUNOLOGIC: No hives or eczema  FAMILY HISTORY:  FH: diabetes mellitus      T(C): 36.7 (09-30-20 @ 06:41), Max: 36.8 (09-29-20 @ 08:28)  HR: 83 (09-30-20 @ 06:41) (72 - 90)  BP: 162/60 (09-30-20 @ 06:41) (98/59 - 178/62)  RR: 15 (09-30-20 @ 06:41) (15 - 15)  SpO2: 95% (09-30-20 @ 06:41) (95% - 99%)  Wt(kg): --Vital Signs Last 24 Hrs  T(C): 36.7 (30 Sep 2020 06:41), Max: 36.8 (29 Sep 2020 08:28)  T(F): 98.1 (30 Sep 2020 06:41), Max: 98.2 (29 Sep 2020 08:28)  HR: 83 (30 Sep 2020 06:41) (72 - 90)  BP: 162/60 (30 Sep 2020 06:41) (98/59 - 178/62)  BP(mean): --  RR: 15 (30 Sep 2020 06:41) (15 - 15)  SpO2: 95% (30 Sep 2020 06:41) (95% - 99%)    T(F): 98.1 (09-30-20 @ 06:41), Max: 98.7 (09-27-20 @ 20:27)  HR: 83 (09-30-20 @ 06:41) (65 - 90)  BP: 162/60 (09-30-20 @ 06:41) (98/59 - 178/62)  RR: 15 (09-30-20 @ 06:41) (15 - 16)  SpO2: 95% (09-30-20 @ 06:41) (95% - 99%)    PHYSICAL EXAM:  GENERAL: NAD, well-groomed, well-developed  HEAD:  Atraumatic, Normocephalic  EYES: EOMI, PERRLA, conjunctiva and sclera clear  ENMT: No tonsillar erythema, exudates, or enlargement; Moist mucous membranes, Good dentition, No lesions  NECK: Supple, No JVD, Normal thyroid  NERVOUS SYSTEM:  Alert & Oriented X3, Good concentration; Motor Strength 5/5 B/L upper and lower extremities; DTRs 2+ intact and symmetric  CHEST/LUNG: Clear to percussion bilaterally; No rales, rhonchi, wheezing, or rubs  HEART: Regular rate and rhythm; No murmurs, rubs, or gallops  ABDOMEN: Soft, Nontender, Nondistended; Bowel sounds present  EXTREMITIES:  2+ Peripheral Pulses, No clubbing, cyanosis, or edema  LYMPH: No lymphadenopathy noted  SKIN: No rashes or lesions    Consultant(s) Notes Reviewed:  [x ] YES  [ ] NO  Care Discussed with Consultants/Other Providers [ x] YES  [ ] NO    LABS:  09-28    136  |  103  |  38<H>  ----------------------------<  94  4.0   |  28  |  0.96    Ca    10.5      28 Sep 2020 07:33    TPro  5.4<L>  /  Alb  2.2<L>  /  TBili  0.3  /  DBili  x   /  AST  19  /  ALT  19  /  AlkPhos  88  09-28                          10.7   5.13  )-----------( 183      ( 28 Sep 2020 07:33 )             32.4         PT/INR - ( 30 Sep 2020 05:10 )   PT: 22.5 sec;   INR: 1.92 ratio           LIVER FUNCTIONS - ( 28 Sep 2020 07:33 )  Alb: 2.2 g/dL / Pro: 5.4 g/dL / ALK PHOS: 88 U/L / ALT: 19 U/L / AST: 19 U/L / GGT: x                            10.7   5.13  )-----------( 183      ( 09-28 @ 07:33 )             32.4                11.6   7.61  )-----------( 155      ( 09-24 @ 05:30 )             35.8                12.3   7.18  )-----------( 158      ( 09-20 @ 06:00 )             36.9                12.0   8.28  )-----------( 164      ( 09-19 @ 05:00 )             37.2                12.9   9.39  )-----------( 138      ( 09-18 @ 04:45 )             39.1                13.3   6.51  )-----------( 147      ( 09-17 @ 23:45 )             40.6                14.2   8.13  )-----------( 150      ( 09-01 @ 06:00 )             44.7               RADIOLOGY & ADDITIONAL TESTS:    Imaging Personally Reviewed:  [ ] YES  [ ] NO  acetaminophen   Tablet .. 650 milliGRAM(s) Oral every 6 hours PRN  brimonidine 0.2%/ timolol 0.5% Ophthalmic Solution 1 Drop(s) Left EYE two times a day  cinacalcet 30 milliGRAM(s) Oral daily  donepezil 5 milliGRAM(s) Oral at bedtime  dorzolamide 2% Ophthalmic Solution 1 Drop(s) Left EYE three times a day  labetalol 100 milliGRAM(s) Oral every 8 hours  lisinopril 10 milliGRAM(s) Oral daily  montelukast 10 milliGRAM(s) Oral at bedtime  multivitamin 1 Tablet(s) Oral daily  senna 2 Tablet(s) Oral at bedtime PRN      HEALTH ISSUES - PROBLEM Dx:  Hand swelling  Hand swelling    HTN (hypertension), benign  HTN (hypertension), benign    Atrial fibrillation  Atrial fibrillation    Subarachnoid bleed  Subarachnoid bleed

## 2020-09-30 NOTE — PROGRESS NOTE ADULT - ASSESSMENT
ASSESSMENT/PLAN  AZRA CHAHAL is a 89yo Female w/ pmh htn, afib on coumadin and asa, +PPM, CHF (EF 25-30%), recent admission for an NSTEMI, presented to [facility] on 9/18 s/p mech fall and found to have b/l frontal SAH and +scalp hematoma. Repeat CT head stable and was resumed on warfarin. Now admitted for multidisciplinary rehab program.     #SAH  -On Ct head 9/20: Small amount of acute subarachnoid hemorrhage in the bifrontal lobes, stable   -No invasive procedures as per family and patient, patient is DNR/DNI   -EEG negative for seizures   --Pt. has not had any seizure activity and Completed 7 day keppra seizure ppx  guideline from [facility]--stopped keppra on 9/24   -Gait Instability, ADL impairments and Functional impairments: start Comprehensive Rehab Program of PT/OT/SLP    #Memory/cognition   -Started aricept 5mg 9/29     #Afib  -Cleared to resume warfarin as per neuro recs   -INR daily -- INR 1.92    9/30  -INR goal 2-3  -cont. Coumadin 3.5 mg tonight    Right wrist Sprain--Resolving  --xray neg for fracture  --ICE and tylenol PRN.      right shoulder pain--resolving  --likely RTC tendinitis  --R shoulder xray 9/24 --No fractures, or dislocation, degenerative changes noted   --Pain control PRN    Right elbow pain --resolving  -Likely medial epicondylitis   -R elbow xray 9/24-- no acute fractures noted     Sleep  --melatonin PRN    #CHF  -TTE from 8/30 with EF 25-30%   -Daily weights     #HTN  -Monitor BP, SBP goal <160   -Continue lisinopril and labetalol   -BP controlled    #MORELIA (resolved)  - SONO w/o hydro, b/l small kidneys  -nephro following   -Monitor BMP     #Pain Mgmt   - Tylenol PRN     #GI/Bowel Mgmt   - Continent c/w Senna PRN     #/Bladder Mgmt   - Continent, voiding well    #FEN   - Diet - Regular + Thins  [CCHO, DASH/TLC]      #Precautions / PROPHYLAXIS:   - Falls, Cardiac, Seizure   - Lungs: Aspiration   - Pressure injury/Skin: OOB to Chair, PT/OT    - DVT: Coumadin   -Code: DNR/DNI \    Team Meeting 9/29--  Nursing: Continent bowel, incontinent bladder,   OT: Sup. eating; Mod A grooming, UBD.  Max A LBD, tub transfer;  goal min A to S; Poor Carryover  PT: Amb. 100ft Min A, Mod A transf;  4 steps min-mod A.   Speech: Poor Memory, Poor Carryover, impaired attention and problem solving.   D/c home 10/7

## 2020-09-30 NOTE — PROVIDER CONTACT NOTE (OTHER) - SITUATION
pt. with elevated manual BP of 178/62 and complaining of mild headache. scheduled BP medications given earlier in morning.

## 2020-09-30 NOTE — CHART NOTE - NSCHARTNOTEFT_GEN_A_CORE
NUTRITION FOLLOW UP    SOURCE: Patient [X)   Family [ ]    Medical Record (X)    Diet, DASH/TLC:   Sodium & Cholesterol Restricted  Supplement Feeding Modality:  Oral  Ensure Enlive Cans or Servings Per Day:  1       Frequency:  Three Times a day (09-22-20 @ 14:25) [Active]      Patient denies any issues at this time. Expresses she is getting plenty to eat and is sure to try and consume her Ensure Enlive TID with meals so she "can get enough nutrition". Question weight accuracy; will continue to trend.     PO INTAKE: 50-75%           CURRENT WEIGHT:  question weight accuracy.   121# (9/29)  122# (9/27)  Admission weight 137# (9/23)    PERTINENT MEDS:   Pertinent Medications: MEDICATIONS  (STANDING):  brimonidine 0.2%/ timolol 0.5% Ophthalmic Solution 1 Drop(s) Left EYE two times a day  cinacalcet 30 milliGRAM(s) Oral daily  donepezil 5 milliGRAM(s) Oral at bedtime  dorzolamide 2% Ophthalmic Solution 1 Drop(s) Left EYE three times a day  labetalol 100 milliGRAM(s) Oral every 8 hours  lisinopril 10 milliGRAM(s) Oral daily  montelukast 10 milliGRAM(s) Oral at bedtime  multivitamin 1 Tablet(s) Oral daily    MEDICATIONS  (PRN):  acetaminophen   Tablet .. 650 milliGRAM(s) Oral every 6 hours PRN Temp greater or equal to 38C (100.4F), Mild Pain (1 - 3)  senna 2 Tablet(s) Oral at bedtime PRN Constipation      PERTINENT LABS:  09-28 Na136 mmol/L Glu 94 mg/dL K+ 4.0 mmol/L Cr  0.96 mg/dL BUN 38 mg/dL<H> 09-28 Alb 2.2 g/dL<L>      SKIN:  no pressure areas  EDEMA: none noted from today. Last from 9/29 to hand  LAST BM: 9/26. Has senna scheduled.    ESTIMATED NEEDS:   [X] no change since previous assessment  [ ] recalculated:     PREVIOUS NUTRITION DIAGNOSIS: severe malnutrition    NUTRITION DIAGNOSIS is :  (x)  Ongoing      (    ) Resolved,  RD will follow as per nutrition department protocol.    NUTRITION RECOMMENDATIONS:   1. Continue diet as ordered.  2. Continue Ensure Enlive TID.   3. Continue micronutrient supplementation as medically feasible.   4. Trend weights.    MONITORING AND EVALUATION:   1. Tolerance to diet prescription   2. PO intake  3. Weights  4. Labs  5. Follow Up per protocol     RD to remain available   Nany Sultana RDN #[id number]

## 2020-10-01 NOTE — PROGRESS NOTE ADULT - SUBJECTIVE AND OBJECTIVE BOX
HPI:  89 y/o F w/ pmh htn, afib on coumadin and asa, +PPM, CHF (EF 25-30%), recent admission for an NSTEMI, presented to [facility] on 9/17 s/p mech fall and found to have b/l frontal SAH and +scalp hematoma. Pt also found to have supratherapeutic INR of 3.9, Hyperkalemia of 6.2, MORELIA, MICU consulted for admission for hyperkalemia, q1h neurochecks. During interview with patients and daughter at bedside pt reported having a headache worst around the R. frontal scalp hematoma. Pt otherwise denied any other medical complains. Pt stated she cannot recall what happened. Daughter stated she did not witness the fall but heard a loud thump and found her mother on the floor. Pt denied any visual changes, dizziness, photo/phonophibia, neck pain, facial pain, sob, cp, palpitation abd pain, back pain, weakness/numbness in the ext.  Pt. also injured her right arm during the fall.     In ICU patient was started on labetalol infusion for BP control and keppra for seizure ppx. Aspirin and coumadin were held, she was given vitamin K and Kcentra. Patient improved and was transferred to floor. She was transitioned to labetalol PO with good BP control until it was discontinued on 9/21.     Repeat head ct 9/20 revealed small amount of acute subarachnoid hemorrhage in bifrontal lobes, small intraparenchymal hemorrhage and tiny bilateral whole hemispheric sdh essentially unchanged. Pt does not want any invasive procedures including aneurysm intervention. As per neuro, warfarin was cleared to be restarted. EEG done on 9/21 without any seizure activity and was better compared to one done on 9/18.     On 9/22 patient hypertensive, treated with IV hydralazine, labetalol was restarted as well as home med lisinopril. Pt asymptomatic. Goal is to have SBP less than 160.  Patient's discharge to rehab was held 9/22 due to hypertension but was medically cleared for discharge to acute rehab 9/23.    (23 Sep 2020 15:56)      PAST MEDICAL & SURGICAL HISTORY:  Pacemaker    Transient cerebral ischemia, unspecified transient cerebral ischemia type    HTN (hypertension), benign    Atrial fibrillation    Acid reflux disease    History of cataract surgery      Subjective:  Patient seen and evaluated this morning. No acute medical issues noted overnight.     REVIEW OF SYMPTOMS  +right shoulder and elbow pain, wrist pain, hearing difficulty, memory loss     Vital Signs Last 24 Hrs  T(C): 36.4 (01 Oct 2020 07:36), Max: 36.8 (30 Sep 2020 13:41)  T(F): 97.6 (01 Oct 2020 07:36), Max: 98.2 (30 Sep 2020 13:41)  HR: 96 (01 Oct 2020 07:36) (76 - 96)  BP: 130/84 (01 Oct 2020 07:36) (130/82 - 154/69)  BP(mean): --  RR: 14 (01 Oct 2020 07:36) (14 - 15)  SpO2: 97% (01 Oct 2020 07:36) (96% - 97%)    PHYSICAL EXAM  Physical Exam: Gen - NAD, Comfortable  HEENT - NCAT, EOMI  Neck - Supple  Pulm - CTAB, No wheeze, No rhonchi, No crackles  Cardiovascular - RRR, S1S2, No murmurs  Abdomen - Soft, NT/ND, +BS  Extremities - No C/C/E, No calf tenderness  Neuro-     Cognitive - AAOx2-3      Communication - Fluent, No dysarthria, hypophonic      Motor -                     LEFT    UE - 4/5                    RIGHT UE - ShAB 5/5, EF 5/5, EE 5/5, WE 5/5,  5/5                    LEFT    LE - HF 5/5, KE 5/5, DF 5/5, PF 5/5                    RIGHT LE - HF 4/5, KE 5/5, DF 5/5, PF 5/5        Tone - normal  MSK: right wrist, and index and middle PIP swelling and tenderness resolving.   Right shoulder and right elbow ROM normal --non-painful   Psychiatric - Mood stable, Affect WNL      RECENT LABS/IMAGING                        11.8   5.98  )-----------( 226      ( 01 Oct 2020 06:48 )             34.7     10-01    136  |  102  |  30<H>  ----------------------------<  95  3.8   |  26  |  0.91    Ca    10.4      01 Oct 2020 06:48    TPro  5.9<L>  /  Alb  2.5<L>  /  TBili  0.4  /  DBili  x   /  AST  24  /  ALT  19  /  AlkPhos  103  10-01    PT/INR - ( 01 Oct 2020 06:48 )   PT: 24.1 sec;   INR: 2.06 ratio          RADIOLOGY/OTHER RESULTS      MEDICATIONS  (STANDING):  brimonidine 0.2%/ timolol 0.5% Ophthalmic Solution 1 Drop(s) Left EYE two times a day  cinacalcet 30 milliGRAM(s) Oral daily  donepezil 5 milliGRAM(s) Oral at bedtime  dorzolamide 2% Ophthalmic Solution 1 Drop(s) Left EYE three times a day  labetalol 100 milliGRAM(s) Oral every 8 hours  lisinopril 10 milliGRAM(s) Oral daily  montelukast 10 milliGRAM(s) Oral at bedtime  multivitamin 1 Tablet(s) Oral daily  warfarin 3.5 milliGRAM(s) Oral once    MEDICATIONS  (PRN):  acetaminophen   Tablet .. 650 milliGRAM(s) Oral every 6 hours PRN Temp greater or equal to 38C (100.4F), Mild Pain (1 - 3)  senna 2 Tablet(s) Oral at bedtime PRN Constipation

## 2020-10-01 NOTE — PROGRESS NOTE ADULT - SUBJECTIVE AND OBJECTIVE BOX
Resting    Vital Signs Last 24 Hrs  T(C): 36.4 (10-01-20 @ 07:36), Max: 36.8 (09-30-20 @ 13:41)  T(F): 97.6 (10-01-20 @ 07:36), Max: 98.2 (09-30-20 @ 13:41)  HR: 96 (10-01-20 @ 07:36) (76 - 96)  BP: 130/84 (10-01-20 @ 07:36) (130/82 - 154/69)  RR: 14 (10-01-20 @ 07:36) (14 - 15)  SpO2: 97% (10-01-20 @ 07:36) (96% - 97%)    card s1s2  b/l air entry  soft, ND  no edema                                                                                             11.8   5.98  )-----------( 226      ( 01 Oct 2020 06:48 )             34.7     01 Oct 2020 06:48    136    |  102    |  30     ----------------------------<  95     3.8     |  26     |  0.91     Ca    10.4       01 Oct 2020 06:48    TPro  5.9    /  Alb  2.5    /  TBili  0.4    /  DBili  x      /  AST  24     /  ALT  19     /  AlkPhos  103    01 Oct 2020 06:48    LIVER FUNCTIONS - ( 01 Oct 2020 06:48 )  Alb: 2.5 g/dL / Pro: 5.9 g/dL / ALK PHOS: 103 U/L / ALT: 19 U/L / AST: 24 U/L / GGT: x           PT/INR - ( 01 Oct 2020 06:48 )   PT: 24.1 sec;   INR: 2.06 ratio      acetaminophen   Tablet .. 650 milliGRAM(s) Oral every 6 hours PRN  brimonidine 0.2%/ timolol 0.5% Ophthalmic Solution 1 Drop(s) Left EYE two times a day  cinacalcet 30 milliGRAM(s) Oral daily  donepezil 5 milliGRAM(s) Oral at bedtime  dorzolamide 2% Ophthalmic Solution 1 Drop(s) Left EYE three times a day  labetalol 100 milliGRAM(s) Oral every 8 hours  lisinopril 10 milliGRAM(s) Oral daily  montelukast 10 milliGRAM(s) Oral at bedtime  multivitamin 1 Tablet(s) Oral daily  senna 2 Tablet(s) Oral at bedtime PRN  warfarin 3.5 milliGRAM(s) Oral once    A/P:    S/p fall, b/l SAH  Known CM, EF 25-30%, MR, TR, AR  S/p hemodynamic MORELIA   Resolved  UA w/pr 15 otherwise bland  SONO w/o hydro, b/l small kidneys  Avoid nephrotoxins  F/u BMP, BP    [phone]

## 2020-10-01 NOTE — PROGRESS NOTE ADULT - ATTENDING COMMENTS
Pt. seen with resident.  Agree with documentation above as per resident with amendments made as appropriate. Patient medically stable. Making progress towards rehab goals.

## 2020-10-01 NOTE — PROGRESS NOTE ADULT - SUBJECTIVE AND OBJECTIVE BOX
Patient is a 89y old  Female who presents with a chief complaint of SAH  SAH  02.22 Traumatic, Close Injury (01 Oct 2020 13:32)      INTERVAL HPI/OVERNIGHT EVENTS: fatigued      REVIEW OF SYSTEMS:  CONSTITUTIONAL: No fever, weight loss, or fatigue  EYES: No eye pain, visual disturbances, or discharge  ENMT:  No difficulty hearing, tinnitus, vertigo; No sinus or throat pain  NECK: No pain or stiffness  BREASTS: No pain, masses, or nipple discharge  RESPIRATORY: No cough, wheezing, chills or hemoptysis; No shortness of breath  CARDIOVASCULAR: No chest pain, palpitations, dizziness, or leg swelling  GASTROINTESTINAL: No abdominal or epigastric pain. No nausea, vomiting, or hematemesis; No diarrhea or constipation. No melena or hematochezia.  GENITOURINARY: No dysuria, frequency, hematuria, or incontinence  NEUROLOGICAL: No headaches, memory loss, loss of strength, numbness, or tremors  SKIN: No itching, burning, rashes, or lesions   LYMPH NODES: No enlarged glands  ENDOCRINE: No heat or cold intolerance; No hair loss  MUSCULOSKELETAL: No joint pain or swelling; No muscle, back, or extremity pain  PSYCHIATRIC: No depression, anxiety, mood swings, or difficulty sleeping  HEME/LYMPH: No easy bruising, or bleeding gums  ALLERY AND IMMUNOLOGIC: No hives or eczema  FAMILY HISTORY:  FH: diabetes mellitus      T(C): 36.7 (10-01-20 @ 20:32), Max: 36.7 (09-30-20 @ 21:12)  HR: 80 (10-01-20 @ 20:32) (80 - 96)  BP: 151/84 (10-01-20 @ 20:32) (130/82 - 154/69)  RR: 15 (10-01-20 @ 20:32) (14 - 15)  SpO2: 95% (10-01-20 @ 20:32) (95% - 97%)  Wt(kg): --Vital Signs Last 24 Hrs  T(C): 36.7 (01 Oct 2020 20:32), Max: 36.7 (30 Sep 2020 21:12)  T(F): 98.1 (01 Oct 2020 20:32), Max: 98.1 (30 Sep 2020 21:12)  HR: 80 (01 Oct 2020 20:32) (80 - 96)  BP: 151/84 (01 Oct 2020 20:32) (130/82 - 154/69)  BP(mean): --  RR: 15 (01 Oct 2020 20:32) (14 - 15)  SpO2: 95% (01 Oct 2020 20:32) (95% - 97%)    T(F): 98.1 (10-01-20 @ 20:32), Max: 98.2 (09-29-20 @ 08:28)  HR: 80 (10-01-20 @ 20:32) (72 - 96)  BP: 151/84 (10-01-20 @ 20:32) (98/59 - 178/62)  RR: 15 (10-01-20 @ 20:32) (14 - 15)  SpO2: 95% (10-01-20 @ 20:32) (95% - 99%)    PHYSICAL EXAM:  GENERAL: NAD, well-groomed, well-developed  HEAD:  Atraumatic, Normocephalic  EYES: EOMI, PERRLA, conjunctiva and sclera clear  ENMT: No tonsillar erythema, exudates, or enlargement; Moist mucous membranes, Good dentition, No lesions  NECK: Supple, No JVD, Normal thyroid  NERVOUS SYSTEM:  Alert & Oriented X3, Good concentration; Motor Strength 5/5 B/L upper and lower extremities; DTRs 2+ intact and symmetric  CHEST/LUNG: Clear to percussion bilaterally; No rales, rhonchi, wheezing, or rubs  HEART: Regular rate and rhythm; No murmurs, rubs, or gallops  ABDOMEN: Soft, Nontender, Nondistended; Bowel sounds present  EXTREMITIES:  2+ Peripheral Pulses, No clubbing, cyanosis, or edema  LYMPH: No lymphadenopathy noted  SKIN: No rashes or lesions    Consultant(s) Notes Reviewed:  [x ] YES  [ ] NO  Care Discussed with Consultants/Other Providers [ x] YES  [ ] NO    LABS:  10-01    136  |  102  |  30<H>  ----------------------------<  95  3.8   |  26  |  0.91    Ca    10.4      01 Oct 2020 06:48    TPro  5.9<L>  /  Alb  2.5<L>  /  TBili  0.4  /  DBili  x   /  AST  24  /  ALT  19  /  AlkPhos  103  10-01                          11.8   5.98  )-----------( 226      ( 01 Oct 2020 06:48 )             34.7         PT/INR - ( 01 Oct 2020 06:48 )   PT: 24.1 sec;   INR: 2.06 ratio           LIVER FUNCTIONS - ( 01 Oct 2020 06:48 )  Alb: 2.5 g/dL / Pro: 5.9 g/dL / ALK PHOS: 103 U/L / ALT: 19 U/L / AST: 24 U/L / GGT: x                            11.8   5.98  )-----------( 226      ( 10-01 @ 06:48 )             34.7                10.7   5.13  )-----------( 183      ( 09-28 @ 07:33 )             32.4                11.6   7.61  )-----------( 155      ( 09-24 @ 05:30 )             35.8                12.3   7.18  )-----------( 158      ( 09-20 @ 06:00 )             36.9                12.0   8.28  )-----------( 164      ( 09-19 @ 05:00 )             37.2                12.9   9.39  )-----------( 138      ( 09-18 @ 04:45 )             39.1                13.3   6.51  )-----------( 147      ( 09-17 @ 23:45 )             40.6               RADIOLOGY & ADDITIONAL TESTS:    Imaging Personally Reviewed:  [ ] YES  [ ] NO  acetaminophen   Tablet .. 650 milliGRAM(s) Oral every 6 hours PRN  brimonidine 0.2%/ timolol 0.5% Ophthalmic Solution 1 Drop(s) Left EYE two times a day  cinacalcet 30 milliGRAM(s) Oral daily  donepezil 5 milliGRAM(s) Oral at bedtime  dorzolamide 2% Ophthalmic Solution 1 Drop(s) Left EYE three times a day  labetalol 100 milliGRAM(s) Oral every 8 hours  lisinopril 10 milliGRAM(s) Oral daily  montelukast 10 milliGRAM(s) Oral at bedtime  multivitamin 1 Tablet(s) Oral daily  senna 2 Tablet(s) Oral at bedtime PRN  warfarin 3.5 milliGRAM(s) Oral once      HEALTH ISSUES - PROBLEM Dx:  Hand swelling  Hand swelling    HTN (hypertension), benign  HTN (hypertension), benign    Atrial fibrillation  Atrial fibrillation    Subarachnoid bleed  Subarachnoid bleed

## 2020-10-01 NOTE — PROGRESS NOTE ADULT - ASSESSMENT
ASSESSMENT/PLAN  AZRA CHAHAL is a 89yo Female w/ pmh htn, afib on coumadin and asa, +PPM, CHF (EF 25-30%), recent admission for an NSTEMI, presented to [facility] on 9/18 s/p mech fall and found to have b/l frontal SAH and +scalp hematoma. Repeat CT head stable and was resumed on warfarin. Now admitted for multidisciplinary rehab program.     #SAH  -On Ct head 9/20: Small amount of acute subarachnoid hemorrhage in the bifrontal lobes, stable   -No invasive procedures as per family and patient, patient is DNR/DNI   -EEG negative for seizures   --Pt. has not had any seizure activity and Completed 7 day keppra seizure ppx  guideline from [facility]--stopped keppra on 9/24   -Gait Instability, ADL impairments and Functional impairments: start Comprehensive Rehab Program of PT/OT/SLP    #Memory/cognition   -Started aricept 5mg 9/29     #Afib  -Cleared to resume warfarin as per neuro recs   -INR daily -- INR 2.06  10/1   -INR goal 2-3  -cont. Coumadin 3.5 mg tonight    Right wrist Sprain--Resolving  --xray neg for fracture  --ICE and tylenol PRN.      right shoulder pain--resolving  --likely RTC tendinitis  --R shoulder xray 9/24 --No fractures, or dislocation, degenerative changes noted   --Pain control PRN    Right elbow pain --resolving  -Likely medial epicondylitis   -R elbow xray 9/24-- no acute fractures noted     Sleep  --melatonin PRN    #CHF  -TTE from 8/30 with EF 25-30%   -Daily weights     #HTN  -Monitor BP, SBP goal <160   -Continue lisinopril and labetalol   -BP controlled    #MORELIA (resolved)  - SONO w/o hydro, b/l small kidneys  -nephro following   -Monitor BMP     #Pain Mgmt   - Tylenol PRN     #GI/Bowel Mgmt   - Continent c/w Senna PRN     #/Bladder Mgmt   - Continent, voiding well    #FEN   - Diet - Regular + Thins  [CCHO, DASH/TLC]      #Precautions / PROPHYLAXIS:   - Falls, Cardiac, Seizure   - Lungs: Aspiration   - Pressure injury/Skin: OOB to Chair, PT/OT    - DVT: Coumadin   -Code: DNR/DNI     Team Meeting 9/29--  Nursing: Continent bowel, incontinent bladder,   OT: Sup. eating; Mod A grooming, UBD.  Max A LBD, tub transfer;  goal min A to S; Poor Carryover  PT: Amb. 100ft Min A, Mod A transf;  4 steps min-mod A.   Speech: Poor Memory, Poor Carryover, impaired attention and problem solving.   D/c home 10/7 ASSESSMENT/PLAN  AZRA CHAHAL is a 89yo Female w/ pmh htn, afib on coumadin and asa, +PPM, CHF (EF 25-30%), recent admission for an NSTEMI, presented to [facility] on 9/18 s/p mech fall and found to have b/l frontal SAH and +scalp hematoma. Repeat CT head stable and was resumed on warfarin. Now admitted for multidisciplinary rehab program.     #SAH  -On Ct head 9/20: Small amount of acute subarachnoid hemorrhage in the bifrontal lobes, stable   -No invasive procedures as per family and patient, patient is DNR/DNI   -EEG negative for seizures   --Pt. has not had any seizure activity and Completed 7 day keppra seizure ppx  guideline from A[facility]--stopped keppra on 9/24   -Gait Instability, ADL impairments and Functional impairments: cont Comprehensive Rehab Program of PT/OT/SLP    #Memory/cognition   -Cont. aricept 5mg 9/29     #Afib  -Cleared to resume warfarin as per neuro recs   -INR daily -- INR 2.06  10/1   -INR goal 2-3  -cont. Coumadin 3.5 mg tonight    Right wrist Sprain--Resolving  --xray neg for fracture  --ICE and tylenol PRN.      right shoulder pain--resolving  --likely RTC tendinitis  --R shoulder xray 9/24 --No fractures, or dislocation, degenerative changes noted   --Pain control PRN    Right elbow pain --resolving  -Likely medial epicondylitis   -R elbow xray 9/24-- no acute fractures noted     Sleep  --melatonin PRN    #CHF  -TTE from 8/30 with EF 25-30%   -Daily weights     #HTN  -Monitor BP, SBP goal <160   -Continue lisinopril and labetalol   -BP controlled    #MORELIA (resolved)  - SONO w/o hydro, b/l small kidneys  -nephro following   -Monitor BMP     #Pain Mgmt   - Tylenol PRN     #GI/Bowel Mgmt   - Continent c/w Senna PRN     #/Bladder Mgmt   - Continent, voiding well    #FEN   - Diet - Regular + Thins  [CCHO, DASH/TLC]      #Precautions / PROPHYLAXIS:   - Falls, Cardiac, Seizure   - Lungs: Aspiration   - Pressure injury/Skin: OOB to Chair, PT/OT    - DVT: Coumadin   -Code: DNR/DNI     Team Meeting 9/29--  Nursing: Continent bowel, incontinent bladder,   OT: Sup. eating; Mod A grooming, UBD.  Max A LBD, tub transfer;  goal min A to S; Poor Carryover  PT: Amb. 100ft Min A, Mod A transf;  4 steps min-mod A.   Speech: Poor Memory, Poor Carryover, impaired attention and problem solving.   D/c home 10/7

## 2020-10-02 NOTE — PROGRESS NOTE ADULT - ASSESSMENT
ASSESSMENT/PLAN  AZRA CHAHAL is a 89yo Female w/ pmh htn, afib on coumadin and asa, +PPM, CHF (EF 25-30%), recent admission for an NSTEMI, presented to [facility] on 9/18 s/p mech fall and found to have b/l frontal SAH and +scalp hematoma. Repeat CT head stable and was resumed on warfarin. Now admitted for multidisciplinary rehab program.     #SAH  -On Ct head 9/20: Small amount of acute subarachnoid hemorrhage in the bifrontal lobes, stable   -No invasive procedures as per family and patient, patient is DNR/DNI   -EEG negative for seizures   --Pt. has not had any seizure activity and Completed 7 day keppra seizure ppx  guideline from [facility]--stopped keppra on 9/24   -Gait Instability, ADL impairments and Functional impairments: cont Comprehensive Rehab Program of PT/OT/SLP    #Memory/cognition   -Cont. aricept 5mg 9/29     #Afib  -Cleared to resume warfarin as per neuro recs   -INR daily -- INR 2.41  10/1   -INR goal 2-3  -dose Coumadin 3 mg tonight    Right wrist Sprain--Resolving  --xray neg for fracture  --ICE and tylenol PRN.      right shoulder pain--resolving  --likely RTC tendinitis  --R shoulder xray 9/24 --No fractures, or dislocation, degenerative changes noted   --Pain control PRN    Right elbow pain --resolving  -Likely medial epicondylitis   -R elbow xray 9/24-- no acute fractures noted     Sleep  --melatonin PRN    #CHF  -TTE from 8/30 with EF 25-30%   -Daily weights     #HTN  -Monitor BP, SBP goal <160   -Continue lisinopril and labetalol   -BP controlled    #MORELIA (resolved)  - SONO w/o hydro, b/l small kidneys  -nephro following   -Monitor BMP     #Pain Mgmt   - Tylenol PRN     #GI/Bowel Mgmt   - Continent c/w Senna PRN     #/Bladder Mgmt   - Continent, voiding well    #FEN   - Diet - Regular + Thins  [CCHO, DASH/TLC]      #Precautions / PROPHYLAXIS:   - Falls, Cardiac, Seizure   - Lungs: Aspiration   - Pressure injury/Skin: OOB to Chair, PT/OT    - DVT: Coumadin   -Code: DNR/DNI     Team Meeting 9/29--  Nursing: Continent bowel, incontinent bladder,   OT: Sup. eating; Mod A grooming, UBD.  Max A LBD, tub transfer;  goal min A to S; Poor Carryover  PT: Amb. 100ft Min A, Mod A transf;  4 steps min-mod A.   Speech: Poor Memory, Poor Carryover, impaired attention and problem solving.   D/c home 10/7 ASSESSMENT/PLAN  AZRA CHAHAL is a 89yo Female w/ pmh htn, afib on coumadin and asa, +PPM, CHF (EF 25-30%), recent admission for an NSTEMI, presented to [facility] on 9/18 s/p mech fall and found to have b/l frontal SAH and +scalp hematoma. Repeat CT head stable and was resumed on warfarin. Now admitted for multidisciplinary rehab program.     #SAH  -On Ct head 9/20: Small amount of acute subarachnoid hemorrhage in the bifrontal lobes, stable   -No invasive procedures as per family and patient, patient is DNR/DNI   -EEG negative for seizures   --Pt. has not had any seizure activity and Completed 7 day keppra seizure ppx  guideline from [facility]--stopped keppra on 9/24   -Gait Instability, ADL impairments and Functional impairments: cont Comprehensive Rehab Program of PT/OT/SLP    #Memory/cognition   -Cont. aricept 5mg 9/29     #Afib  -Cleared to resume warfarin as per neuro recs   -INR daily -- INR 2.41  10/1   -INR goal 2-3  -dose Coumadin 3 mg tonight    Right wrist Sprain--Resolving  --xray neg for fracture  --ICE and tylenol PRN.      right shoulder pain--resolving  --likely RTC tendinitis  --R shoulder xray 9/24 --No fractures, or dislocation, degenerative changes noted   --Pain control PRN    Right elbow pain --resolving  -Likely medial epicondylitis   -R elbow xray 9/24-- no acute fractures noted     Sleep  --melatonin PRN    #CHF  -TTE from 8/30 with EF 25-30%   -Daily weights     #HTN  -Monitor BP, SBP goal <160   -Continue lisinopril and labetalol   -BP controlled    #MORELIA (resolved)  - SONO w/o hydro, b/l small kidneys  -nephro following   -Monitor BMP     #Pain Mgmt   - Tylenol PRN     #GI/Bowel Mgmt   - Continent c/w Senna PRN     #/Bladder Mgmt   - Continent, voiding well    #FEN   - Diet - Regular + Thins  [CCHO, DASH/TLC]      #Precautions / PROPHYLAXIS:   - Falls, Cardiac, Seizure   - Lungs: Aspiration   - Pressure injury/Skin: OOB to Chair, PT/OT .  - DVT: Coumadin   -Code: DNR/DNI     Team Meeting 9/29--  Nursing: Continent bowel, incontinent bladder,   OT: Sup. eating; Mod A grooming, UBD.  Max A LBD, tub transfer;  goal min A to S; Poor Carryover  PT: Amb. 100ft Min A, Mod A transf;  4 steps min-mod A.   Speech: Poor Memory, Poor Carryover, impaired attention and problem solving.   D/c home 10/7

## 2020-10-02 NOTE — PROGRESS NOTE ADULT - SUBJECTIVE AND OBJECTIVE BOX
Patient is a 89y old  Female who presents with a chief complaint of SAH  SAH  02.22 Traumatic, Close Injury (02 Oct 2020 14:22)      INTERVAL HPI/OVERNIGHT EVENTS:      REVIEW OF SYSTEMS:  CONSTITUTIONAL: No fever, weight loss, or fatigue  EYES: No eye pain, visual disturbances, or discharge  ENMT:  No difficulty hearing, tinnitus, vertigo; No sinus or throat pain  NECK: No pain or stiffness  BREASTS: No pain, masses, or nipple discharge  RESPIRATORY: No cough, wheezing, chills or hemoptysis; No shortness of breath  CARDIOVASCULAR: No chest pain, palpitations, dizziness, or leg swelling  GASTROINTESTINAL: No abdominal or epigastric pain. No nausea, vomiting, or hematemesis; No diarrhea or constipation. No melena or hematochezia.  GENITOURINARY: No dysuria, frequency, hematuria, or incontinence  NEUROLOGICAL: No headaches, memory loss, loss of strength, numbness, or tremors  SKIN: No itching, burning, rashes, or lesions   LYMPH NODES: No enlarged glands  ENDOCRINE: No heat or cold intolerance; No hair loss  MUSCULOSKELETAL: No joint pain or swelling; No muscle, back, or extremity pain  PSYCHIATRIC: No depression, anxiety, mood swings, or difficulty sleeping  HEME/LYMPH: No easy bruising, or bleeding gums  ALLERY AND IMMUNOLOGIC: No hives or eczema  FAMILY HISTORY:  FH: diabetes mellitus      T(C): 36.8 (10-02-20 @ 07:40), Max: 36.8 (10-02-20 @ 07:40)  HR: 86 (10-02-20 @ 07:40) (80 - 91)  BP: 133/51 (10-02-20 @ 07:40) (133/51 - 151/84)  RR: 14 (10-02-20 @ 07:40) (14 - 15)  SpO2: 96% (10-02-20 @ 07:40) (95% - 96%)  Wt(kg): --Vital Signs Last 24 Hrs  T(C): 36.8 (02 Oct 2020 07:40), Max: 36.8 (02 Oct 2020 07:40)  T(F): 98.2 (02 Oct 2020 07:40), Max: 98.2 (02 Oct 2020 07:40)  HR: 86 (02 Oct 2020 07:40) (80 - 91)  BP: 133/51 (02 Oct 2020 07:40) (133/51 - 151/84)  BP(mean): --  RR: 14 (02 Oct 2020 07:40) (14 - 15)  SpO2: 96% (02 Oct 2020 07:40) (95% - 96%)    T(F): 98.2 (10-02-20 @ 07:40), Max: 98.2 (09-29-20 @ 21:30)  HR: 86 (10-02-20 @ 07:40) (72 - 96)  BP: 133/51 (10-02-20 @ 07:40) (127/73 - 178/62)  RR: 14 (10-02-20 @ 07:40) (14 - 15)  SpO2: 96% (10-02-20 @ 07:40) (95% - 99%)    PHYSICAL EXAM:  GENERAL: NAD, well-groomed, well-developed  HEAD:  Atraumatic, Normocephalic  EYES: EOMI, PERRLA, conjunctiva and sclera clear  ENMT: No tonsillar erythema, exudates, or enlargement; Moist mucous membranes, Good dentition, No lesions  NECK: Supple, No JVD, Normal thyroid  NERVOUS SYSTEM:  Alert & Oriented X3, Good concentration; Motor Strength 5/5 B/L upper and lower extremities; DTRs 2+ intact and symmetric  CHEST/LUNG: Clear to percussion bilaterally; No rales, rhonchi, wheezing, or rubs  HEART: Regular rate and rhythm; No murmurs, rubs, or gallops  ABDOMEN: Soft, Nontender, Nondistended; Bowel sounds present  EXTREMITIES:  2+ Peripheral Pulses, No clubbing, cyanosis, or edema  LYMPH: No lymphadenopathy noted  SKIN: No rashes or lesions    Consultant(s) Notes Reviewed:  [x ] YES  [ ] NO  Care Discussed with Consultants/Other Providers [ x] YES  [ ] NO    LABS:  10-01    136  |  102  |  30<H>  ----------------------------<  95  3.8   |  26  |  0.91    Ca    10.4      01 Oct 2020 06:48    TPro  5.9<L>  /  Alb  2.5<L>  /  TBili  0.4  /  DBili  x   /  AST  24  /  ALT  19  /  AlkPhos  103  10-01                          11.8   5.98  )-----------( 226      ( 01 Oct 2020 06:48 )             34.7         PT/INR - ( 02 Oct 2020 06:35 )   PT: 28.0 sec;   INR: 2.41 ratio           LIVER FUNCTIONS - ( 01 Oct 2020 06:48 )  Alb: 2.5 g/dL / Pro: 5.9 g/dL / ALK PHOS: 103 U/L / ALT: 19 U/L / AST: 24 U/L / GGT: x                            11.8   5.98  )-----------( 226      ( 10-01 @ 06:48 )             34.7                10.7   5.13  )-----------( 183      ( 09-28 @ 07:33 )             32.4                11.6   7.61  )-----------( 155      ( 09-24 @ 05:30 )             35.8                12.3   7.18  )-----------( 158      ( 09-20 @ 06:00 )             36.9                12.0   8.28  )-----------( 164      ( 09-19 @ 05:00 )             37.2                12.9   9.39  )-----------( 138      ( 09-18 @ 04:45 )             39.1                13.3   6.51  )-----------( 147      ( 09-17 @ 23:45 )             40.6               RADIOLOGY & ADDITIONAL TESTS:    Imaging Personally Reviewed:  [ ] YES  [ ] NO  acetaminophen   Tablet .. 650 milliGRAM(s) Oral every 6 hours PRN  brimonidine 0.2%/ timolol 0.5% Ophthalmic Solution 1 Drop(s) Left EYE two times a day  cinacalcet 30 milliGRAM(s) Oral daily  donepezil 5 milliGRAM(s) Oral at bedtime  dorzolamide 2% Ophthalmic Solution 1 Drop(s) Left EYE three times a day  labetalol 100 milliGRAM(s) Oral every 8 hours  lisinopril 10 milliGRAM(s) Oral daily  montelukast 10 milliGRAM(s) Oral at bedtime  multivitamin 1 Tablet(s) Oral daily  senna 2 Tablet(s) Oral at bedtime PRN  warfarin 3 milliGRAM(s) Oral once      HEALTH ISSUES - PROBLEM Dx:  Hand swelling  Hand swelling    HTN (hypertension), benign  HTN (hypertension), benign    Atrial fibrillation  Atrial fibrillation    Subarachnoid bleed  Subarachnoid bleed

## 2020-10-02 NOTE — PROGRESS NOTE ADULT - SUBJECTIVE AND OBJECTIVE BOX
HPI:  89 y/o F w/ pmh htn, afib on coumadin and asa, +PPM, CHF (EF 25-30%), recent admission for an NSTEMI, presented to [facility] on 9/17 s/p mech fall and found to have b/l frontal SAH and +scalp hematoma. Pt also found to have supratherapeutic INR of 3.9, Hyperkalemia of 6.2, MORELIA, MICU consulted for admission for hyperkalemia, q1h neurochecks. During interview with patients and daughter at bedside pt reported having a headache worst around the R. frontal scalp hematoma. Pt otherwise denied any other medical complains. Pt stated she cannot recall what happened. Daughter stated she did not witness the fall but heard a loud thump and found her mother on the floor. Pt denied any visual changes, dizziness, photo/phonophibia, neck pain, facial pain, sob, cp, palpitation abd pain, back pain, weakness/numbness in the ext.  Pt. also injured her right arm during the fall.     In ICU patient was started on labetalol infusion for BP control and keppra for seizure ppx. Aspirin and coumadin were held, she was given vitamin K and Kcentra. Patient improved and was transferred to floor. She was transitioned to labetalol PO with good BP control until it was discontinued on 9/21.     Repeat head ct 9/20 revealed small amount of acute subarachnoid hemorrhage in bifrontal lobes, small intraparenchymal hemorrhage and tiny bilateral whole hemispheric sdh essentially unchanged. Pt does not want any invasive procedures including aneurysm intervention. As per neuro, warfarin was cleared to be restarted. EEG done on 9/21 without any seizure activity and was better compared to one done on 9/18.     On 9/22 patient hypertensive, treated with IV hydralazine, labetalol was restarted as well as home med lisinopril. Pt asymptomatic. Goal is to have SBP less than 160.  Patient's discharge to rehab was held 9/22 due to hypertension but was medically cleared for discharge to acute rehab 9/23.    (23 Sep 2020 15:56)      PAST MEDICAL & SURGICAL HISTORY:  Pacemaker    Transient cerebral ischemia, unspecified transient cerebral ischemia type    HTN (hypertension), benign    Atrial fibrillation    Acid reflux disease    History of cataract surgery        Subjective:  No new complaints      VITALS  Vital Signs Last 24 Hrs  T(C): 36.8 (02 Oct 2020 07:40), Max: 36.8 (02 Oct 2020 07:40)  T(F): 98.2 (02 Oct 2020 07:40), Max: 98.2 (02 Oct 2020 07:40)  HR: 86 (02 Oct 2020 07:40) (80 - 91)  BP: 133/51 (02 Oct 2020 07:40) (133/51 - 151/84)  BP(mean): --  RR: 14 (02 Oct 2020 07:40) (14 - 15)  SpO2: 96% (02 Oct 2020 07:40) (95% - 96%)    REVIEW OF SYMPTOMS  +right shoulder and elbow pain, wrist pain, hearing difficulty, memory loss     Vital Signs Last 24 Hrs  T(C): 36.4 (01 Oct 2020 07:36), Max: 36.8 (30 Sep 2020 13:41)  T(F): 97.6 (01 Oct 2020 07:36), Max: 98.2 (30 Sep 2020 13:41)  HR: 96 (01 Oct 2020 07:36) (76 - 96)  BP: 130/84 (01 Oct 2020 07:36) (130/82 - 154/69)  BP(mean): --  RR: 14 (01 Oct 2020 07:36) (14 - 15)  SpO2: 97% (01 Oct 2020 07:36) (96% - 97%)    PHYSICAL EXAM  Physical Exam: Gen - NAD, Comfortable  HEENT - NCAT, EOMI  Neck - Supple  Pulm - CTAB, No wheeze, No rhonchi, No crackles  Cardiovascular - RRR, S1S2, No murmurs  Abdomen - Soft, NT/ND, +BS  Extremities - No C/C/E, No calf tenderness  Neuro-     Cognitive - AAOx2-3      Communication - Fluent, No dysarthria, hypophonic      Motor -                     LEFT    UE - 4/5                    RIGHT UE - ShAB 5/5, EF 5/5, EE 5/5, WE 5/5,  5/5                    LEFT    LE - HF 5/5, KE 5/5, DF 5/5, PF 5/5                    RIGHT LE - HF 4/5, KE 5/5, DF 5/5, PF 5/5        Tone - normal  MSK: right wrist, and index and middle PIP swelling and tenderness resolving.   Right shoulder and right elbow ROM normal --non-painful   Psychiatric - Mood stable, Affect WNL    RECENT LABS                        11.8   5.98  )-----------( 226      ( 01 Oct 2020 06:48 )             34.7     10-01    136  |  102  |  30<H>  ----------------------------<  95  3.8   |  26  |  0.91    Ca    10.4      01 Oct 2020 06:48    TPro  5.9<L>  /  Alb  2.5<L>  /  TBili  0.4  /  DBili  x   /  AST  24  /  ALT  19  /  AlkPhos  103  10-01    PT/INR - ( 02 Oct 2020 06:35 )   PT: 28.0 sec;   INR: 2.41 ratio                 RADIOLOGY/OTHER RESULTS      MEDICATIONS  (STANDING):  brimonidine 0.2%/ timolol 0.5% Ophthalmic Solution 1 Drop(s) Left EYE two times a day  cinacalcet 30 milliGRAM(s) Oral daily  donepezil 5 milliGRAM(s) Oral at bedtime  dorzolamide 2% Ophthalmic Solution 1 Drop(s) Left EYE three times a day  labetalol 100 milliGRAM(s) Oral every 8 hours  lisinopril 10 milliGRAM(s) Oral daily  montelukast 10 milliGRAM(s) Oral at bedtime  multivitamin 1 Tablet(s) Oral daily    MEDICATIONS  (PRN):  acetaminophen   Tablet .. 650 milliGRAM(s) Oral every 6 hours PRN Temp greater or equal to 38C (100.4F), Mild Pain (1 - 3)  senna 2 Tablet(s) Oral at bedtime PRN Constipation         HPI:  89 y/o F w/ pmh htn, afib on coumadin and asa, +PPM, CHF (EF 25-30%), recent admission for an NSTEMI, presented to [facility] on 9/17 s/p mech fall and found to have b/l frontal SAH and +scalp hematoma. Pt also found to have supratherapeutic INR of 3.9, Hyperkalemia of 6.2, MORELIA, MICU consulted for admission for hyperkalemia, q1h neurochecks. During interview with patients and daughter at bedside pt reported having a headache worst around the R. frontal scalp hematoma. Pt otherwise denied any other medical complains. Pt stated she cannot recall what happened. Daughter stated she did not witness the fall but heard a loud thump and found her mother on the floor. Pt denied any visual changes, dizziness, photo/phonophibia, neck pain, facial pain, sob, cp, palpitation abd pain, back pain, weakness/numbness in the ext.  Pt. also injured her right arm during the fall.     In ICU patient was started on labetalol infusion for BP control and keppra for seizure ppx. Aspirin and coumadin were held, she was given vitamin K and Kcentra. Patient improved and was transferred to floor. She was transitioned to labetalol PO with good BP control until it was discontinued on 9/21.     Repeat head ct 9/20 revealed small amount of acute subarachnoid hemorrhage in bifrontal lobes, small intraparenchymal hemorrhage and tiny bilateral whole hemispheric sdh essentially unchanged. Pt does not want any invasive procedures including aneurysm intervention. As per neuro, warfarin was cleared to be restarted. EEG done on 9/21 without any seizure activity and was better compared to one done on 9/18.     On 9/22 patient hypertensive, treated with IV hydralazine, labetalol was restarted as well as home med lisinopril. Pt asymptomatic. Goal is to have SBP less than 160.  Patient's discharge to rehab was held 9/22 due to hypertension but was medically cleared for discharge to acute rehab 9/23.    (23 Sep 2020 15:56)      PAST MEDICAL & SURGICAL HISTORY:  Pacemaker    Transient cerebral ischemia, unspecified transient cerebral ischemia type    HTN (hypertension), benign    Atrial fibrillation    Acid reflux disease    History of cataract surgery        Subjective:  No new complaints      VITALS  Vital Signs Last 24 Hrs  T(C): 36.8 (02 Oct 2020 07:40), Max: 36.8 (02 Oct 2020 07:40)  T(F): 98.2 (02 Oct 2020 07:40), Max: 98.2 (02 Oct 2020 07:40)  HR: 86 (02 Oct 2020 07:40) (80 - 91)  BP: 133/51 (02 Oct 2020 07:40) (133/51 - 151/84)  BP(mean): --  RR: 14 (02 Oct 2020 07:40) (14 - 15)  SpO2: 96% (02 Oct 2020 07:40) (95% - 96%)    REVIEW OF SYMPTOMS  +right shoulder and elbow pain, wrist pain, hearing difficulty, memory loss         PHYSICAL EXAM  Physical Exam: Gen - NAD, Comfortable  HEENT - NCAT, EOMI  Neck - Supple  Pulm - CTAB, No wheeze, No rhonchi, No crackles  Cardiovascular - RRR, S1S2, No murmurs  Abdomen - Soft, NT/ND, +BS  Extremities - No C/C/E, No calf tenderness  Neuro-     Cognitive - AAOx2-3      Communication - Fluent, No dysarthria, hypophonic      Motor -                     LEFT    UE - 4/5                    RIGHT UE - ShAB 5/5, EF 5/5, EE 5/5, WE 5/5,  5/5                    LEFT    LE - HF 5/5, KE 5/5, DF 5/5, PF 5/5                    RIGHT LE - HF 4/5, KE 5/5, DF 5/5, PF 5/5        Tone - normal  MSK: right wrist, and index and middle PIP swelling and tenderness resolving.   Right shoulder and right elbow ROM normal --non-painful   Psychiatric - Mood stable, Affect WNL    RECENT LABS                        11.8   5.98  )-----------( 226      ( 01 Oct 2020 06:48 )             34.7     10-01    136  |  102  |  30<H>  ----------------------------<  95  3.8   |  26  |  0.91    Ca    10.4      01 Oct 2020 06:48    TPro  5.9<L>  /  Alb  2.5<L>  /  TBili  0.4  /  DBili  x   /  AST  24  /  ALT  19  /  AlkPhos  103  10-01    PT/INR - ( 02 Oct 2020 06:35 )   PT: 28.0 sec;   INR: 2.41 ratio                 RADIOLOGY/OTHER RESULTS      MEDICATIONS  (STANDING):  brimonidine 0.2%/ timolol 0.5% Ophthalmic Solution 1 Drop(s) Left EYE two times a day  cinacalcet 30 milliGRAM(s) Oral daily  donepezil 5 milliGRAM(s) Oral at bedtime  dorzolamide 2% Ophthalmic Solution 1 Drop(s) Left EYE three times a day  labetalol 100 milliGRAM(s) Oral every 8 hours  lisinopril 10 milliGRAM(s) Oral daily  montelukast 10 milliGRAM(s) Oral at bedtime  multivitamin 1 Tablet(s) Oral daily    MEDICATIONS  (PRN):  acetaminophen   Tablet .. 650 milliGRAM(s) Oral every 6 hours PRN Temp greater or equal to 38C (100.4F), Mild Pain (1 - 3)  senna 2 Tablet(s) Oral at bedtime PRN Constipation

## 2020-10-02 NOTE — PROGRESS NOTE ADULT - SUBJECTIVE AND OBJECTIVE BOX
Resting    Vital Signs Last 24 Hrs  T(C): 36.8 (10-02-20 @ 07:40), Max: 36.8 (10-02-20 @ 07:40)  T(F): 98.2 (10-02-20 @ 07:40), Max: 98.2 (10-02-20 @ 07:40)  HR: 86 (10-02-20 @ 07:40) (80 - 91)  BP: 133/51 (10-02-20 @ 07:40) (133/51 - 151/84)  RR: 14 (10-02-20 @ 07:40) (14 - 15)  SpO2: 96% (10-02-20 @ 07:40) (95% - 96%)    card s1s2  b/l air entry  soft, ND  no edema                                                                                                     11.8   5.98  )-----------( 226      ( 01 Oct 2020 06:48 )             34.7     01 Oct 2020 06:48    136    |  102    |  30     ----------------------------<  95     3.8     |  26     |  0.91     Ca    10.4       01 Oct 2020 06:48    TPro  5.9    /  Alb  2.5    /  TBili  0.4    /  DBili  x      /  AST  24     /  ALT  19     /  AlkPhos  103    01 Oct 2020 06:48    LIVER FUNCTIONS - ( 01 Oct 2020 06:48 )  Alb: 2.5 g/dL / Pro: 5.9 g/dL / ALK PHOS: 103 U/L / ALT: 19 U/L / AST: 24 U/L / GGT: x           PT/INR - ( 02 Oct 2020 06:35 )   PT: 28.0 sec;   INR: 2.41 ratio      acetaminophen   Tablet .. 650 milliGRAM(s) Oral every 6 hours PRN  brimonidine 0.2%/ timolol 0.5% Ophthalmic Solution 1 Drop(s) Left EYE two times a day  cinacalcet 30 milliGRAM(s) Oral daily  donepezil 5 milliGRAM(s) Oral at bedtime  dorzolamide 2% Ophthalmic Solution 1 Drop(s) Left EYE three times a day  labetalol 100 milliGRAM(s) Oral every 8 hours  lisinopril 10 milliGRAM(s) Oral daily  montelukast 10 milliGRAM(s) Oral at bedtime  multivitamin 1 Tablet(s) Oral daily  senna 2 Tablet(s) Oral at bedtime PRN    A/P:    S/p fall, b/l SAH  Known CM, EF 25-30%, MR, TR, AR  S/p hemodynamic MORELIA   Resolved  UA w/pr 15 otherwise bland  SONO w/o hydro, b/l small kidneys  BP acceptable  Avoid nephrotoxins  Rehab    [phone]

## 2020-10-03 NOTE — PROGRESS NOTE ADULT - SUBJECTIVE AND OBJECTIVE BOX
No overnight events.      REVIEW OF SYSTEMS  Constitutional - No fever,  No fatigue  Neurological - No headaches, No loss of strength  Musculoskeletal - No joint pain, No joint swelling, No muscle pain    VITALS  T(C): 36.6 (10-03-20 @ 08:32), Max: 36.7 (10-02-20 @ 19:48)  HR: 77 (10-03-20 @ 08:32) (70 - 81)  BP: 158/70 (10-03-20 @ 08:32) (123/74 - 171/67)  RR: 16 (10-03-20 @ 08:32) (14 - 16)  SpO2: 98% (10-03-20 @ 08:32) (96% - 98%)  Wt(kg): --       MEDICATIONS   acetaminophen   Tablet .. 650 milliGRAM(s) every 6 hours PRN  brimonidine 0.2%/ timolol 0.5% Ophthalmic Solution 1 Drop(s) two times a day  cinacalcet 30 milliGRAM(s) daily  donepezil 5 milliGRAM(s) at bedtime  dorzolamide 2% Ophthalmic Solution 1 Drop(s) three times a day  labetalol 100 milliGRAM(s) every 8 hours  lisinopril 10 milliGRAM(s) two times a day  montelukast 10 milliGRAM(s) at bedtime  multivitamin 1 Tablet(s) daily  senna 2 Tablet(s) at bedtime PRN      RECENT LABS/IMAGING          PT/INR - ( 03 Oct 2020 06:45 )   PT: 38.0 sec;   INR: 3.33 ratio         Physical Exam: Gen - NAD, Comfortable, sitting in chair, eating breakfast   HEENT - NCAT, EOMI  Neck - Supple  Pulm - breathing comfortably   Cardiovascular - RRR, S1S2, No murmurs  Abdomen - Soft  Extremities - No C/C/E, No calf tenderness  Neuro-     Cognitive - AAOx2-3      Communication - Fluent, No dysarthria, hypophonic      Motor -                     LEFT    UE - 4/5                    RIGHT UE - ShAB 5/5, EF 5/5, EE 5/5, WE 5/5,  5/5                    LEFT    LE - HF 5/5, KE 5/5, DF 5/5, PF 5/5                    RIGHT LE - HF 4/5, KE 5/5, DF 5/5, PF 5/5        Psychiatric - Mood stable, Affect WNL    ASSESSMENT/PLAN  89y Female pmh htn, afib on coumadin and asa, +PPM, CHF (EF 25-30%), recent admission for an NSTEMI, presented to [facility] on 9/18 s/p mech fall and found to have b/l frontal SAH and +scalp hematoma. Repeat CT head stable and was resumed on warfarin.  Continue current medical management  started on aricept for memory/cognition   dose coumadin daily, elevated today, hold coumadin tonight, INR in am    HTN; BP elevated last night, given hydralazine, on lisinopril and labetalol, continue to monitor   Pain - Tylenol PRN  DVT PPX - coumadin   Continue 3hrs a day of comprehensive rehab program.

## 2020-10-03 NOTE — PROGRESS NOTE ADULT - SUBJECTIVE AND OBJECTIVE BOX
Patient is a 89y old  Female who presents with a chief complaint of SAH  SAH  02.22 Traumatic, Close Injury (02 Oct 2020 19:48)      INTERVAL HPI/OVERNIGHT EVENTS: fatigued      REVIEW OF SYSTEMS:  CONSTITUTIONAL: No fever, weight loss, or fatigue  EYES: No eye pain, visual disturbances, or discharge  ENMT:  No difficulty hearing, tinnitus, vertigo; No sinus or throat pain  NECK: No pain or stiffness  BREASTS: No pain, masses, or nipple discharge  RESPIRATORY: No cough, wheezing, chills or hemoptysis; No shortness of breath  CARDIOVASCULAR: No chest pain, palpitations, dizziness, or leg swelling  GASTROINTESTINAL: No abdominal or epigastric pain. No nausea, vomiting, or hematemesis; No diarrhea or constipation. No melena or hematochezia.  GENITOURINARY: No dysuria, frequency, hematuria, or incontinence  NEUROLOGICAL: No headaches, memory loss, loss of strength, numbness, or tremors  SKIN: No itching, burning, rashes, or lesions   LYMPH NODES: No enlarged glands  ENDOCRINE: No heat or cold intolerance; No hair loss  MUSCULOSKELETAL: No joint pain or swelling; No muscle, back, or extremity pain  PSYCHIATRIC: No depression, anxiety, mood swings, or difficulty sleeping  HEME/LYMPH: No easy bruising, or bleeding gums  ALLERY AND IMMUNOLOGIC: No hives or eczema  FAMILY HISTORY:  FH: diabetes mellitus      T(C): 36.7 (10-02-20 @ 19:48), Max: 36.7 (10-02-20 @ 19:48)  HR: 77 (10-03-20 @ 05:13) (70 - 81)  BP: 153/69 (10-03-20 @ 05:13) (123/74 - 171/67)  RR: 14 (10-02-20 @ 19:48) (14 - 14)  SpO2: 96% (10-02-20 @ 19:48) (96% - 96%)  Wt(kg): --Vital Signs Last 24 Hrs  T(C): 36.7 (02 Oct 2020 19:48), Max: 36.7 (02 Oct 2020 19:48)  T(F): 98.1 (02 Oct 2020 19:48), Max: 98.1 (02 Oct 2020 19:48)  HR: 77 (03 Oct 2020 05:13) (70 - 81)  BP: 153/69 (03 Oct 2020 05:13) (123/74 - 171/67)  BP(mean): --  RR: 14 (02 Oct 2020 19:48) (14 - 14)  SpO2: 96% (02 Oct 2020 19:48) (96% - 96%)    T(F): 98.1 (10-02-20 @ 19:48), Max: 98.2 (09-30-20 @ 13:41)  HR: 77 (10-03-20 @ 05:13) (70 - 96)  BP: 153/69 (10-03-20 @ 05:13) (123/74 - 171/67)  RR: 14 (10-02-20 @ 19:48) (14 - 15)  SpO2: 96% (10-02-20 @ 19:48) (95% - 97%)    PHYSICAL EXAM:  GENERAL: NAD, well-groomed, well-developed  HEAD:  Atraumatic, Normocephalic  EYES: EOMI, PERRLA, conjunctiva and sclera clear  ENMT: No tonsillar erythema, exudates, or enlargement; Moist mucous membranes, Good dentition, No lesions  NECK: Supple, No JVD, Normal thyroid  NERVOUS SYSTEM:  Alert & Oriented X3, Good concentration; Motor Strength 5/5 B/L upper and lower extremities; DTRs 2+ intact and symmetric  CHEST/LUNG: Clear to percussion bilaterally; No rales, rhonchi, wheezing, or rubs  HEART: Regular rate and rhythm; No murmurs, rubs, or gallops  ABDOMEN: Soft, Nontender, Nondistended; Bowel sounds present  EXTREMITIES:  2+ Peripheral Pulses, No clubbing, cyanosis, or edema  LYMPH: No lymphadenopathy noted  SKIN: No rashes or lesions    Consultant(s) Notes Reviewed:  [x ] YES  [ ] NO  Care Discussed with Consultants/Other Providers [ x] YES  [ ] NO    LABS:              PT/INR - ( 02 Oct 2020 06:35 )   PT: 28.0 sec;   INR: 2.41 ratio                              11.8   5.98  )-----------( 226      ( 10-01 @ 06:48 )             34.7                10.7   5.13  )-----------( 183      ( 09-28 @ 07:33 )             32.4                11.6   7.61  )-----------( 155      ( 09-24 @ 05:30 )             35.8                12.3   7.18  )-----------( 158      ( 09-20 @ 06:00 )             36.9                12.0   8.28  )-----------( 164      ( 09-19 @ 05:00 )             37.2                12.9   9.39  )-----------( 138      ( 09-18 @ 04:45 )             39.1                13.3   6.51  )-----------( 147      ( 09-17 @ 23:45 )             40.6               RADIOLOGY & ADDITIONAL TESTS:    Imaging Personally Reviewed:  [ ] YES  [ ] NO  acetaminophen   Tablet .. 650 milliGRAM(s) Oral every 6 hours PRN  brimonidine 0.2%/ timolol 0.5% Ophthalmic Solution 1 Drop(s) Left EYE two times a day  cinacalcet 30 milliGRAM(s) Oral daily  donepezil 5 milliGRAM(s) Oral at bedtime  dorzolamide 2% Ophthalmic Solution 1 Drop(s) Left EYE three times a day  labetalol 100 milliGRAM(s) Oral every 8 hours  lisinopril 10 milliGRAM(s) Oral two times a day  montelukast 10 milliGRAM(s) Oral at bedtime  multivitamin 1 Tablet(s) Oral daily  senna 2 Tablet(s) Oral at bedtime PRN      HEALTH ISSUES - PROBLEM Dx:  Hand swelling  Hand swelling    HTN (hypertension), benign  HTN (hypertension), benign    Atrial fibrillation  Atrial fibrillation    Subarachnoid bleed  Subarachnoid bleed

## 2020-10-03 NOTE — PROVIDER CONTACT NOTE (CHANGE IN STATUS NOTIFICATION) - SITUATION
Took patients vitals with v/s 171/67, HR, 81. BP medication given and reassessed patient after 30 mins with systolic BP still elevated of 173/81 . Patient complained of headache with Tylenol given. Notified MD.

## 2020-10-04 NOTE — PROGRESS NOTE ADULT - SUBJECTIVE AND OBJECTIVE BOX
patient complains of headache, wants to rest, didn't sleep well       REVIEW OF SYSTEMS  Constitutional - No fever,  No fatigue  Neurological - + headaches, No loss of strength  Musculoskeletal - No joint pain, No joint swelling, No muscle pain    VITALS  T(C): 36.6 (10-04-20 @ 09:33), Max: 37.2 (10-03-20 @ 20:08)  HR: 73 (10-04-20 @ 09:33) (73 - 88)  BP: 170/75 (10-04-20 @ 09:33) (146/73 - 170/75)  RR: 15 (10-04-20 @ 09:33) (15 - 15)  SpO2: 99% (10-04-20 @ 09:33) (98% - 99%)  Wt(kg): --       MEDICATIONS   acetaminophen   Tablet .. 650 milliGRAM(s) every 6 hours PRN  brimonidine 0.2%/ timolol 0.5% Ophthalmic Solution 1 Drop(s) two times a day  cinacalcet 30 milliGRAM(s) daily  donepezil 5 milliGRAM(s) at bedtime  dorzolamide 2% Ophthalmic Solution 1 Drop(s) three times a day  labetalol 100 milliGRAM(s) every 8 hours  lisinopril 10 milliGRAM(s) two times a day  montelukast 10 milliGRAM(s) at bedtime  multivitamin 1 Tablet(s) daily  senna 2 Tablet(s) at bedtime PRN      RECENT LABS/IMAGING          PT/INR - ( 04 Oct 2020 05:40 )   PT: 36.5 sec;   INR: 3.19 ratio           Physical Exam: Gen - NAD, Comfortable, in bed   HEENT - NCAT, EOMI  Neck - Supple  Pulm - breathing comfortably   Cardiovascular - RRR, S1S2, No murmurs  Abdomen - Soft  Extremities - No C/C/E, No calf tenderness  Neuro-     Cognitive - AAOx2-3      Communication - Fluent, No dysarthria, hypophonic      Motor -                     LEFT    UE - 4/5                    RIGHT UE - ShAB 5/5, EF 5/5, EE 5/5, WE 5/5,  5/5                    LEFT    LE - HF 5/5, KE 5/5, DF 5/5, PF 5/5                    RIGHT LE - HF 4/5, KE 5/5, DF 5/5, PF 5/5        Psychiatric - Mood stable, Affect WNL    ASSESSMENT/PLAN  89y Female pmh htn, afib on coumadin and asa, +PPM, CHF (EF 25-30%), recent admission for an NSTEMI, [facility] and found to have b/l frontal SAH and +scalp hematoma. Repeat CT head stable and was resumed on warfarin.  Continue current medical management  started on aricept for memory/cognition   dose coumadin daily, continues to be elevated today, hold coumadin tonight, INR in am    HTN; BP elevated, lisinopril increased to BID yesterday, medicine follow up   Pain - Tylenol PRN  DVT PPX - coumadin   Continue 3hrs a day of comprehensive rehab program.

## 2020-10-04 NOTE — PROGRESS NOTE ADULT - SUBJECTIVE AND OBJECTIVE BOX
Patient is a 89y old  Female who presents with a chief complaint of SAH  SAH  02.22 Traumatic, Close Injury (04 Oct 2020 10:49)      INTERVAL HPI/OVERNIGHT EVENTS:  poor sleep      REVIEW OF SYSTEMS:  CONSTITUTIONAL: No fever, weight loss, or fatigue  EYES: No eye pain, visual disturbances, or discharge  ENMT:  No difficulty hearing, tinnitus, vertigo; No sinus or throat pain  NECK: No pain or stiffness  BREASTS: No pain, masses, or nipple discharge  RESPIRATORY: No cough, wheezing, chills or hemoptysis; No shortness of breath  CARDIOVASCULAR: No chest pain, palpitations, dizziness, or leg swelling  GASTROINTESTINAL: No abdominal or epigastric pain. No nausea, vomiting, or hematemesis; No diarrhea or constipation. No melena or hematochezia.  GENITOURINARY: No dysuria, frequency, hematuria, or incontinence  NEUROLOGICAL: No headaches, memory loss, loss of strength, numbness, or tremors  SKIN: No itching, burning, rashes, or lesions   LYMPH NODES: No enlarged glands  ENDOCRINE: No heat or cold intolerance; No hair loss  MUSCULOSKELETAL: No joint pain or swelling; No muscle, back, or extremity pain  PSYCHIATRIC: No depression, anxiety, mood swings, or difficulty sleeping  HEME/LYMPH: No easy bruising, or bleeding gums  ALLERY AND IMMUNOLOGIC: No hives or eczema  FAMILY HISTORY:  FH: diabetes mellitus      T(C): 36.6 (10-04-20 @ 09:33), Max: 37.2 (10-03-20 @ 20:08)  HR: 73 (10-04-20 @ 09:33) (73 - 88)  BP: 170/75 (10-04-20 @ 09:33) (146/73 - 170/75)  RR: 15 (10-04-20 @ 09:33) (15 - 15)  SpO2: 99% (10-04-20 @ 09:33) (98% - 99%)  Wt(kg): --Vital Signs Last 24 Hrs  T(C): 36.6 (04 Oct 2020 09:33), Max: 37.2 (03 Oct 2020 20:08)  T(F): 97.8 (04 Oct 2020 09:33), Max: 98.9 (03 Oct 2020 20:08)  HR: 73 (04 Oct 2020 09:33) (73 - 88)  BP: 170/75 (04 Oct 2020 09:33) (146/73 - 170/75)  BP(mean): --  RR: 15 (04 Oct 2020 09:33) (15 - 15)  SpO2: 99% (04 Oct 2020 09:33) (98% - 99%)    T(F): 97.8 (10-04-20 @ 09:33), Max: 98.9 (10-03-20 @ 20:08)  HR: 73 (10-04-20 @ 09:33) (70 - 91)  BP: 170/75 (10-04-20 @ 09:33) (123/74 - 171/67)  RR: 15 (10-04-20 @ 09:33) (14 - 16)  SpO2: 99% (10-04-20 @ 09:33) (95% - 99%)    PHYSICAL EXAM:  GENERAL: NAD, well-groomed, well-developed  HEAD:  Atraumatic, Normocephalic  EYES: EOMI, PERRLA, conjunctiva and sclera clear  ENMT: No tonsillar erythema, exudates, or enlargement; Moist mucous membranes, Good dentition, No lesions  NECK: Supple, No JVD, Normal thyroid  NERVOUS SYSTEM:  Alert & Oriented X3, Good concentration; Motor Strength 5/5 B/L upper and lower extremities; DTRs 2+ intact and symmetric  CHEST/LUNG: Clear to percussion bilaterally; No rales, rhonchi, wheezing, or rubs  HEART: Regular rate and rhythm; No murmurs, rubs, or gallops  ABDOMEN: Soft, Nontender, Nondistended; Bowel sounds present  EXTREMITIES:  2+ Peripheral Pulses, No clubbing, cyanosis, or edema  LYMPH: No lymphadenopathy noted  SKIN: No rashes or lesions    Consultant(s) Notes Reviewed:  [x ] YES  [ ] NO  Care Discussed with Consultants/Other Providers [ x] YES  [ ] NO    LABS:              PT/INR - ( 04 Oct 2020 05:40 )   PT: 36.5 sec;   INR: 3.19 ratio                              11.8   5.98  )-----------( 226      ( 10-01 @ 06:48 )             34.7                10.7   5.13  )-----------( 183      ( 09-28 @ 07:33 )             32.4                11.6   7.61  )-----------( 155      ( 09-24 @ 05:30 )             35.8                12.3   7.18  )-----------( 158      ( 09-20 @ 06:00 )             36.9                12.0   8.28  )-----------( 164      ( 09-19 @ 05:00 )             37.2                12.9   9.39  )-----------( 138      ( 09-18 @ 04:45 )             39.1                13.3   6.51  )-----------( 147      ( 09-17 @ 23:45 )             40.6               RADIOLOGY & ADDITIONAL TESTS:    Imaging Personally Reviewed:  [ ] YES  [ ] NO  acetaminophen   Tablet .. 650 milliGRAM(s) Oral every 6 hours PRN  brimonidine 0.2%/ timolol 0.5% Ophthalmic Solution 1 Drop(s) Left EYE two times a day  cinacalcet 30 milliGRAM(s) Oral daily  donepezil 5 milliGRAM(s) Oral at bedtime  dorzolamide 2% Ophthalmic Solution 1 Drop(s) Both EYES three times a day  labetalol 100 milliGRAM(s) Oral every 8 hours  lisinopril 10 milliGRAM(s) Oral two times a day  montelukast 10 milliGRAM(s) Oral at bedtime  multivitamin 1 Tablet(s) Oral daily  senna 2 Tablet(s) Oral at bedtime PRN      HEALTH ISSUES - PROBLEM Dx:  Hand swelling  Hand swelling    HTN (hypertension), benign  HTN (hypertension), benign    Atrial fibrillation  Atrial fibrillation    Subarachnoid bleed  Subarachnoid bleed

## 2020-10-05 NOTE — PROGRESS NOTE ADULT - SUBJECTIVE AND OBJECTIVE BOX
Resting    Vital Signs Last 24 Hrs  T(C): 36.4 (10-05-20 @ 07:31), Max: 36.8 (10-04-20 @ 21:00)  T(F): 97.6 (10-05-20 @ 07:31), Max: 98.3 (10-04-20 @ 21:00)  HR: 60 (10-05-20 @ 13:29) (60 - 80)  BP: 138/73 (10-05-20 @ 13:29) (138/73 - 174/71)  RR: 15 (10-05-20 @ 07:31) (15 - 15)  SpO2: 95% (10-05-20 @ 07:31) (95% - 96%)    card s1s2  b/l air entry  soft, ND  no edema                                                                                                             10.9   5.38  )-----------( 239      ( 05 Oct 2020 05:30 )             33.8     05 Oct 2020 05:30    135    |  101    |  23     ----------------------------<  89     3.5     |  27     |  0.86     Ca    10.1       05 Oct 2020 05:30    TPro  5.6    /  Alb  2.5    /  TBili  0.4    /  DBili  x      /  AST  16     /  ALT  19     /  AlkPhos  99     05 Oct 2020 05:30    LIVER FUNCTIONS - ( 05 Oct 2020 05:30 )  Alb: 2.5 g/dL / Pro: 5.6 g/dL / ALK PHOS: 99 U/L / ALT: 19 U/L / AST: 16 U/L / GGT: x           PT/INR - ( 05 Oct 2020 05:30 )   PT: 31.5 sec;   INR: 2.73 ratio      acetaminophen   Tablet .. 650 milliGRAM(s) Oral every 6 hours PRN  brimonidine 0.2%/ timolol 0.5% Ophthalmic Solution 1 Drop(s) Left EYE two times a day  cinacalcet 30 milliGRAM(s) Oral daily  donepezil 5 milliGRAM(s) Oral at bedtime  dorzolamide 2% Ophthalmic Solution 1 Drop(s) Both EYES three times a day  labetalol 100 milliGRAM(s) Oral every 8 hours  lisinopril 10 milliGRAM(s) Oral two times a day  montelukast 10 milliGRAM(s) Oral at bedtime  multivitamin 1 Tablet(s) Oral daily  senna 2 Tablet(s) Oral at bedtime PRN  warfarin 2.5 milliGRAM(s) Oral at bedtime    A/P:    S/p fall, b/l SAH  Known CM, EF 25-30%, MR, TR, AR  S/p hemodynamic MORELIA   Resolved  UA w/pr 15 otherwise bland  SONO w/o hydro, b/l small kidneys  BP acceptable  Avoid nephrotoxins  Hypercalcemia corrected on sensipar  Rehab    [phone]

## 2020-10-05 NOTE — PROGRESS NOTE ADULT - SUBJECTIVE AND OBJECTIVE BOX
Patient is a 89y old  Female who presents with a chief complaint of SAH  SAH  02.22 Traumatic, Close Injury (05 Oct 2020 16:20)      INTERVAL HPI/OVERNIGHT EVENTS: tired      REVIEW OF SYSTEMS:  CONSTITUTIONAL: No fever, weight loss, or fatigue  EYES: No eye pain, visual disturbances, or discharge  ENMT:  No difficulty hearing, tinnitus, vertigo; No sinus or throat pain  NECK: No pain or stiffness  BREASTS: No pain, masses, or nipple discharge  RESPIRATORY: No cough, wheezing, chills or hemoptysis; No shortness of breath  CARDIOVASCULAR: No chest pain, palpitations, dizziness, or leg swelling  GASTROINTESTINAL: No abdominal or epigastric pain. No nausea, vomiting, or hematemesis; No diarrhea or constipation. No melena or hematochezia.  GENITOURINARY: No dysuria, frequency, hematuria, or incontinence  NEUROLOGICAL: No headaches, memory loss, loss of strength, numbness, or tremors  SKIN: No itching, burning, rashes, or lesions   LYMPH NODES: No enlarged glands  ENDOCRINE: No heat or cold intolerance; No hair loss  MUSCULOSKELETAL: No joint pain or swelling; No muscle, back, or extremity pain  PSYCHIATRIC: No depression, anxiety, mood swings, or difficulty sleeping  HEME/LYMPH: No easy bruising, or bleeding gums  ALLERY AND IMMUNOLOGIC: No hives or eczema  FAMILY HISTORY:  FH: diabetes mellitus      T(C): 36.4 (10-05-20 @ 07:31), Max: 36.8 (10-04-20 @ 21:00)  HR: 60 (10-05-20 @ 13:29) (60 - 80)  BP: 138/73 (10-05-20 @ 13:29) (138/73 - 174/71)  RR: 15 (10-05-20 @ 07:31) (15 - 15)  SpO2: 95% (10-05-20 @ 07:31) (95% - 96%)  Wt(kg): --Vital Signs Last 24 Hrs  T(C): 36.4 (05 Oct 2020 07:31), Max: 36.8 (04 Oct 2020 21:00)  T(F): 97.6 (05 Oct 2020 07:31), Max: 98.3 (04 Oct 2020 21:00)  HR: 60 (05 Oct 2020 13:29) (60 - 80)  BP: 138/73 (05 Oct 2020 13:29) (138/73 - 174/71)  BP(mean): --  RR: 15 (05 Oct 2020 07:31) (15 - 15)  SpO2: 95% (05 Oct 2020 07:31) (95% - 96%)    T(F): 97.6 (10-05-20 @ 07:31), Max: 98.9 (10-03-20 @ 20:08)  HR: 60 (10-05-20 @ 13:29) (60 - 88)  BP: 138/73 (10-05-20 @ 13:29) (138/73 - 174/71)  RR: 15 (10-05-20 @ 07:31) (15 - 16)  SpO2: 95% (10-05-20 @ 07:31) (95% - 99%)    PHYSICAL EXAM:  GENERAL: NAD, well-groomed, well-developed  HEAD:  Atraumatic, Normocephalic  EYES: EOMI, PERRLA, conjunctiva and sclera clear  ENMT: No tonsillar erythema, exudates, or enlargement; Moist mucous membranes, Good dentition, No lesions  NECK: Supple, No JVD, Normal thyroid  NERVOUS SYSTEM:  Alert & Oriented X3, Good concentration; Motor Strength 5/5 B/L upper and lower extremities; DTRs 2+ intact and symmetric  CHEST/LUNG: Clear to percussion bilaterally; No rales, rhonchi, wheezing, or rubs  HEART: Regular rate and rhythm; No murmurs, rubs, or gallops  ABDOMEN: Soft, Nontender, Nondistended; Bowel sounds present  EXTREMITIES:  2+ Peripheral Pulses, No clubbing, cyanosis, or edema  LYMPH: No lymphadenopathy noted  SKIN: No rashes or lesions    Consultant(s) Notes Reviewed:  [x ] YES  [ ] NO  Care Discussed with Consultants/Other Providers [ x] YES  [ ] NO    LABS:  10-05    135  |  101  |  23  ----------------------------<  89  3.5   |  27  |  0.86    Ca    10.1      05 Oct 2020 05:30    TPro  5.6<L>  /  Alb  2.5<L>  /  TBili  0.4  /  DBili  x   /  AST  16  /  ALT  19  /  AlkPhos  99  10-05                          10.9   5.38  )-----------( 239      ( 05 Oct 2020 05:30 )             33.8         PT/INR - ( 05 Oct 2020 05:30 )   PT: 31.5 sec;   INR: 2.73 ratio           LIVER FUNCTIONS - ( 05 Oct 2020 05:30 )  Alb: 2.5 g/dL / Pro: 5.6 g/dL / ALK PHOS: 99 U/L / ALT: 19 U/L / AST: 16 U/L / GGT: x                            10.9   5.38  )-----------( 239      ( 10-05 @ 05:30 )             33.8                11.8   5.98  )-----------( 226      ( 10-01 @ 06:48 )             34.7                10.7   5.13  )-----------( 183      ( 09-28 @ 07:33 )             32.4                11.6   7.61  )-----------( 155      ( 09-24 @ 05:30 )             35.8                12.3   7.18  )-----------( 158      ( 09-20 @ 06:00 )             36.9                12.0   8.28  )-----------( 164      ( 09-19 @ 05:00 )             37.2                12.9   9.39  )-----------( 138      ( 09-18 @ 04:45 )             39.1                13.3   6.51  )-----------( 147      ( 09-17 @ 23:45 )             40.6               RADIOLOGY & ADDITIONAL TESTS:    Imaging Personally Reviewed:  [ ] YES  [ ] NO  acetaminophen   Tablet .. 650 milliGRAM(s) Oral every 6 hours PRN  brimonidine 0.2%/ timolol 0.5% Ophthalmic Solution 1 Drop(s) Left EYE two times a day  cinacalcet 30 milliGRAM(s) Oral daily  donepezil 5 milliGRAM(s) Oral at bedtime  dorzolamide 2% Ophthalmic Solution 1 Drop(s) Both EYES three times a day  labetalol 200 milliGRAM(s) Oral three times a day  lisinopril 10 milliGRAM(s) Oral two times a day  montelukast 10 milliGRAM(s) Oral at bedtime  multivitamin 1 Tablet(s) Oral daily  senna 2 Tablet(s) Oral at bedtime PRN  warfarin 2.5 milliGRAM(s) Oral at bedtime      HEALTH ISSUES - PROBLEM Dx:  Hand swelling  Hand swelling    HTN (hypertension), benign  HTN (hypertension), benign    Atrial fibrillation  Atrial fibrillation    Subarachnoid bleed  Subarachnoid bleed

## 2020-10-05 NOTE — PROGRESS NOTE ADULT - ASSESSMENT
ASSESSMENT/PLAN  AZRA CHAHAL is a 89yo Female w/ pmh htn, afib on coumadin and asa, +PPM, CHF (EF 25-30%), recent admission for an NSTEMI, presented to [facility] on 9/18 s/p mech fall and found to have b/l frontal SAH and +scalp hematoma. Repeat CT head stable and was resumed on warfarin. Now admitted for multidisciplinary rehab program.     #SAH  -On Ct head 9/20: Small amount of acute subarachnoid hemorrhage in the bifrontal lobes, stable   -No invasive procedures as per family and patient, patient is DNR/DNI   -EEG negative for seizures   --Pt. has not had any seizure activity and Completed 7 day keppra seizure ppx  guideline from [facility]--stopped keppra on 9/24   -Gait Instability, ADL impairments and Functional impairments: cont Comprehensive Rehab Program of PT/OT/SLP    #Memory/cognition   -Cont. aricept 5mg 9/29     #Afib  -Cleared to resume warfarin as per neuro recs   -INR supratherapeudic over the weekend -- coumadin was held   -INR daily -- INR 2.73  10/5   -INR goal 2-3  -dose Coumadin 2.5 mg 10/5    Right wrist Sprain--Resolving  --xray neg for fracture  --ICE and tylenol PRN.      right shoulder pain--resolving  --likely RTC tendinitis  --R shoulder xray 9/24 --No fractures, or dislocation, degenerative changes noted   --Pain control PRN    Right elbow pain --resolving  -Likely medial epicondylitis   -R elbow xray 9/24-- no acute fractures noted     Sleep  --melatonin PRN    #CHF  -TTE from 8/30 with EF 25-30%   -Daily weights     #HTN  -Monitor BP, SBP goal <160   -Lisinopril was increased to 10mg BID as per hospitalist 10/3   -Continue lisinopril and labetalol     #MORELIA (resolved)  - SONO w/o hydro, b/l small kidneys  -nephro following   -Monitor BMP     #Pain Mgmt   - Tylenol PRN     #GI/Bowel Mgmt   - Continent c/w Senna PRN     #/Bladder Mgmt   - Continent, voiding well    #FEN   - Diet - Regular + Thins  [CCHO, DASH/TLC]      #Precautions / PROPHYLAXIS:   - Falls, Cardiac, Seizure   - Lungs: Aspiration   - Pressure injury/Skin: OOB to Chair, PT/OT .  - DVT: Coumadin   -Code: DNR/DNI     Team Meeting 9/29--  Nursing: Continent bowel, incontinent bladder,   OT: Sup. eating; Mod A grooming, UBD.  Max A LBD, tub transfer;  goal min A to S; Poor Carryover  PT: Amb. 100ft Min A, Mod A transf;  4 steps min-mod A.   Speech: Poor Memory, Poor Carryover, impaired attention and problem solving.   D/c home 10/7

## 2020-10-05 NOTE — PROGRESS NOTE ADULT - SUBJECTIVE AND OBJECTIVE BOX
HPI:  89 y/o F w/ pmh htn, afib on coumadin and asa, +PPM, CHF (EF 25-30%), recent admission for an NSTEMI, presented to [facility] on 9/17 s/p mech fall and found to have b/l frontal SAH and +scalp hematoma. Pt also found to have supratherapeutic INR of 3.9, Hyperkalemia of 6.2, MORELIA, MICU consulted for admission for hyperkalemia, q1h neurochecks. During interview with patients and daughter at bedside pt reported having a headache worst around the R. frontal scalp hematoma. Pt otherwise denied any other medical complains. Pt stated she cannot recall what happened. Daughter stated she did not witness the fall but heard a loud thump and found her mother on the floor. Pt denied any visual changes, dizziness, photo/phonophibia, neck pain, facial pain, sob, cp, palpitation abd pain, back pain, weakness/numbness in the ext.  Pt. also injured her right arm during the fall.     In ICU patient was started on labetalol infusion for BP control and keppra for seizure ppx. Aspirin and coumadin were held, she was given vitamin K and Kcentra. Patient improved and was transferred to floor. She was transitioned to labetalol PO with good BP control until it was discontinued on 9/21.     Repeat head ct 9/20 revealed small amount of acute subarachnoid hemorrhage in bifrontal lobes, small intraparenchymal hemorrhage and tiny bilateral whole hemispheric sdh essentially unchanged. Pt does not want any invasive procedures including aneurysm intervention. As per neuro, warfarin was cleared to be restarted. EEG done on 9/21 without any seizure activity and was better compared to one done on 9/18.     On 9/22 patient hypertensive, treated with IV hydralazine, labetalol was restarted as well as home med lisinopril. Pt asymptomatic. Goal is to have SBP less than 160.  Patient's discharge to rehab was held 9/22 due to hypertension but was medically cleared for discharge to acute rehab 9/23.    (23 Sep 2020 15:56)      PAST MEDICAL & SURGICAL HISTORY:  Pacemaker    Transient cerebral ischemia, unspecified transient cerebral ischemia type    HTN (hypertension), benign    Atrial fibrillation    Acid reflux disease    History of cataract surgery      Subjective:  Patient seen and evaluated this morning. Patient's INR noted to be supra therapeudic over the weekend and coumadin held. No acute medical issues noted overnight.     REVIEW OF SYMPTOMS  +right shoulder and elbow pain, wrist pain, hearing difficulty, memory loss       Vital Signs Last 24 Hrs  T(C): 36.4 (05 Oct 2020 07:31), Max: 36.8 (04 Oct 2020 21:00)  T(F): 97.6 (05 Oct 2020 07:31), Max: 98.3 (04 Oct 2020 21:00)  HR: 80 (05 Oct 2020 07:31) (66 - 86)  BP: 174/71 (05 Oct 2020 07:31) (147/69 - 174/71)  BP(mean): --  RR: 15 (05 Oct 2020 07:31) (15 - 15)  SpO2: 95% (05 Oct 2020 07:31) (95% - 96%)      PHYSICAL EXAM  Physical Exam: Gen - NAD, Comfortable  HEENT - NCAT, EOMI  Neck - Supple  Pulm - CTAB, No wheeze, No rhonchi, No crackles  Cardiovascular - RRR, S1S2, No murmurs  Abdomen - Soft, NT/ND, +BS  Extremities - No C/C/E, No calf tenderness  Neuro-     Cognitive - AAOx2-3      Communication - Fluent, No dysarthria, hypophonic      Motor - Motor stable      Tone - normal  MSK: right wrist, and index and middle PIP swelling and tenderness resolving.   Right shoulder and right elbow ROM normal --non-painful   Psychiatric - Mood stable, Affect WNL    RECENT LABS/IMAGING                        10.9   5.38  )-----------( 239      ( 05 Oct 2020 05:30 )             33.8     10-05    135  |  101  |  23  ----------------------------<  89  3.5   |  27  |  0.86    Ca    10.1      05 Oct 2020 05:30    TPro  5.6<L>  /  Alb  2.5<L>  /  TBili  0.4  /  DBili  x   /  AST  16  /  ALT  19  /  AlkPhos  99  10-05    PT/INR - ( 05 Oct 2020 05:30 )   PT: 31.5 sec;   INR: 2.73 ratio         RADIOLOGY/OTHER RESULTS      MEDICATIONS  (STANDING):  brimonidine 0.2%/ timolol 0.5% Ophthalmic Solution 1 Drop(s) Left EYE two times a day  cinacalcet 30 milliGRAM(s) Oral daily  donepezil 5 milliGRAM(s) Oral at bedtime  dorzolamide 2% Ophthalmic Solution 1 Drop(s) Both EYES three times a day  labetalol 100 milliGRAM(s) Oral every 8 hours  lisinopril 10 milliGRAM(s) Oral two times a day  montelukast 10 milliGRAM(s) Oral at bedtime  multivitamin 1 Tablet(s) Oral daily  warfarin 2.5 milliGRAM(s) Oral at bedtime    MEDICATIONS  (PRN):  acetaminophen   Tablet .. 650 milliGRAM(s) Oral every 6 hours PRN Temp greater or equal to 38C (100.4F), Mild Pain (1 - 3)  senna 2 Tablet(s) Oral at bedtime PRN Constipation

## 2020-10-06 NOTE — PROGRESS NOTE ADULT - ASSESSMENT
ASSESSMENT/PLAN  AZRA CHAHAL is a 89yo Female w/ pmh htn, afib on coumadin and asa, +PPM, CHF (EF 25-30%), recent admission for an NSTEMI, presented to [facility] on 9/18 s/p mech fall and found to have b/l frontal SAH and +scalp hematoma. Repeat CT head stable and was resumed on warfarin. Now admitted for multidisciplinary rehab program.     #SAH  -On Ct head 9/20: Small amount of acute subarachnoid hemorrhage in the bifrontal lobes, stable   -No invasive procedures as per family and patient, patient is DNR/DNI   -EEG negative for seizures   --Pt. has not had any seizure activity and Completed 7 day keppra seizure ppx  guideline from [facility]--stopped keppra on 9/24   -Gait Instability, ADL impairments and Functional impairments: cont Comprehensive Rehab Program of PT/OT/SLP    #Memory/cognition   -Cont. aricept 5mg 9/29     #Afib  -Cleared to resume warfarin as per neuro recs   -INR supratherapeudic over the weekend -- coumadin was held   -INR daily -- INR 2.26  10/6   -INR goal 2-3  -dose Coumadin 2.5 mg 10/6    #Right wrist Sprain--Resolving  --xray neg for fracture  --ICE and tylenol PRN.      #right shoulder pain--resolving  --likely RTC tendinitis  --R shoulder xray 9/24 --No fractures, or dislocation, degenerative changes noted   --Pain control PRN    #Right elbow pain --resolving  -Likely medial epicondylitis   -R elbow xray 9/24-- no acute fractures noted     Sleep  --melatonin PRN    #CHF  -TTE from 8/30 with EF 25-30%   -Daily weights     #HTN  -Monitor BP, SBP goal <160   -Lisinopril was increased to 10mg BID as per hospitalist 10/3   -Labetalol increased to 200mg TID as per PMD 10/5   -Continue lisinopril and labetalol   -BP improved 10/6     #MORELIA (resolved)  - SONO w/o hydro, b/l small kidneys  -nephro following   -Monitor BMP     #Pain Mgmt   - Tylenol PRN     #GI/Bowel Mgmt   - Continent c/w Senna PRN     #/Bladder Mgmt   - Continent BM, Incontinent BM, voiding well  -Start toileting program     #FEN   - Diet - Regular + Thins  [CCHO, DASH/TLC]      #Precautions / PROPHYLAXIS:   - Falls, Cardiac, Seizure   - Lungs: Aspiration   - Pressure injury/Skin: OOB to Chair, PT/OT .  - DVT: Coumadin   -Code: DNR/DNI     Team Meeting 10/6--  Nursing: Continent bowel, incontinent bladder  OT: Supervision eating, grooming, Min A UBD, LBD, transfers  PT: [place]. 100ft Min A, Mod A transf; 4 steps min-mod A.   Speech: Regular, Mod A improving on biographical info and problem solving   D/c home 10/7

## 2020-10-06 NOTE — PROGRESS NOTE ADULT - PROBLEM SELECTOR PROBLEM 3
HTN (hypertension), benign
Subarachnoid bleed

## 2020-10-06 NOTE — PROGRESS NOTE ADULT - ATTENDING COMMENTS
Pt. seen with resident.  Agree with documentation above as per resident with amendments made as appropriate. Patient medically stable. Making progress towards rehab goals.       d/c planning home tomorrow.  stable

## 2020-10-06 NOTE — DISCHARGE NOTE PROVIDER - HOSPITAL COURSE
89 y/o F w/ pmh htn, afib on coumadin and asa, +PPM, CHF (EF 25-30%), recent admission for an NSTEMI, presented to [facility] on 9/17 s/p mech fall and found to have b/l frontal SAH and +scalp hematoma. Pt also found to have supratherapeutic INR of 3.9, Hyperkalemia of 6.2, MORELIA, MICU consulted for admission for hyperkalemia, q1h neurochecks. During interview with patients and daughter at bedside pt reported having a headache worst around the R. frontal scalp hematoma. Pt otherwise denied any other medical complains. Pt stated she cannot recall what happened. Daughter stated she did not witness the fall but heard a loud thump and found her mother on the floor. Pt denied any visual changes, dizziness, photo/phonophibia, neck pain, facial pain, sob, cp, palpitation abd pain, back pain, weakness/numbness in the ext.  Pt. also injured her right arm during the fall.     In ICU patient was started on labetalol infusion for BP control and keppra for seizure ppx. Aspirin and coumadin were held, she was given vitamin K and Kcentra. Patient improved and was transferred to floor. She was transitioned to labetalol PO with good BP control until it was discontinued on 9/21.     Repeat head ct 9/20 revealed small amount of acute subarachnoid hemorrhage in bifrontal lobes, small intraparenchymal hemorrhage and tiny bilateral whole hemispheric sdh essentially unchanged. Pt does not want any invasive procedures including aneurysm intervention. As per neuro, warfarin was cleared to be restarted. EEG done on 9/21 without any seizure activity and was better compared to one done on 9/18.     On 9/22 patient hypertensive, treated with IV hydralazine, labetalol was restarted as well as home med lisinopril. Pt asymptomatic. Goal is to have SBP less than 160.  Patient's discharge to rehab was held 9/22 due to hypertension but was medically cleared for discharge to acute rehab 9/23.    While in the rehab unit patient's course significant for right shoulder and elbow pain, xray showed no fractures and with degenerative changes. Patient's BP noted to be elevated and lisinopril was increased to 10mg BID and labetalol increased to 200mg three times a day. BP noted to have improved. Patient's INR currently at goal with 2.5mg of warfarin.     Patient participated in daily therapies and made good functional gains. Patient seen and examined on day of discharge.  Medications, medication side effects, and discharge instructions were reviewed with the patient, who expressed understanding of all information.  Patient was medically and functionally optimized and cleared for discharge. 89 y/o F w/ pmh htn, afib on coumadin and asa, +PPM, CHF (EF 25-30%), recent admission for an NSTEMI, presented to [facility] on 9/17 s/p mech fall and found to have b/l frontal SAH and +scalp hematoma. Pt also found to have supratherapeutic INR of 3.9, Hyperkalemia of 6.2, MORELIA, MICU consulted for admission for hyperkalemia, q1h neurochecks. During interview with patients and daughter at bedside pt reported having a headache worst around the R. frontal scalp hematoma. Pt otherwise denied any other medical complains. Pt stated she cannot recall what happened. Daughter stated she did not witness the fall but heard a loud thump and found her mother on the floor. Pt denied any visual changes, dizziness, photo/phonophibia, neck pain, facial pain, sob, cp, palpitation abd pain, back pain, weakness/numbness in the ext.  Pt. also injured her right arm during the fall.     In ICU patient was started on labetalol infusion for BP control and keppra for seizure ppx. Aspirin and coumadin were held, she was given vitamin K and Kcentra. Patient improved and was transferred to floor. She was transitioned to labetalol PO with good BP control until it was discontinued on 9/21.     Repeat head ct 9/20 revealed small amount of acute subarachnoid hemorrhage in bifrontal lobes, small intraparenchymal hemorrhage and tiny bilateral whole hemispheric sdh essentially unchanged. Pt does not want any invasive procedures including aneurysm intervention. As per neuro, warfarin was cleared to be restarted. EEG done on 9/21 without any seizure activity and was better compared to one done on 9/18.     On 9/22 patient hypertensive, treated with IV hydralazine, labetalol was restarted as well as home med lisinopril. Pt asymptomatic. Goal is to have SBP less than 160.  Patient's discharge to rehab was held 9/22 due to hypertension but was medically cleared for discharge to acute rehab 9/23.    While in the rehab unit patient's course significant for right shoulder and elbow pain, xray showed no fractures and with degenerative changes. Patient's BP noted to be elevated and lisinopril was increased to 10mg BID and labetalol increased to 300mg three times a day. BP noted to have improved. Patient's INR currently at goal with 2.5mg of warfarin.     Patient participated in daily therapies and made good functional gains. Patient seen and examined on day of discharge.  Medications, medication side effects, and discharge instructions were reviewed with the patient, who expressed understanding of all information.  Patient was medically and functionally optimized and cleared for discharge.

## 2020-10-06 NOTE — DISCHARGE NOTE PROVIDER - NSDCFUADDINST_GEN_ALL_CORE_FT
Supervision/assistance with all activities as advised by rehabilitation team    Avoid alcohol and unprescribed medications as these can impair the brain's recovery process.

## 2020-10-06 NOTE — DISCHARGE NOTE PROVIDER - NSDCMRMEDTOKEN_GEN_ALL_CORE_FT
acetaminophen 325 mg oral tablet: 2 tab(s) orally every 6 hours, As needed, Temp greater or equal to 38C (100.4F), Mild Pain (1 - 3)  brimonidine-timolol 0.2%-0.5% ophthalmic solution: 1 drop(s) to each affected eye 2 times a day  cinacalcet 30 mg oral tablet: 1 tab(s) orally once a day  Coumadin 2.5 mg oral tablet: 1 tab(s) orally once a day   donepezil 5 mg oral tablet: 1 tab(s) orally once a day (at bedtime)  dorzolamide 2% ophthalmic solution: 1 drop(s) to each affected eye 3 times a day  labetalol 200 mg oral tablet: 1 tab(s) orally 3 times a day  lisinopril 10 mg oral tablet: 1 tab(s) orally 2 times a day  Multiple Vitamins oral tablet: 1 tab(s) orally once a day   acetaminophen 325 mg oral tablet: 2 tab(s) orally every 6 hours, As needed, Temp greater or equal to 38C (100.4F), Mild Pain (1 - 3)  brimonidine-timolol 0.2%-0.5% ophthalmic solution: 1 drop(s) to each affected eye 2 times a day  cinacalcet 30 mg oral tablet: 1 tab(s) orally once a day  Coumadin 2.5 mg oral tablet: 1 tab(s) orally once a day   donepezil 5 mg oral tablet: 1 tab(s) orally once a day (at bedtime)  dorzolamide 2% ophthalmic solution: 1 drop(s) to each affected eye 3 times a day  labetalol 300 mg oral tablet: 1 tab(s) orally 3 times a day  lisinopril 10 mg oral tablet: 1 tab(s) orally 2 times a day  Multiple Vitamins oral tablet: 1 tab(s) orally once a day

## 2020-10-06 NOTE — DISCHARGE NOTE PROVIDER - CARE PROVIDER_API CALL
Kiko Turner  GASTROENTEROLOGY  [address] [place] [address], [address]  [place], [place] [id number]  Phone: [phone]  Fax: [phone]  Follow Up Time:     Melva Howell  NEUROLOGY  [address] [place] [address], [address]  [place] [address]  Phone: [phone]  Fax: [phone]  Follow Up Time:     Elham Del Real  BRAIN INJURY MEDICINE [address] [place] [address]  Phone: [phone]  Fax: [phone]  Follow Up Time:    Kiko Turner  GASTROENTEROLOGY  [address] [place] [address], [address]  [place], [place] [id number]  Phone: [phone]  Fax: [phone]  Follow Up Time: 1 week    Melva Howell  NEUROLOGY  [address], [address]  [place] [address]  Phone: [phone]  Fax: [phone]  Follow Up Time:     Elham Del Real  BRAIN INJURY MEDICINE [address] [place] [address]  Phone: [phone]  Fax: [phone]  Follow Up Time:     Shantel Bower  [facility]  [address] [facility], [address]  [place], [place] [id number]  Phone: [phone]  Fax: [phone]  Follow Up Time: 2 weeks

## 2020-10-06 NOTE — DISCHARGE NOTE PROVIDER - NSDCQMSTROKERISK_NEU_ALL_CORE
Atrial fibrillation/History of a stroke or TIA/High blood pressure History of a stroke or TIA/Atrial fibrillation/High blood pressure

## 2020-10-06 NOTE — PROGRESS NOTE ADULT - SUBJECTIVE AND OBJECTIVE BOX
HPI:  89 y/o F w/ pmh htn, afib on coumadin and asa, +PPM, CHF (EF 25-30%), recent admission for an NSTEMI, presented to [facility] on 9/17 s/p mech fall and found to have b/l frontal SAH and +scalp hematoma. Pt also found to have supratherapeutic INR of 3.9, Hyperkalemia of 6.2, MORELIA, MICU consulted for admission for hyperkalemia, q1h neurochecks. During interview with patients and daughter at bedside pt reported having a headache worst around the R. frontal scalp hematoma. Pt otherwise denied any other medical complains. Pt stated she cannot recall what happened. Daughter stated she did not witness the fall but heard a loud thump and found her mother on the floor. Pt denied any visual changes, dizziness, photo/phonophibia, neck pain, facial pain, sob, cp, palpitation abd pain, back pain, weakness/numbness in the ext.  Pt. also injured her right arm during the fall.     In ICU patient was started on labetalol infusion for BP control and keppra for seizure ppx. Aspirin and coumadin were held, she was given vitamin K and Kcentra. Patient improved and was transferred to floor. She was transitioned to labetalol PO with good BP control until it was discontinued on 9/21.     Repeat head ct 9/20 revealed small amount of acute subarachnoid hemorrhage in bifrontal lobes, small intraparenchymal hemorrhage and tiny bilateral whole hemispheric sdh essentially unchanged. Pt does not want any invasive procedures including aneurysm intervention. As per neuro, warfarin was cleared to be restarted. EEG done on 9/21 without any seizure activity and was better compared to one done on 9/18.     On 9/22 patient hypertensive, treated with IV hydralazine, labetalol was restarted as well as home med lisinopril. Pt asymptomatic. Goal is to have SBP less than 160.  Patient's discharge to rehab was held 9/22 due to hypertension but was medically cleared for discharge to acute rehab 9/23.    (23 Sep 2020 15:56)      PAST MEDICAL & SURGICAL HISTORY:  Pacemaker    Transient cerebral ischemia, unspecified transient cerebral ischemia type    HTN (hypertension), benign    Atrial fibrillation    Acid reflux disease    History of cataract surgery      Subjective:  Patient seen and evaluated this morning. No acute medical issues noted overnight.     REVIEW OF SYMPTOMS  +right shoulder and elbow pain, wrist pain, hearing difficulty, memory loss     Vital Signs Last 24 Hrs  T(C): 36.9 (06 Oct 2020 07:45), Max: 37.1 (05 Oct 2020 20:46)  T(F): 98.5 (06 Oct 2020 07:45), Max: 98.7 (05 Oct 2020 20:46)  HR: 70 (06 Oct 2020 07:45) (70 - 70)  BP: 134/77 (06 Oct 2020 07:45) (134/77 - 134/77)  BP(mean): --  RR: 15 (06 Oct 2020 07:45) (15 - 15)  SpO2: 97% (06 Oct 2020 07:45) (95% - 97%)    PHYSICAL EXAM  Physical Exam: Gen - NAD, Comfortable  HEENT - NCAT, EOMI  Neck - Supple  Pulm - CTAB, No wheeze, No rhonchi, No crackles  Cardiovascular - RRR, S1S2, No murmurs  Abdomen - Soft, NT/ND, +BS  Extremities - No C/C/E, No calf tenderness  Neuro-     Cognitive - AAOx2-3      Communication - Fluent, No dysarthria, hypophonic      Motor - Motor stable      Tone - normal  Psychiatric - Mood stable, Affect WNL    RECENT LABS/IMAGING                        10.9   5.38  )-----------( 239      ( 05 Oct 2020 05:30 )             33.8     10-05    135  |  101  |  23  ----------------------------<  89  3.5   |  27  |  0.86    Ca    10.1      05 Oct 2020 05:30    TPro  5.6<L>  /  Alb  2.5<L>  /  TBili  0.4  /  DBili  x   /  AST  16  /  ALT  19  /  AlkPhos  99  10-05    PT/INR - ( 06 Oct 2020 05:30 )   PT: 26.3 sec;   INR: 2.26 ratio         RADIOLOGY/OTHER RESULTS      MEDICATIONS  (STANDING):  brimonidine 0.2%/ timolol 0.5% Ophthalmic Solution 1 Drop(s) Left EYE two times a day  cinacalcet 30 milliGRAM(s) Oral daily  donepezil 5 milliGRAM(s) Oral at bedtime  dorzolamide 2% Ophthalmic Solution 1 Drop(s) Both EYES three times a day  labetalol 200 milliGRAM(s) Oral three times a day  lisinopril 10 milliGRAM(s) Oral two times a day  montelukast 10 milliGRAM(s) Oral at bedtime  multivitamin 1 Tablet(s) Oral daily  warfarin 2.5 milliGRAM(s) Oral once    MEDICATIONS  (PRN):  acetaminophen   Tablet .. 650 milliGRAM(s) Oral every 6 hours PRN Temp greater or equal to 38C (100.4F), Mild Pain (1 - 3)  senna 2 Tablet(s) Oral at bedtime PRN Constipation

## 2020-10-06 NOTE — PROGRESS NOTE ADULT - SUBJECTIVE AND OBJECTIVE BOX
Resting    Vital Signs Last 24 Hrs  T(C): 36.9 (10-06-20 @ 07:45), Max: 37.1 (10-05-20 @ 20:46)  T(F): 98.5 (10-06-20 @ 07:45), Max: 98.7 (10-05-20 @ 20:46)  HR: 70 (10-06-20 @ 07:45) (70 - 70)  BP: 134/77 (10-06-20 @ 07:45) (134/77 - 134/77)  RR: 15 (10-06-20 @ 07:45) (15 - 15)  SpO2: 97% (10-06-20 @ 07:45) (95% - 97%)    card s1s2  b/l air entry  soft, ND  no edema                                                                                                                     10.9   5.38  )-----------( 239      ( 05 Oct 2020 05:30 )             33.8     05 Oct 2020 05:30    135    |  101    |  23     ----------------------------<  89     3.5     |  27     |  0.86     Ca    10.1       05 Oct 2020 05:30    TPro  5.6    /  Alb  2.5    /  TBili  0.4    /  DBili  x      /  AST  16     /  ALT  19     /  AlkPhos  99     05 Oct 2020 05:30    LIVER FUNCTIONS - ( 05 Oct 2020 05:30 )  Alb: 2.5 g/dL / Pro: 5.6 g/dL / ALK PHOS: 99 U/L / ALT: 19 U/L / AST: 16 U/L / GGT: x           PT/INR - ( 06 Oct 2020 05:30 )   PT: 26.3 sec;   INR: 2.26 ratio      acetaminophen   Tablet .. 650 milliGRAM(s) Oral every 6 hours PRN  brimonidine 0.2%/ timolol 0.5% Ophthalmic Solution 1 Drop(s) Left EYE two times a day  cinacalcet 30 milliGRAM(s) Oral daily  donepezil 5 milliGRAM(s) Oral at bedtime  dorzolamide 2% Ophthalmic Solution 1 Drop(s) Both EYES three times a day  labetalol 200 milliGRAM(s) Oral three times a day  lisinopril 10 milliGRAM(s) Oral two times a day  montelukast 10 milliGRAM(s) Oral at bedtime  multivitamin 1 Tablet(s) Oral daily  senna 2 Tablet(s) Oral at bedtime PRN  warfarin 2.5 milliGRAM(s) Oral at bedtime    A/P:    S/p fall, b/l SAH  Known CM, EF 25-30%, MR, TR, AR  S/p hemodynamic MORELIA   Resolved  UA w/pr 15 otherwise bland  SONO w/o hydro, b/l small kidneys  BP controlled  Avoid nephrotoxins  Hypercalcemia corrected on Sensipar  Rehab    [phone]

## 2020-10-06 NOTE — DISCHARGE NOTE PROVIDER - CARE PROVIDERS DIRECT ADDRESSES
,[email or web address] ,[email or web address]

## 2020-10-06 NOTE — PROGRESS NOTE ADULT - SUBJECTIVE AND OBJECTIVE BOX
Patient is a 89y old  Female who presents with a chief complaint of SAH  SAH  02.22 Traumatic, Close Injury (06 Oct 2020 14:17)      INTERVAL HPI/OVERNIGHT EVENTS: tired      REVIEW OF SYSTEMS:  CONSTITUTIONAL: No fever, weight loss, or fatigue  EYES: No eye pain, visual disturbances, or discharge  ENMT:  No difficulty hearing, tinnitus, vertigo; No sinus or throat pain  NECK: No pain or stiffness  BREASTS: No pain, masses, or nipple discharge  RESPIRATORY: No cough, wheezing, chills or hemoptysis; No shortness of breath  CARDIOVASCULAR: No chest pain, palpitations, dizziness, or leg swelling  GASTROINTESTINAL: No abdominal or epigastric pain. No nausea, vomiting, or hematemesis; No diarrhea or constipation. No melena or hematochezia.  GENITOURINARY: No dysuria, frequency, hematuria, or incontinence  NEUROLOGICAL: No headaches, memory loss, loss of strength, numbness, or tremors  SKIN: No itching, burning, rashes, or lesions   LYMPH NODES: No enlarged glands  ENDOCRINE: No heat or cold intolerance; No hair loss  MUSCULOSKELETAL: No joint pain or swelling; No muscle, back, or extremity pain  PSYCHIATRIC: No depression, anxiety, mood swings, or difficulty sleeping  HEME/LYMPH: No easy bruising, or bleeding gums  ALLERY AND IMMUNOLOGIC: No hives or eczema  FAMILY HISTORY:  FH: diabetes mellitus      T(C): 36.9 (10-06-20 @ 07:45), Max: 37.1 (10-05-20 @ 20:46)  HR: 91 (10-06-20 @ 17:31) (70 - 91)  BP: 179/74 (10-06-20 @ 17:31) (134/77 - 179/74)  RR: 15 (10-06-20 @ 07:45) (15 - 15)  SpO2: 97% (10-06-20 @ 07:45) (95% - 97%)  Wt(kg): --Vital Signs Last 24 Hrs  T(C): 36.9 (06 Oct 2020 07:45), Max: 37.1 (05 Oct 2020 20:46)  T(F): 98.5 (06 Oct 2020 07:45), Max: 98.7 (05 Oct 2020 20:46)  HR: 91 (06 Oct 2020 17:31) (70 - 91)  BP: 179/74 (06 Oct 2020 17:31) (134/77 - 179/74)  BP(mean): --  RR: 15 (06 Oct 2020 07:45) (15 - 15)  SpO2: 97% (06 Oct 2020 07:45) (95% - 97%)    T(F): 98.5 (10-06-20 @ 07:45), Max: 98.7 (10-05-20 @ 20:46)  HR: 91 (10-06-20 @ 17:31) (60 - 91)  BP: 179/74 (10-06-20 @ 17:31) (134/77 - 179/74)  RR: 15 (10-06-20 @ 07:45) (15 - 15)  SpO2: 97% (10-06-20 @ 07:45) (95% - 99%)    PHYSICAL EXAM:  GENERAL: NAD, well-groomed, well-developed  HEAD:  Atraumatic, Normocephalic  EYES: EOMI, PERRLA, conjunctiva and sclera clear  ENMT: No tonsillar erythema, exudates, or enlargement; Moist mucous membranes, Good dentition, No lesions  NECK: Supple, No JVD, Normal thyroid  NERVOUS SYSTEM:  Alert & Oriented X3, Good concentration; Motor Strength 5/5 B/L upper and lower extremities; DTRs 2+ intact and symmetric  CHEST/LUNG: Clear to percussion bilaterally; No rales, rhonchi, wheezing, or rubs  HEART: Regular rate and rhythm; No murmurs, rubs, or gallops  ABDOMEN: Soft, Nontender, Nondistended; Bowel sounds present  EXTREMITIES:  2+ Peripheral Pulses, No clubbing, cyanosis, or edema  LYMPH: No lymphadenopathy noted  SKIN: No rashes or lesions    Consultant(s) Notes Reviewed:  [x ] YES  [ ] NO  Care Discussed with Consultants/Other Providers [ x] YES  [ ] NO    LABS:  10-05    135  |  101  |  23  ----------------------------<  89  3.5   |  27  |  0.86    Ca    10.1      05 Oct 2020 05:30    TPro  5.6<L>  /  Alb  2.5<L>  /  TBili  0.4  /  DBili  x   /  AST  16  /  ALT  19  /  AlkPhos  99  10-05                          10.9   5.38  )-----------( 239      ( 05 Oct 2020 05:30 )             33.8         PT/INR - ( 06 Oct 2020 05:30 )   PT: 26.3 sec;   INR: 2.26 ratio           LIVER FUNCTIONS - ( 05 Oct 2020 05:30 )  Alb: 2.5 g/dL / Pro: 5.6 g/dL / ALK PHOS: 99 U/L / ALT: 19 U/L / AST: 16 U/L / GGT: x                            10.9   5.38  )-----------( 239      ( 10-05 @ 05:30 )             33.8                11.8   5.98  )-----------( 226      ( 10-01 @ 06:48 )             34.7                10.7   5.13  )-----------( 183      ( 09-28 @ 07:33 )             32.4                11.6   7.61  )-----------( 155      ( 09-24 @ 05:30 )             35.8                12.3   7.18  )-----------( 158      ( 09-20 @ 06:00 )             36.9                12.0   8.28  )-----------( 164      ( 09-19 @ 05:00 )             37.2                12.9   9.39  )-----------( 138      ( 09-18 @ 04:45 )             39.1                13.3   6.51  )-----------( 147      ( 09-17 @ 23:45 )             40.6               RADIOLOGY & ADDITIONAL TESTS:    Imaging Personally Reviewed:  [ ] YES  [ ] NO  acetaminophen   Tablet .. 650 milliGRAM(s) Oral every 6 hours PRN  brimonidine 0.2%/ timolol 0.5% Ophthalmic Solution 1 Drop(s) Left EYE two times a day  cinacalcet 30 milliGRAM(s) Oral daily  donepezil 5 milliGRAM(s) Oral at bedtime  dorzolamide 2% Ophthalmic Solution 1 Drop(s) Both EYES three times a day  labetalol 200 milliGRAM(s) Oral three times a day  lisinopril 10 milliGRAM(s) Oral two times a day  montelukast 10 milliGRAM(s) Oral at bedtime  multivitamin 1 Tablet(s) Oral daily  senna 2 Tablet(s) Oral at bedtime PRN  warfarin 2.5 milliGRAM(s) Oral once      HEALTH ISSUES - PROBLEM Dx:  Hand swelling  Hand swelling    HTN (hypertension), benign  HTN (hypertension), benign    Atrial fibrillation  Atrial fibrillation    Subarachnoid bleed  Subarachnoid bleed

## 2020-10-06 NOTE — DISCHARGE NOTE PROVIDER - NSDCCPCAREPLAN_GEN_ALL_CORE_FT
PRINCIPAL DISCHARGE DIAGNOSIS  Diagnosis: Subarachnoidal hemorrhage  Assessment and Plan of Treatment: Follow up with PCP after discharge   Follow up with rehab physician after discharge   Follow up with neurologist after discharge      SECONDARY DISCHARGE DIAGNOSES  Diagnosis: Hypercalcemia  Assessment and Plan of Treatment: Continue Cinacalet daily   Follow up with PCP to monitor    Diagnosis: HTN (hypertension)  Assessment and Plan of Treatment: Continue lisinopril 10mg two times a day   Continue labetalol 200mg three times a day   Follow up with PCP    Diagnosis: Congestive heart failure (CHF)  Assessment and Plan of Treatment: Follow up with your PCP    Diagnosis: Afib  Assessment and Plan of Treatment: Continue warfarin 2.5mg   INR checks to be done three days a week and to be followe dup with your PCP   INR goal 2-3   Follow up with your PCP    Diagnosis: Memory deficit  Assessment and Plan of Treatment: Continue aricept     PRINCIPAL DISCHARGE DIAGNOSIS  Diagnosis: Subarachnoidal hemorrhage  Assessment and Plan of Treatment: Follow up with PCP after discharge   Follow up with rehab physician after discharge   Follow up with neurologist after discharge      SECONDARY DISCHARGE DIAGNOSES  Diagnosis: Urine incontinence  Assessment and Plan of Treatment: Incontinence noted at night  Take patient to the toilet before bedtime, and limit fluid intake after dinner. Can allow small sips of water if needed but nothing more.   Can wear pads at night for comfort    Diagnosis: Hypercalcemia  Assessment and Plan of Treatment: Continue Cinacalet daily   Follow up with PCP to monitor    Diagnosis: HTN (hypertension)  Assessment and Plan of Treatment: Continue lisinopril 10mg two times a day   Continue labetalol 200mg three times a day   Follow up with PCP    Diagnosis: Congestive heart failure (CHF)  Assessment and Plan of Treatment: Follow up with your PCP    Diagnosis: Afib  Assessment and Plan of Treatment: Continue warfarin 2.5mg   INR checks to be done three days a week and to be followe dup with your PCP   INR goal 2-3   Follow up with your PCP    Diagnosis: Memory deficit  Assessment and Plan of Treatment: Continue aricept     PRINCIPAL DISCHARGE DIAGNOSIS  Diagnosis: Subarachnoidal hemorrhage  Assessment and Plan of Treatment: Follow up with PCP after discharge   Follow up with rehab physician after discharge   Follow up with neurologist after discharge      SECONDARY DISCHARGE DIAGNOSES  Diagnosis: Urine incontinence  Assessment and Plan of Treatment: Incontinence noted at night  Take patient to the toilet before bedtime, and limit fluid intake after dinner. Can allow small sips of water if needed but nothing more.   Can wear pads at night for comfort    Diagnosis: Hypercalcemia  Assessment and Plan of Treatment: Continue Cinacalet daily   Follow up with PCP to monitor    Diagnosis: HTN (hypertension)  Assessment and Plan of Treatment: Continue lisinopril 10mg two times a day   Continue labetalol 300mg three times a day   Follow up with PCP    Diagnosis: Congestive heart failure (CHF)  Assessment and Plan of Treatment: Follow up with your PCP    Diagnosis: Afib  Assessment and Plan of Treatment: Continue warfarin 2.5mg   INR checks to be done three days a week and to be followe dup with your PCP   INR goal 2-3   Follow up with your PCP    Diagnosis: Memory deficit  Assessment and Plan of Treatment: Continue aricept

## 2020-10-07 NOTE — PROGRESS NOTE ADULT - NUTRITIONAL ASSESSMENT
This patient has been assessed with a concern for Malnutrition and has been determined to have a diagnosis/diagnoses of Severe protein-calorie malnutrition.    This patient is being managed with:   Diet DASH/TLC-  Sodium & Cholesterol Restricted  Supplement Feeding Modality:  Oral  Ensure Enlive Cans or Servings Per Day:  1       Frequency:  Three Times a day  Entered: Sep 23 2020 12:31PM    

## 2020-10-07 NOTE — PROGRESS NOTE ADULT - PROBLEM SELECTOR PLAN 2
inr2-3
restart warfarin daily INRs
Inr 2-3
inr2-3
restart warfarin
restart warfarin
inr 2-3  follow blood pressure on increased labetolol

## 2020-10-07 NOTE — PROGRESS NOTE ADULT - SUBJECTIVE AND OBJECTIVE BOX
HPI:  89 y/o F w/ pmh htn, afib on coumadin and asa, +PPM, CHF (EF 25-30%), recent admission for an NSTEMI, [facility] and found to have b/l frontal SAH and +scalp hematoma. Pt also found to have supratherapeutic INR of 3.9, Hyperkalemia of 6.2, MORELIA, MICU consulted for admission for hyperkalemia, q1h neurochecks. During interview with patients and daughter at bedside pt reported having a headache worst around the R. frontal scalp hematoma. Pt otherwise denied any other medical complains. Pt stated she cannot recall what happened. Daughter stated she did not witness the fall but heard a loud thump and found her mother on the floor. Pt denied any visual changes, dizziness, photo/phonophibia, neck pain, facial pain, sob, cp, palpitation abd pain, back pain, weakness/numbness in the ext.  Pt. also injured her right arm during the fall.     In ICU patient was started on labetalol infusion for BP control and keppra for seizure ppx. Aspirin and coumadin were held, she was given vitamin K and Kcentra. Patient improved and was transferred to floor. She was transitioned to labetalol PO with good BP control until it was discontinued on 9/21.     Repeat head ct 9/20 revealed small amount of acute subarachnoid hemorrhage in bifrontal lobes, small intraparenchymal hemorrhage and tiny bilateral whole hemispheric sdh essentially unchanged. Pt does not want any invasive procedures including aneurysm intervention. As per neuro, warfarin was cleared to be restarted. EEG done on 9/21 without any seizure activity and was better compared to one done on 9/18.     On 9/22 patient hypertensive, treated with IV hydralazine, labetalol was restarted as well as home med lisinopril. Pt asymptomatic. Goal is to have SBP less than 160.  Patient's discharge to rehab was held 9/22 due to hypertension but was medically cleared for discharge to acute rehab 9/23.    (23 Sep 2020 15:56)      PAST MEDICAL & SURGICAL HISTORY:  Pacemaker    Transient cerebral ischemia, unspecified transient cerebral ischemia type    HTN (hypertension), benign    Atrial fibrillation    Acid reflux disease    History of cataract surgery        Subjective:  No new complaints.  Pt's BP still elevated     VITALS  Vital Signs Last 24 Hrs  T(C): 36.9 (07 Oct 2020 07:17), Max: 36.9 (07 Oct 2020 07:17)  T(F): 98.4 (07 Oct 2020 07:17), Max: 98.4 (07 Oct 2020 07:17)  HR: 90 (07 Oct 2020 07:17) (68 - 91)  BP: 183/70 (07 Oct 2020 07:17) (161/68 - 183/70)  BP(mean): --  RR: 14 (07 Oct 2020 07:17) (14 - 15)  SpO2: 96% (07 Oct 2020 07:17) (96% - 97%)    REVIEW OF SYMPTOMS  +right shoulder and elbow pain, wrist pain, hearing difficulty, memory loss       PHYSICAL EXAM  Physical Exam: Gen - NAD, Comfortable  HEENT - NCAT, EOMI  Neck - Supple  Pulm - CTAB, No wheeze, No rhonchi, No crackles  Cardiovascular - RRR, S1S2, No murmurs  Abdomen - Soft, NT/ND, +BS  Extremities - No C/C/E, No calf tenderness  Neuro-     Cognitive - AAOx2-3      Communication - Fluent, No dysarthria, hypophonic      Motor - Motor stable      Tone - normal  Psychiatric - Mood stable, Affect WNL  RECENT LABS          PT/INR - ( 07 Oct 2020 05:30 )   PT: 24.6 sec;   INR: 2.11 ratio                 RADIOLOGY/OTHER RESULTS      MEDICATIONS  (STANDING):  brimonidine 0.2%/ timolol 0.5% Ophthalmic Solution 1 Drop(s) Left EYE two times a day  cinacalcet 30 milliGRAM(s) Oral daily  donepezil 5 milliGRAM(s) Oral at bedtime  dorzolamide 2% Ophthalmic Solution 1 Drop(s) Both EYES three times a day  labetalol 300 milliGRAM(s) Oral three times a day  lisinopril 10 milliGRAM(s) Oral two times a day  montelukast 10 milliGRAM(s) Oral at bedtime  multivitamin 1 Tablet(s) Oral daily    MEDICATIONS  (PRN):  acetaminophen   Tablet .. 650 milliGRAM(s) Oral every 6 hours PRN Temp greater or equal to 38C (100.4F), Mild Pain (1 - 3)  senna 2 Tablet(s) Oral at bedtime PRN Constipation

## 2020-10-07 NOTE — PROGRESS NOTE ADULT - SUBJECTIVE AND OBJECTIVE BOX
Patient is a 89y old  Female who presents with a chief complaint of SAH  SAH  02.22 Traumatic, Close Injury (06 Oct 2020 20:38)      INTERVAL HPI/OVERNIGHT EVENTS: alert      REVIEW OF SYSTEMS:  CONSTITUTIONAL: No fever, weight loss, or fatigue  EYES: No eye pain, visual disturbances, or discharge  ENMT:  No difficulty hearing, tinnitus, vertigo; No sinus or throat pain  NECK: No pain or stiffness  BREASTS: No pain, masses, or nipple discharge  RESPIRATORY: No cough, wheezing, chills or hemoptysis; No shortness of breath  CARDIOVASCULAR: No chest pain, palpitations, dizziness, or leg swelling  GASTROINTESTINAL: No abdominal or epigastric pain. No nausea, vomiting, or hematemesis; No diarrhea or constipation. No melena or hematochezia.  GENITOURINARY: No dysuria, frequency, hematuria, or incontinence  NEUROLOGICAL: No headaches, memory loss, loss of strength, numbness, or tremors  SKIN: No itching, burning, rashes, or lesions   LYMPH NODES: No enlarged glands  ENDOCRINE: No heat or cold intolerance; No hair loss  MUSCULOSKELETAL: No joint pain or swelling; No muscle, back, or extremity pain  PSYCHIATRIC: No depression, anxiety, mood swings, or difficulty sleeping  HEME/LYMPH: No easy bruising, or bleeding gums  ALLERY AND IMMUNOLOGIC: No hives or eczema  FAMILY HISTORY:  FH: diabetes mellitus      T(C): 36.9 (10-07-20 @ 07:17), Max: 36.9 (10-07-20 @ 07:17)  HR: 90 (10-07-20 @ 07:17) (68 - 91)  BP: 183/70 (10-07-20 @ 07:17) (161/68 - 183/70)  RR: 14 (10-07-20 @ 07:17) (14 - 15)  SpO2: 96% (10-07-20 @ 07:17) (96% - 97%)  Wt(kg): --Vital Signs Last 24 Hrs  T(C): 36.9 (07 Oct 2020 07:17), Max: 36.9 (07 Oct 2020 07:17)  T(F): 98.4 (07 Oct 2020 07:17), Max: 98.4 (07 Oct 2020 07:17)  HR: 90 (07 Oct 2020 07:17) (68 - 91)  BP: 183/70 (07 Oct 2020 07:17) (161/68 - 183/70)  BP(mean): --  RR: 14 (07 Oct 2020 07:17) (14 - 15)  SpO2: 96% (07 Oct 2020 07:17) (96% - 97%)    T(F): 98.4 (10-07-20 @ 07:17), Max: 98.7 (10-05-20 @ 20:46)  HR: 90 (10-07-20 @ 07:17) (60 - 91)  BP: 183/70 (10-07-20 @ 07:17) (134/77 - 183/70)  RR: 14 (10-07-20 @ 07:17) (14 - 15)  SpO2: 96% (10-07-20 @ 07:17) (95% - 97%)    PHYSICAL EXAM:  GENERAL: NAD, well-groomed, well-developed  HEAD:  Atraumatic, Normocephalic  EYES: EOMI, PERRLA, conjunctiva and sclera clear  ENMT: No tonsillar erythema, exudates, or enlargement; Moist mucous membranes, Good dentition, No lesions  NECK: Supple, No JVD, Normal thyroid  NERVOUS SYSTEM:  Alert & Oriented X3, Good concentration; Motor Strength 5/5 B/L upper and lower extremities; DTRs 2+ intact and symmetric  CHEST/LUNG: Clear to percussion bilaterally; No rales, rhonchi, wheezing, or rubs  HEART: Regular rate and rhythm; No murmurs, rubs, or gallops  ABDOMEN: Soft, Nontender, Nondistended; Bowel sounds present  EXTREMITIES:  2+ Peripheral Pulses, No clubbing, cyanosis, or edema  LYMPH: No lymphadenopathy noted  SKIN: No rashes or lesions    Consultant(s) Notes Reviewed:  [x ] YES  [ ] NO  Care Discussed with Consultants/Other Providers [ x] YES  [ ] NO    LABS:              PT/INR - ( 07 Oct 2020 05:30 )   PT: 24.6 sec;   INR: 2.11 ratio                              10.9   5.38  )-----------( 239      ( 10-05 @ 05:30 )             33.8                11.8   5.98  )-----------( 226      ( 10-01 @ 06:48 )             34.7                10.7   5.13  )-----------( 183      ( 09-28 @ 07:33 )             32.4                11.6   7.61  )-----------( 155      ( 09-24 @ 05:30 )             35.8                12.3   7.18  )-----------( 158      ( 09-20 @ 06:00 )             36.9                12.0   8.28  )-----------( 164      ( 09-19 @ 05:00 )             37.2                12.9   9.39  )-----------( 138      ( 09-18 @ 04:45 )             39.1                13.3   6.51  )-----------( 147      ( 09-17 @ 23:45 )             40.6               RADIOLOGY & ADDITIONAL TESTS:    Imaging Personally Reviewed:  [ ] YES  [ ] NO  acetaminophen   Tablet .. 650 milliGRAM(s) Oral every 6 hours PRN  brimonidine 0.2%/ timolol 0.5% Ophthalmic Solution 1 Drop(s) Left EYE two times a day  cinacalcet 30 milliGRAM(s) Oral daily  donepezil 5 milliGRAM(s) Oral at bedtime  dorzolamide 2% Ophthalmic Solution 1 Drop(s) Both EYES three times a day  labetalol 300 milliGRAM(s) Oral three times a day  lisinopril 10 milliGRAM(s) Oral two times a day  montelukast 10 milliGRAM(s) Oral at bedtime  multivitamin 1 Tablet(s) Oral daily  senna 2 Tablet(s) Oral at bedtime PRN      HEALTH ISSUES - PROBLEM Dx:  Hand swelling  Hand swelling    HTN (hypertension), benign  HTN (hypertension), benign    Atrial fibrillation  Atrial fibrillation    Subarachnoid bleed  Subarachnoid bleed

## 2020-10-07 NOTE — PROGRESS NOTE ADULT - PROVIDER SPECIALTY LIST ADULT
Internal Medicine
Nephrology
Rehab Medicine
Nephrology
Rehab Medicine
Hospitalist
Internal Medicine
Internal Medicine

## 2020-10-07 NOTE — PROGRESS NOTE ADULT - PROBLEM SELECTOR PLAN 1
as per tramatic brain unit
stable
discuss nephrology
improved
increase labetolol 200 mg three times a day
increase lisinopril to 10 mg twice a day and follow bp
stable
stable BIU rehab
stable continue rehab
stable

## 2020-10-07 NOTE — PROGRESS NOTE ADULT - PROBLEM SELECTOR PROBLEM 1
Subarachnoid bleed
Subarachnoid bleed
HTN (hypertension)
HTN (hypertension), benign
Subarachnoid bleed

## 2020-10-07 NOTE — PROGRESS NOTE ADULT - SUBJECTIVE AND OBJECTIVE BOX
Resting    Vital Signs Last 24 Hrs  T(C): 36.9 (10-07-20 @ 07:17), Max: 36.9 (10-07-20 @ 07:17)  T(F): 98.4 (10-07-20 @ 07:17), Max: 98.4 (10-07-20 @ 07:17)  HR: 90 (10-07-20 @ 07:17) (68 - 90)  BP: 161/68  RR: 14 (10-07-20 @ 07:17) (14 - 15)  SpO2: 96% (10-07-20 @ 07:17) (96% - 97%)    card s1s2  b/l air entry  soft, ND  no edema                                                                                                          acetaminophen   Tablet .. 650 milliGRAM(s) Oral every 6 hours PRN  brimonidine 0.2%/ timolol 0.5% Ophthalmic Solution 1 Drop(s) Left EYE two times a day  cinacalcet 30 milliGRAM(s) Oral daily  donepezil 5 milliGRAM(s) Oral at bedtime  dorzolamide 2% Ophthalmic Solution 1 Drop(s) Both EYES three times a day  labetalol 300 milliGRAM(s) Oral three times a day  lisinopril 10 milliGRAM(s) Oral two times a day  montelukast 10 milliGRAM(s) Oral at bedtime  multivitamin 1 Tablet(s) Oral daily  senna 2 Tablet(s) Oral at bedtime PRN    A/P:    S/p fall, b/l SAH  Known CM, EF 25-30%, MR, TR, AR  S/p hemodynamic MORELIA   Resolved  UA w/pr 15 otherwise bland  SONO w/o hydro, b/l small kidneys  HTN, Labetalol increased  Avoid nephrotoxins  Hypercalcemia corrected on Sensipar  F/u w/PMD, renal as op    [phone]

## 2020-10-07 NOTE — DISCHARGE NOTE NURSING/CASE MANAGEMENT/SOCIAL WORK - PATIENT PORTAL LINK FT
You can access the FollowMyHealth Patient Portal offered by [facility] by registering at the following website: [email or web address]. By joining [employer]’s FollowMyHealth portal, you will also be able to view your health information using other applications (apps) compatible with our system.

## 2020-10-07 NOTE — PROGRESS NOTE ADULT - ASSESSMENT
ASSESSMENT/PLAN  AZRA CHAHAL is a 89yo Female w/ pmh htn, afib on coumadin and asa, +PPM, CHF (EF 25-30%), recent admission for an NSTEMI, presented to [facility] on 9/18 s/p mech fall and found to have b/l frontal SAH and +scalp hematoma. Repeat CT head stable and was resumed on warfarin. Now admitted for multidisciplinary rehab program.     Patient medically stable for discharge to home today.  Discharge medications and instructions reviewed with patient's daughter.  All questions answered.     #SAH  -On Ct head 9/20: Small amount of acute subarachnoid hemorrhage in the bifrontal lobes, stable   -No invasive procedures as per family and patient, patient is DNR/DNI   -EEG negative for seizures   --Pt. has not had any seizure activity and Completed 7 day keppra seizure ppx  guideline from [facility]--stopped keppra on 9/24   -Gait Instability, ADL impairments and Functional impairments: cont Rehab Program of PT/OT/SLP with home care therapy.     #Memory/cognition   -Cont. aricept 5mg 9/29     #Afib  -Cleared to resume warfarin as per neuro recs   -INR therapeutic-- INR 2.26  10/6   -INR goal 2-3  -dose Coumadin 2.5 mg 10/6    #Right wrist Sprain--Resolving  --xray neg for fracture  --ICE and tylenol PRN.      #right shoulder pain--resolving  --likely RTC tendinitis  --R shoulder xray 9/24 --No fractures, or dislocation, degenerative changes noted   --Pain control PRN    #Right elbow pain --resolving  -Likely medial epicondylitis   -R elbow xray 9/24-- no acute fractures noted         #CHF  -TTE from 8/30 with EF 25-30%       #HTN  --BP still elevated   --d/w with IM--Dr. Turner-- recommended to d/w nephrology for management   ----spoke with Dr. Bower-- rec.  increase Labetalol to 300mg TID   -- SBP goal <160   -Lisinopril was increased to 10mg BID as per IM 10/3   -follow up with PCP and Renal post discharge    #MORELIA (resolved)  - SONO w/o hydro, b/l small kidneys  -nephro following   --f/u as outpatient    #Pain Mgmt   - Tylenol PRN     #GI/Bowel Mgmt   - Continent     #/Bladder Mgmt   - Continent during the day, Intermittent Incontinence at night.   - toileting program     #FEN   - Diet - Regular + Thins  [CCHO, DASH/TLC]      #Precautions / PROPHYLAXIS:   - Falls, Cardiac, Seizure   - Lungs: Aspiration   - Pressure injury/Skin: OOB to Chair, PT/OT .  - DVT: Coumadin   -Code: DNR/DNI     Team Meeting 10/6--  Nursing: Continent bowel, incontinent bladder  OT: Supervision eating, grooming, Min A UBD, LBD, transfers  PT: Amb. 100ft Min A, Mod A transf; 4 steps min-mod A.   Speech: Regular, Mod A improving on biographical info and problem solving   D/c home 10/7

## 2020-10-12 PROBLEM — F41.9 ANXIETY AND DEPRESSION: Status: ACTIVE | Noted: 2020-01-01

## 2020-10-12 PROBLEM — Z23 INFLUENZA VACCINATION ADMINISTERED AT CURRENT VISIT: Status: ACTIVE | Noted: 2020-01-01

## 2020-10-13 PROBLEM — H35.30 MACULAR DEGENERATION: Status: ACTIVE | Noted: 2017-07-10

## 2020-10-13 PROBLEM — Z23 INFLUENZA VACCINATION ADMINISTERED AT CURRENT VISIT: Status: RESOLVED | Noted: 2017-10-16 | Resolved: 2020-01-01

## 2020-10-13 NOTE — HEALTH RISK ASSESSMENT
[No] : No [One fall no injury in past year] : Patient reported one fall in the past year without injury [1] : 2) Feeling down, depressed, or hopeless for several days (1) [HIV test declined] : HIV test declined [Hepatitis C test declined] : Hepatitis C test declined [] : No [[id number]] : 2 [MammogramDate] : defers [ColonoscopyDate] : defers

## 2020-10-13 NOTE — HISTORY OF PRESENT ILLNESS
[Post-hospitalization from ___ Hospital] : Post-hospitalization from [unfilled] Hospital [Discharged on ___] : discharged on [unfilled] [FreeTextEntry2] : 89-year-old woman comes to the office for transitional care accompanied by her daughter after recent hospitalization at [facility] for subarachnoid hemorrhage patient was on the brain injury unit after a regular hospital admission since discharge the patient has been lethargic sleeping large portions of the day needing 24-hour assistance the daughter has taken off from work to voluntarily take care of her her appetite has been poor and she has been losing weight she denies temperature chills sweats or myalgias she has had no headache sinus congestion sore throat cough wheezing pleurisy chest pain shortness of breath exertional dyspnea lightheadedness palpitations dizziness vertigo or syncope denies abdominal pain nausea vomiting diarrhea constipation bright red blood per rectum or black stools she has chronic leg edema and has been sleeping excessively

## 2020-10-13 NOTE — PHYSICAL EXAM
[No Acute Distress] : no acute distress [Well Nourished] : well nourished [Well Developed] : well developed [Well-Appearing] : well-appearing [Normal Voice/Communication] : normal voice/communication [Normal Sclera/Conjunctiva] : normal sclera/conjunctiva [PERRL] : pupils equal round and reactive to light [EOMI] : extraocular movements intact [Normal Outer Ear/Nose] : the outer ears and nose were normal in appearance [Normal Oropharynx] : the oropharynx was normal [Normal TMs] : both tympanic membranes were normal [Normal Nasal Mucosa] : the nasal mucosa was normal [No JVD] : no jugular venous distention [No Lymphadenopathy] : no lymphadenopathy [Supple] : supple [Thyroid Normal, No Nodules] : the thyroid was normal and there were no nodules present [No Respiratory Distress] : no respiratory distress  [No Accessory Muscle Use] : no accessory muscle use [Clear to Auscultation] : lungs were clear to auscultation bilaterally [Normal Percussion] : the chest was normal to percussion [Normal Rate] : normal rate  [Regular Rhythm] : with a regular rhythm [Normal S1, S2] : normal S1 and S2 [No Carotid Bruits] : no carotid bruits [No Abdominal Bruit] : a ~M bruit was not heard ~T in the abdomen [No Varicosities] : no varicosities [Pedal Pulses Present] : the pedal pulses are present [No Palpable Aorta] : no palpable aorta [No Extremity Clubbing/Cyanosis] : no extremity clubbing/cyanosis [Declined Breast Exam] : declined breast exam  [Soft] : abdomen soft [Non Tender] : non-tender [Non-distended] : non-distended [No Masses] : no abdominal mass palpated [No HSM] : no HSM [Normal Bowel Sounds] : normal bowel sounds [No Hernias] : no hernias [Declined Rectal Exam] : declined rectal exam [Normal Supraclavicular Nodes] : no supraclavicular lymphadenopathy [Normal Axillary Nodes] : no axillary lymphadenopathy [Normal Posterior Cervical Nodes] : no posterior cervical lymphadenopathy [Normal Anterior Cervical Nodes] : no anterior cervical lymphadenopathy [Normal Inguinal Nodes] : no inguinal lymphadenopathy [Normal Femoral Nodes] : no femoral lymphadenopathy [No CVA Tenderness] : no CVA  tenderness [No Spinal Tenderness] : no spinal tenderness [No Joint Swelling] : no joint swelling [Grossly Normal Strength/Tone] : grossly normal strength/tone [No Rash] : no rash [Coordination Grossly Intact] : coordination grossly intact [No Focal Deficits] : no focal deficits [Normal Gait] : normal gait [Deep Tendon Reflexes (DTR)] : deep tendon reflexes were 2+ and symmetric [Speech Grossly Normal] : speech grossly normal [Memory Grossly Normal] : memory grossly normal [Normal Affect] : the affect was normal [Alert and Oriented x3] : oriented to person, place, and time [Normal Mood] : the mood was normal [Normal Insight/Judgement] : insight and judgment were intact [Kyphosis] : no kyphosis [Scoliosis] : no scoliosis [Acne] : no acne [de-identified] : 1/6 systolic

## 2020-10-13 NOTE — ASSESSMENT
[FreeTextEntry1] : Physical exam shows a somewhat lethargic female in no acute distress blood pressure 118/64 height 5 feet unable to stand for weight temperature 97.5 °F heart rate of 72 respirations 15 and saturation on room air was 99% HEENT was unremarkable chest was clear cardiovascular exam showed a normal S1 and S2 with no extra heart sounds or murmurs abdomen was soft and nontender extremities showed bilateral leg edema to the knee neurologic exam was nonfocal... Patient while in hospital had difficulty controlling her blood pressure numbers at home still fluctuate and it will be followed closely patient needs close supervision of her warfarin in view of her recent subarachnoid hemorrhage unless levels can be safely coordinated warfarin may be discontinued as it being more risky in her situation long discussion with the daughter and the patient about her care tomorrow we will attempt to get 24-hour attendance to help her assisted living is not an option at subacute rehab remains an option if the situation at home cannot be maximized high-dose influenza vaccine was administered patient is up-to-date with her Prevnar 13 and her Pneumovax 23 she will if able attempt to get the shingles vaccine

## 2020-10-13 NOTE — REVIEW OF SYSTEMS
[Fatigue] : fatigue [Recent Change In Weight] : ~T recent weight change [Lower Ext Edema] : lower extremity edema [Nocturia] : nocturia [Joint Stiffness] : joint stiffness [Back Pain] : back pain [Anxiety] : anxiety [Fever] : no fever [Chills] : no chills [Hot Flashes] : no hot flashes [Night Sweats] : no night sweats [Discharge] : no discharge [Pain] : no pain [Redness] : no redness [Dryness] : no dryness  [Vision Problems] : no vision problems [Itching] : no itching [Earache] : no earache [Hearing Loss] : no hearing loss [Nosebleed] : no nosebleeds [Nasal Discharge] : no nasal discharge [Sore Throat] : no sore throat [Postnasal Drip] : no postnasal drip [Chest Pain] : no chest pain [Palpitations] : no palpitations [Leg Claudication] : no leg claudication [Orthopnea] : no orthopnea [Paroxysmal Nocturnal Dyspnea] : no paroxysmal nocturnal dyspnea [Shortness Of Breath] : no shortness of breath [Wheezing] : no wheezing [Cough] : no cough [Dyspnea on Exertion] : no dyspnea on exertion [Abdominal Pain] : no abdominal pain [Nausea] : no nausea [Constipation] : no constipation [Diarrhea] : diarrhea [Vomiting] : no vomiting [Heartburn] : no heartburn [Melena] : no melena [Dysuria] : no dysuria [Incontinence] : no incontinence [Poor Libido] : libido not poor [Hematuria] : no hematuria [Frequency] : no frequency [Vaginal Discharge] : no vaginal discharge [Dysmenorrhea] : no dysmenorrhea [Joint Pain] : no joint pain [Joint Swelling] : no joint swelling [Muscle Weakness] : no muscle weakness [Muscle Pain] : no muscle pain [Itching] : no itching [Mole Changes] : no mole changes [Nail Changes] : no nail changes [Hair Changes] : no hair changes [Skin Rash] : no skin rash [Headache] : no headache [Dizziness] : no dizziness [Fainting] : no fainting [Confusion] : no confusion [Memory Loss] : no memory loss [Unsteady Walking] : no ataxia [Suicidal] : not suicidal [Insomnia] : no insomnia [Depression] : no depression [Easy Bleeding] : no easy bleeding [Easy Bruising] : no easy bruising [Swollen Glands] : no swollen glands [FreeTextEntry2] : 2 lbs down [FreeTextEntry7] : poor appetite

## 2020-10-13 NOTE — HEALTH RISK ASSESSMENT
[No] : No [One fall no injury in past year] : Patient reported one fall in the past year without injury [1] : 2) Feeling down, depressed, or hopeless for several days (1) [HIV test declined] : HIV test declined [Hepatitis C test declined] : Hepatitis C test declined [] : No [[id number]] : 2 [MammogramDate] : defers [ColonoscopyDate] : defers

## 2020-10-13 NOTE — PHYSICAL EXAM
[No Acute Distress] : no acute distress [Well Nourished] : well nourished [Well Developed] : well developed [Well-Appearing] : well-appearing [Normal Voice/Communication] : normal voice/communication [Normal Sclera/Conjunctiva] : normal sclera/conjunctiva [PERRL] : pupils equal round and reactive to light [EOMI] : extraocular movements intact [Normal Outer Ear/Nose] : the outer ears and nose were normal in appearance [Normal Oropharynx] : the oropharynx was normal [Normal TMs] : both tympanic membranes were normal [Normal Nasal Mucosa] : the nasal mucosa was normal [No JVD] : no jugular venous distention [No Lymphadenopathy] : no lymphadenopathy [Supple] : supple [Thyroid Normal, No Nodules] : the thyroid was normal and there were no nodules present [No Respiratory Distress] : no respiratory distress  [No Accessory Muscle Use] : no accessory muscle use [Clear to Auscultation] : lungs were clear to auscultation bilaterally [Normal Percussion] : the chest was normal to percussion [Normal Rate] : normal rate  [Regular Rhythm] : with a regular rhythm [Normal S1, S2] : normal S1 and S2 [No Carotid Bruits] : no carotid bruits [No Abdominal Bruit] : a ~M bruit was not heard ~T in the abdomen [No Varicosities] : no varicosities [Pedal Pulses Present] : the pedal pulses are present [No Palpable Aorta] : no palpable aorta [No Extremity Clubbing/Cyanosis] : no extremity clubbing/cyanosis [Declined Breast Exam] : declined breast exam  [Soft] : abdomen soft [Non Tender] : non-tender [Non-distended] : non-distended [No Masses] : no abdominal mass palpated [No HSM] : no HSM [Normal Bowel Sounds] : normal bowel sounds [No Hernias] : no hernias [Declined Rectal Exam] : declined rectal exam [Normal Supraclavicular Nodes] : no supraclavicular lymphadenopathy [Normal Axillary Nodes] : no axillary lymphadenopathy [Normal Posterior Cervical Nodes] : no posterior cervical lymphadenopathy [Normal Anterior Cervical Nodes] : no anterior cervical lymphadenopathy [Normal Inguinal Nodes] : no inguinal lymphadenopathy [Normal Femoral Nodes] : no femoral lymphadenopathy [No CVA Tenderness] : no CVA  tenderness [No Spinal Tenderness] : no spinal tenderness [No Joint Swelling] : no joint swelling [Grossly Normal Strength/Tone] : grossly normal strength/tone [No Rash] : no rash [Coordination Grossly Intact] : coordination grossly intact [No Focal Deficits] : no focal deficits [Normal Gait] : normal gait [Deep Tendon Reflexes (DTR)] : deep tendon reflexes were 2+ and symmetric [Speech Grossly Normal] : speech grossly normal [Memory Grossly Normal] : memory grossly normal [Normal Affect] : the affect was normal [Alert and Oriented x3] : oriented to person, place, and time [Normal Mood] : the mood was normal [Normal Insight/Judgement] : insight and judgment were intact [Kyphosis] : no kyphosis [Scoliosis] : no scoliosis [Acne] : no acne [de-identified] : 1/6 systolic

## 2020-10-13 NOTE — HISTORY OF PRESENT ILLNESS
[Post-hospitalization from ___ Hospital] : Post-hospitalization from [unfilled] Hospital [Discharged on ___] : discharged on [unfilled] [FreeTextEntry2] : 89-year-old woman comes to the office for transitional care accompanied by her daughter after recent hospitalization at [facility] for subarachnoid hemorrhage patient was on the brain injury unit after a regular hospital admission since discharge the patient has been lethargic sleeping large portions of the day needing 24-hour assistance the daughter has taken off from work to voluntarily take care of her her appetite has been poor and she has been losing weight she denies temperature chills sweats or myalgias she has had no headache sinus congestion sore throat cough wheezing pleurisy chest pain shortness of breath exertional dyspnea lightheadedness palpitations dizziness vertigo or syncope denies abdominal pain nausea vomiting diarrhea constipation bright red blood per rectum or black stools she has chronic leg edema and has been sleeping excessively

## 2020-11-04 PROBLEM — Z95.0 CARDIAC PACEMAKER: Status: ACTIVE | Noted: 2017-09-20

## 2020-11-04 NOTE — PROCEDURE
[VVI] : VVI [Voltage: ___ volts] : Voltage was [unfilled] volts [Longevity: ___ months] : The estimated remaining battery life is [unfilled] months [Threshold Testing Performed] : Threshold testing was performed [Ventricular] : Ventricular [Lead Imp:  ___ohms] : lead impedance was [unfilled] ohms [Sensing Amplitude ___mv] : sensing amplitude was [unfilled] mv [___V @] : [unfilled] V [___ ms] : [unfilled] ms [Pace ___ %] : Pace [unfilled]% [de-identified] : medtronic [de-identified] : [id number] [de-identified] : [id number] [de-identified] : 12/2/2015 [de-identified] : 60 [de-identified] : 18 episodes of nsvt longest 5 beats at 203 bpm(1second)\par normal device function

## 2020-11-10 PROBLEM — S06.6X9A TRAUMATIC SUBARACHNOID HEMORRHAGE: Status: ACTIVE | Noted: 2020-01-01

## 2020-11-10 PROBLEM — F03.90 DEMENTIA: Status: ACTIVE | Noted: 2020-01-01

## 2020-11-10 NOTE — HISTORY OF PRESENT ILLNESS
[FreeTextEntry1] : hx from my note on sunrise from Sept [facility] admission:\par 89-year-old woman who had a second unwitnessed syncopal episode earlier this month.  Previous episode was tied to a non-STEMI and Dr. Cunningham was consulted for the syncopal event.  EEG at the time was unremarkable.  Patient presents with another syncopal event. Please check HCT w/o contrast TODAY. If stable can restart coumadin for stroke prevention due to afib, if medically cleared. Her subarachnoid hemorrhage is likely traumatic.  Patient's daughter is aware of the risk and benefits of restarting Coumadin during initial consultation with me.  As patient expresses no desire for invasive procedure including aneurysm intervention, will hold off on vessel imaging as because of the subarachnoid hemorrhage.  EEG pending.Will recommend checking orthostatic blood pressure and cardiac monitoring as a cause of her syncope. Continue keppra for 2 weeks as sah can be cause of seizure during this acute phase. Signout will be given to Dr. Neil.--end of record--\par \par Pt was cleared for coumadin by dr. Neil but it was discontinued as per Dr. Turner's note when she saw him as outpt. Her INR was supratherapeutic. \par \par addendum: dr. Turner notes that Dr. nicole had requested warfarin already\par \par

## 2020-11-10 NOTE — PHYSICAL EXAM
[General Appearance - Alert] : alert [Person] : oriented to person [Fluency] : fluency intact [Cranial Nerves Optic (II)] : visual acuity intact bilaterally,  visual fields full to confrontation, pupils equal round and reactive to light [Cranial Nerves Oculomotor (III)] : extraocular motion intact [Cranial Nerves Trigeminal (V)] : facial sensation intact symmetrically [Cranial Nerves Facial (VII)] : face symmetrical [Cranial Nerves Vestibulocochlear (VIII)] : hearing was intact bilaterally [Cranial Nerves Glossopharyngeal (IX)] : tongue and palate midline [Cranial Nerves Accessory (XI - Cranial And Spinal)] : head turning and shoulder shrug symmetric [Cranial Nerves Hypoglossal (XII)] : there was no tongue deviation with protrusion [Motor Tone] : muscle tone was normal in all four extremities [1+] : Biceps left 1+ [0] : Ankle jerk left 0 [] : no rash [Place] : disoriented to place [Time] : disoriented to time [Short Term Intact] : short term memory impaired [Current Events] : inadequate knowledge of current events [Coordination - Dysmetria Impaired Finger-to-Nose Bilateral] : not present [FreeTextEntry6] : able to lift both arms and legs against gravity. [FreeTextEntry8] : wheelchair bound [FreeTextEntry1] : as above

## 2020-11-10 NOTE — DISCUSSION/SUMMARY
[FreeTextEntry1] : 89 W who is here for consultation after hospital discharge for traumatic SAH while on coumadin for Afib. Given her risk factors, daughter and I had discussion about the possibility of eliquis for her treatment of secondary prevention of cva from afib. I sent msg to dr. Turner regarding the consideration of eliquis for afib in place of coumadin. For her SAH, there was some residual blood on last imaging. will repeat HCT today. she will fu w me.\par \par More than 50% of time spent on counseling and educate patient on differential, workup, disease course, and treatment/management. Education was provided to the patient during this encounter. All questions and concerns were answered and addressed in detail. The patient verbalized understanding and agreed to plan. Patient was advised to continue to monitor for neurologic symptoms and to notify my office or go to the nearest emergency room if there are any changes. \par Side effects of the above medications were discussed in detail including but not limited to applicable black box warning and teratogenicity as appropriate. \par Patient was advised to bring previous records to my office. \par \par \par

## 2021-01-01 ENCOUNTER — EMERGENCY (EMERGENCY)
Facility: HOSPITAL | Age: 86
LOS: 1 days | Discharge: HOME | End: 2021-01-01
Attending: EMERGENCY MEDICINE | Admitting: EMERGENCY MEDICINE
Payer: MEDICARE

## 2021-01-01 ENCOUNTER — APPOINTMENT (OUTPATIENT)
Dept: OTOLARYNGOLOGY | Facility: CLINIC | Age: 86
End: 2021-01-01

## 2021-01-01 ENCOUNTER — INPATIENT (INPATIENT)
Facility: HOSPITAL | Age: 86
LOS: 4 days | DRG: 291 | End: 2021-01-30
Attending: INTERNAL MEDICINE | Admitting: HOSPITALIST
Payer: MEDICARE

## 2021-01-01 VITALS
SYSTOLIC BLOOD PRESSURE: 195 MMHG | HEIGHT: 62 IN | TEMPERATURE: 98 F | RESPIRATION RATE: 17 BRPM | DIASTOLIC BLOOD PRESSURE: 99 MMHG | WEIGHT: 110.01 LBS | HEART RATE: 97 BPM | OXYGEN SATURATION: 95 %

## 2021-01-01 VITALS — OXYGEN SATURATION: 100 % | RESPIRATION RATE: 20 BRPM | HEART RATE: 90 BPM | TEMPERATURE: 98 F

## 2021-01-01 VITALS — HEIGHT: 62 IN | WEIGHT: 104.94 LBS

## 2021-01-01 VITALS — HEIGHT: 63 IN | WEIGHT: 114.64 LBS

## 2021-01-01 DIAGNOSIS — I50.9 HEART FAILURE, UNSPECIFIED: ICD-10-CM

## 2021-01-01 DIAGNOSIS — Z98.49 CATARACT EXTRACTION STATUS, UNSPECIFIED EYE: Chronic | ICD-10-CM

## 2021-01-01 DIAGNOSIS — N17.9 ACUTE KIDNEY FAILURE, UNSPECIFIED: ICD-10-CM

## 2021-01-01 DIAGNOSIS — F03.90 UNSPECIFIED DEMENTIA WITHOUT BEHAVIORAL DISTURBANCE: ICD-10-CM

## 2021-01-01 DIAGNOSIS — I10 ESSENTIAL (PRIMARY) HYPERTENSION: ICD-10-CM

## 2021-01-01 DIAGNOSIS — I48.91 UNSPECIFIED ATRIAL FIBRILLATION: ICD-10-CM

## 2021-01-01 DIAGNOSIS — R06.02 SHORTNESS OF BREATH: ICD-10-CM

## 2021-01-01 LAB
ALBUMIN SERPL ELPH-MCNC: 3.1 G/DL — LOW (ref 3.3–5)
ALBUMIN SERPL ELPH-MCNC: 3.6 G/DL — SIGNIFICANT CHANGE UP (ref 3.3–5)
ALP SERPL-CCNC: 101 U/L — SIGNIFICANT CHANGE UP (ref 40–120)
ALP SERPL-CCNC: 112 U/L — SIGNIFICANT CHANGE UP (ref 40–120)
ALT FLD-CCNC: 13 U/L — SIGNIFICANT CHANGE UP (ref 10–45)
ALT FLD-CCNC: 20 U/L — SIGNIFICANT CHANGE UP (ref 10–45)
ANION GAP SERPL CALC-SCNC: 11 MMOL/L — SIGNIFICANT CHANGE UP (ref 5–17)
ANION GAP SERPL CALC-SCNC: 14 MMOL/L — SIGNIFICANT CHANGE UP (ref 5–17)
ANION GAP SERPL CALC-SCNC: 8 MMOL/L — SIGNIFICANT CHANGE UP (ref 5–17)
ANION GAP SERPL CALC-SCNC: 9 MMOL/L — SIGNIFICANT CHANGE UP (ref 5–17)
APTT BLD: 26.1 SEC — LOW (ref 27.5–35.5)
APTT BLD: 26.7 SEC — LOW (ref 27.5–35.5)
AST SERPL-CCNC: 21 U/L — SIGNIFICANT CHANGE UP (ref 10–40)
AST SERPL-CCNC: 26 U/L — SIGNIFICANT CHANGE UP (ref 10–40)
BASOPHILS # BLD AUTO: 0.04 K/UL — SIGNIFICANT CHANGE UP (ref 0–0.2)
BASOPHILS # BLD AUTO: 0.04 K/UL — SIGNIFICANT CHANGE UP (ref 0–0.2)
BASOPHILS # BLD AUTO: 0.06 K/UL — SIGNIFICANT CHANGE UP (ref 0–0.2)
BASOPHILS # BLD AUTO: 0.06 K/UL — SIGNIFICANT CHANGE UP (ref 0–0.2)
BASOPHILS NFR BLD AUTO: 0.5 % — SIGNIFICANT CHANGE UP (ref 0–2)
BASOPHILS NFR BLD AUTO: 0.5 % — SIGNIFICANT CHANGE UP (ref 0–2)
BASOPHILS NFR BLD AUTO: 0.6 % — SIGNIFICANT CHANGE UP (ref 0–2)
BASOPHILS NFR BLD AUTO: 0.6 % — SIGNIFICANT CHANGE UP (ref 0–2)
BILIRUB SERPL-MCNC: 0.6 MG/DL — SIGNIFICANT CHANGE UP (ref 0.2–1.2)
BILIRUB SERPL-MCNC: 0.7 MG/DL — SIGNIFICANT CHANGE UP (ref 0.2–1.2)
BUN SERPL-MCNC: 12 MG/DL — SIGNIFICANT CHANGE UP (ref 7–23)
BUN SERPL-MCNC: 22 MG/DL — SIGNIFICANT CHANGE UP (ref 7–23)
BUN SERPL-MCNC: 23 MG/DL — SIGNIFICANT CHANGE UP (ref 7–23)
BUN SERPL-MCNC: 26 MG/DL — HIGH (ref 7–23)
BUN SERPL-MCNC: 27 MG/DL — HIGH (ref 7–23)
BUN SERPL-MCNC: 29 MG/DL — HIGH (ref 7–23)
CALCIUM SERPL-MCNC: 10.4 MG/DL — SIGNIFICANT CHANGE UP (ref 8.4–10.5)
CALCIUM SERPL-MCNC: 7.6 MG/DL — LOW (ref 8.4–10.5)
CALCIUM SERPL-MCNC: 7.8 MG/DL — LOW (ref 8.4–10.5)
CALCIUM SERPL-MCNC: 7.9 MG/DL — LOW (ref 8.4–10.5)
CALCIUM SERPL-MCNC: 8.3 MG/DL — LOW (ref 8.4–10.5)
CALCIUM SERPL-MCNC: 8.6 MG/DL — SIGNIFICANT CHANGE UP (ref 8.4–10.5)
CHLORIDE SERPL-SCNC: 100 MMOL/L — SIGNIFICANT CHANGE UP (ref 96–108)
CHLORIDE SERPL-SCNC: 100 MMOL/L — SIGNIFICANT CHANGE UP (ref 96–108)
CHLORIDE SERPL-SCNC: 102 MMOL/L — SIGNIFICANT CHANGE UP (ref 96–108)
CHLORIDE SERPL-SCNC: 103 MMOL/L — SIGNIFICANT CHANGE UP (ref 96–108)
CHLORIDE SERPL-SCNC: 104 MMOL/L — SIGNIFICANT CHANGE UP (ref 96–108)
CHLORIDE SERPL-SCNC: 104 MMOL/L — SIGNIFICANT CHANGE UP (ref 96–108)
CO2 BLDA-SCNC: 28 MMOL/L — SIGNIFICANT CHANGE UP (ref 22–30)
CO2 SERPL-SCNC: 24 MMOL/L — SIGNIFICANT CHANGE UP (ref 22–31)
CO2 SERPL-SCNC: 26 MMOL/L — SIGNIFICANT CHANGE UP (ref 22–31)
CO2 SERPL-SCNC: 28 MMOL/L — SIGNIFICANT CHANGE UP (ref 22–31)
CO2 SERPL-SCNC: 31 MMOL/L — SIGNIFICANT CHANGE UP (ref 22–31)
CREAT SERPL-MCNC: 1 MG/DL — SIGNIFICANT CHANGE UP (ref 0.5–1.3)
CREAT SERPL-MCNC: 1.2 MG/DL — SIGNIFICANT CHANGE UP (ref 0.5–1.3)
CREAT SERPL-MCNC: 1.25 MG/DL — SIGNIFICANT CHANGE UP (ref 0.5–1.3)
CREAT SERPL-MCNC: 1.44 MG/DL — HIGH (ref 0.5–1.3)
CREAT SERPL-MCNC: 1.54 MG/DL — HIGH (ref 0.5–1.3)
CREAT SERPL-MCNC: 1.66 MG/DL — HIGH (ref 0.5–1.3)
CRP SERPL-MCNC: 0.67 MG/DL — HIGH (ref 0–0.4)
CRP SERPL-MCNC: 1.65 MG/DL — HIGH (ref 0–0.4)
D DIMER BLD IA.RAPID-MCNC: 1007 NG/ML DDU — HIGH
D DIMER BLD IA.RAPID-MCNC: 499 NG/ML DDU — HIGH
D DIMER BLD IA.RAPID-MCNC: 542 NG/ML DDU — HIGH
EOSINOPHIL # BLD AUTO: 0.09 K/UL — SIGNIFICANT CHANGE UP (ref 0–0.5)
EOSINOPHIL # BLD AUTO: 0.1 K/UL — SIGNIFICANT CHANGE UP (ref 0–0.5)
EOSINOPHIL # BLD AUTO: 0.14 K/UL — SIGNIFICANT CHANGE UP (ref 0–0.5)
EOSINOPHIL # BLD AUTO: 0.3 K/UL — SIGNIFICANT CHANGE UP (ref 0–0.5)
EOSINOPHIL NFR BLD AUTO: 0.9 % — SIGNIFICANT CHANGE UP (ref 0–6)
EOSINOPHIL NFR BLD AUTO: 1 % — SIGNIFICANT CHANGE UP (ref 0–6)
EOSINOPHIL NFR BLD AUTO: 1.8 % — SIGNIFICANT CHANGE UP (ref 0–6)
EOSINOPHIL NFR BLD AUTO: 3.7 % — SIGNIFICANT CHANGE UP (ref 0–6)
FERRITIN SERPL-MCNC: 129 NG/ML — SIGNIFICANT CHANGE UP (ref 15–150)
FERRITIN SERPL-MCNC: 183 NG/ML — HIGH (ref 15–150)
FLU A RESULT: SIGNIFICANT CHANGE UP
FLU A RESULT: SIGNIFICANT CHANGE UP
FLUAV AG NPH QL: SIGNIFICANT CHANGE UP
FLUBV AG NPH QL: SIGNIFICANT CHANGE UP
GAS PNL BLDA: SIGNIFICANT CHANGE UP
GLUCOSE SERPL-MCNC: 106 MG/DL — HIGH (ref 70–99)
GLUCOSE SERPL-MCNC: 117 MG/DL — HIGH (ref 70–99)
GLUCOSE SERPL-MCNC: 177 MG/DL — HIGH (ref 70–99)
GLUCOSE SERPL-MCNC: 199 MG/DL — HIGH (ref 70–99)
GLUCOSE SERPL-MCNC: 94 MG/DL — SIGNIFICANT CHANGE UP (ref 70–99)
GLUCOSE SERPL-MCNC: 96 MG/DL — SIGNIFICANT CHANGE UP (ref 70–99)
HCT VFR BLD CALC: 35.5 % — SIGNIFICANT CHANGE UP (ref 34.5–45)
HCT VFR BLD CALC: 35.6 % — SIGNIFICANT CHANGE UP (ref 34.5–45)
HCT VFR BLD CALC: 36.5 % — SIGNIFICANT CHANGE UP (ref 34.5–45)
HCT VFR BLD CALC: 37.8 % — SIGNIFICANT CHANGE UP (ref 34.5–45)
HCT VFR BLD CALC: 40.7 % — SIGNIFICANT CHANGE UP (ref 34.5–45)
HCT VFR BLD CALC: 42.2 % — SIGNIFICANT CHANGE UP (ref 34.5–45)
HGB BLD-MCNC: 11.1 G/DL — LOW (ref 11.5–15.5)
HGB BLD-MCNC: 11.3 G/DL — LOW (ref 11.5–15.5)
HGB BLD-MCNC: 11.5 G/DL — SIGNIFICANT CHANGE UP (ref 11.5–15.5)
HGB BLD-MCNC: 11.9 G/DL — SIGNIFICANT CHANGE UP (ref 11.5–15.5)
HGB BLD-MCNC: 12.5 G/DL — SIGNIFICANT CHANGE UP (ref 11.5–15.5)
HGB BLD-MCNC: 13.2 G/DL — SIGNIFICANT CHANGE UP (ref 11.5–15.5)
HOROWITZ INDEX BLDA+IHG-RTO: SIGNIFICANT CHANGE UP
IMM GRANULOCYTES NFR BLD AUTO: 0.4 % — SIGNIFICANT CHANGE UP (ref 0–1.5)
IMM GRANULOCYTES NFR BLD AUTO: 0.4 % — SIGNIFICANT CHANGE UP (ref 0–1.5)
IMM GRANULOCYTES NFR BLD AUTO: 0.5 % — SIGNIFICANT CHANGE UP (ref 0–1.5)
IMM GRANULOCYTES NFR BLD AUTO: 0.5 % — SIGNIFICANT CHANGE UP (ref 0–1.5)
INR BLD: 1.62 RATIO — HIGH (ref 0.88–1.16)
INR BLD: 2.13 RATIO — HIGH (ref 0.88–1.16)
LYMPHOCYTES # BLD AUTO: 0.82 K/UL — LOW (ref 1–3.3)
LYMPHOCYTES # BLD AUTO: 1.08 K/UL — SIGNIFICANT CHANGE UP (ref 1–3.3)
LYMPHOCYTES # BLD AUTO: 1.74 K/UL — SIGNIFICANT CHANGE UP (ref 1–3.3)
LYMPHOCYTES # BLD AUTO: 1.84 K/UL — SIGNIFICANT CHANGE UP (ref 1–3.3)
LYMPHOCYTES # BLD AUTO: 10.1 % — LOW (ref 13–44)
LYMPHOCYTES # BLD AUTO: 14.2 % — SIGNIFICANT CHANGE UP (ref 13–44)
LYMPHOCYTES # BLD AUTO: 18 % — SIGNIFICANT CHANGE UP (ref 13–44)
LYMPHOCYTES # BLD AUTO: 18.7 % — SIGNIFICANT CHANGE UP (ref 13–44)
MAGNESIUM SERPL-MCNC: 1.5 MG/DL — LOW (ref 1.6–2.6)
MCHC RBC-ENTMCNC: 28 PG — SIGNIFICANT CHANGE UP (ref 27–34)
MCHC RBC-ENTMCNC: 28 PG — SIGNIFICANT CHANGE UP (ref 27–34)
MCHC RBC-ENTMCNC: 28.3 PG — SIGNIFICANT CHANGE UP (ref 27–34)
MCHC RBC-ENTMCNC: 28.3 PG — SIGNIFICANT CHANGE UP (ref 27–34)
MCHC RBC-ENTMCNC: 28.4 PG — SIGNIFICANT CHANGE UP (ref 27–34)
MCHC RBC-ENTMCNC: 28.8 PG — SIGNIFICANT CHANGE UP (ref 27–34)
MCHC RBC-ENTMCNC: 30.7 GM/DL — LOW (ref 32–36)
MCHC RBC-ENTMCNC: 31.2 GM/DL — LOW (ref 32–36)
MCHC RBC-ENTMCNC: 31.3 GM/DL — LOW (ref 32–36)
MCHC RBC-ENTMCNC: 31.5 GM/DL — LOW (ref 32–36)
MCHC RBC-ENTMCNC: 31.5 GM/DL — LOW (ref 32–36)
MCHC RBC-ENTMCNC: 31.8 GM/DL — LOW (ref 32–36)
MCV RBC AUTO: 89 FL — SIGNIFICANT CHANGE UP (ref 80–100)
MCV RBC AUTO: 89 FL — SIGNIFICANT CHANGE UP (ref 80–100)
MCV RBC AUTO: 89.7 FL — SIGNIFICANT CHANGE UP (ref 80–100)
MCV RBC AUTO: 89.8 FL — SIGNIFICANT CHANGE UP (ref 80–100)
MCV RBC AUTO: 92.1 FL — SIGNIFICANT CHANGE UP (ref 80–100)
MCV RBC AUTO: 92.5 FL — SIGNIFICANT CHANGE UP (ref 80–100)
MONOCYTES # BLD AUTO: 0.45 K/UL — SIGNIFICANT CHANGE UP (ref 0–0.9)
MONOCYTES # BLD AUTO: 0.64 K/UL — SIGNIFICANT CHANGE UP (ref 0–0.9)
MONOCYTES # BLD AUTO: 0.67 K/UL — SIGNIFICANT CHANGE UP (ref 0–0.9)
MONOCYTES # BLD AUTO: 0.78 K/UL — SIGNIFICANT CHANGE UP (ref 0–0.9)
MONOCYTES NFR BLD AUTO: 5.6 % — SIGNIFICANT CHANGE UP (ref 2–14)
MONOCYTES NFR BLD AUTO: 6.6 % — SIGNIFICANT CHANGE UP (ref 2–14)
MONOCYTES NFR BLD AUTO: 7.9 % — SIGNIFICANT CHANGE UP (ref 2–14)
MONOCYTES NFR BLD AUTO: 8.8 % — SIGNIFICANT CHANGE UP (ref 2–14)
NEUTROPHILS # BLD AUTO: 5.67 K/UL — SIGNIFICANT CHANGE UP (ref 1.8–7.4)
NEUTROPHILS # BLD AUTO: 6.44 K/UL — SIGNIFICANT CHANGE UP (ref 1.8–7.4)
NEUTROPHILS # BLD AUTO: 7 K/UL — SIGNIFICANT CHANGE UP (ref 1.8–7.4)
NEUTROPHILS # BLD AUTO: 7.07 K/UL — SIGNIFICANT CHANGE UP (ref 1.8–7.4)
NEUTROPHILS NFR BLD AUTO: 71.4 % — SIGNIFICANT CHANGE UP (ref 43–77)
NEUTROPHILS NFR BLD AUTO: 73.3 % — SIGNIFICANT CHANGE UP (ref 43–77)
NEUTROPHILS NFR BLD AUTO: 74.3 % — SIGNIFICANT CHANGE UP (ref 43–77)
NEUTROPHILS NFR BLD AUTO: 79.7 % — HIGH (ref 43–77)
NRBC # BLD: 0 /100 WBCS — SIGNIFICANT CHANGE UP (ref 0–0)
NT-PROBNP SERPL-SCNC: HIGH PG/ML (ref 0–300)
NT-PROBNP SERPL-SCNC: HIGH PG/ML (ref 0–300)
OB PNL STL: NEGATIVE — SIGNIFICANT CHANGE UP
PCO2 BLDA: 43 MMHG — SIGNIFICANT CHANGE UP (ref 32–46)
PH BLDA: 7.41 — SIGNIFICANT CHANGE UP (ref 7.35–7.45)
PLATELET # BLD AUTO: 173 K/UL — SIGNIFICANT CHANGE UP (ref 150–400)
PLATELET # BLD AUTO: 177 K/UL — SIGNIFICANT CHANGE UP (ref 150–400)
PLATELET # BLD AUTO: 186 K/UL — SIGNIFICANT CHANGE UP (ref 150–400)
PLATELET # BLD AUTO: 235 K/UL — SIGNIFICANT CHANGE UP (ref 150–400)
PLATELET # BLD AUTO: 245 K/UL — SIGNIFICANT CHANGE UP (ref 150–400)
PLATELET # BLD AUTO: 258 K/UL — SIGNIFICANT CHANGE UP (ref 150–400)
PO2 BLDA: 86 MMHG — SIGNIFICANT CHANGE UP (ref 74–108)
POTASSIUM SERPL-MCNC: 3.2 MMOL/L — LOW (ref 3.5–5.3)
POTASSIUM SERPL-MCNC: 3.4 MMOL/L — LOW (ref 3.5–5.3)
POTASSIUM SERPL-MCNC: 3.7 MMOL/L — SIGNIFICANT CHANGE UP (ref 3.5–5.3)
POTASSIUM SERPL-MCNC: 4.1 MMOL/L — SIGNIFICANT CHANGE UP (ref 3.5–5.3)
POTASSIUM SERPL-MCNC: 4.1 MMOL/L — SIGNIFICANT CHANGE UP (ref 3.5–5.3)
POTASSIUM SERPL-MCNC: 4.3 MMOL/L — SIGNIFICANT CHANGE UP (ref 3.5–5.3)
POTASSIUM SERPL-SCNC: 3.2 MMOL/L — LOW (ref 3.5–5.3)
POTASSIUM SERPL-SCNC: 3.4 MMOL/L — LOW (ref 3.5–5.3)
POTASSIUM SERPL-SCNC: 3.7 MMOL/L — SIGNIFICANT CHANGE UP (ref 3.5–5.3)
POTASSIUM SERPL-SCNC: 4.1 MMOL/L — SIGNIFICANT CHANGE UP (ref 3.5–5.3)
POTASSIUM SERPL-SCNC: 4.1 MMOL/L — SIGNIFICANT CHANGE UP (ref 3.5–5.3)
POTASSIUM SERPL-SCNC: 4.3 MMOL/L — SIGNIFICANT CHANGE UP (ref 3.5–5.3)
PROCALCITONIN SERPL-MCNC: 0.08 NG/ML — SIGNIFICANT CHANGE UP
PROCALCITONIN SERPL-MCNC: 0.19 NG/ML — HIGH
PROT SERPL-MCNC: 6.7 G/DL — SIGNIFICANT CHANGE UP (ref 6–8.3)
PROT SERPL-MCNC: 6.9 G/DL — SIGNIFICANT CHANGE UP (ref 6–8.3)
PROTHROM AB SERPL-ACNC: 19.2 SEC — HIGH (ref 10.6–13.6)
PROTHROM AB SERPL-ACNC: 24.9 SEC — HIGH (ref 10.6–13.6)
RBC # BLD: 3.97 M/UL — SIGNIFICANT CHANGE UP (ref 3.8–5.2)
RBC # BLD: 3.99 M/UL — SIGNIFICANT CHANGE UP (ref 3.8–5.2)
RBC # BLD: 4.1 M/UL — SIGNIFICANT CHANGE UP (ref 3.8–5.2)
RBC # BLD: 4.21 M/UL — SIGNIFICANT CHANGE UP (ref 3.8–5.2)
RBC # BLD: 4.4 M/UL — SIGNIFICANT CHANGE UP (ref 3.8–5.2)
RBC # BLD: 4.58 M/UL — SIGNIFICANT CHANGE UP (ref 3.8–5.2)
RBC # FLD: 14.5 % — SIGNIFICANT CHANGE UP (ref 10.3–14.5)
RBC # FLD: 16.5 % — HIGH (ref 10.3–14.5)
RBC # FLD: 16.5 % — HIGH (ref 10.3–14.5)
RBC # FLD: 16.7 % — HIGH (ref 10.3–14.5)
RBC # FLD: 16.8 % — HIGH (ref 10.3–14.5)
RBC # FLD: 17 % — HIGH (ref 10.3–14.5)
RSV RESULT: SIGNIFICANT CHANGE UP
RSV RNA RESP QL NAA+PROBE: SIGNIFICANT CHANGE UP
SAO2 % BLDA: 96 % — SIGNIFICANT CHANGE UP (ref 92–96)
SARS-COV-2 IGG SERPL QL IA: NEGATIVE — SIGNIFICANT CHANGE UP
SARS-COV-2 IGM SERPL IA-ACNC: <3.8 AU/ML — SIGNIFICANT CHANGE UP
SARS-COV-2 RNA SPEC QL NAA+PROBE: SIGNIFICANT CHANGE UP
SARS-COV-2 RNA SPEC QL NAA+PROBE: SIGNIFICANT CHANGE UP
SODIUM SERPL-SCNC: 137 MMOL/L — SIGNIFICANT CHANGE UP (ref 135–145)
SODIUM SERPL-SCNC: 137 MMOL/L — SIGNIFICANT CHANGE UP (ref 135–145)
SODIUM SERPL-SCNC: 140 MMOL/L — SIGNIFICANT CHANGE UP (ref 135–145)
SODIUM SERPL-SCNC: 141 MMOL/L — SIGNIFICANT CHANGE UP (ref 135–145)
SODIUM SERPL-SCNC: 141 MMOL/L — SIGNIFICANT CHANGE UP (ref 135–145)
SODIUM SERPL-SCNC: 142 MMOL/L — SIGNIFICANT CHANGE UP (ref 135–145)
TROPONIN I SERPL-MCNC: <.017 NG/ML — LOW (ref 0.02–0.06)
WBC # BLD: 6.13 K/UL — SIGNIFICANT CHANGE UP (ref 3.8–10.5)
WBC # BLD: 6.75 K/UL — SIGNIFICANT CHANGE UP (ref 3.8–10.5)
WBC # BLD: 7.63 K/UL — SIGNIFICANT CHANGE UP (ref 3.8–10.5)
WBC # BLD: 8.08 K/UL — SIGNIFICANT CHANGE UP (ref 3.8–10.5)
WBC # BLD: 9.66 K/UL — SIGNIFICANT CHANGE UP (ref 3.8–10.5)
WBC # BLD: 9.82 K/UL — SIGNIFICANT CHANGE UP (ref 3.8–10.5)
WBC # FLD AUTO: 6.13 K/UL — SIGNIFICANT CHANGE UP (ref 3.8–10.5)
WBC # FLD AUTO: 6.75 K/UL — SIGNIFICANT CHANGE UP (ref 3.8–10.5)
WBC # FLD AUTO: 7.63 K/UL — SIGNIFICANT CHANGE UP (ref 3.8–10.5)
WBC # FLD AUTO: 8.08 K/UL — SIGNIFICANT CHANGE UP (ref 3.8–10.5)
WBC # FLD AUTO: 9.66 K/UL — SIGNIFICANT CHANGE UP (ref 3.8–10.5)
WBC # FLD AUTO: 9.82 K/UL — SIGNIFICANT CHANGE UP (ref 3.8–10.5)

## 2021-01-01 PROCEDURE — 85379 FIBRIN DEGRADATION QUANT: CPT

## 2021-01-01 PROCEDURE — 99232 SBSQ HOSP IP/OBS MODERATE 35: CPT

## 2021-01-01 PROCEDURE — 85027 COMPLETE CBC AUTOMATED: CPT

## 2021-01-01 PROCEDURE — U0005: CPT

## 2021-01-01 PROCEDURE — 93306 TTE W/DOPPLER COMPLETE: CPT | Mod: 26

## 2021-01-01 PROCEDURE — 99235 HOSP IP/OBS SAME DATE MOD 70: CPT

## 2021-01-01 PROCEDURE — 86140 C-REACTIVE PROTEIN: CPT

## 2021-01-01 PROCEDURE — 96375 TX/PRO/DX INJ NEW DRUG ADDON: CPT

## 2021-01-01 PROCEDURE — 80053 COMPREHEN METABOLIC PANEL: CPT

## 2021-01-01 PROCEDURE — 99233 SBSQ HOSP IP/OBS HIGH 50: CPT

## 2021-01-01 PROCEDURE — 85730 THROMBOPLASTIN TIME PARTIAL: CPT

## 2021-01-01 PROCEDURE — 99223 1ST HOSP IP/OBS HIGH 75: CPT

## 2021-01-01 PROCEDURE — 93005 ELECTROCARDIOGRAM TRACING: CPT

## 2021-01-01 PROCEDURE — 99497 ADVNCD CARE PLAN 30 MIN: CPT | Mod: 25

## 2021-01-01 PROCEDURE — 86769 SARS-COV-2 COVID-19 ANTIBODY: CPT

## 2021-01-01 PROCEDURE — 84145 PROCALCITONIN (PCT): CPT

## 2021-01-01 PROCEDURE — U0003: CPT

## 2021-01-01 PROCEDURE — 93010 ELECTROCARDIOGRAM REPORT: CPT

## 2021-01-01 PROCEDURE — 36600 WITHDRAWAL OF ARTERIAL BLOOD: CPT

## 2021-01-01 PROCEDURE — 99285 EMERGENCY DEPT VISIT HI MDM: CPT | Mod: 25

## 2021-01-01 PROCEDURE — 87631 RESP VIRUS 3-5 TARGETS: CPT

## 2021-01-01 PROCEDURE — 83735 ASSAY OF MAGNESIUM: CPT

## 2021-01-01 PROCEDURE — 96365 THER/PROPH/DIAG IV INF INIT: CPT

## 2021-01-01 PROCEDURE — 85610 PROTHROMBIN TIME: CPT

## 2021-01-01 PROCEDURE — 93306 TTE W/DOPPLER COMPLETE: CPT

## 2021-01-01 PROCEDURE — 36415 COLL VENOUS BLD VENIPUNCTURE: CPT

## 2021-01-01 PROCEDURE — 71045 X-RAY EXAM CHEST 1 VIEW: CPT

## 2021-01-01 PROCEDURE — 99223 1ST HOSP IP/OBS HIGH 75: CPT | Mod: AI

## 2021-01-01 PROCEDURE — 83880 ASSAY OF NATRIURETIC PEPTIDE: CPT

## 2021-01-01 PROCEDURE — 99285 EMERGENCY DEPT VISIT HI MDM: CPT

## 2021-01-01 PROCEDURE — 84484 ASSAY OF TROPONIN QUANT: CPT

## 2021-01-01 PROCEDURE — 82728 ASSAY OF FERRITIN: CPT

## 2021-01-01 PROCEDURE — 71045 X-RAY EXAM CHEST 1 VIEW: CPT | Mod: 26

## 2021-01-01 PROCEDURE — 99284 EMERGENCY DEPT VISIT MOD MDM: CPT | Mod: 25

## 2021-01-01 PROCEDURE — 96374 THER/PROPH/DIAG INJ IV PUSH: CPT

## 2021-01-01 PROCEDURE — 85025 COMPLETE CBC W/AUTO DIFF WBC: CPT

## 2021-01-01 PROCEDURE — 82803 BLOOD GASES ANY COMBINATION: CPT

## 2021-01-01 PROCEDURE — 99239 HOSP IP/OBS DSCHRG MGMT >30: CPT

## 2021-01-01 PROCEDURE — 82272 OCCULT BLD FECES 1-3 TESTS: CPT

## 2021-01-01 PROCEDURE — 80048 BASIC METABOLIC PNL TOTAL CA: CPT

## 2021-01-01 RX ORDER — METOPROLOL TARTRATE 50 MG
100 TABLET ORAL DAILY
Refills: 0 | Status: DISCONTINUED | OUTPATIENT
Start: 2021-01-01 | End: 2021-01-01

## 2021-01-01 RX ORDER — METOPROLOL TARTRATE 50 MG
50 TABLET ORAL DAILY
Refills: 0 | Status: DISCONTINUED | OUTPATIENT
Start: 2021-01-01 | End: 2021-01-01

## 2021-01-01 RX ORDER — LABETALOL HCL 100 MG
300 TABLET ORAL ONCE
Refills: 0 | Status: COMPLETED | OUTPATIENT
Start: 2021-01-01 | End: 2021-01-01

## 2021-01-01 RX ORDER — FUROSEMIDE 40 MG
20 TABLET ORAL DAILY
Refills: 0 | Status: DISCONTINUED | OUTPATIENT
Start: 2021-01-01 | End: 2021-01-01

## 2021-01-01 RX ORDER — LISINOPRIL 2.5 MG/1
10 TABLET ORAL
Refills: 0 | Status: DISCONTINUED | OUTPATIENT
Start: 2021-01-01 | End: 2021-01-01

## 2021-01-01 RX ORDER — LABETALOL HCL 100 MG
300 TABLET ORAL THREE TIMES A DAY
Refills: 0 | Status: DISCONTINUED | OUTPATIENT
Start: 2021-01-01 | End: 2021-01-01

## 2021-01-01 RX ORDER — ALBUTEROL SULFATE 90 UG/1
108 (90 BASE) INHALANT RESPIRATORY (INHALATION)
Qty: 1 | Refills: 3 | Status: ACTIVE | COMMUNITY
Start: 2021-01-01 | End: 1900-01-01

## 2021-01-01 RX ORDER — FUROSEMIDE 40 MG
40 TABLET ORAL ONCE
Refills: 0 | Status: COMPLETED | OUTPATIENT
Start: 2021-01-01 | End: 2021-01-01

## 2021-01-01 RX ORDER — APIXABAN 2.5 MG/1
2.5 TABLET, FILM COATED ORAL
Refills: 0 | Status: DISCONTINUED | OUTPATIENT
Start: 2021-01-01 | End: 2021-01-01

## 2021-01-01 RX ORDER — POTASSIUM CHLORIDE 20 MEQ
40 PACKET (EA) ORAL ONCE
Refills: 0 | Status: COMPLETED | OUTPATIENT
Start: 2021-01-01 | End: 2021-01-01

## 2021-01-01 RX ORDER — FUROSEMIDE 40 MG
1 TABLET ORAL
Qty: 30 | Refills: 0
Start: 2021-01-01 | End: 2021-02-12

## 2021-01-01 RX ORDER — LISINOPRIL 10 MG/1
10 TABLET ORAL TWICE DAILY
Qty: 180 | Refills: 0 | Status: ACTIVE | COMMUNITY
Start: 2020-01-01 | End: 1900-01-01

## 2021-01-01 RX ORDER — ONDANSETRON 8 MG/1
4 TABLET, FILM COATED ORAL ONCE
Refills: 0 | Status: COMPLETED | OUTPATIENT
Start: 2021-01-01 | End: 2021-01-01

## 2021-01-01 RX ORDER — APIXABAN 2.5 MG/1
1 TABLET, FILM COATED ORAL
Qty: 0 | Refills: 0 | DISCHARGE

## 2021-01-01 RX ORDER — FLUTICASONE FUROATE AND VILANTEROL TRIFENATATE 100; 25 UG/1; UG/1
100-25 POWDER RESPIRATORY (INHALATION)
Qty: 1 | Refills: 6 | Status: ACTIVE | COMMUNITY
Start: 2017-01-26 | End: 1900-01-01

## 2021-01-01 RX ORDER — METOPROLOL TARTRATE 50 MG
5 TABLET ORAL ONCE
Refills: 0 | Status: COMPLETED | OUTPATIENT
Start: 2021-01-01 | End: 2021-01-01

## 2021-01-01 RX ORDER — DONEPEZIL HYDROCHLORIDE 10 MG/1
5 TABLET, FILM COATED ORAL AT BEDTIME
Refills: 0 | Status: DISCONTINUED | OUTPATIENT
Start: 2021-01-01 | End: 2021-01-01

## 2021-01-01 RX ORDER — HYDRALAZINE HCL 50 MG
10 TABLET ORAL ONCE
Refills: 0 | Status: COMPLETED | OUTPATIENT
Start: 2021-01-01 | End: 2021-01-01

## 2021-01-01 RX ORDER — FUROSEMIDE 40 MG
20 TABLET ORAL
Refills: 0 | Status: DISCONTINUED | OUTPATIENT
Start: 2021-01-01 | End: 2021-01-01

## 2021-01-01 RX ORDER — FUROSEMIDE 40 MG
20 TABLET ORAL ONCE
Refills: 0 | Status: COMPLETED | OUTPATIENT
Start: 2021-01-01 | End: 2021-01-01

## 2021-01-01 RX ORDER — ALBUTEROL SULFATE 0.63 MG/3ML
0.63 SOLUTION RESPIRATORY (INHALATION)
Qty: 1 | Refills: 3 | Status: ACTIVE | COMMUNITY
Start: 2021-01-01 | End: 1900-01-01

## 2021-01-01 RX ORDER — CINACALCET 30 MG/1
30 TABLET, FILM COATED ORAL DAILY
Refills: 0 | Status: DISCONTINUED | OUTPATIENT
Start: 2021-01-01 | End: 2021-01-01

## 2021-01-01 RX ORDER — LOSARTAN POTASSIUM 100 MG/1
50 TABLET, FILM COATED ORAL DAILY
Refills: 0 | Status: DISCONTINUED | OUTPATIENT
Start: 2021-01-01 | End: 2021-01-01

## 2021-01-01 RX ORDER — ISOSORBIDE DINITRATE 5 MG/1
15 TABLET ORAL ONCE
Refills: 0 | Status: COMPLETED | OUTPATIENT
Start: 2021-01-01 | End: 2021-01-01

## 2021-01-01 RX ORDER — LISINOPRIL 2.5 MG/1
10 TABLET ORAL ONCE
Refills: 0 | Status: COMPLETED | OUTPATIENT
Start: 2021-01-01 | End: 2021-01-01

## 2021-01-01 RX ADMIN — Medication 20 MILLIGRAM(S): at 16:27

## 2021-01-01 RX ADMIN — Medication 20 MILLIGRAM(S): at 05:23

## 2021-01-01 RX ADMIN — Medication 20 MILLIGRAM(S): at 11:59

## 2021-01-01 RX ADMIN — APIXABAN 2.5 MILLIGRAM(S): 2.5 TABLET, FILM COATED ORAL at 21:43

## 2021-01-01 RX ADMIN — APIXABAN 2.5 MILLIGRAM(S): 2.5 TABLET, FILM COATED ORAL at 04:22

## 2021-01-01 RX ADMIN — ISOSORBIDE DINITRATE 15 MILLIGRAM(S): 5 TABLET ORAL at 21:17

## 2021-01-01 RX ADMIN — DONEPEZIL HYDROCHLORIDE 5 MILLIGRAM(S): 10 TABLET, FILM COATED ORAL at 21:27

## 2021-01-01 RX ADMIN — APIXABAN 2.5 MILLIGRAM(S): 2.5 TABLET, FILM COATED ORAL at 17:12

## 2021-01-01 RX ADMIN — APIXABAN 2.5 MILLIGRAM(S): 2.5 TABLET, FILM COATED ORAL at 05:19

## 2021-01-01 RX ADMIN — Medication 5 MILLIGRAM(S): at 03:57

## 2021-01-01 RX ADMIN — Medication 300 MILLIGRAM(S): at 05:34

## 2021-01-01 RX ADMIN — DONEPEZIL HYDROCHLORIDE 5 MILLIGRAM(S): 10 TABLET, FILM COATED ORAL at 21:17

## 2021-01-01 RX ADMIN — Medication 20 MILLIGRAM(S): at 05:35

## 2021-01-01 RX ADMIN — Medication 20 MILLIGRAM(S): at 04:23

## 2021-01-01 RX ADMIN — Medication 300 MILLIGRAM(S): at 04:22

## 2021-01-01 RX ADMIN — Medication 50 MILLIGRAM(S): at 11:18

## 2021-01-01 RX ADMIN — Medication 1 TABLET(S): at 11:18

## 2021-01-01 RX ADMIN — Medication 20 MILLIGRAM(S): at 16:14

## 2021-01-01 RX ADMIN — CINACALCET 30 MILLIGRAM(S): 30 TABLET, FILM COATED ORAL at 11:18

## 2021-01-01 RX ADMIN — Medication 10 MILLIGRAM(S): at 18:21

## 2021-01-01 RX ADMIN — DONEPEZIL HYDROCHLORIDE 5 MILLIGRAM(S): 10 TABLET, FILM COATED ORAL at 21:24

## 2021-01-01 RX ADMIN — APIXABAN 2.5 MILLIGRAM(S): 2.5 TABLET, FILM COATED ORAL at 05:31

## 2021-01-01 RX ADMIN — Medication 20 MILLIGRAM(S): at 21:45

## 2021-01-01 RX ADMIN — Medication 300 MILLIGRAM(S): at 21:26

## 2021-01-01 RX ADMIN — Medication 0.25 MILLIGRAM(S): at 03:57

## 2021-01-01 RX ADMIN — Medication 300 MILLIGRAM(S): at 20:08

## 2021-01-01 RX ADMIN — DONEPEZIL HYDROCHLORIDE 5 MILLIGRAM(S): 10 TABLET, FILM COATED ORAL at 20:08

## 2021-01-01 RX ADMIN — Medication 40 MILLIEQUIVALENT(S): at 11:19

## 2021-01-01 RX ADMIN — Medication 50 MILLIGRAM(S): at 05:31

## 2021-01-01 RX ADMIN — Medication 100 MILLIGRAM(S): at 04:24

## 2021-01-01 RX ADMIN — Medication 1 TABLET(S): at 11:43

## 2021-01-01 RX ADMIN — Medication 1 TABLET(S): at 12:21

## 2021-01-01 RX ADMIN — Medication 1 TABLET(S): at 11:30

## 2021-01-01 RX ADMIN — Medication 20 MILLIGRAM(S): at 22:03

## 2021-01-01 RX ADMIN — Medication 40 MILLIEQUIVALENT(S): at 08:45

## 2021-01-01 RX ADMIN — APIXABAN 2.5 MILLIGRAM(S): 2.5 TABLET, FILM COATED ORAL at 16:27

## 2021-01-01 RX ADMIN — Medication 300 MILLIGRAM(S): at 10:19

## 2021-01-01 RX ADMIN — CINACALCET 30 MILLIGRAM(S): 30 TABLET, FILM COATED ORAL at 11:43

## 2021-01-01 RX ADMIN — Medication 50 MILLIGRAM(S): at 05:28

## 2021-01-01 RX ADMIN — Medication 100 MILLIGRAM(S): at 21:44

## 2021-01-01 RX ADMIN — Medication 20 MILLIGRAM(S): at 21:26

## 2021-01-01 RX ADMIN — APIXABAN 2.5 MILLIGRAM(S): 2.5 TABLET, FILM COATED ORAL at 05:35

## 2021-01-01 RX ADMIN — CINACALCET 30 MILLIGRAM(S): 30 TABLET, FILM COATED ORAL at 12:21

## 2021-01-01 RX ADMIN — Medication 5 MILLIGRAM(S): at 02:29

## 2021-01-01 RX ADMIN — APIXABAN 2.5 MILLIGRAM(S): 2.5 TABLET, FILM COATED ORAL at 16:14

## 2021-01-01 RX ADMIN — LISINOPRIL 10 MILLIGRAM(S): 2.5 TABLET ORAL at 10:19

## 2021-01-01 RX ADMIN — CINACALCET 30 MILLIGRAM(S): 30 TABLET, FILM COATED ORAL at 11:30

## 2021-01-01 RX ADMIN — Medication 20 MILLIGRAM(S): at 04:22

## 2021-01-01 RX ADMIN — APIXABAN 2.5 MILLIGRAM(S): 2.5 TABLET, FILM COATED ORAL at 18:57

## 2021-01-01 RX ADMIN — ONDANSETRON 4 MILLIGRAM(S): 8 TABLET, FILM COATED ORAL at 04:50

## 2021-01-01 RX ADMIN — Medication 300 MILLIGRAM(S): at 14:21

## 2021-01-01 RX ADMIN — Medication 40 MILLIGRAM(S): at 14:13

## 2021-01-01 RX ADMIN — Medication 10 MILLIGRAM(S): at 06:20

## 2021-01-01 RX ADMIN — Medication 0.25 MILLIGRAM(S): at 22:02

## 2021-01-01 RX ADMIN — ONDANSETRON 4 MILLIGRAM(S): 8 TABLET, FILM COATED ORAL at 06:33

## 2021-01-01 RX ADMIN — DONEPEZIL HYDROCHLORIDE 5 MILLIGRAM(S): 10 TABLET, FILM COATED ORAL at 21:45

## 2021-01-14 NOTE — ED PROVIDER NOTE - NSFOLLOWUPINSTRUCTIONS_ED_ALL_ED_FT
1. Take Lasix 20mg once a day  2. Lisinopril 10mg twice a day  3. Labetalol 300mg three times a day  4. Follow up with Dr. Carroll and Dr. Farrell in 1-2 days  5. Return for worsening symptoms, fevers, chills, difficulty breathing or any concerns.

## 2021-01-14 NOTE — ED PROVIDER NOTE - OBJECTIVE STATEMENT
89F h/o Afib on Eliquis, PPM, CHF, NSTEMI, recent traumatic SAH, p/w 1 day of malaise, found to be 90% on RA. Denies chest pain (despite triage RN), rectal bleeding, fevers, chills, cough. +covid exposure per pt.

## 2021-01-14 NOTE — ED ADULT NURSE REASSESSMENT NOTE - NS ED NURSE REASSESS COMMENT FT1
Patient with edema to left anticubital area , attempted IV access times 2 in left AC area, unable to cannulate MD Hammond notified that patient is a difficult stick, NICCI Olson to bedside to attempt IV access.

## 2021-01-14 NOTE — ED PROVIDER NOTE - CARE PLAN
Principal Discharge DX:	Malaise and fatigue  Secondary Diagnosis:	Hypoxia   Principal Discharge DX:	Malaise and fatigue  Secondary Diagnosis:	Hypoxia  Secondary Diagnosis:	Hypertension, unspecified type   Principal Discharge DX:	Acute congestive heart failure, unspecified heart failure type  Secondary Diagnosis:	Hypertension, unspecified type

## 2021-01-14 NOTE — ED PROVIDER NOTE - PCP FREE TEXT FOR MDM DISCUSSED CASE WITH QUESTION
d/w Dr. Carroll, pt's condition likely secondary to CHF, will add low dose diuretic and pt to f/u with him and Dr. Patterson as outpatient

## 2021-01-14 NOTE — ED PROVIDER NOTE - FAMILY DETAILS FREE TEXT FOR MDM ADDL HISTORY OBTAINED FROM QUESTION
message left for daughter per HCP daughter Florence andrews is on labetalol 300mg three times a day and lisinopril 10mg twice a day but she has not taken it today. Also per daughter her BP has been in the 190s. PMD is Dr. Carroll per HCP daughter Florence pt is on labetalol 300mg three times a day and lisinopril 10mg twice a day but she has not taken it today. Also per daughter her BP has been in the 190s. PMD is Dr. Carly Fitch daughter Florence who will  pt. Understands that pt will start lasix.

## 2021-01-14 NOTE — ED PROVIDER NOTE - CLINICAL SUMMARY MEDICAL DECISION MAKING FREE TEXT BOX
Pt here with malaise, hypoxia, will r/o covid, reassess Pt here with malaise, hypoxia, will r/o covid, reassess    Pt reassessed, doing well, urinated when she had a BM, has no urinary sx, will dc home Pt here with malaise, hypoxia, will r/o covid, reassess    Pt reassessed, doing well, urinated when she had a BM, has no urinary sx, will dc home    12pm D/w daughter Florence who will come  pt. Pt here with malaise, hypoxia, will r/o covid, reassess    Pt reassessed, doing well, urinated when she had a BM, has no urinary sx, will dc home. RA sat 94%.    12pm D/w daughter Florence who will come  pt.

## 2021-01-14 NOTE — ED PROVIDER NOTE - PATIENT PORTAL LINK FT
You can access the FollowMyHealth Patient Portal offered by [facility] by registering at the following website: [email or web address]. By joining [facility]’s FollowMyHealth portal, you will also be able to view your health information using other applications (apps) compatible with our system.

## 2021-01-14 NOTE — ED ADULT NURSE REASSESSMENT NOTE - NS ED NURSE REASSESS COMMENT FT1
Patients diaper was saturated, I changed diaper and attempted to straight cath, no urine returned, MD Hammond notified.

## 2021-01-15 NOTE — ED POST DISCHARGE NOTE - RESULT SUMMARY
Called to check up on patient, per son she is doing well, ambulating without difficulty, not sob, eating well.

## 2021-01-25 NOTE — ED PROVIDER NOTE - CLINICAL SUMMARY MEDICAL DECISION MAKING FREE TEXT BOX
89 yr old female with hx of GERD, a.fib, HTN, pacemaker presents with shortness of breath for the last few days, worsening today. Pt was not able to get out of bed this morning as per daughter. Pt ambulates with walker at baseline. Denies any fever, cough, chills, n/v/d, abdominal pain, chest pain or any other symptoms. Pt did not take her am daily doses today.   b/l crackles at the bases, covid labs sent, bnp sent, treated with lasix and levaquin. plan to admit to medicine. d/w hospitalist

## 2021-01-25 NOTE — ED PROVIDER NOTE - OBJECTIVE STATEMENT
89 yr old female with hx of GERD, a.fib, HTN, pacemaker 89 yr old female with hx of GERD, a.fib, HTN, pacemaker presents with shortness of breath for the last few days, worsening today. Pt was not able to get out of bed this morning as per daughter. Pt ambulates with walker at baseline. Denies any fever, cough, chills, n/v/d, abdominal pain, chest pain or any other symptoms. Pt did not take her am daily doses today.

## 2021-01-25 NOTE — ED PROVIDER NOTE - ATTENDING CONTRIBUTION TO CARE
June with ROSSI Geiger. 89 yr old female with hx of GERD, a.fib, HTN, pacemaker presents with shortness of breath for the last few days, worsening today. Pt was not able to get out of bed this morning as per daughter. Pt ambulates with walker at baseline. Denies any fever, cough, chills, n/v/d, abdominal pain, chest pain or any other symptoms. Pt did not take her am daily doses today.   b/l crackles at the bases, covid labs sent, bnp sent, treated with lasix and levaquin. plan to admit to medicine. d/w hospitalist  I performed a face to face bedside interview with patient regarding history of present illness, review of symptoms and past medical history. I completed an independent physical exam.  I have discussed the patient's plan of care with Physician Assistant (PA). I agree with note as stated above, having amended the EMR as needed to reflect my findings.   This includes History of Present Illness, HIV, Past Medical/Surgical/Family/Social History, Allergies and Home Medications, Review of Systems, Physical Exam, and any Progress Notes during the time I functioned as the attending physician for this patient.

## 2021-01-25 NOTE — ED ADULT NURSE NOTE - OBJECTIVE STATEMENT
Pt presents to ED awake, alert, oriented to person, place, and time. Reports "tightness when breathing in". Spoke with patient's daughter, Florence [phone] who states patients shortness of breath has been worsening; also worse with activity. Pt denies presence of chest pain.

## 2021-01-25 NOTE — H&P ADULT - HISTORY OF PRESENT ILLNESS
89yo female with hx of Dementia, HTN, A fib, presented to the ED with complaints of progressively worsening sob associated with b/l LE edema x 2-3 days. Pt is a poor historian, hx provided by pt's daughter. Pt has been complaining of difficulty breathing for the past few days. Per daughter, her lasix dose was recently decreased from 60mg daily to 20mg daily, unclear reason per daughter. Pt and daughter deny chest pain, palpitations, abd pain, n/v, fever, chills.

## 2021-01-25 NOTE — H&P ADULT - ASSESSMENT
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

## 2021-01-25 NOTE — ED PROVIDER NOTE - CARE PLAN
Principal Discharge DX:	Shortness of breath  Secondary Diagnosis:	CHF (congestive heart failure)  Secondary Diagnosis:	Bilateral pneumonia  Secondary Diagnosis:	MORELIA (acute kidney injury)

## 2021-01-25 NOTE — ED ADULT NURSE NOTE - NSIMPLEMENTINTERV_GEN_ALL_ED
Implemented All Universal Safety Interventions:  [place] to call system. Call bell, personal items and telephone within reach. Instruct patient to call for assistance. Room bathroom lighting operational. Non-slip footwear when patient is off stretcher. Physically safe environment: no spills, clutter or unnecessary equipment. Stretcher in lowest position, wheels locked, appropriate side rails in place.

## 2021-01-26 NOTE — DIETITIAN NUTRITION RISK NOTIFICATION - TREATMENT: THE FOLLOWING DIET HAS BEEN RECOMMENDED
Diet, Regular:   1000mL Fluid Restriction ([id number])  Low Sodium (01-25-21 @ 15:45) [Active]

## 2021-01-26 NOTE — DIETITIAN INITIAL EVALUATION ADULT. - PERTINENT MEDS FT
MEDICATIONS  (STANDING):  apixaban 2.5 milliGRAM(s) Oral two times a day  cinacalcet 30 milliGRAM(s) Oral daily  donepezil 5 milliGRAM(s) Oral at bedtime  furosemide   Injectable 20 milliGRAM(s) IV Push two times a day  labetalol 300 milliGRAM(s) Oral three times a day  multivitamin 1 Tablet(s) Oral daily

## 2021-01-26 NOTE — PROGRESS NOTE ADULT - SUBJECTIVE AND OBJECTIVE BOX
Patient is a 89y old  Female who presents with a chief complaint of     INTERVAL HPI/OVERNIGHT EVENTS:   lethargic    REVIEW OF SYSTEMS:  CONSTITUTIONAL: No fever, weight loss, or fatigue  EYES: No eye pain, visual disturbances, or discharge  ENMT:  No difficulty hearing, tinnitus, vertigo; No sinus or throat pain  NECK: No pain or stiffness  BREASTS: No pain, masses, or nipple discharge  RESPIRATORY: No cough, wheezing, chills or hemoptysis; No shortness of breath  CARDIOVASCULAR: No chest pain, palpitations, dizziness, or leg swelling  GASTROINTESTINAL: No abdominal or epigastric pain. No nausea, vomiting, or hematemesis; No diarrhea or constipation. No melena or hematochezia.  GENITOURINARY: No dysuria, frequency, hematuria, or incontinence  NEUROLOGICAL: No headaches, memory loss, loss of strength, numbness, or tremors  SKIN: No itching, burning, rashes, or lesions   LYMPH NODES: No enlarged glands  ENDOCRINE: No heat or cold intolerance; No hair loss  MUSCULOSKELETAL: No joint pain or swelling; No muscle, back, or extremity pain  PSYCHIATRIC: No depression, anxiety, mood swings, or difficulty sleeping  HEME/LYMPH: No easy bruising, or bleeding gums  ALLERY AND IMMUNOLOGIC: No hives or eczema  FAMILY HISTORY:  FH: HTN (hypertension)      T(C): 36.6 (01-26-21 @ 05:29), Max: 36.9 (01-25-21 @ 19:40)  HR: 105 (01-26-21 @ 05:29) (91 - 108)  BP: 160/95 (01-26-21 @ 05:29) (156/77 - 187/89)  RR: 18 (01-26-21 @ 05:29) (16 - 24)  SpO2: 93% (01-26-21 @ 05:29) (92% - 100%)  Wt(kg): --Vital Signs Last 24 Hrs  T(C): 36.6 (26 Jan 2021 05:29), Max: 36.9 (25 Jan 2021 19:40)  T(F): 97.9 (26 Jan 2021 05:29), Max: 98.4 (25 Jan 2021 19:40)  HR: 105 (26 Jan 2021 05:29) (91 - 108)  BP: 160/95 (26 Jan 2021 05:29) (156/77 - 187/89)  BP(mean): 115 (25 Jan 2021 12:30) (115 - 115)  RR: 18 (26 Jan 2021 05:29) (16 - 24)  SpO2: 93% (26 Jan 2021 05:29) (92% - 100%)    T(F): 97.9 (01-26-21 @ 05:29), Max: 98.4 (01-25-21 @ 19:40)  HR: 105 (01-26-21 @ 05:29) (91 - 108)  BP: 160/95 (01-26-21 @ 05:29) (156/77 - 187/89)  RR: 18 (01-26-21 @ 05:29) (16 - 24)  SpO2: 93% (01-26-21 @ 05:29) (92% - 100%)    PHYSICAL EXAM:  GENERAL: NAD, well-groomed, well-developed  HEAD:  Atraumatic, Normocephalic  EYES: EOMI, PERRLA, conjunctiva and sclera clear  ENMT: No tonsillar erythema, exudates, or enlargement; Moist mucous membranes, Good dentition, No lesions  NECK: Supple, No JVD, Normal thyroid  NERVOUS SYSTEM:  Alert & Oriented X3, Good concentration; Motor Strength 5/5 B/L upper and lower extremities; DTRs 2+ intact and symmetric  CHEST/LUNG: Clear to percussion bilaterally; No rales, rhonchi, wheezing, or rubs  HEART: Regular rate and rhythm; No murmurs, rubs, or gallops  ABDOMEN: Soft, Nontender, Nondistended; Bowel sounds present  EXTREMITIES:  2+ Peripheral Pulses, No clubbing, cyanosis, or edema  LYMPH: No lymphadenopathy noted  SKIN: No rashes or lesions    Consultant(s) Notes Reviewed:  [x ] YES  [ ] NO  Care Discussed with Consultants/Other Providers [ x] YES  [ ] NO    LABS:  01-26    141  |  104  |  29<H>  ----------------------------<  106<H>  4.1   |  26  |  1.54<H>    Ca    8.3<L>      26 Jan 2021 07:26  Mg     1.5     01-26    TPro  6.7  /  Alb  3.6  /  TBili  0.7  /  DBili  x   /  AST  26  /  ALT  20  /  AlkPhos  101  01-25                          11.1   7.63  )-----------( 173      ( 26 Jan 2021 07:26 )             35.6         PT/INR - ( 25 Jan 2021 12:30 )   PT: 24.9 sec;   INR: 2.13 ratio         PTT - ( 25 Jan 2021 12:30 )  PTT:26.1 sec  LIVER FUNCTIONS - ( 25 Jan 2021 12:30 )  Alb: 3.6 g/dL / Pro: 6.7 g/dL / ALK PHOS: 101 U/L / ALT: 20 U/L / AST: 26 U/L / GGT: x           CARDIAC MARKERS ( 25 Jan 2021 12:30 )  <.017 ng/mL / x     / x     / x     / x                       11.1   7.63  )-----------( 173      ( 01-26 @ 07:26 )             35.6                12.5   9.66  )-----------( 258      ( 01-25 @ 12:30 )             40.7                11.9   8.08  )-----------( 235      ( 01-14 @ 09:40 )             37.8           Ferritin, Serum: 129 ng/mL (01-25-21 @ 18:15)      RADIOLOGY & ADDITIONAL TESTS:    Imaging Personally Reviewed:  [ ] YES  [ ] NO  apixaban 2.5 milliGRAM(s) Oral two times a day  cinacalcet 30 milliGRAM(s) Oral daily  donepezil 5 milliGRAM(s) Oral at bedtime  furosemide   Injectable 20 milliGRAM(s) IV Push two times a day  labetalol 300 milliGRAM(s) Oral three times a day  multivitamin 1 Tablet(s) Oral daily      HEALTH ISSUES - PROBLEM Dx:

## 2021-01-26 NOTE — CHART NOTE - NSCHARTNOTEFT_GEN_A_CORE
Reviewed prior progress notes, labs and imaging.    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  - Recent ECHO EF 25-30% with grade I diastolic dysfunction  - Continue Lasix 20mg IV BID  - Continue Labetalol, although pt will benefit from either Metoprolol or Coreg for mortality benefit   - Telemetry  - Monitor strict i/os   - fluid restriction  - Monitor renal function    #Afib   - Continue Labetalol, although pt will benefit from either Metoprolol or Coreg for mortality benefit   - Eliquis    #MORELIA on CKD Stage III  - Monitor renal function with diuresis, expect improvement for cardiorenal   - Hold Lisinopril for now    #Dementia  - Aricept    Updated patient's daughter Florence ([phone])

## 2021-01-26 NOTE — DIETITIAN INITIAL EVALUATION ADULT. - OTHER INFO
89yo female with hx of Dementia, HTN, A fib, CHF, presents with worsening edema and shortness of breath. +3 edema noted to feet and on diuretics. Ate 50% of dinner and [facility], patient with fair/poor appetite and eating about 50% of meals since admission. Blanchable area to sacrum noted; on a daily MVI. No BM documented since admission. Weight fluctuations may be expected d/t fluid restriction, diuretics, and CHF. Weight ~3 months ago was 121# and now ~115# which is a 5.5% weight change in the last 3 months. Writer attempted to see patient but unable to answer questions appropriately. Muscle mass and body fat depletion observed, and suspect intakes have been inadequate for quite some time. Recommend high protein/calorie foods to be sent on tray to encourage PO as patient remains at a high nutrition risk and meets criteria for severe protein-calorie malnutrition.

## 2021-01-27 NOTE — CONSULT NOTE ADULT - ASSESSMENT
89 year old admitted with complaints of increasing lower extremity edema and shortness of breath.  She has AF, s/p PPM, previous echo had demonstrated LV dysfunction, and valvular heart disease.  MORELIA with CKD. HTN  Current symptoms are due to CHF ... acute and chronic exacerbation of systolic and diastolic CHF    Plan  - continue cautious diuresis  - consider resuming ACE-I/ARB, though recognize may exacerbate known renal disease  - trial of metoprolol succinate... hold labetalol  - continue anticoagulation   - follow labs   - advance activity as tolerable  - overall guarded prognosis given advanced age and co morbidities    discussed with patient's daughter, Medicine NP and primary MD

## 2021-01-27 NOTE — PROGRESS NOTE ADULT - SUBJECTIVE AND OBJECTIVE BOX
Patient is a 89y old  Female who presents with a chief complaint of shortness of breath (26 Jan 2021 13:35)      INTERVAL HPI/OVERNIGHT EVENTS:lethargic      REVIEW OF SYSTEMS:  CONSTITUTIONAL: No fever, weight loss, or fatigue  EYES: No eye pain, visual disturbances, or discharge  ENMT:  No difficulty hearing, tinnitus, vertigo; No sinus or throat pain  NECK: No pain or stiffness  BREASTS: No pain, masses, or nipple discharge  RESPIRATORY: No cough, wheezing, chills or hemoptysis; No shortness of breath  CARDIOVASCULAR: No chest pain, palpitations, dizziness, or leg swelling  GASTROINTESTINAL: No abdominal or epigastric pain. No nausea, vomiting, or hematemesis; No diarrhea or constipation. No melena or hematochezia.  GENITOURINARY: No dysuria, frequency, hematuria, or incontinence  NEUROLOGICAL: No headaches, memory loss, loss of strength, numbness, or tremors  SKIN: No itching, burning, rashes, or lesions   LYMPH NODES: No enlarged glands  ENDOCRINE: No heat or cold intolerance; No hair loss  MUSCULOSKELETAL: No joint pain or swelling; No muscle, back, or extremity pain  PSYCHIATRIC: No depression, anxiety, mood swings, or difficulty sleeping  HEME/LYMPH: No easy bruising, or bleeding gums  ALLERY AND IMMUNOLOGIC: No hives or eczema  FAMILY HISTORY:  FH: HTN (hypertension)      T(C): 36.4 (01-27-21 @ 04:23), Max: 36.9 (01-26-21 @ 19:50)  HR: 99 (01-27-21 @ 04:23) (99 - 102)  BP: 162/88 (01-27-21 @ 04:23) (139/82 - 162/88)  RR: 20 (01-27-21 @ 04:23) (16 - 20)  SpO2: 95% (01-27-21 @ 04:23) (93% - 95%)  Wt(kg): --Vital Signs Last 24 Hrs  T(C): 36.4 (27 Jan 2021 04:23), Max: 36.9 (26 Jan 2021 19:50)  T(F): 97.6 (27 Jan 2021 04:23), Max: 98.5 (26 Jan 2021 19:50)  HR: 99 (27 Jan 2021 04:23) (99 - 102)  BP: 162/88 (27 Jan 2021 04:23) (139/82 - 162/88)  BP(mean): --  RR: 20 (27 Jan 2021 04:23) (16 - 20)  SpO2: 95% (27 Jan 2021 04:23) (93% - 95%)    T(F): 97.6 (01-27-21 @ 04:23), Max: 98.5 (01-26-21 @ 19:50)  HR: 99 (01-27-21 @ 04:23) (91 - 108)  BP: 162/88 (01-27-21 @ 04:23) (139/82 - 187/89)  RR: 20 (01-27-21 @ 04:23) (16 - 24)  SpO2: 95% (01-27-21 @ 04:23) (92% - 100%)    PHYSICAL EXAM:  GENERAL: NAD, well-groomed, well-developed  HEAD:  Atraumatic, Normocephalic  EYES: EOMI, PERRLA, conjunctiva and sclera clear  ENMT: No tonsillar erythema, exudates, or enlargement; Moist mucous membranes, Good dentition, No lesions  NECK: Supple, No JVD, Normal thyroid  NERVOUS SYSTEM:  Alert & Oriented X3, Good concentration; Motor Strength 5/5 B/L upper and lower extremities; DTRs 2+ intact and symmetric  CHEST/LUNG: Clear to percussion bilaterally; No rales, rhonchi, wheezing, or rubs  HEART: Regular rate and rhythm; No murmurs, rubs, or gallops  ABDOMEN: Soft, Nontender, Nondistended; Bowel sounds present  EXTREMITIES:  2+ Peripheral Pulses, No clubbing, cyanosis, or edema  LYMPH: No lymphadenopathy noted  SKIN: No rashes or lesions    Consultant(s) Notes Reviewed:  [x ] YES  [ ] NO  Care Discussed with Consultants/Other Providers [ x] YES  [ ] NO    LABS:  01-27    141  |  104  |  26<H>  ----------------------------<  96  3.4<L>   |  28  |  1.44<H>    Ca    7.8<L>      27 Jan 2021 06:00  Mg     1.5     01-26    TPro  6.7  /  Alb  3.6  /  TBili  0.7  /  DBili  x   /  AST  26  /  ALT  20  /  AlkPhos  101  01-25                          11.3   6.13  )-----------( 177      ( 27 Jan 2021 06:00 )             35.5         PT/INR - ( 25 Jan 2021 12:30 )   PT: 24.9 sec;   INR: 2.13 ratio         PTT - ( 25 Jan 2021 12:30 )  PTT:26.1 sec  LIVER FUNCTIONS - ( 25 Jan 2021 12:30 )  Alb: 3.6 g/dL / Pro: 6.7 g/dL / ALK PHOS: 101 U/L / ALT: 20 U/L / AST: 26 U/L / GGT: x           CARDIAC MARKERS ( 25 Jan 2021 12:30 )  <.017 ng/mL / x     / x     / x     / x                       11.3   6.13  )-----------( 177      ( 01-27 @ 06:00 )             35.5                11.1   7.63  )-----------( 173      ( 01-26 @ 07:26 )             35.6                12.5   9.66  )-----------( 258      ( 01-25 @ 12:30 )             40.7                11.9   8.08  )-----------( 235      ( 01-14 @ 09:40 )             37.8               RADIOLOGY & ADDITIONAL TESTS:    Imaging Personally Reviewed:  [ ] YES  [ ] NO  apixaban 2.5 milliGRAM(s) Oral two times a day  cinacalcet 30 milliGRAM(s) Oral daily  donepezil 5 milliGRAM(s) Oral at bedtime  furosemide   Injectable 20 milliGRAM(s) IV Push two times a day  labetalol 300 milliGRAM(s) Oral three times a day  multivitamin 1 Tablet(s) Oral daily  potassium chloride   Powder 40 milliEquivalent(s) Oral once      HEALTH ISSUES - PROBLEM Dx:  Dementia  Dementia    Atrial fibrillation  Atrial fibrillation    CHF (congestive heart failure)  CHF (congestive heart failure)

## 2021-01-27 NOTE — CHART NOTE - NSCHARTNOTEFT_GEN_A_CORE
Reviewed prior progress notes, labs and imaging.    Discussed with Dr Turner, Dr Thompson    Primary Diagnosis: CHF exacerbation     89yo female with hx of Dementia, HTN, A fib, presented to the ED with complaints of progressively worsening sob associated with b/l LE edema x 2-3 days, noted to have CHF exacerbation    #Acute on chronic combined systolic/diastolic CHF with exacerbation  - Recent ECHO EF 25-30% with grade I diastolic dysfunction  - Continue Lasix 20mg IV BID  - Continue Labetalol, although pt will benefit from either Metoprolol or Coreg for mortality benefit   - Telemetry  - Monitor strict i/os   - fluid restriction  - Monitor renal function  - cardiology consult today    #Afib   - Continue Labetalol, although pt will benefit from either Metoprolol or Coreg for mortality benefit   - Eliquis    #MORELIA on CKD Stage III  - Monitor renal function with diuresis, expect improvement for cardiorenal   - Improving  - Hold Lisinopril for now    #Dementia  - Aricept    1/26: Updated patient's daughter Florence ([phone]) Reviewed prior progress notes, labs and imaging.    Discussed with Dr Turner, Dr Thompson    Primary Diagnosis: CHF exacerbation     89yo female with hx of Dementia, HTN, A fib, presented to the ED with complaints of progressively worsening sob associated with b/l LE edema x 2-3 days, noted to have CHF exacerbation    #Acute on chronic combined systolic/diastolic CHF with exacerbation  - Recent ECHO EF 25-30% with grade I diastolic dysfunction  - Continue Lasix 20mg IV BID  - Will DC labetalol, switch to metoprolol  - f/u TTE  - Telemetry  - Monitor strict i/os   - fluid restriction  - Monitor renal function  - cardiology consult today    #Afib   - Continue Labetalol, although pt will benefit from either Metoprolol or Coreg for mortality benefit   - Eliquis    #MORELIA on CKD Stage III  - Monitor renal function with diuresis, expect improvement for cardiorenal   - Improving  - Hold Lisinopril for now    #Dementia  - Aricept    1/26: Updated patient's daughter Florence ([phone]) Reviewed prior progress notes, labs and imaging.    Discussed with Dr Turner, Dr Thompson    Primary Diagnosis: CHF exacerbation     89yo female with hx of Dementia, HTN, A fib, presented to the ED with complaints of progressively worsening sob associated with b/l LE edema x 2-3 days, noted to have CHF exacerbation    #Acute on chronic combined systolic/diastolic CHF with exacerbation  - Recent ECHO EF 25-30% with grade I diastolic dysfunction  - Continue Lasix 20mg IV BID  - Will DC labetalol, switch to metoprolol  - f/u TTE  - Telemetry  - Monitor strict i/os   - fluid restriction  - Monitor renal function  - cardiology consult today    #Afib   - Continue Labetalol, although pt will benefit from either Metoprolol or Coreg for mortality benefit   - Eliquis    #MORELIA on CKD Stage III  - Monitor renal function with diuresis, expect improvement for cardiorenal   - Improving  - Hold Lisinopril for now    #Dementia  - Aricept    1/26: Updated patient's daughter Florence ([phone])  1/27: Called daughter Florence, line was busy

## 2021-01-28 NOTE — PHYSICAL THERAPY INITIAL EVALUATION ADULT - PERTINENT HX OF CURRENT PROBLEM, REHAB EVAL
89yo female with hx of Dementia, HTN, A fib, presented to the ED with complaints of progressively worsening sob associated with b/l LE edema x 2-3 days.

## 2021-01-28 NOTE — PROGRESS NOTE ADULT - SUBJECTIVE AND OBJECTIVE BOX
Patient is a 89y old  Female who presents with a chief complaint of shortness of breath (27 Jan 2021 09:36)      INTERVAL HPI/OVERNIGHT EVENTS: angry      REVIEW OF SYSTEMS:  CONSTITUTIONAL: No fever, weight loss, or fatigue  EYES: No eye pain, visual disturbances, or discharge  ENMT:  No difficulty hearing, tinnitus, vertigo; No sinus or throat pain  NECK: No pain or stiffness  BREASTS: No pain, masses, or nipple discharge  RESPIRATORY: No cough, wheezing, chills or hemoptysis; No shortness of breath  CARDIOVASCULAR: No chest pain, palpitations, dizziness, or leg swelling  GASTROINTESTINAL: No abdominal or epigastric pain. No nausea, vomiting, or hematemesis; No diarrhea or constipation. No melena or hematochezia.  GENITOURINARY: No dysuria, frequency, hematuria, or incontinence  NEUROLOGICAL: No headaches, memory loss, loss of strength, numbness, or tremors  SKIN: No itching, burning, rashes, or lesions   LYMPH NODES: No enlarged glands  ENDOCRINE: No heat or cold intolerance; No hair loss  MUSCULOSKELETAL: No joint pain or swelling; No muscle, back, or extremity pain  PSYCHIATRIC: No depression, anxiety, mood swings, or difficulty sleeping  HEME/LYMPH: No easy bruising, or bleeding gums  ALLERY AND IMMUNOLOGIC: No hives or eczema  FAMILY HISTORY:  FH: HTN (hypertension)      T(C): 36.7 (01-28-21 @ 07:47), Max: 37.1 (01-28-21 @ 05:45)  HR: 99 (01-28-21 @ 07:47) (85 - 99)  BP: 181/92 (01-28-21 @ 07:47) (131/65 - 181/92)  RR: 20 (01-28-21 @ 07:47) (16 - 20)  SpO2: 97% (01-28-21 @ 07:47) (94% - 97%)  Wt(kg): --Vital Signs Last 24 Hrs  T(C): 36.7 (28 Jan 2021 07:47), Max: 37.1 (28 Jan 2021 05:45)  T(F): 98.1 (28 Jan 2021 07:47), Max: 98.7 (28 Jan 2021 05:45)  HR: 99 (28 Jan 2021 07:47) (85 - 99)  BP: 181/92 (28 Jan 2021 07:47) (131/65 - 181/92)  BP(mean): --  RR: 20 (28 Jan 2021 07:47) (16 - 20)  SpO2: 97% (28 Jan 2021 07:47) (94% - 97%)    T(F): 98.1 (01-28-21 @ 07:47), Max: 98.7 (01-28-21 @ 05:45)  HR: 99 (01-28-21 @ 07:47) (85 - 108)  BP: 181/92 (01-28-21 @ 07:47) (131/65 - 187/89)  RR: 20 (01-28-21 @ 07:47) (16 - 24)  SpO2: 97% (01-28-21 @ 07:47) (92% - 100%)    PHYSICAL EXAM:  GENERAL: NAD, well-groomed, well-developed  HEAD:  Atraumatic, Normocephalic  EYES: EOMI, PERRLA, conjunctiva and sclera clear  ENMT: No tonsillar erythema, exudates, or enlargement; Moist mucous membranes, Good dentition, No lesions  NECK: Supple, No JVD, Normal thyroid  NERVOUS SYSTEM:  Alert & Oriented X3, Good concentration; Motor Strength 5/5 B/L upper and lower extremities; DTRs 2+ intact and symmetric  CHEST/LUNG: Clear to percussion bilaterally; No rales, rhonchi, wheezing, or rubs  HEART: Regular rate and rhythm; No murmurs, rubs, or gallops  ABDOMEN: Soft, Nontender, Nondistended; Bowel sounds present  EXTREMITIES:  2+ Peripheral Pulses, No clubbing, cyanosis, or edema  LYMPH: No lymphadenopathy noted  SKIN: No rashes or lesions    Consultant(s) Notes Reviewed:  [x ] YES  [ ] NO  Care Discussed with Consultants/Other Providers [ x] YES  [ ] NO    LABS:  01-28    142  |  103  |  22  ----------------------------<  94  3.2<L>   |  31  |  1.20    Ca    7.6<L>      28 Jan 2021 06:10                            11.5   6.75  )-----------( 186      ( 28 Jan 2021 06:10 )             36.5                              11.5   6.75  )-----------( 186      ( 01-28 @ 06:10 )             36.5                11.3   6.13  )-----------( 177      ( 01-27 @ 06:00 )             35.5                11.1   7.63  )-----------( 173      ( 01-26 @ 07:26 )             35.6                12.5   9.66  )-----------( 258      ( 01-25 @ 12:30 )             40.7                11.9   8.08  )-----------( 235      ( 01-14 @ 09:40 )             37.8               RADIOLOGY & ADDITIONAL TESTS:    Imaging Personally Reviewed:  [ ] YES  [ ] NO  apixaban 2.5 milliGRAM(s) Oral two times a day  cinacalcet 30 milliGRAM(s) Oral daily  donepezil 5 milliGRAM(s) Oral at bedtime  furosemide   Injectable 20 milliGRAM(s) IV Push two times a day  metoprolol succinate ER 50 milliGRAM(s) Oral daily  multivitamin 1 Tablet(s) Oral daily      HEALTH ISSUES - PROBLEM Dx:  Dementia  Dementia    Atrial fibrillation  Atrial fibrillation    CHF (congestive heart failure)  CHF (congestive heart failure)

## 2021-01-28 NOTE — PROGRESS NOTE ADULT - SUBJECTIVE AND OBJECTIVE BOX
Follow up for afib  SUBJ:    climbing out of bed    PMH  Pacemaker    Transient cerebral ischemia, unspecified transient cerebral ischemia type    HTN (hypertension), benign    Atrial fibrillation    Acid reflux disease        MEDICATIONS  (STANDING):  apixaban 2.5 milliGRAM(s) Oral two times a day  cinacalcet 30 milliGRAM(s) Oral daily  donepezil 5 milliGRAM(s) Oral at bedtime  furosemide   Injectable 20 milliGRAM(s) IV Push two times a day  metoprolol succinate ER 50 milliGRAM(s) Oral daily  multivitamin 1 Tablet(s) Oral daily    MEDICATIONS  (PRN):        PHYSICAL EXAM:  Vital Signs Last 24 Hrs  T(C): 37 (28 Jan 2021 16:48), Max: 37.1 (28 Jan 2021 05:45)  T(F): 98.6 (28 Jan 2021 16:48), Max: 98.8 (28 Jan 2021 11:51)  HR: 113 (28 Jan 2021 16:48) (69 - 113)  BP: 162/93 (28 Jan 2021 16:48) (131/65 - 181/92)  BP(mean): 116 (28 Jan 2021 16:48) (112 - 116)  RR: 22 (28 Jan 2021 16:48) (16 - 22)  SpO2: 97% (28 Jan 2021 16:48) (94% - 97%)    GENERAL: NAD, well-groomed, well-developed  HEAD:  Atraumatic, Normocephalic  EYES: EOMI, PERRLA, conjunctiva and sclera clear  ENT: Moist mucous membranes,  NECK: Supple, No JVD, no bruits  CHEST/LUNG: Clear to percussion bilaterally; No rales, rhonchi, wheezing, or rubs  HEART: Regular rate and rhythm; No murmurs, rubs, or gallops PMI non displaced.  ABDOMEN: Soft, Nontender, Nondistended; Bowel sounds present  EXTREMITIES:  2+ Peripheral Pulses, No clubbing, cyanosis, or edema  SKIN: No rashes or lesions  NERVOUS SYSTEM:  Cranial Nerves II-XII intact      TELEMETRY:    afib    ECG:    < from: 12 Lead ECG (01.25.21 @ 12:25) >  Diagnosis Line Atrial fibrillation with rapid ventricular response  Rightward axis  Incomplete right bundle branch block  Anteroseptal infarct (cited on or before 30-AUG-2020)  Abnormal ECG  When compared with ECG of 14-GRISEL-2021 08:37,  there is no significant interval change    Confirmed by IAN ALMONTE MD ([id number]) on 1/26/2021 8:56:33 AM    < end of copied text >    ECHO:    LABS:                        11.5   6.75  )-----------( 186      ( 28 Jan 2021 06:10 )             36.5     01-28    142  |  103  |  22  ----------------------------<  94  3.2<L>   |  31  |  1.20    Ca    7.6<L>      28 Jan 2021 06:10    I&O's Summary    27 Jan 2021 07:01  -  28 Jan 2021 07:00  --------------------------------------------------------  IN: 220 mL / OUT: 0 mL / NET: 220 mL      BNP    RADIOLOGY & ADDITIONAL STUDIES:    < from: Xray Chest 1 View-PORTABLE IMMEDIATE (01.25.21 @ 13:37) >  IMPRESSION:   Cardiomegaly. Cardiac device wire lead is within right ventricle.  . Bilateral effusions..        RODOLFO CAMPBELL MD; Attending Radiologist  This document has been electronically signed. Jan 25 2021  5:55PM    < end of copied text >           Follow up for afib  SUBJ:    climbing out of bed    PMH  Pacemaker    Transient cerebral ischemia, unspecified transient cerebral ischemia type    HTN (hypertension), benign    Atrial fibrillation    Acid reflux disease        MEDICATIONS  (STANDING):  apixaban 2.5 milliGRAM(s) Oral two times a day  cinacalcet 30 milliGRAM(s) Oral daily  donepezil 5 milliGRAM(s) Oral at bedtime  furosemide   Injectable 20 milliGRAM(s) IV Push two times a day  metoprolol succinate ER 50 milliGRAM(s) Oral daily  multivitamin 1 Tablet(s) Oral daily    MEDICATIONS  (PRN):        PHYSICAL EXAM:  Vital Signs Last 24 Hrs  T(C): 37 (28 Jan 2021 16:48), Max: 37.1 (28 Jan 2021 05:45)  T(F): 98.6 (28 Jan 2021 16:48), Max: 98.8 (28 Jan 2021 11:51)  HR: 113 (28 Jan 2021 16:48) (69 - 113)  BP: 162/93 (28 Jan 2021 16:48) (131/65 - 181/92)  BP(mean): 116 (28 Jan 2021 16:48) (112 - 116)  RR: 22 (28 Jan 2021 16:48) (16 - 22)  SpO2: 97% (28 Jan 2021 16:48) (94% - 97%)    GENERAL: NAD, well-groomed, well-developed  HEAD:  Atraumatic, Normocephalic  EYES: EOMI, PERRLA, conjunctiva and sclera clear  ENT: Moist mucous membranes,  NECK: Supple, No JVD, no bruits  CHEST/LUNG: Clear to percussion bilaterally; No rales, rhonchi, wheezing, or rubs  HEART: Regular rate and rhythm; No murmurs, rubs, or gallops PMI non displaced.  ABDOMEN: Soft, Nontender, Nondistended; Bowel sounds present  EXTREMITIES:  2+ Peripheral Pulses, No clubbing, cyanosis, or edema  SKIN: No rashes or lesions  NERVOUS SYSTEM:  Cranial Nerves II-XII intact      TELEMETRY:    afib    ECG:    < from: 12 Lead ECG (01.25.21 @ 12:25) >  Diagnosis Line Atrial fibrillation with rapid ventricular response  Rightward axis  Incomplete right bundle branch block  Anteroseptal infarct (cited on or before 30-AUG-2020)  Abnormal ECG  When compared with ECG of 14-GRISEL-2021 08:37,  there is no significant interval change    Confirmed by RODOLFO ALMONTE MD ([id number]) on 1/26/2021 8:56:33 AM    < end of copied text >    ECHO:    LABS:                        11.5   6.75  )-----------( 186      ( 28 Jan 2021 06:10 )             36.5     01-28    142  |  103  |  22  ----------------------------<  94  3.2<L>   |  31  |  1.20    Ca    7.6<L>      28 Jan 2021 06:10    I&O's Summary    27 Jan 2021 07:01  -  28 Jan 2021 07:00  --------------------------------------------------------  IN: 220 mL / OUT: 0 mL / NET: 220 mL      BNP    RADIOLOGY & ADDITIONAL STUDIES:    < from: Xray Chest 1 View-PORTABLE IMMEDIATE (01.25.21 @ 13:37) >  IMPRESSION:   Cardiomegaly. Cardiac device wire lead is within right ventricle.  . Bilateral effusions..        RODOLFO CAMPBELL MD; Attending Radiologist  This document has been electronically signed. Jan 25 2021  5:55PM    < end of copied text >        < from: TTE Echo Complete w/o Contrast w/ Doppler (01.28.21 @ 10:01) >  Summary:   1. Left ventricular ejection fraction, by visual estimation, is 35 to 40%.   2. Moderately to severely decreased global left ventricular systolic function.   3. Moderate to severe left atrial enlargement.   4. Severely enlarged right atrium.   5. Multiple left ventricular regional wall motion abnormalities exist. See wall motion findings.   6. Mildly increased LV wall thickness.   7. Normal left ventricular internal cavity size.   8. Spectral Doppler shows restrictive pattern of left ventricular myocardial filling (Grade III diastolic dysfunction).   9. There is mild concentric left ventricular hypertrophy.  10. Moderately reduced RV systolic function.  11. Biatrial enlargement conisistent wiht a restrictive physiology.  12. Moderate pleural effusion in the left lateral region.  13. Trivial pericardial effusion.  14. Mild mitral annular calcification.  15. Moderate to severe mitral valve regurgitation.  16. Thickening and calcification of the anterior and posterior mitral valve leaflets.  17. Severe tricuspid regurgitation.  18. Moderate aortic regurgitation.  19. Sclerotic aortic valve withnormal opening.  20. Estimated pulmonary artery systolic pressure is 68.2 mmHg assuming a right atrial pressure of 10 mmHg, which is consistent with severe pulmonary hypertension.  21. Mildly dilated pulmonary artery.  22. There is mild aortic root calcification.    [id number] Rodolfo Almonte , Electronically signed on 1/28/2021 at 11:55:41 AM    *** Final ***      RODOLFO ALMONTE   This document has been electronically signed. Jan 28 2021 10:01AM    < end of copied text >

## 2021-01-28 NOTE — PROGRESS NOTE ADULT - SUBJECTIVE AND OBJECTIVE BOX
Resting    Vital Signs Last 24 Hrs  T(C): 37.1 (01-28-21 @ 11:51), Max: 37.1 (01-28-21 @ 05:45)  T(F): 98.8 (01-28-21 @ 11:51), Max: 98.8 (01-28-21 @ 11:51)  HR: 102 (01-28-21 @ 11:51) (98 - 102)  BP: 151/92 (01-28-21 @ 11:51) (131/65 - 181/92)  BP(mean): 112 (01-28-21 @ 11:51) (112 - 112)  RR: 20 (01-28-21 @ 11:51) (17 - 20)  SpO2: 97% (01-28-21 @ 11:51) (94% - 97%)    card s1s2  lungs decr BS b/l  abd soft, ND  sm edema                        11.5   6.75  )-----------( 186      ( 28 Jan 2021 06:10 )             36.5     28 Jan 2021 06:10    142    |  103    |  22     ----------------------------<  94     3.2     |  31     |  1.20     Ca    7.6        28 Jan 2021 06:10    apixaban 2.5 milliGRAM(s) Oral two times a day  cinacalcet 30 milliGRAM(s) Oral daily  donepezil 5 milliGRAM(s) Oral at bedtime  furosemide   Injectable 20 milliGRAM(s) IV Push two times a day  metoprolol succinate ER 50 milliGRAM(s) Oral daily  multivitamin 1 Tablet(s) Oral daily    A/P:    CM, EF 35-40%, MR, TR, AR  Dementia, FTT  MORELIA, Cardio-renal (Cr 0.86 - 10/5/20)  No objection to Lasix   Cr is fairly stable  F/u BMP  Suppl K, Mg as needed  Prognosis is poor overall    [phone]

## 2021-01-28 NOTE — CHART NOTE - NSCHARTNOTEFT_GEN_A_CORE
Patient is a 89y old  Female who presents with a chief complaint of cmp (28 Jan 2021 17:08)      BRIEF HOSPITAL COURSE: 89yo female with hx of Dementia, HTN, A fib admitted for CHF exacerbation.    Events last 24 hours: Called to bedside by RN to evaluate pt. Pt found by RN groaning/moaning, c/o of difficulty breathing. Pt also endorses to RN that she "needs a hug." Pt assessed at bedside - appearing emotional/anxious stating she feels SOB, but it is improving. Pt also endorses feeling anxious/nervous, but unsure why - does not have a history of anxiety. Pt denies chest pain, abdominal pain, dizziness/lightheadedness. On my bedside examination, pt satting 97% on RA. Hypertensive to 173/119, tachycardic to 116 - was hypertensive earlier and given Hydralazine 10mg IVP. Due for Isosorbide Dinitrate for HTN. Pt was receiving Lasix 20mg IVP BID, but did not receive second dose as per worklist manager.    PAST MEDICAL & SURGICAL HISTORY:  Pacemaker    Transient cerebral ischemia, unspecified transient cerebral ischemia type    HTN (hypertension), benign    Atrial fibrillation    Acid reflux disease    History of cataract surgery      SOCIAL HISTORY:  lives home with daughter  ambulates with walker and assistance  denies smoking, etoh use      Review of Systems:  CONSTITUTIONAL: No fever, chills, or fatigue  EYES: No eye pain, visual disturbances, or discharge  ENMT:  No difficulty hearing, tinnitus, vertigo; No sinus or throat pain  NECK: No pain or stiffness  RESPIRATORY: +SOB; No cough, wheezing, chills or hemoptysis  CARDIOVASCULAR: No chest pain, palpitations, dizziness, or leg swelling  GASTROINTESTINAL: No abdominal or epigastric pain. No nausea, vomiting, or hematemesis; No diarrhea or constipation. No melena or hematochezia.  GENITOURINARY: No dysuria, frequency, hematuria, or incontinence  NEUROLOGICAL: No headaches, memory loss, loss of strength, numbness, or tremors  SKIN: No itching, burning, rashes, or lesions   MUSCULOSKELETAL: No joint pain or swelling; No muscle, back, or extremity pain  PSYCHIATRIC: +anxiety/nervousness      Medications:    metoprolol succinate ER 50 milliGRAM(s) Oral daily      donepezil 5 milliGRAM(s) Oral at bedtime      apixaban 2.5 milliGRAM(s) Oral two times a day        cinacalcet 30 milliGRAM(s) Oral daily    multivitamin 1 Tablet(s) Oral daily                ICU Vital Signs Last 24 Hrs  T(C): 37.3 (28 Jan 2021 21:02), Max: 37.3 (28 Jan 2021 21:02)  T(F): 99.2 (28 Jan 2021 21:02), Max: 99.2 (28 Jan 2021 21:02)  HR: 116 (28 Jan 2021 21:02) (69 - 116)  BP: 173/119 (28 Jan 2021 21:02) (151/92 - 181/92)  BP(mean): 116 (28 Jan 2021 16:48) (112 - 116)  ABP: --  ABP(mean): --  RR: 22 (28 Jan 2021 21:02) (16 - 22)  SpO2: 97% (28 Jan 2021 21:02) (94% - 97%)          I&O's Detail    27 Jan 2021 07:01  -  28 Jan 2021 07:00  --------------------------------------------------------  IN:    Oral Fluid: 220 mL  Total IN: 220 mL    OUT:  Total OUT: 0 mL    Total NET: 220 mL            LABS:                        11.5   6.75  )-----------( 186      ( 28 Jan 2021 06:10 )             36.5     01-28    142  |  103  |  22  ----------------------------<  94  3.2<L>   |  31  |  1.20    Ca    7.6<L>      28 Jan 2021 06:10            CAPILLARY BLOOD GLUCOSE            CULTURES:      Physical Examination:    PULM: Clear to auscultation bilaterally.    CVS: s1/s2.    ABD: Soft, nondistended, nontender, normoactive bowel sounds.    EXT: No edema, nontender    NEURO: A&Ox3.      RADIOLOGY:   < from: Xray Chest 1 View-PORTABLE IMMEDIATE (01.25.21 @ 13:37) >  EXAM:  XR CHEST PORTABLE IMMED 1V    PROCEDURE DATE:  01/25/2021    INTERPRETATION:  Portable chest radiograph  CLINICAL INFORMATION: Dyspnea, shortness of breath.  TECHNIQUE:  Portable  AP view of the chest was obtained.  COMPARISON: No previous examinations are available for review  FINDINGS:The lungs show haziness overlying the lower hemithoraces indicating mild bilateral effusions. Upper lobes clear. No pneumothorax.  The  heart is  enlarged in transverse diameter unchanged from prior 8/30/2020 exam.. No hilar mass.  Cardiac device wire lead is within right ventricle.   Visualized osseous structures are intact.  IMPRESSION:   Cardiomegaly. Cardiac device wire lead is within right ventricle.  . Bilateral effusions..  RODOLFO CAMPBELL MD; Attending Radiologist  This document has been electronically signed. Jan 25 2021  5:55PM      89yo female with hx of Dementia, HTN, A fib admitted for CHF exacerbation.  Assessment:  1. SOB  2. CHF/tachycardia  Plan:  1. Patient's chart reviewed and examined at bedside. Symptoms/vitals likely due to anxiety/emotional feelings as well as missed Lasix dose per Cardio rec. Ativan 0.25mg IVP, Lasix 20mg IVP ordered STAT. Encouraged giving Isosorbide Dinitrate pt due to receive for HTN. D-dimer ordered. Will monitor vital signs post medication administration.   2. Cardiology following, recs appreciated. Cardio suspecting pt target heart rate ~100bpm as dehydration/cardiomyopathy drivers of tachycardia. Will hold further diuresis.   Will monitor/reassess and determine if any additional management needed. Rest of care as per primary team.     TIME SPENT: 36 minutes  Evaluating/treating patient, reviewing data/labs/imaging, discussing case with multidisciplinary team, discussing plan/goals of care with patient/family. Non-inclusive of procedure time.

## 2021-01-28 NOTE — PROGRESS NOTE ADULT - ATTENDING COMMENTS
tachycardia hfref  most likely due to dehydration and a cardiomyopathy would consider patient for an ACE ARB ARNI if deemed a candidate and possibly aldactone. would avoid furosemide at this juncture. continue metoprolol succinate given low ef. I suspect target heart rate about 100 given ongoing drivers of tachycardia. tachycardia hfref  most likely due to dehydration and a cardiomyopathy would consider patient for an ACE ARB ARNI if deemed a candidate and possibly aldactone. would avoid furosemide at this juncture. continue metoprolol succinate given low ef. I suspect target heart rate about 100 given ongoing drivers of tachycardia. kimberly consider hydralazine and nitrates in lieu of ace arb arni given gfr 38.

## 2021-01-28 NOTE — CHART NOTE - NSCHARTNOTEFT_GEN_A_CORE
Chart reviewed.       Primary Diagnosis: CHF exacerbation     89yo female with hx of Dementia, HTN, Afib, presented to the ED with complaints of progressively worsening sob associated with b/l LE edema x 2-3 days, admitted w. acute on chronic combined CHF exacerbation.     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 with exacerbation  - Cont wireless tele  - Appreciate Cards recs   - Recent ECHO EF 25-30% with grade I diastolic dysfunction, pending repeat-f/u  - Continue Lasix 20mg IV BID  - Cont Metoprolol (Labetalol held)  - Consider resuming ACE-I/ARB, though recognize may exacerbate known renal disease- monitor renal fnx  - Strict I/O  - Telemetry  - Monitor strict i/os     *AFib   - Appreciate Cards recs   - Continue Metoprolol  (switched from Labetalol for mortality benefit)  - Cont Eliquis    *MORELIA on CKD Stage III  - Monitor renal function with diuresis  - Improving  - Cont to Hold Lisinopril for now    *Dementia  - Cont Aricept    Case & Plan d/w Dr. Carroll.   Code Status- DNR/DNI.  Dispo: Pending PT June. Chart reviewed.       Primary Diagnosis: CHF exacerbation     89yo female with hx of Dementia, HTN, Afib, presented to the ED with complaints of progressively worsening sob associated with b/l LE edema x 2-3 days, admitted w. acute on chronic combined CHF exacerbation.     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 with exacerbation  - Cont wireless tele  - Appreciate Cards recs   - Recent ECHO EF 25-30% with grade I diastolic dysfunction, pending repeat-f/u  - Continue Lasix 20mg IV BID  - Cont Metoprolol (Labetalol held)  - Consider resuming ACE-I/ARB, though recognize may exacerbate known renal disease- monitor renal fnx  - Strict I/O  - Telemetry  - Monitor strict i/os     *AFib   - Appreciate Cards recs   - Continue Metoprolol  (switched from Labetalol for mortality benefit)  - Cont Eliquis    *MORELIA on CKD Stage III  - Monitor renal function with diuresis  - Improving  - Cont to Hold Lisinopril for now    *Dementia  - Cont Aricept    Case & Plan d/w Dr. Carroll.   Code Status- DNR/DNI.  Dispo: Pending PT Eval.      Addendum:  Pt reported hypertensive and tachycardia up to , d/w cards- recs to hold further Lasix dosing (as possible cause for tachycardia at this point) and dose hydralazine and Isosorbide. Evening doses ordered, further dosing on 1.29 to be determined in AM.

## 2021-01-28 NOTE — PHYSICAL THERAPY INITIAL EVALUATION ADULT - ADDITIONAL COMMENTS
pt is a poor historian, info per chart; lives in private house w/dtr, 3 evan w/1 rail, no stairs in house that pt uses. pt states she uses a rolling walker to ambulate at home, also has a commode and grab bars in br

## 2021-01-29 NOTE — PROGRESS NOTE ADULT - SUBJECTIVE AND OBJECTIVE BOX
Patient is a 89y old  Female who presents with a chief complaint of cmp (28 Jan 2021 17:08)      INTERVAL HPI/OVERNIGHT EVENTS:  confused and agitated    REVIEW OF SYSTEMS:  CONSTITUTIONAL: No fever, weight loss, or fatigue  EYES: No eye pain, visual disturbances, or discharge  ENMT:  No difficulty hearing, tinnitus, vertigo; No sinus or throat pain  NECK: No pain or stiffness  BREASTS: No pain, masses, or nipple discharge  RESPIRATORY: No cough, wheezing, chills or hemoptysis; No shortness of breath  CARDIOVASCULAR: No chest pain, palpitations, dizziness, or leg swelling  GASTROINTESTINAL: No abdominal or epigastric pain. No nausea, vomiting, or hematemesis; No diarrhea or constipation. No melena or hematochezia.  GENITOURINARY: No dysuria, frequency, hematuria, or incontinence  NEUROLOGICAL: No headaches, memory loss, loss of strength, numbness, or tremors  SKIN: No itching, burning, rashes, or lesions   LYMPH NODES: No enlarged glands  ENDOCRINE: No heat or cold intolerance; No hair loss  MUSCULOSKELETAL: No joint pain or swelling; No muscle, back, or extremity pain  PSYCHIATRIC: No depression, anxiety, mood swings, or difficulty sleeping  HEME/LYMPH: No easy bruising, or bleeding gums  ALLERY AND IMMUNOLOGIC: No hives or eczema  FAMILY HISTORY:  FH: HTN (hypertension)      T(C): 36.2 (01-29-21 @ 05:00), Max: 37.3 (01-28-21 @ 21:02)  HR: 107 (01-29-21 @ 06:51) (69 - 116)  BP: 159/84 (01-29-21 @ 06:51) (151/92 - 181/111)  RR: 21 (01-29-21 @ 05:00) (13 - 25)  SpO2: 95% (01-29-21 @ 05:00) (95% - 97%)  Wt(kg): --Vital Signs Last 24 Hrs  T(C): 36.2 (29 Jan 2021 05:00), Max: 37.3 (28 Jan 2021 21:02)  T(F): 97.1 (29 Jan 2021 05:00), Max: 99.2 (28 Jan 2021 21:02)  HR: 107 (29 Jan 2021 06:51) (69 - 116)  BP: 159/84 (29 Jan 2021 06:51) (151/92 - 181/111)  BP(mean): 116 (28 Jan 2021 16:48) (112 - 116)  RR: 21 (29 Jan 2021 05:00) (13 - 25)  SpO2: 95% (29 Jan 2021 05:00) (95% - 97%)    T(F): 97.1 (01-29-21 @ 05:00), Max: 99.2 (01-28-21 @ 21:02)  HR: 107 (01-29-21 @ 06:51) (69 - 116)  BP: 159/84 (01-29-21 @ 06:51) (131/65 - 181/111)  RR: 21 (01-29-21 @ 05:00) (13 - 25)  SpO2: 95% (01-29-21 @ 05:00) (93% - 97%)    PHYSICAL EXAM:  GENERAL: NAD, well-groomed, well-developed  HEAD:  Atraumatic, Normocephalic  EYES: EOMI, PERRLA, conjunctiva and sclera clear  ENMT: No tonsillar erythema, exudates, or enlargement; Moist mucous membranes, Good dentition, No lesions  NECK: Supple, No JVD, Normal thyroid  NERVOUS SYSTEM:  Alert & Oriented X3, Good concentration; Motor Strength 5/5 B/L upper and lower extremities; DTRs 2+ intact and symmetric  CHEST/LUNG: Clear to percussion bilaterally; No rales, rhonchi, wheezing, or rubs  HEART: Regular rate and rhythm; No murmurs, rubs, or gallops  ABDOMEN: Soft, Nontender, Nondistended; Bowel sounds present  EXTREMITIES:  2+ Peripheral Pulses, No clubbing, cyanosis, or edema  LYMPH: No lymphadenopathy noted  SKIN: No rashes or lesions    Consultant(s) Notes Reviewed:  [x ] YES  [ ] NO  Care Discussed with Consultants/Other Providers [ x] YES  [ ] NO    LABS:  01-28    142  |  103  |  22  ----------------------------<  94  3.2<L>   |  31  |  1.20    Ca    7.6<L>      28 Jan 2021 06:10                            11.5   6.75  )-----------( 186      ( 28 Jan 2021 06:10 )             36.5                              11.5   6.75  )-----------( 186      ( 01-28 @ 06:10 )             36.5                11.3   6.13  )-----------( 177      ( 01-27 @ 06:00 )             35.5                11.1   7.63  )-----------( 173      ( 01-26 @ 07:26 )             35.6                12.5   9.66  )-----------( 258      ( 01-25 @ 12:30 )             40.7                11.9   8.08  )-----------( 235      ( 01-14 @ 09:40 )             37.8               RADIOLOGY & ADDITIONAL TESTS:    Imaging Personally Reviewed:  [ ] YES  [ ] NO  apixaban 2.5 milliGRAM(s) Oral two times a day  cinacalcet 30 milliGRAM(s) Oral daily  donepezil 5 milliGRAM(s) Oral at bedtime  metoprolol succinate ER 50 milliGRAM(s) Oral daily  multivitamin 1 Tablet(s) Oral daily      HEALTH ISSUES - PROBLEM Dx:  HTN (hypertension), benign  HTN (hypertension), benign    MORELIA (acute kidney injury)  MORELIA (acute kidney injury)    Dementia  Dementia    Atrial fibrillation  Atrial fibrillation    CHF (congestive heart failure)  CHF (congestive heart failure)

## 2021-01-29 NOTE — CONSULT NOTE ADULT - CONVERSATION DETAILS
"      HPI:       Catarina Guerra is a 44 year old who presents for the following  Patient presents with:  RECHECK: f/u lab result  Repeat Pap Smear: annual pap  Refill Request: naproxen 500mg         Concerns: Review labs and Urinary issues and Constipation    Patient is a 44 year old female with a past medical history of Lyme Disease, methamphetamine use, opiod use disorder currently at [facility] who presents to follow up on labs obtained at last visit and discuss urinary issues.      Labs reviewed with patient- all were normal.     Last 4-5 years, patient has had problems with urinary urgency and frequency.  Patient has incontinence with laughing, sneezing and walking fast.  Patient states large amounts of urine come out and she is dependent on wearing Depends/bladder pads during the day and at night.  Patient urinates every 1-2 hours and gets up at night 3-4 times to urinate.  No dysuria.  Patient has had a partial hysterectomy (age 23), no other surgeries.  She does not know if her cervix was removed or not although later she stated the hysterectomy was performed for \"cervical cancer\".   Patient has had 3 vaginal delivers (last delivery 1995).  Patient states she is on multiple medications (naprosyn, ranitidine, mirtazipine, and prazosin and stratera, and duloxetine, hydroxyzine).      Patient has been evaluated by a Urologist in [facility] prior to going to [employer] [facility] program and was told she has \"mixed incontinence\", possibly a combination of stress and urge incontinence.  She was then referred to another provider for surgical evaluation.  She never received the surgical evaluation.  She has tried oxybutynin and another medication (not sure what it was) but they did not help symptoms.      Patient is also complaining of constipation.  Last BM 3 days prior.  BMs are small and hard, patient requesting something for constipation.  She believes it is from the food she eats at the program.          Problem, " Medication and Allergy Lists were reviewed and are current.  Patient is an established patient of this clinic.         Review of Systems:   Review of Systems   Constitutional: Positive for appetite change. Negative for chills and fever.   HENT: Negative for congestion.    Respiratory: Negative for cough and shortness of breath.    Cardiovascular: Negative for chest pain.   Gastrointestinal: Positive for constipation. Negative for abdominal pain, diarrhea, nausea and vomiting.   Endocrine: Positive for polyuria.   Genitourinary: Positive for frequency. Negative for dysuria, hematuria, menstrual problem and vaginal pain.   Psychiatric/Behavioral: The patient is nervous/anxious.              Physical Exam:   Patient Vitals for the past 24 hrs:   BP Temp Temp src Pulse Resp SpO2 Weight   10/24/17 1321 110/75 98.2  F (36.8  C) Oral 89 18 96 % 148 lb (67.1 kg)     Body mass index is 29.25 kg/(m^2).  Vitals were reviewed and were normal      Physical Exam   Constitutional: She is oriented to person, place, and time. She appears well-developed and well-nourished.   HENT:   Head: Normocephalic.   Neck: Normal range of motion. Neck supple.   Cardiovascular: Normal rate.    Pulmonary/Chest: Effort normal.   Genitourinary:   Genitourinary Comments: No prolapse of bladder or vaginal walls noted on exam.  Constipation felt through posterior vaginal wall.  No cervix visualized on speculum exam, however, pap sample  obtained through folds of vaginal tissue in posterior vaginal vault.     Musculoskeletal: Normal range of motion.   Neurological: She is alert and oriented to person, place, and time.   Skin: No rash noted. No erythema. No pallor.   Psychiatric: She has a normal mood and affect. Her behavior is normal. Judgment and thought content normal.   Vitals reviewed.      Results:     Results for LINUS RAMESH (MRN [id number]) as of 10/25/2017 09:34   Ref. Range 10/24/2017 14:12   Glucose Urine Latest Ref Range: NEGATIVE   Negative   Bilirubin UR Latest Ref Range: NEGATIVE  Negative   Ketones Urine Latest Ref Range: NEGATIVE  Negative   Specific Gravity Urine Latest Ref Range: 1.005 - 1.030  1.015   pH Urine Latest Ref Range: 4.5 - 8.0  7.0   Protein UR Latest Ref Range: NEGATIVE  Negative   Urobilinogen mg/dL Latest Ref Range: 0.2 E.U./dL  0.2 E.U./dL   Nitrite Urine Latest Ref Range: NEGATIVE  Negative   Blood UR Latest Ref Range: NEGATIVE  Negative   Leukocyte Esterase UR Latest Ref Range: NEGATIVE  Negative     Assessment and Plan     Catarina was seen today for recheck, repeat pap smear and refill request.    Patient had multiple issues to discuss.     First, labs from last visit were reviewed and showed no significant abnormalities.      Second, we discussed her urinary issues.  Based on her symptoms, she appears to have both mixed urge and stress incontinence.  She has been worked up by another provider, so ABDI was obtained from patient to get information from Urologist in [facility].  UA performed as first step to evaluate patient's stress and urge incontinence, which was normal.  Exam showed no prolapse of bladder or vaginal walls indicating severe pelvic floor dysfunction, but this may still be a component in patient's urinary issues.     Also attempted to get ABDI from provider that performed hysterectomy (to find out indication and if patient has a cervix).   A typical cervix was not felt on bimanual exam or seen on speculum exam, however, I did perform a pap smear in posterior vaginal vault (this would still be indicated if patient had a hysterectomy for high grade cervical cancer).     Patient was also given Miralax for constipation and advised to RETURN TO CLINIC if symptoms do not improve with Miralax.      Diagnoses and all orders for this visit:    Other constipation  -     polyethylene glycol (MIRALAX) powder; Take 17 g (1 capful) by mouth daily    Polyuria  -     Urinalysis, Micro If (UA) ([facility])    Pap smear for  cervical cancer screening  -     Pap imaged thin layer screen with HPV - recommended age 30 - 65 years (select HPV order below)  -     HPV High Risk Types DNA Cervical    Arthralgia, unspecified joint  -     naproxen (NAPROSYN) 500 MG tablet; Take 1 tablet (500 mg) by mouth 2 times daily as needed for moderate pain      There are no discontinued medications.  Options for treatment and follow-up care were reviewed with the patient. Catarina Guerra  engaged in the decision making process and verbalized understanding of the options discussed and agreed with the final plan.    Anny Bethea M.D.  PGY-3, Family Medicine     Called and spoke to candi Henriquez this morning.  We discussed why pt is here and how she is currently doing. Daughter is main caregiver for the patient and they  live in same home. Daughter aware of pts decreased  heart function , and her fragile state at 89 yo.  Daughter wants to take pt back home when she is medically stable, she does NOT want her mother to go to rehab.  I began discussion of home hospice services, and explained  how home hospice could help her to keep her mom at home .  Daughter interested , however she must speak to her brother first, he also helps with pts care per daughter.    Plan is for family to  discuss home hospice option , and to let us know decision .   Candi Henriquez has my contact info for any questions.

## 2021-01-29 NOTE — PROVIDER CONTACT NOTE (OTHER) - SITUATION
Pt. reassessed s/p Lopressor 5mg IVP. Upon assessment pt. c/o SOB. No oxygen issues with pulse ox. Seems to be anxiety induced. Provider contacted.

## 2021-01-29 NOTE — PROGRESS NOTE ADULT - SUBJECTIVE AND OBJECTIVE BOX
Follow up for   CHF, af    SUBJ: comfortable, lethargic    PMH  Pacemaker    Transient cerebral ischemia, unspecified transient cerebral ischemia type    HTN (hypertension), benign    Atrial fibrillation    Acid reflux disease        MEDICATIONS  (STANDING):  apixaban 2.5 milliGRAM(s) Oral two times a day  cinacalcet 30 milliGRAM(s) Oral daily  donepezil 5 milliGRAM(s) Oral at bedtime  metoprolol succinate ER 50 milliGRAM(s) Oral daily  multivitamin 1 Tablet(s) Oral daily    MEDICATIONS  (PRN):        PHYSICAL EXAM:  Vital Signs Last 24 Hrs  T(C): 36.6 (2021 08:20), Max: 37.3 (2021 21:02)  T(F): 97.9 (2021 08:20), Max: 99.2 (2021 21:02)  HR: 118 (2021 08:20) (69 - 118)  BP: 162/94 (2021 08:20) (151/92 - 181/111)  BP(mean): 94 (2021 08:20) (94 - 116)  RR: 18 (2021 08:20) (13 - 25)  SpO2: 97% (2021 08:20) (95% - 97%)    GENERAL: NAD,   HEAD:  Atraumatic, Normocephalic  EYES:  conjunctiva and sclera clear    NECK jvp elevated  CHEST/LUNG: basal rales  HEART: irregular rate and rhythm; No murmurs, rubs, or gallops PMI non displaced.  ABDOMEN: Soft, Nontender, Nondistended; Bowel sounds present  EXTREMITIES: ++edema          TELEMETRY:  af        LABS:                        13.2   9.82  )-----------( 245      ( 2021 08:30 )             42.2         140  |  102  |  23  ----------------------------<  199<H>  3.7   |  24  |  1.25    Ca    7.9<L>      2021 08:30        I&O's Summary    2021 07:01  -  2021 07:00  --------------------------------------------------------  IN: 0 mL / OUT: 800 mL / NET: -800 mL      ECHO:  < from: TTE Echo Complete w/o Contrast w/ Doppler (21 @ 10:01) >    EXAM:  ECHO TTE WO CON COMP W DOPP      PROCEDURE DATE:  2021        INTERPRETATION:  REPORT:  TRANSTHORACIC ECHOCARDIOGRAM REPORT        Patient Name:   AZRA CHAHAL Patient Location: [address] Rec #:  [id number]       Accession #: [id number]  Account #:      [id number]         Height:           63.0 in 160.0 cm  YOB: 1925     Weight:           114.6 lb 52.00 kg  Patient Age:    89 years      BSA:              1.53 m²  Patient Gender: F             BP:               135/69 mmHg      Date of Exam:        2021 10:01:43 AM  Sonographer:         Efrain Foster  Referring Physician: GENIA MCKEON    Procedure:     2D Echo/Doppler/Color Doppler Complete.  Indications:   Dyspnea, unspecified - R06.00  Diagnosis:    Other forms of dyspnea - R06.09  Study Details: Technically good study.        2D AND M-MODE MEASUREMENTS (normal ranges within parentheses):  Left                 Normal   Aorta/Left            Normal  Ventricle:                    Atrium:  IVSd(2D):    1.23  (0.7-1.1) Aortic Root   2.68  (2.4-3.7)                 cm             (2D):          cm  LVPWd (2D):   1.24  (0.7-1.1) Aortic Root   3.05  (2.4-3.7)                 cm             (Mmode):       cm  LVIDd (2D):   4.67  (3.4-5.7) AoV Cusp      1.62  (1.5-2.6)                 cm             Separation:    cm  LVIDs (2D):   3.83            Left Atrium   4.23  (1.9-4.0)                 cm             (2D):          cm  LV FS (2D):   18.1   (>25%)   Left Atrium   4.01  (1.9-4.0)            %             (Mmode):       cm  LV EF (2D):   38 %   (>55%)   LA Volume     37.8  Relative Wall 0.53   (<0.42)  Index        ml/m²  Thickness                     Right Ventricle:                                TAPSE:           1.16 cm    LV SYSTOLIC FUNCTION BY 2D PLANIMETRY (MOD):  EF-A4C View: 39.2 % EF-A2C View: 38.1 % EF-Biplane: 41 %    LV DIASTOLIC FUNCTION:  MV Peak E: 1.18 m/s Decel Time: 116 msec  MV Peak A: 0.33 m/s  E/A Ratio: 3.60    SPECTRAL DOPPLER ANALYSIS (where applicable):  Mitral Valve:  MV P1/2 Time: 33.65 msec  MV Area, PHT: 6.54 cm²    Aortic Valve: AoV Max Jeremie: 1.52 m/s AoV Peak P.2 mmHg AoV Mean PG: 3.8 mmHg    LVOT Vmax: 0.78 m/s LVOT VTI: 0.137 m LVOT Diameter: 2.01 cm    AoV Area, Vmax: 1.62 cm² AoV Area, VTI: 2.09 cm² AoV Area, Vmn: 1.61 cm²  Ao VTI: 0.207  Aortic Insufficiency:  AI Half-time:  422 msec  AI Decel Rate: 3.16 m/s²    Tricuspid Valve and PA/RV Systolic Pressure: TR Max Velocity: 3.81 m/s RA Pressure: 10 mmHg RVSP/PASP: 68.2 mmHg      PHYSICIAN INTERPRETATION:  Left Ventricle: The left ventricular internal cavity size is normal. Left ventricular wall thickness is mildly increased. There is mild concentric left ventricular hypertrophy involving the global wall. The LVH involves global walls.  Global LV systolic function was moderately to severely decreased. Left ventricular ejection fraction, by visual estimation, is 35 to 40%. Spectral Doppler shows restrictive pattern of left ventricular myocardial filling (Grade III diastolic dysfunction).      LV Wall Scoring:  There is diffuse hypokinesis.    Right Ventricle: The right ventricular size is normal. RV wall thickness is normal. RV systolic function is moderately reduced.  Left Atrium: Moderate to severe left atrial enlargement. Biatrial enlargement conisistent wiht a restrictive physiology.  Right Atrium: Severely enlarged right atrium.  Pericardium: Trivial pericardial effusion is present. The pericardial effusion is globally located around the entire heart. There is a moderate pleural effusion in the left lateral region.  Mitral Valve: Thickening and calcification of the anterior and posterior mitral valve leaflets. Mobility is mildly decreased for the posterior leaflet. There is mild mitral annular calcification. Moderate to severe mitral valve regurgitation is seen. The MR jet is centrally-directed. Diastolic mitral regurgitation is present.  Tricuspid Valve: The tricuspid valve is normal in structure. Severe tricuspid regurgitation is visualized. Estimated pulmonary artery systolic pressure is 68.2 mmHg assuming a right atrial pressure of 10 mmHg, which is consistent with severe pulmonary hypertension.  Aortic Valve: The aortic valve is trileaflet. Sclerotic aortic valve with normal opening. Moderateaortic valve regurgitation is seen.  Pulmonic Valve: The pulmonic valve is normal. Trace pulmonic valve regurgitation.  Aorta: The aortic root and ascending aorta are structurally normal, with no evidence of dilitation. The aortic arch was not well visualized. Aortic root measured at Sinus of Valsalva is normal. There is mild aortic root calcification.  Pulmonary Artery: The pulmonary artery is mildly dilated.  Venous: The inferior vena cava is abnormal. The inferior vena cava was dilated, with respiratory size variation less than 50%.  Additional Comments: A pacer wire is visualized in the right atrium and right ventricle.      Summary:   1. Left ventricular ejection fraction, by visual estimation, is 35 to 40%.   2. Moderately to severely decreased global left ventricular systolic function.   3. Moderate to severe left atrial enlargement.   4. Severely enlarged right atrium.   5. Multiple left ventricular regional wall motion abnormalities exist. See wall motion findings.   6. Mildly increased LV wall thickness.   7. Normal left ventricular internal cavity size.   8. Spectral Doppler shows restrictive pattern of left ventricular myocardial filling (Grade III diastolic dysfunction).   9. There is mild concentric left ventricular hypertrophy.  10. Moderately reduced RV systolic function.  11. Biatrial enlargement conisistent wiht a restrictive physiology.  12. Moderate pleural effusion in the left lateral region.  13. Trivial pericardial effusion.  14. Mild mitral annular calcification.  15. Moderate to severe mitral valve regurgitation.  16. Thickening and calcification of the anterior and posterior mitral valve leaflets.  17. Severe tricuspid regurgitation.  18. Moderate aortic regurgitation.  19. Sclerotic aortic valve withnormal opening.  20. Estimated pulmonary artery systolic pressure is 68.2 mmHg assuming a right atrial pressure of 10 mmHg, which is consistent with severe pulmonary hypertension.  21. Mildly dilated pulmonary artery.  22. There is mild aortic root calcification.    [id number] Ian Almonte , Electronically signed on 2021 at 11:55:41 AM            *** Final ***                IAN ALMONTE   This document has been electronically signed. 2021 10:01AM    < end of copied text >

## 2021-01-29 NOTE — PROVIDER CONTACT NOTE (OTHER) - BACKGROUND
Pt. adm for SOB
Pt. adm for SOB.
Pt. with hx. Afib on Eliquis & metoprolol succinate to control HR.
Pt. with hx. Afib on Eliquis & metoprolol succinate to control HR.

## 2021-01-29 NOTE — CONSULT NOTE ADULT - ASSESSMENT
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  Pt known to palliative team from previous admissions here. Pt has a copy of her completed Molst on chart for dnr/dni   Saw pt bedside, she wakes w/ stimulation and can converse, she c/o feeling very tired this morning, of note,  pt was given low dose ativan last night so this could be why she is so tired this morning.   Also c/o thirst, she will eat and drink well if helped , she needs assist to eat.   Pt would make good home hospice candidate, reviewed benefits of home hospice with daughter Florence, see GOC note above.  Will wait for return call from daughter in regards to home hospice services.    Palliative will follow pts clinical course w/ med team .            A/P 89yo female with hx of Dementia, HTN, A fib, presented to the ED with complaints of progressively worsening sob associated with b/l LE edema x 2-3 days, noted to have CHF exacerbation    #Acute on chronic combined systolic/diastolic CHF with exacerbation  -Recent ECHO EF 35-40% with grade I diastolic dysfunction  -given  Lasix 20mg IV BID  -Cards consult - see recs    -Telemetry  -Monitor strict i/os   -fluid restriction  -Monitor renal function    #Afib   -Continue Labetalol, - see cards consult   -Eliquis    #MORELIA on CKD Stage III  -Monitor renal function with diuresis, expect improvement for cardiorenal   -Hold Lisinopril for now    #Dementia  -Aricept  - supportive care , re-orient    -Avoid benzos            Palliative :  asked for assist w/ GOC/possible hospice, case reviewed w/ med team NP and chart reviewed.  Pt known to palliative team from previous admissions here. Pt has a copy of her completed Molst on chart for dnr/dni   Saw pt bedside, she wakes w/ stimulation and can converse, she c/o feeling very tired this morning, of note,  pt was given low dose ativan last night so this could be why she is so tired this morning.  Would try to Avoid Benzos   Also c/o thirst, she will eat and drink well if helped , she needs assist to eat.   Pt would make good home hospice candidate, reviewed benefits of home hospice with daughter Florence, see GOC note above.  Will wait for return call from daughter in regards to home hospice services.    Palliative will follow pts clinical course w/ med team .

## 2021-01-29 NOTE — PROGRESS NOTE ADULT - SUBJECTIVE AND OBJECTIVE BOX
Resting    Vital Signs Last 24 Hrs  T(C): 36.4 (01-29-21 @ 16:00), Max: 37.3 (01-28-21 @ 21:02)  T(F): 97.6 (01-29-21 @ 16:00), Max: 99.2 (01-28-21 @ 21:02)  HR: 104 (01-29-21 @ 16:00) (99 - 118)  BP: 163/93 (01-29-21 @ 16:00) (135/86 - 181/111)  BP(mean): 94 (01-29-21 @ 08:20) (94 - 94)  RR: 18 (01-29-21 @ 16:00) (13 - 25)  SpO2: 98% (01-29-21 @ 16:00) (95% - 98%)    card s1s2  lungs decr BS b/l  abd soft, ND  sm edema                                13.2   9.82  )-----------( 245      ( 29 Jan 2021 08:30 )             42.2     29 Jan 2021 08:30    140    |  102    |  23     ----------------------------<  199    3.7     |  24     |  1.25     Ca    7.9        29 Jan 2021 08:30    apixaban 2.5 milliGRAM(s) Oral two times a day  cinacalcet 30 milliGRAM(s) Oral daily  donepezil 5 milliGRAM(s) Oral at bedtime  furosemide    Tablet 20 milliGRAM(s) Oral daily  lisinopril 10 milliGRAM(s) Oral two times a day  metoprolol succinate ER 50 milliGRAM(s) Oral daily  multivitamin 1 Tablet(s) Oral daily    A/P:    CM, EF 35-40%, MR, TR, AR  Dementia, FTT  MOREILA, Cardio-renal (Cr 0.86 - 10/5/20)  No objection to Lasix   Avoid ACE/ARB  Cr is fairly stable  F/u BMP  Prognosis is poor overall  Palliative eval is most appr    [phone]

## 2021-01-29 NOTE — CHART NOTE - NSCHARTNOTEFT_GEN_A_CORE
Off Service Hospitalist Note  Case discussed with Dr. Carroll    labs and imaging reviewed    89 female with dementia, htn, afib, presents with progressively worsening sob associated with B/L LE edema admitted with acute on chronic combined CHF exacerbation.    # Acute on Chronic Combined CHF Exacerbation  cards following  resume lasix 20 daily, lisinopril 10 BID as per cards recs  ECHO showed EF 25-30%  continue toprol xl 50 daily  continue to monitor renal function  strict I/Os, daily weights  palliative following  family to decide about home hospice    # Atrial Fibrillation  cards following  ECHO reviewed  continue metoprolol for rate control  continue renally dosed eliquis    # MORELIA/CKD 3  continue to monitor renal function  resumed lasix and lisinopril    # Dementia  chronic condition  continue aricept  supportive care    # GOC  DNR/DNI  family to discuss and decide about hospice services

## 2021-01-29 NOTE — PROVIDER CONTACT NOTE (OTHER) - ACTION/TREATMENT ORDERED:
Provider ordered Hydralazine IVP for elevated BP. Agitation induced elevation in HR per provider assessment at bedside.

## 2021-01-29 NOTE — PROVIDER CONTACT NOTE (OTHER) - ASSESSMENT
Pt. in visible distress. Pt. breathing controlled. Seems to be anxiety induced SOB. SpO2 on Room air: 97%. /119. . Rectal temp: 99.2. RR: 22.

## 2021-01-29 NOTE — CONSULT NOTE ADULT - SUBJECTIVE AND OBJECTIVE BOX
NEPHROLOGY CONSULTATION    CHIEF COMPLAINT: SOB    HPI:  Pt is 89 yo female with hx of Dementia, HTN, A fib, presented to the ED with complaints of progressively worsening sob associated with b/l LE edema x 2-3 days. Pt has been complaining of difficulty breathing for the past few days. No c/o chest pain, palpitations, abd pain, n/v, fever, chills. Noted to have worsening renal fx.    ROS:  as above    Allergies:  Keflex (Diarrhea)  Norvasc (Unknown)    PAST MEDICAL & SURGICAL HISTORY:  Pacemaker  Transient cerebral ischemia, unspecified transient cerebral ischemia type  HTN (hypertension), benign  Atrial fibrillation  Acid reflux disease  History of cataract surgery    SOCIAL HISTORY:  negative    FAMILY HISTORY:  FH: HTN (hypertension)    MEDICATIONS  (STANDING):  apixaban 2.5 milliGRAM(s) Oral two times a day  cinacalcet 30 milliGRAM(s) Oral daily  donepezil 5 milliGRAM(s) Oral at bedtime  furosemide   Injectable 20 milliGRAM(s) IV Push two times a day  metoprolol succinate ER 50 milliGRAM(s) Oral daily  multivitamin 1 Tablet(s) Oral daily    Vital Signs Last 24 Hrs  T(C): 36.8 (01-27-21 @ 15:48), Max: 36.9 (01-26-21 @ 19:50)  T(F): 98.2 (01-27-21 @ 15:48), Max: 98.5 (01-26-21 @ 19:50)  HR: 85 (01-27-21 @ 15:48) (85 - 102)  BP: 154/78 (01-27-21 @ 15:48) (135/69 - 162/88)  RR: 16 (01-27-21 @ 15:48) (16 - 20)  SpO2: 95% (01-27-21 @ 15:48) (94% - 95%)    card s1s2  lungs decr BS b/l  abd soft, ND  sm edema    LABS:                        11.3   6.13  )-----------( 177      ( 27 Jan 2021 06:00 )             35.5     01-27    141  |  104  |  26<H>  ----------------------------<  96  3.4<L>   |  28  |  1.44<H>    Ca    7.8<L>      27 Jan 2021 06:00  Mg     1.5     01-26    A/P:    CM, EF 25-30%, mod MR, TR, AR, MT  Dementia, FTT  MORELIA, Cardio-renal (Cr 0.86 - 10/5/20)  No objection to Lasix as needed  F/u BMP  Suppl K, Mg as needed  Prognosis is poor overall    [phone]        
[facility] Consultation    CHIEF COMPLAINT: Patient is a 89y old  Female who presents with a chief complaint of shortness of breath (26 Jan 2021 13:35)  asked by Dr. Carroll to see patient   chart is reviewed and patient examined  patient is a poor source of the history     HPI:  89yo female with hx of Dementia, HTN, A fib, presented to the ED with complaints of progressively worsening sob associated with b/l LE edema x 2-3 days. Pt is a poor historian, hx provided by pt's daughter. Pt has been complaining of difficulty breathing for the past few days. Per daughter, her lasix dose was recently decreased from 60mg daily to 20mg daily, unclear reason per daughter. Pt and daughter deny chest pain, palpitations, abd pain, n/v, fever, chills.  (25 Jan 2021 15:47)    As above...  lying in bed, appears comfortable, slightly confused     PAST MEDICAL & SURGICAL HISTORY:  Pacemaker    Transient cerebral ischemia, unspecified transient cerebral ischemia type    HTN (hypertension), benign    Atrial fibrillation    Acid reflux disease    History of cataract surgery        SOCIAL HISTORY: non smoker, lives with daughter     FAMILY HISTORY: non contributory     Home Medications:  Eliquis 2.5 mg oral tablet: 1 tab(s) orally 2 times a day (25 Jan 2021 15:46)  Multiple Vitamins oral tablet: 1 tab(s) orally once a day (25 Jan 2021 15:46)    MEDICATIONS  (STANDING):  apixaban 2.5 milliGRAM(s) Oral two times a day  cinacalcet 30 milliGRAM(s) Oral daily  donepezil 5 milliGRAM(s) Oral at bedtime  furosemide   Injectable 20 milliGRAM(s) IV Push two times a day  labetalol 300 milliGRAM(s) Oral three times a day  multivitamin 1 Tablet(s) Oral daily  potassium chloride   Powder 40 milliEquivalent(s) Oral once    MEDICATIONS  (PRN):      Allergies    Keflex (Diarrhea)  Norvasc (Unknown)    Intolerances        REVIEW OF SYSTEMS: unable to obtain     VITAL SIGNS:   Vital Signs Last 24 Hrs  T(C): 36.4 (27 Jan 2021 04:23), Max: 36.9 (26 Jan 2021 19:50)  T(F): 97.6 (27 Jan 2021 04:23), Max: 98.5 (26 Jan 2021 19:50)  HR: 99 (27 Jan 2021 04:23) (99 - 102)  BP: 162/88 (27 Jan 2021 04:23) (139/82 - 162/88)  BP(mean): --  RR: 20 (27 Jan 2021 04:23) (16 - 20)  SpO2: 95% (27 Jan 2021 04:23) (93% - 95%)    I&O's Summary    26 Jan 2021 07:01  -  27 Jan 2021 07:00  --------------------------------------------------------  IN: 400 mL / OUT: 0 mL / NET: 400 mL        PHYSICAL EXAM:    Constitutional: NAD, awake and alert, well-developed  Eyes:  EOMI,  Pupils round, no lesions  ENMT: no exudate or erythema  Pulmonary:  decreased breath sounds bilaterally   Cardiovascular: irregular S1 and S2 ll/Vl systolic murmur   Gastrointestinal: Bowel Sounds present, soft, nontender.   Lymph: No peripheral edema. No cervical lymphadenopathy.  Skin: No rashes. Changes of chronic venous stasis. No cyanosis.  Psych:  Mood & affect appropriate    LABS: All Labs Reviewed:                        11.3   6.13  )-----------( 177      ( 27 Jan 2021 06:00 )             35.5                         11.1   7.63  )-----------( 173      ( 26 Jan 2021 07:26 )             35.6                         12.5   9.66  )-----------( 258      ( 25 Jan 2021 12:30 )             40.7     27 Jan 2021 06:00    141    |  104    |  26     ----------------------------<  96     3.4     |  28     |  1.44   26 Jan 2021 07:26    141    |  104    |  29     ----------------------------<  106    4.1     |  26     |  1.54   25 Jan 2021 12:30    137    |  100    |  27     ----------------------------<  177    4.3     |  26     |  1.66     Ca    7.8        27 Jan 2021 06:00  Ca    8.3        26 Jan 2021 07:26  Ca    8.6        25 Jan 2021 12:30  Mg     1.5       26 Jan 2021 07:26    TPro  6.7    /  Alb  3.6    /  TBili  0.7    /  DBili  x      /  AST  26     /  ALT  20     /  AlkPhos  101    25 Jan 2021 12:30    PT/INR - ( 25 Jan 2021 12:30 )   PT: 24.9 sec;   INR: 2.13 ratio         PTT - ( 25 Jan 2021 12:30 )  PTT:26.1 sec  CARDIAC MARKERS ( 25 Jan 2021 12:30 )  <.017 ng/mL / x     / x     / x     / x            01-25 @ 12:30  Pro Bnp [id number]    < from: Xray Chest 1 View-PORTABLE IMMEDIATE (01.25.21 @ 13:37) >  Cardiomegaly. Cardiac device wire lead is within right ventricle.  . Bilateral effusions..    < end of copied text >    EKG: `< from: 12 Lead ECG (01.25.21 @ 12:25) >   Atrial fibrillation with rapid ventricular response  Rightward axis  Incomplete right bundle branch block  Anteroseptal infarct (cited on or before 30-AUG-2020)  Abnormal ECG  When compared with ECG of 14-JAN-2021 08:37,  there is no significant interval change    < end of copied text >    `< from: TTE Echo Complete w/o Contrast w/ Doppler (08.30.20 @ 08:55) >   1. Left ventricular ejection fraction, by visual estimation, is 25 to 30%.   2. Moderately decreased global left ventricular systolic function.   3. Severely enlarged right atrium.   4. Multiple left ventricular regional wall motion abnormalities exist. See wall motion findings.   5. Spectral Doppler shows impaired relaxation pattern of left ventricular myocardial filling (Grade I diastolic dysfunction).   6. There is severe concentric left ventricular hypertrophy.   7. Biatrial enlargement consistent with restrictive physiology.   8. Moderately enlarged left atrium.   9. Mild thickening and calcification of the anterior and posterior mitral valveleaflets.  10. Moderate mitral valve regurgitation.  11. Moderate-severe tricuspid regurgitation.  12. Moderate aortic regurgitation.  13. Sclerotic aortic valve with normal opening.  14. Moderate pulmonic valve regurgitation.  15. Estimated pulmonary artery systolic pressure is 59.6 mmHg assuming a right atrial pressure of 10 mmHg, which is consistent with moderate pulmonary hypertension.  16. The main pulmonary artery is normal in size.  17. LA volume Index is 86.3 ml/m² ml/m2.    < end of copied text >      
HPI:  89yo female with hx of Dementia, HTN, A fib, presented to the ED with complaints of progressively worsening sob associated with b/l LE edema x 2-3 days. Pt is a poor historian, hx provided by pt's daughter. Pt has been complaining of difficulty breathing for the past few days. Per daughter, her lasix dose was recently decreased from 60mg daily to 20mg daily, unclear reason per daughter. Pt and daughter deny chest pain, palpitations, abd pain, n/v, fever, chills.  Admitted to tele.      PAST MEDICAL & SURGICAL HISTORY:  Pacemaker    Transient cerebral ischemia, unspecified transient cerebral ischemia type    HTN (hypertension), benign    Atrial fibrillation    Acid reflux disease    History of cataract surgery        SOCIAL HISTORY:    Admitted from:  home   Substance abuse history:              Tobacco hx:                  Alcohol hx:              Home Opioid hx:  Tenriism:                                    Preferred Language:    Surrogate/HCP/:   Daughter  Florence Douglas            Phone#:  [phone]    FAMILY HISTORY:  FH: HTN (hypertension)      Baseline ADLs (prior to admission):    Allergies    Keflex (Diarrhea)  Norvasc (Unknown)    Intolerances      Present Symptoms:   Dyspnea:   no  Nausea/Vomiting: no  Anxiety:  Depressed   Fatigue:   yes  Loss of appetite:   Pain:             no                   location:          Review of Systems:  Unable to obtain due to poor mentation]    MEDICATIONS  (STANDING):  apixaban 2.5 milliGRAM(s) Oral two times a day  cinacalcet 30 milliGRAM(s) Oral daily  donepezil 5 milliGRAM(s) Oral at bedtime  metoprolol succinate ER 50 milliGRAM(s) Oral daily  multivitamin 1 Tablet(s) Oral daily    MEDICATIONS  (PRN):      PHYSICAL EXAM:    Vital Signs Last 24 Hrs  T(C): 36.6 (29 Jan 2021 08:20), Max: 37.3 (28 Jan 2021 21:02)  T(F): 97.9 (29 Jan 2021 08:20), Max: 99.2 (28 Jan 2021 21:02)  HR: 118 (29 Jan 2021 08:20) (69 - 118)  BP: 162/94 (29 Jan 2021 08:20) (151/92 - 181/111)  BP(mean): 94 (29 Jan 2021 08:20) (94 - 116)  RR: 18 (29 Jan 2021 08:20) (13 - 25)  SpO2: 97% (29 Jan 2021 08:20) (95% - 97%)    General: alert  oriented x _1-2 _lethargic,  but verbal, c/o feeling tired this morning     Karnofsky Performance Score/Palliative Performance Status Version2:  40   %  PPSV: 30/40  HEENT: normal  dry mouth    Lungs: dim to bases, breathing comfortable   CV:  tachy  GI:  abd soft, n/t  normal    Musculoskeletal: normal  w/ weakness  trace le edema   Skin: normal  , w/d   Neuro: wakes to stimuli, converses well  Oral intake ability:  moderate full capability w/ assist   Diet: reg as adriana   needs help to eat     LABS:                        13.2   9.82  )-----------( 245      ( 29 Jan 2021 08:30 )             42.2     01-29    140  |  102  |  23  ----------------------------<  199<H>  3.7   |  24  |  1.25    Ca    7.9<L>      29 Jan 2021 08:30          RADIOLOGY & ADDITIONAL STUDIES: < from: Xray Chest 1 View-PORTABLE IMMEDIATE (01.25.21 @ 13:37) >  EXAM:  XR CHEST PORTABLE IMMED 1V      PROCEDURE DATE:  01/25/2021        INTERPRETATION:  Portable chest radiograph    CLINICAL INFORMATION: Dyspnea, shortness of breath.    TECHNIQUE:  Portable  AP view of the chest was obtained.    COMPARISON: No previous examinations are available for review.    FINDINGS:  The lungs show haziness overlying the lower hemithoraces indicating mild bilateral effusions. Upper lobes clear. No pneumothorax.    The  heart is  enlarged in transverse diameter unchanged from prior 8/30/2020 exam.. No hilar mass.  Cardiac device wire lead is within right ventricle.     Visualized osseous structures are intact.        IMPRESSION:   Cardiomegaly. Cardiac device wire lead is within right ventricle.  . Bilateral effusions..      < from: TTE Echo Complete w/o Contrast w/ Doppler (01.28.21 @ 10:01) >  EXAM:  ECHO TTE WO CON COMP W DOPP      PROCEDURE DATE:  01/28/2021        < from: TTE Echo Complete w/o Contrast w/ Doppler (01.28.21 @ 10:01) >  Patient Name:   AZRA CHAHAL Patient Location: [id number]    < from: TTE Echo Complete w/o Contrast w/ Doppler (01.28.21 @ 10:01) >  PHYSICIAN INTERPRETATION:  Left Ventricle: The left ventricular internal cavity size is normal. Left ventricular wall thickness is mildly increased. There is mild concentric left ventricular hypertrophy involving the global wall. The LVH involves global walls.  Global LV systolic function was moderately to severely decreased. Left ventricular ejection fraction, by visual estimation, is 35 to 40%. Spectral Doppler shows restrictive pattern of left ventricular myocardial filling (Grade III diastolic dysfunction).      LV Wall Scoring:  There is diffuse hypokinesis.    Right Ventricle: The right ventricular size is normal. RV wall thickness is normal. RV systolic function is moderately reduced.  Left Atrium: Moderate to severe left atrial enlargement. Biatrial enlargement conisistent wiht a restrictive physiology.  Right Atrium: Severely enlarged right atrium.  Pericardium: Trivial pericardial effusion is present. The pericardial effusion is globally located around the entire heart. There is a moderate pleural effusion in the left lateral region.  Mitral Valve: Thickening and calcification of the anterior and posterior mitral valve leaflets. Mobility is mildly decreased for the posterior leaflet. There is mild mitral annular calcification. Moderate to severe mitral valve regurgitation is seen. The MR jet is centrally-directed. Diastolic mitral regurgitation is present.  Tricuspid Valve: The tricuspid valve is normal in structure. Severe tricuspid regurgitation is visualized. Estimated pulmonary artery systolic pressure is 68.2 mmHg assuming a right atrial pressure of 10 mmHg, which is consistent with severe pulmonary hypertension.  Aortic Valve: The aortic valve is trileaflet. Sclerotic aortic valve with normal opening. Moderateaortic valve regurgitation is seen.  Pulmonic Valve: The pulmonic valve is normal. Trace pulmonic valve regurgitation.  Aorta: The aortic root and ascending aorta are structurally normal, with no evidence of dilitation. The aortic arch was not well visualized. Aortic root measured at Sinus of Valsalva is normal. There is mild aortic root calcification.  Pulmonary Artery: The pulmonary artery is mildly dilated.  Venous: The inferior vena cava is abnormal. The inferior vena cava was dilated, with respiratory size variation less than 50%.  Additional Comments: A pacer wire is visualized in the right atrium and right ventricle.      Summary:   1. Left ventricular ejection fraction, by visual estimation, is 35 to 40%.   2. Moderately to severely decreased global left ventricular systolic function.   3. Moderate to severe left atrial enlargement.   4. Severely enlarged right atrium.   5. Multiple left ventricular regional wall motion abnormalities exist. See wall motion findings.   6. Mildly increased LV wall thickness.   7. Normal left ventricular internal cavity size.   8. Spectral Doppler shows restrictive pattern of left ventricular myocardial filling (Grade III diastolic dysfunction).   9. There is mild concentric left ventricular hypertrophy.  10. Moderately reduced RV systolic function.  11. Biatrial enlargement conisistent wiht a restrictive physiology.  12. Moderate pleural effusion in the left lateral region.  13. Trivial pericardial effusion.  14. Mild mitral annular calcification.  15. Moderate to severe mitral valve regurgitation.  16. Thickening and calcification of the anterior and posterior mitral valve leaflets.  17. Severe tricuspid regurgitation.  18. Moderate aortic regurgitation.  19. Sclerotic aortic valve withnormal opening.  20. Estimated pulmonary artery systolic pressure is 68.2 mmHg assuming a right atrial pressure of 10 mmHg, which is consistent with severe pulmonary hypertension.  21. Mildly dilated pulmonary artery.  22. There is mild aortic root calcification.            ADVANCE DIRECTIVES: MOLST   DNR/DNI   ( on chart)   Advanced Care Planning discussion total time spent:

## 2021-01-29 NOTE — PROVIDER CONTACT NOTE (OTHER) - ACTION/TREATMENT ORDERED:
Provider requests Vitals. Continue to monitor if BP is WDL. Provider requests Vitals. Continue to monitor HR. IVP Hydralazine to be ordered for BP.

## 2021-01-30 NOTE — PROGRESS NOTE ADULT - PROVIDER SPECIALTY LIST ADULT
Nephrology
Nephrology
Cardiology
Cardiology
Internal Medicine

## 2021-01-30 NOTE — CHART NOTE - NSCHARTNOTEFT_GEN_A_CORE
Medicine Death Note    Called to bedside by RN to evaluate the patient for death pronouncement.     Patient's Identification confirmed.  On physical exam, patient did not respond to verbal or noxious stimuli.  No spontaneous respirations.  Absent heart and breath sounds.  Absent radial and carotid pulses. Pupils fixed and dilated, no corneal reflex. Patient pronounced dead at 0715AM.  Attending Dr. Carroll notified.  Family- Daughter Florence [phone] notified. Family denies autopsy. Organ donation notified.    Dr. Carroll to complete death certificate.

## 2021-01-30 NOTE — PROGRESS NOTE ADULT - ASSESSMENT
chf, systolic, acute and chronic  oms  af  improved renal status  volume overloaded  hypertension      suggest  lasix  add ACEI or ARB  continue oral a/c  dnr status noted
angry this am  ...sat with her and spoke to daughter about situation  ..renal and cardiology notes seen...adjust meds  ...prognosis guarded...physical therapy ordered
chart reviewed and patient examined  admitted with dyspnea at rest ...bnp elevated/ cxr with bilateral effusions ...inr therapeutic ....discuss with cardiology 
echocardiogram result noted will discuss with cardiology adjustment in medications  situation discussed with daughter.....palliative consult to discuss hospice 
discussed with daughter who will not be coming in
discussed with Dr Thompson who will do cardiology consult today follow bmp

## 2021-01-30 NOTE — PROGRESS NOTE ADULT - PROBLEM SELECTOR PLAN 1
discuss medications with cardiology
discuss with cardiology
diurese and adjust meds as per cardiology and nephrology
  dnr
krish check renal function nephrology consult dr Bower

## 2021-01-30 NOTE — PROGRESS NOTE ADULT - SUBJECTIVE AND OBJECTIVE BOX
Patient is a 89y old  Female who presents with a chief complaint of CHF (2021 10:46)      INTERVAL HPI/OVERNIGHT EVENTS:  715 am found        REVIEW OF SYSTEMS:  CONSTITUTIONAL: No fever, weight loss, or fatigue  EYES: No eye pain, visual disturbances, or discharge  ENMT:  No difficulty hearing, tinnitus, vertigo; No sinus or throat pain  NECK: No pain or stiffness  BREASTS: No pain, masses, or nipple discharge  RESPIRATORY: No cough, wheezing, chills or hemoptysis; No shortness of breath  CARDIOVASCULAR: No chest pain, palpitations, dizziness, or leg swelling  GASTROINTESTINAL: No abdominal or epigastric pain. No nausea, vomiting, or hematemesis; No diarrhea or constipation. No melena or hematochezia.  GENITOURINARY: No dysuria, frequency, hematuria, or incontinence  NEUROLOGICAL: No headaches, memory loss, loss of strength, numbness, or tremors  SKIN: No itching, burning, rashes, or lesions   LYMPH NODES: No enlarged glands  ENDOCRINE: No heat or cold intolerance; No hair loss  MUSCULOSKELETAL: No joint pain or swelling; No muscle, back, or extremity pain  PSYCHIATRIC: No depression, anxiety, mood swings, or difficulty sleeping  HEME/LYMPH: No easy bruising, or bleeding gums  ALLERY AND IMMUNOLOGIC: No hives or eczema  FAMILY HISTORY:  FH: HTN (hypertension)      T(C): 36.8 (21 @ 04:57), Max: 36.8 (21 @ 20:00)  HR: 104 (21 @ 04:57) (104 - 113)  BP: 144/88 (21 @ 04:57) (135/86 - 163/93)  RR: 17 (21 @ 04:57) (17 - 20)  SpO2: 95% (21 @ 04:57) (94% - 98%)  Wt(kg): --Vital Signs Last 24 Hrs  T(C): 36.8 (2021 04:57), Max: 36.8 (2021 20:00)  T(F): 98.3 (2021 04:57), Max: 98.3 (2021 20:00)  HR: 104 (2021 04:57) (104 - 113)  BP: 144/88 (2021 04:57) (135/86 - 163/93)  BP(mean): --  RR: 17 (2021 04:57) (17 - 20)  SpO2: 95% (2021 04:57) (94% - 98%)    T(F): 98.3 (21 @ 04:57), Max: 99.2 (21 @ 21:02)  HR: 104 (21 @ 04:57) (69 - 118)  BP: 144/88 (21 @ 04:57) (131/65 - 181/111)  RR: 17 (21 @ 04:57) (13 - 25)  SpO2: 95% (21 @ 04:57) (94% - 98%)    PHYSICAL EXAM:  GENERAL: NAD, well-groomed, well-developed  HEAD:  Atraumatic, Normocephalic  EYES: EOMI, PERRLA, conjunctiva and sclera clear  ENMT: No tonsillar erythema, exudates, or enlargement; Moist mucous membranes, Good dentition, No lesions  NECK: Supple, No JVD, Normal thyroid  NERVOUS SYSTEM:  Alert & Oriented X3, Good concentration; Motor Strength 5/5 B/L upper and lower extremities; DTRs 2+ intact and symmetric  CHEST/LUNG: Clear to percussion bilaterally; No rales, rhonchi, wheezing, or rubs  HEART: Regular rate and rhythm; No murmurs, rubs, or gallops  ABDOMEN: Soft, Nontender, Nondistended; Bowel sounds present  EXTREMITIES:  2+ Peripheral Pulses, No clubbing, cyanosis, or edema  LYMPH: No lymphadenopathy noted  SKIN: No rashes or lesions    Consultant(s) Notes Reviewed:  [x ] YES  [ ] NO  Care Discussed with Consultants/Other Providers [ x] YES  [ ] NO    LABS:      140  |  102  |  23  ----------------------------<  199<H>  3.7   |  24  |  1.25    Ca    7.9<L>      2021 08:30                            13.2   9.82  )-----------( 245      ( 2021 08:30 )             42.2                              13.2   9.82  )-----------( 245      (  @ 08:30 )             42.2                11.5   6.75  )-----------( 186      (  @ 06:10 )             36.5                11.3   6.13  )-----------( 177      (  @ 06:00 )             35.5                11.1   7.63  )-----------( 173      (  @ 07:26 )             35.6                12.5   9.66  )-----------( 258      (  @ 12:30 )             40.7                11.9   8.08  )-----------( 235      (  @ 09:40 )             37.8               RADIOLOGY & ADDITIONAL TESTS:    Imaging Personally Reviewed:  [ ] YES  [ ] NO  apixaban 2.5 milliGRAM(s) Oral two times a day  donepezil 5 milliGRAM(s) Oral at bedtime  furosemide    Tablet 20 milliGRAM(s) Oral daily  metoprolol succinate  milliGRAM(s) Oral daily  multivitamin 1 Tablet(s) Oral daily      HEALTH ISSUES - PROBLEM Dx:  HTN (hypertension), benign  HTN (hypertension), benign    MORELIA (acute kidney injury)  MORELIA (acute kidney injury)    Dementia  Dementia    Atrial fibrillation  Atrial fibrillation    CHF (congestive heart failure)  CHF (congestive heart failure)

## 2021-02-06 NOTE — DIETITIAN INITIAL EVALUATION ADULT. - CHIEF COMPLAINT
AM Hospitalist DAILY PROGRESS NOTE    ADMISSION DATE:  2/2/2021  DATE:  2/6/2021  CURRENT HOSPITAL DAY:  Hospital Day: 5  ATTENDING PHYSICIAN:  Huber Anguiano MD  CODE STATUS:  Full Resuscitation    Subjective: Patient seen and examined.  She is in a goo dmood she says she is feel much less pain today.     ACTIVE PROBLEMS:    Active Hospital Problems    Diagnosis   • Generalized abdominal pain   • Hepatic steatosis   • Sacral wound   • Chronic Normocytic anemia   • Type 2 diabetes mellitus without complication, without long-term current use of insulin (CMS/HCC)   • Other hyperlipidemia   • Hypothyroid   • Benign essential hypertension   • Anal cancer (CMS/HCC)       MEDICATIONS/INFUSIONS:    Reviewed today.       OBJECTIVE:    VITAL SIGNS:     Vital Last Value 24 Hour Range   Temperature 97.7 °F (36.5 °C) (02/06/21 0744) Temp  Min: 97.7 °F (36.5 °C)  Max: 97.9 °F (36.6 °C)   Pulse (!) 59 (02/06/21 0744) Pulse  Min: 57  Max: 62   Respiratory 18 (02/06/21 0744) Resp  Min: 16  Max: 18   Non-Invasive  Blood Pressure 138/73 (02/06/21 0744) BP  Min: 120/72  Max: 138/73   Pulse Oximetry 98 % (02/06/21 0744) SpO2  Min: 97 %  Max: 98 %     Vital Today Admitted   Weight 75.8 kg (167 lb 1.7 oz) (02/03/21 0621) Weight: 75.8 kg (167 lb) (02/02/21 2119)   Height N/A Height: 5' 4\" (162.6 cm) (02/02/21 2119)   BMI N/A BMI (Calculated): 28.67 (02/02/21 2119)       INTAKE/OUTPUT:      Intake/Output Summary (Last 24 hours) at 2/6/2021 1333  Last data filed at 2/6/2021 1100  Gross per 24 hour   Intake 1300 ml   Output 3970 ml   Net -2670 ml         PHYSICAL EXAM:    Physical Exam   Constitutional: She is oriented to person, place, and time and well-developed, well-nourished, and in no distress.   HENT:   Head: Normocephalic.   Right Ear: External ear normal.   Left Ear: External ear normal.   Nose: Nose normal.   Eyes: Pupils are equal, round, and reactive to light. Conjunctivae and EOM are normal.   Neck: Normal range of motion. Neck  supple. No JVD present. No tracheal deviation present.   Cardiovascular: Normal rate, regular rhythm and normal heart sounds. Exam reveals no gallop and no friction rub.   No murmur heard.  Pulmonary/Chest: Effort normal and breath sounds normal. No stridor. No respiratory distress. She has no wheezes. She has no rales. She exhibits no tenderness.   Abdominal: Soft. Bowel sounds are normal. She exhibits no distension. There is abdominal tenderness. There is no rebound and no guarding.   ostemy on left side  Diffuse tenderness to palpation   Musculoskeletal: Normal range of motion.         General: No tenderness or edema.   Neurological: She is alert and oriented to person, place, and time. No cranial nerve deficit.   Skin: No rash noted. No erythema. No pallor.        LABORATORY DATA:    Recent Labs   Lab 02/06/21  0652 02/05/21  0539 02/04/21  0538   SODIUM 140 138 140   POTASSIUM 3.9 4.0 4.0   CHLORIDE 107 106 108*   CO2 25 23 28   BUN 10 9 9   CREATININE 0.67 0.72 0.71   GLUCOSE 50* 56* 78   ALBUMIN 2.8* 3.2* 2.6*   PHOS 3.3 3.5  --    AST 17 21 15   BILIRUBIN 0.9 1.1* 0.7        ECHOCARDIOGRAM RESULTS:  No procedure found.     IMAGING STUDIES:    FL Small Bowel Single Contrast   Final Result      Unremarkable small bowel follow through.  Contrast reaches the colon and   ostomy within 20 minutes.          Electronically Signed by: DOMINIC GOMES M.D.    Signed on: 2/6/2021 12:34 PM          CT ABDOMEN PELVIS W CONTRAST - Oral and  IV contrast   Final Result       No definite evidence of small bowel obstruction at this time as described   above.      Hepatic steatosis.         Electronically Signed by: CHARO ROSS M.D.    Signed on: 2/3/2021 4:09 AM                                     ASSESSMENT:    * Generalized abdominal pain  Assessment & Plan  Patient comes in with abdominal pain, multiple history of surgeries in the abdomen  CT of the abdomen showed no evidence of small bowel obstruction and hepatic  steatosis  Patient on IV fluids made CLD  Ostomy nurse and general surgery has been consulted  Norco restart morphine for pain  We will start patient on aggressive bowel regimen  Keep G-tube to gravity  Small bowel imaging today, most likely clamping G-tube this afternoon    Sacral wound  Assessment & Plan  secondary to radiation therapy  Wound care per wound nurse      Hepatic steatosis  Assessment & Plan  Seen on CT imaging   With normal LFTs    Chronic Normocytic anemia  Assessment & Plan  Patient came in with a hemoglobin 11.9>11.2>12.7>11.4 baseline is closer to 10  Patient most likely hemoconcentrated due to dehydration  We will transfuse if under 7    Other hyperlipidemia  Assessment & Plan  Continue on statin    Anal cancer (CMS/HCC)  Assessment & Plan  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 we will hold off on medications  Once patient is started on food we will start on low-dose sliding scale with hypoglycemia protocol       • sodium chloride (PF)  2 mL Intracatheter 2 times per day   • enoxaparin  40 mg Subcutaneous Q24H   • Potassium Standard Replacement Protocol   Does not apply See Admin Instructions   • Magnesium Standard Replacement Protocol   Does not apply See Admin Instructions   • levothyroxine  100 mcg Oral Daily   • polyethylene glycol  17 g Oral Daily   • lisinopril  10 mg Oral Daily   • atorvastatin  10 mg Oral Nightly      sodium chloride, sodium chloride, ondansetron, acetaminophen, morphine injection                   DEEP VEIN THROMBOSIS PROPHYLAXIS:   Venous Thromboembolism Pharmacologic  Prophylaxis:  Yes, Lovenox prophylactic dosing (dose adjusted as per renal function)  Venous Thromboembolism Mechanical Prophylaxis:  Yes, Sequential compression device(s)    GASTROINTESTINAL PROPHYLAXIS:  Tolerating diet, no prophylaxis required.    Fluids:100 ml/hr   Electrolytes:  Nutrition: Liquid Clear Diet     Discussed with patient and nurse, juan    DISCHARGE:    Barriers:  Clinical improvement  Destination:  Home  MD Dr.Mario Silvio Waldrop Hospitalist       The patient is a 89y Female complaining of shortness of breath.

## 2021-02-10 NOTE — CHART NOTE - NSCHARTNOTEFT_GEN_A_CORE
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

## 2021-02-25 NOTE — PATIENT PROFILE ADULT - NSPROGENDIFFINTUB_GEN_A_NUR
How Many Skin Cancers Have You Had?: one
What Is The Reason For Today's Visit?: History of Non-Melanoma Skin Cancer
When Was Your Last Cancer Diagnosed?: 08/2020
never intubated

## 2021-05-05 ENCOUNTER — APPOINTMENT (OUTPATIENT)
Dept: CARDIOLOGY | Facility: CLINIC | Age: 86
End: 2021-05-05

## 2021-07-31 NOTE — CHART NOTE - NSCHARTNOTESELECT_GEN_ALL_CORE
Off Service Note
Event Note
NP Hospitalist/Off Service Note
NP Hospitalist/Off Service Note
Off Service Note
Off Service Note

## 2021-09-22 NOTE — BRIEF OPERATIVE NOTE - NSEVIDENCEINFORABS_GEN_ALL_CORE
Take zyrtec, allegra, or Claritin daily  Use flonase 1-2 sprays in each nare daily   Use nasal saline to the nose,   Use humidifer in room  Symptoms worsen go to [address]    Your healthcare provider and/or public health staff have evaluated you and have determined that you do not need to remain in the hospital at this time  At this time you can be isolated at home where you will be monitored by staff from your local or state health department  You should carefully follow the prevention and isolation steps below until a healthcare provider or local or state health department says that you can return to your normal activities  Stay home except to get medical care    People who are mildly ill with COVID-19 are able to isolate at home during their illness  You should restrict activities outside your home, except for getting medical care  Do not go to work, school, or public areas  Avoid using public transportation, ride-sharing, or taxis  Separate yourself from other people and animals in your home    People: As much as possible, you should stay in a specific room and away from other people in your home  Also, you should use a separate bathroom, if available  Animals: You should restrict contact with pets and other animals while you are sick with COVID-19, just like you would around other people  Although there have not been reports of pets or other animals becoming sick with COVID-19, it is still recommended that people sick with COVID-19 limit contact with animals until more information is known about the virus  When possible, have another member of your household care for your animals while you are sick  If you are sick with COVID-19, avoid contact with your pet, including petting, snuggling, being kissed or licked, and sharing food   If you must care for your pet or be around animals while you are sick, wash your hands before and after you interact with pets and wear a facemask  See COVID-19 and Animals for more information  Call ahead before visiting your doctor    If you have a medical appointment, call the healthcare provider and tell them that you have or may have COVID-19  This will help the healthcare providers office take steps to keep other people from getting infected or exposed  Wear a facemask    You should wear a facemask when you are around other people (e g , sharing a room or vehicle) or pets and before you enter a healthcare providers office  If you are not able to wear a facemask (for example, because it causes trouble breathing), then people who live with you should not stay in the same room with you, or they should wear a facemask if they enter your room  Cover your coughs and sneezes    Cover your mouth and nose with a tissue when you cough or sneeze  Throw used tissues in a lined trash can  Immediately wash your hands with soap and water for at least 20 seconds or, if soap and water are not available, clean your hands with an alcohol-based hand  that contains at least 60% alcohol  Clean your hands often    Wash your hands often with soap and water for at least 20 seconds, especially after blowing your nose, coughing, or sneezing; going to the bathroom; and before eating or preparing food  If soap and water are not readily available, use an alcohol-based hand  with at least 60% alcohol, covering all surfaces of your hands and rubbing them together until they feel dry  Soap and water are the best option if hands are visibly dirty  Avoid touching your eyes, nose, and mouth with unwashed hands  Avoid sharing personal household items    You should not share dishes, drinking glasses, cups, eating utensils, towels, or bedding with other people or pets in your home  After using these items, they should be washed thoroughly with soap and water      Clean all high-touch surfaces everyday    High touch surfaces include counters, tabletops, doorknobs, bathroom fixtures, toilets, phones, keyboards, tablets, and bedside tables  Also, clean any surfaces that may have blood, stool, or body fluids on them  Use a household cleaning spray or wipe, according to the label instructions  Labels contain instructions for safe and effective use of the cleaning product including precautions you should take when applying the product, such as wearing gloves and making sure you have good ventilation during use of the product  Monitor your symptoms    Seek prompt medical attention if your illness is worsening (e g , difficulty breathing)  Before seeking care, call your healthcare provider and tell them that you have, or are being evaluated for, COVID-19  Put on a facemask before you enter the facility  These steps will help the healthcare providers office to keep other people in the office or waiting room from getting infected or exposed  Ask your healthcare provider to call the local or [facility] health department  Persons who are placed under active monitoring or facilitated self-monitoring should follow instructions provided by their local health department or occupational health professionals, as appropriate  If you have a medical emergency and need to call 911, notify the dispatch personnel that you have, or are being evaluated for COVID-19  If possible, put on a facemask before emergency medical services arrive      Discontinuing home isolation    Patients with confirmed COVID-19 should remain under home isolation precautions until the following conditions are met:   - They have had no fever for at least 24 hours (that is one full day of no fever without the use medicine that reduces fevers)  AND  - other symptoms have improved (for example, when their cough or shortness of breath have improved)  AND  - If had mild or moderate illness, at least 10 days have passed since their symptoms first appeared or if severe illness (needed oxygen) or immunosuppressed, at least 20 days have passed since symptoms first appeared  Patients with confirmed COVID-19 should also notify close contacts (including their workplace) and ask that they self-quarantine  Currently, close contact is defined as being within 6 feet for 15 minutes or more from the period 24 hours starting 48 hours before symptom onset to the time at which the patient went into isolation  Close contacts of patients diagnosed with COVID-19 should be instructed by the patient to self-quarantine for 14 days from the last time of their last contact with the patient       Source: [employer]

## 2021-11-30 NOTE — PHYSICAL THERAPY INITIAL EVALUATION ADULT - PHYSICAL ASSIST/NONPHYSICAL ASSIST: SIT/STAND, REHAB EVAL
Spoke to patient informing her she needs to schedule pre-op exam before her procedure that's scheduled for 1/20/2022 with Dr. Rolo Alonzo.  Patient verbalized understanding, pre-op exam scheduled for 12/20/21 with provider Bear Baltazar PA-C, also advised patient
2 person assist

## 2021-12-21 NOTE — PATIENT PROFILE ADULT - FALL HARM RISK CONCLUSION
Elevated blood pressure and headache. Currently blood pressure normal, but patient still dizzy. Labs, head CT, supportive care, reassess.
Fall with Harm Risk

## 2022-01-10 NOTE — PATIENT PROFILE ADULT - NSASFUNCLEVELADLTRANSFER_GEN_A_NUR
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  Plan: s/p EDTA chelation with Dr. Quang Foreman
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 OCT OS= scarring with no SRF/IRF OS 42 -- poor segmentation today [facility] per history. 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 Plan: Mets definition of legal blindness. Will fax to [phone] for home services Previously treated with Oral steroids, IVK, topical drops, cellcept, cyclosporin, and methotrexate. On Humira - but last treatment was in November - has not been able to get it due to what sounds supply chain Last Ozurdex was 9/7/17 No CME today CME hasn't recurred on Durezol QID OS and Atropine BID OS Hypotonous OS. Likely secondary to long standing uveitis. IOP remains low despite ozurdex. No Ozurdex today - CSM with durezol QID OS Continue Humira FU 3-6 m, OCT OU, poss ozurdex OS
Impression: Unspecified hypotony of eye: H44.40.  Plan: Please see above
4 = completely dependent

## 2022-03-18 NOTE — DISCHARGE NOTE ADULT - BLOOD LEVEL. WHEN WARFARIN/COUMADIN IS TAKEN WITH OTHER MEDICINES IT CAN CHANGE THE WAY OTHER MEDICINES WORK. OTHER MEDICINES CAN ALSO CHANGE THE WAY WARFARIN/COUMADIN WORKS. IT IS VERY
Unsuccessful outreach:  Outreach attempted to patients POA to make sure she received housing resources. VM was left for POA.   Statement Selected

## 2022-07-29 NOTE — DISCHARGE NOTE PROVIDER - PROVIDER TOKENS
PROVIDER:[TOKEN:[[id number]]],PROVIDER:[TOKEN:[[id number]]],PROVIDER:[TOKEN:[[id number]]] PROVIDER:[TOKEN:[[id number]],FOLLOWUP:[1 week]],PROVIDER:[TOKEN:[[id number]]],PROVIDER:[TOKEN:[[id number]]],PROVIDER:[TOKEN:[[id number]],FOLLOWUP:[2 weeks]] No

## 2022-08-31 NOTE — PHYSICAL THERAPY INITIAL EVALUATION ADULT - LEVEL OF INDEPENDENCE: GAIT, REHAB EVAL
minimum assist (75% patients effort) Drysol Counseling:  I discussed with the patient the risks of drysol/aluminum chloride including but not limited to skin rash, itching, irritation, burning.

## 2022-11-25 NOTE — ED PROVIDER NOTE - NS ED ROS FT
Except as otherwise indicated in HPI:  CONSTITUTIONAL: Neg  HEENT: neg  CV: neg  Resp: neg  GI: Neg  : Neg  SKIN: Neg  NEURO: Neg  PSYCHIATRIC: Neg  Heme/Onc: Neg Female

## 2023-08-10 NOTE — ED ADULT NURSE NOTE - NSFALLRSKUNASSIST_ED_ALL_ED
Roxanne Norwood (HCP), Addie Norwood and patient's son and son-in-law
no
son in law Alfred
Wood son in law

## 2023-08-30 NOTE — PATIENT PROFILE ADULT - NSPROMUTANXFEARADDRESSFT_GEN_A_NUR
How Severe Is This Condition?: moderate Additional History: Some pink spots have resolved, but one on left calf is staying behind Additional History: Painful and itchy spots on backs of ears, worse in sun light or heat. verbalize

## 2024-02-19 NOTE — DIETITIAN INITIAL EVALUATION ADULT. - RD TO REMAIN AVAILABLE
----- Message from Silva Burgess MD sent at 2/19/2024 12:52 PM CST -----  Augmentin 500mg po bid for 5 days    Patient called. Informed of above. Patient verbally understood    
yes

## 2025-03-22 NOTE — H&P ADULT - NSHPLABSRESULTS_GEN_ALL_CORE
\"You can buy naloxone (heroin overdose reversal medication) over-the-counter at [facility] pharmacies without a prescription. Information on this be found at https://www.[employer].com/content/prescription-drug-abuse/save-a-life.   Call your primary care physician to schedule a follow up appointment.  Addiction services are available at the [facility] ([phone]) in [place]. Information on this program can be found at https://www.[facility].com/health-services/addiction-treatment-program/.  Addiction services are also available through [facility] [facility] ([phone]) in [place]. Information on [facility] can be found at http://www.[email or web address].[email or web address]/home/services/behavioral-health/ and  http://www.[facility].com/inpatient-drug-detox/424-[facility]-no-uzzz-zdpshrbt-behavioral-health.html   [employer] [facility] ([phone]) provides alcohol and substance abuse treatment and other mental health and . [facility]’s primary center is located at [address] [place] [address] in [place].  Also call the [place] [employer] intergroup ([phone]) today to find out about local NA meetings. I recommend that you attend at least five meetings to decide if you think this will benefit you.\"    
EKG afib [address] abn lateral leads  Cxray cardiomegaly, bilat inc markings PPM
